# Patient Record
Sex: MALE | Race: OTHER | HISPANIC OR LATINO | Employment: FULL TIME | URBAN - METROPOLITAN AREA
[De-identification: names, ages, dates, MRNs, and addresses within clinical notes are randomized per-mention and may not be internally consistent; named-entity substitution may affect disease eponyms.]

---

## 2022-03-08 ENCOUNTER — OFFICE VISIT (OUTPATIENT)
Dept: FAMILY MEDICINE CLINIC | Facility: CLINIC | Age: 37
End: 2022-03-08
Payer: COMMERCIAL

## 2022-03-08 DIAGNOSIS — I10 HTN, GOAL BELOW 140/90: ICD-10-CM

## 2022-03-08 DIAGNOSIS — G47.33 OSA (OBSTRUCTIVE SLEEP APNEA): ICD-10-CM

## 2022-03-08 DIAGNOSIS — E11.9 DIABETES MELLITUS WITHOUT COMPLICATION (HCC): ICD-10-CM

## 2022-03-08 DIAGNOSIS — E66.01 MORBID OBESITY DUE TO EXCESS CALORIES (HCC): ICD-10-CM

## 2022-03-08 DIAGNOSIS — Z76.89 ENCOUNTER TO ESTABLISH CARE: Primary | ICD-10-CM

## 2022-03-08 PROBLEM — E78.5 HYPERLIPIDEMIA: Status: ACTIVE | Noted: 2021-11-22

## 2022-03-08 PROBLEM — Z72.0 TOBACCO ABUSE: Status: ACTIVE | Noted: 2021-11-22

## 2022-03-08 PROBLEM — N52.9 ERECTILE DYSFUNCTION: Status: ACTIVE | Noted: 2021-07-30

## 2022-03-08 PROCEDURE — 3725F SCREEN DEPRESSION PERFORMED: CPT | Performed by: NURSE PRACTITIONER

## 2022-03-08 PROCEDURE — 3078F DIAST BP <80 MM HG: CPT | Performed by: NURSE PRACTITIONER

## 2022-03-08 PROCEDURE — 3074F SYST BP LT 130 MM HG: CPT | Performed by: NURSE PRACTITIONER

## 2022-03-08 PROCEDURE — 99203 OFFICE O/P NEW LOW 30 MIN: CPT | Performed by: NURSE PRACTITIONER

## 2022-03-08 PROCEDURE — 3008F BODY MASS INDEX DOCD: CPT | Performed by: NURSE PRACTITIONER

## 2022-03-08 RX ORDER — SEMAGLUTIDE 1.34 MG/ML
INJECTION, SOLUTION SUBCUTANEOUS
COMMUNITY
Start: 2022-02-03 | End: 2022-03-08 | Stop reason: SDUPTHER

## 2022-03-08 RX ORDER — FLASH GLUCOSE SENSOR
KIT MISCELLANEOUS
COMMUNITY
Start: 2022-02-01 | End: 2022-04-14 | Stop reason: SDUPTHER

## 2022-03-08 RX ORDER — LISINOPRIL 10 MG/1
TABLET ORAL
COMMUNITY
Start: 2022-01-16

## 2022-03-08 RX ORDER — SILDENAFIL 100 MG/1
100 TABLET, FILM COATED ORAL DAILY PRN
COMMUNITY
Start: 2021-11-22 | End: 2022-04-14 | Stop reason: SDUPTHER

## 2022-03-08 RX ORDER — SEMAGLUTIDE 1.34 MG/ML
0.5 INJECTION, SOLUTION SUBCUTANEOUS WEEKLY
Qty: 1.5 ML | Refills: 1 | Status: SHIPPED | OUTPATIENT
Start: 2022-03-08 | End: 2022-04-14 | Stop reason: SDUPTHER

## 2022-03-08 RX ORDER — FLASH GLUCOSE SENSOR
KIT MISCELLANEOUS
COMMUNITY
Start: 2022-02-04 | End: 2022-03-08

## 2022-03-08 RX ORDER — METFORMIN HYDROCHLORIDE 500 MG/1
1000 TABLET, EXTENDED RELEASE ORAL 2 TIMES DAILY
COMMUNITY

## 2022-03-09 VITALS
BODY MASS INDEX: 42.38 KG/M2 | RESPIRATION RATE: 16 BRPM | TEMPERATURE: 97.5 F | HEART RATE: 80 BPM | DIASTOLIC BLOOD PRESSURE: 66 MMHG | SYSTOLIC BLOOD PRESSURE: 124 MMHG | HEIGHT: 67 IN | WEIGHT: 270 LBS

## 2022-03-09 NOTE — PROGRESS NOTES
Assessment/Plan:    1  Encounter to establish care    2  Diabetes mellitus without complication [facility])  -     Ambulatory Referral to Endocrinology; Future  -     Ozempic, 0 25 or 0 5 MG/DOSE, 2 MG/1 5ML SOPN; Inject 0 5 mg under the skin once a week    3  LOLITA (obstructive sleep apnea)  -     Ambulatory Referral to Sleep Medicine; Future    4  HTN, goal below 140/90    5  Morbid obesity due to excess calories [facility])    The case discussed with patient using patient centered shared decision making  The patient was counseled regarding instructions for management,-- risk factor reductions,-- prognosis,-- impressions,-- risks and benefits of treatment options,-- importance of compliance with treatment  I have reviewed the instructions with the patient, answering all questions to his satisfaction  rto 4 weeks  Many records to review  Will need some time to review same    Depression Screening and Follow-up Plan: Patient was screened for depression during today's encounter  They screened negative with a PHQ-2 score of 0  There are no Patient Instructions on file for this visit  Return in about 4 weeks (around 4/5/2022)  Subjective:      Patient ID: Jose Cook is a 39 y o  male  Chief Complaint   Patient presents with   [address] presents to establish  Move to this area ([place]) within last few weeks    Records interfaced just prior to visit  Unable to review in detail prior to ov    He was closely managed by diabetic team at previous PCP->[facility]  A1c went 13->7s    We discussed PMH, family HX, social history in detail  History provided by pt    He reports feeling well    He is seeking referral for sleep med for LOLITA surg  He is adamant-> I won't use CPAP    He requests a refill of Ozempic   Advised that I may not be able to support a prior auth as I do not know enough info r/t prior DM management       The following portions of the patient's history were reviewed and updated as appropriate: allergies, current medications, past family history, past medical history, past social history, past surgical history and problem list     Review of Systems   Constitutional: Negative  Respiratory: Negative  Cardiovascular: Negative  Endocrine: Negative  Neurological: Negative  Current Outpatient Medications   Medication Sig Dispense Refill    Continuous Blood Gluc Sensor (FreeStyle Oli 2 Sensor) MISC Replace every 2 weeks as directed  Dx Code E 11 9      lisinopril (ZESTRIL) 10 mg tablet       metFORMIN (GLUCOPHAGE-XR) 500 mg 24 hr tablet Take 1,000 mg by mouth 2 (two) times a day      Ozempic, 0 25 or 0 5 MG/DOSE, 2 MG/1 5ML SOPN Inject 0 5 mg under the skin once a week 1 5 mL 1    sildenafil (VIAGRA) 100 mg tablet Take 100 mg by mouth daily as needed       No current facility-administered medications for this visit  Objective:    /66   Pulse 80   Temp 97 5 °F (36 4 °C)   Resp 16   Ht 5' 7" (1 702 m)   Wt 122 kg (270 lb)   BMI 42 29 kg/m²        Physical Exam  Vitals and nursing note reviewed  Constitutional:       General: He is not in acute distress  Appearance: Normal appearance  He is obese  Cardiovascular:      Rate and Rhythm: Normal rate and regular rhythm  Pulses: Normal pulses  Heart sounds: Normal heart sounds  Pulmonary:      Effort: Pulmonary effort is normal       Breath sounds: Normal breath sounds  Lymphadenopathy:      Cervical: No cervical adenopathy  Skin:     Coloration: Skin is not pale  Neurological:      General: No focal deficit present  Mental Status: He is alert     Psychiatric:         Mood and Affect: Mood normal                 Alex Cardoso [place]

## 2022-04-14 ENCOUNTER — OFFICE VISIT (OUTPATIENT)
Dept: FAMILY MEDICINE CLINIC | Facility: CLINIC | Age: 37
End: 2022-04-14
Payer: COMMERCIAL

## 2022-04-14 VITALS
RESPIRATION RATE: 16 BRPM | HEIGHT: 67 IN | BODY MASS INDEX: 43 KG/M2 | DIASTOLIC BLOOD PRESSURE: 88 MMHG | WEIGHT: 274 LBS | TEMPERATURE: 95.6 F | SYSTOLIC BLOOD PRESSURE: 130 MMHG | HEART RATE: 94 BPM

## 2022-04-14 DIAGNOSIS — E11.9 DIABETES MELLITUS WITHOUT COMPLICATION (HCC): ICD-10-CM

## 2022-04-14 DIAGNOSIS — N52.8 OTHER MALE ERECTILE DYSFUNCTION: Primary | ICD-10-CM

## 2022-04-14 DIAGNOSIS — I10 HTN, GOAL BELOW 140/90: ICD-10-CM

## 2022-04-14 LAB — SL AMB POCT HEMOGLOBIN AIC: 6.1 (ref ?–6.5)

## 2022-04-14 PROCEDURE — 3079F DIAST BP 80-89 MM HG: CPT | Performed by: NURSE PRACTITIONER

## 2022-04-14 PROCEDURE — 3008F BODY MASS INDEX DOCD: CPT | Performed by: NURSE PRACTITIONER

## 2022-04-14 PROCEDURE — 3044F HG A1C LEVEL LT 7.0%: CPT | Performed by: NURSE PRACTITIONER

## 2022-04-14 PROCEDURE — 99214 OFFICE O/P EST MOD 30 MIN: CPT | Performed by: NURSE PRACTITIONER

## 2022-04-14 PROCEDURE — 3075F SYST BP GE 130 - 139MM HG: CPT | Performed by: NURSE PRACTITIONER

## 2022-04-14 PROCEDURE — 83036 HEMOGLOBIN GLYCOSYLATED A1C: CPT | Performed by: NURSE PRACTITIONER

## 2022-04-14 RX ORDER — SEMAGLUTIDE 1.34 MG/ML
0.5 INJECTION, SOLUTION SUBCUTANEOUS WEEKLY
Qty: 1.5 ML | Refills: 1 | Status: SHIPPED | OUTPATIENT
Start: 2022-04-14 | End: 2022-06-03

## 2022-04-14 RX ORDER — FLASH GLUCOSE SENSOR
1 KIT MISCELLANEOUS
Qty: 6 EACH | Refills: 2 | Status: SHIPPED | OUTPATIENT
Start: 2022-04-14

## 2022-04-14 RX ORDER — SILDENAFIL 100 MG/1
100 TABLET, FILM COATED ORAL DAILY PRN
Qty: 30 TABLET | Refills: 9 | Status: SHIPPED | OUTPATIENT
Start: 2022-04-14

## 2022-04-14 NOTE — PROGRESS NOTES
Assessment/Plan:    1  Other male erectile dysfunction  -     sildenafil (VIAGRA) 100 mg tablet; Take 1 tablet (100 mg total) by mouth daily as needed for erectile dysfunction    2  Di[facility] )  -     POCT hemoglobin A1c  -     Ozempic, 0 25 or 0 5 MG/DOSE, 2 MG/1 5ML SOPN; Inject 0 5 mg under the skin once a week  -     Diabetic foot exam; Future  -     Continuous Blood Gluc Sensor (FreeStyle Oli 2 Sensor) MISC; Inject 1 Device under the skin every 14 (fourteen) days    3  HTN, goal below 140/90    The case discussed with patient using patient centered shared decision making  The patient was counseled regarding instructions for management,-- risk factor reductions,-- prognosis,-- impressions,-- risks and benefits of treatment options,-- importance of compliance with treatment  I have reviewed the instructions with the patient, answering all questions to his satisfaction  A1c 6 1  Continue DM treatment  Seeing endo 6/2022  Consider lipid lowering med  Pt dclines at present  rto 4 months    BMI Counseling: Body mass index is 42 91 kg/m²  The BMI is above normal  Nutrition recommendations include decreasing portion sizes, moderation in carbohydrate intake and increasing intake of lean protein  Exercise recommendations include exercising 3-5 times per week  Rationale for BMI follow-up plan is due to patient being overweight or obese  Tobacco Cessation Counseling: Tobacco cessation counseling was provided  The patient is sincerely urged to quit consumption of tobacco  He is not ready to quit tobacco         There are no Patient Instructions on file for this visit  Return in about 5 months (around 9/14/2022)  Subjective:      Patient ID: Nicole Honeycutt is a 39 y o  male      Chief Complaint   Patient presents with    Diabetes     1 mth f/u    Medication Refill     ozempic,viagra, oli sensors       Pt presents for dm check med refill  Recently moved to [place]  Having difficulty get meds approved    Did not take lisinopril today    Advises that he needs PA for Ozempic  Paperwork not received    He is requesting viagra for ED, longstanding problem      The following portions of the patient's history were reviewed and updated as appropriate: allergies, current medications, past family history, past medical history, past social history, past surgical history and problem list     Review of Systems   Constitutional: Positive for fatigue  Negative for fever and unexpected weight change  Respiratory: Negative  Cardiovascular: Negative  Gastrointestinal: Negative  Endocrine: Negative  Musculoskeletal: Positive for arthralgias and back pain  Neurological: Negative  Current Outpatient Medications   Medication Sig Dispense Refill    Continuous Blood Gluc Sensor (FreeStyle Oli 2 Sensor) MISC Inject 1 Device under the skin every 14 (fourteen) days 6 each 2    lisinopril (ZESTRIL) 10 mg tablet       metFORMIN (GLUCOPHAGE-XR) 500 mg 24 hr tablet Take 1,000 mg by mouth 2 (two) times a day      Ozempic, 0 25 or 0 5 MG/DOSE, 2 MG/1 5ML SOPN Inject 0 5 mg under the skin once a week 1 5 mL 1    sildenafil (VIAGRA) 100 mg tablet Take 1 tablet (100 mg total) by mouth daily as needed for erectile dysfunction 30 tablet 9     No current facility-administered medications for this visit  Objective:    /88   Pulse 94   Temp (!) 95 6 °F (35 3 °C)   Resp 16   Ht 5' 7" (1 702 m)   Wt 124 kg (274 lb)   BMI 42 91 kg/m²        Physical Exam  Vitals and nursing note reviewed  Constitutional:       General: He is not in acute distress  Appearance: Normal appearance  He is obese  HENT:      Head: Normocephalic and atraumatic  Cardiovascular:      Rate and Rhythm: Normal rate and regular rhythm  Pulses: Normal pulses  no weak pulses          Dorsalis pedis pulses are 2+ on the right side and 2+ on the left side          Posterior tibial pulses are 2+ on the right side and 2+ on the left side  Heart sounds: Normal heart sounds  Pulmonary:      Effort: Pulmonary effort is normal       Breath sounds: Normal breath sounds  Musculoskeletal:      Right lower leg: No edema  Left lower leg: No edema  Feet:      Right foot:      Skin integrity: No ulcer, skin breakdown, erythema, warmth, callus or dry skin  Left foot:      Skin integrity: No ulcer, skin breakdown, erythema, warmth, callus or dry skin  Lymphadenopathy:      Cervical: No cervical adenopathy  Skin:     Coloration: Skin is not pale  Neurological:      General: No focal deficit present  Mental Status: He is alert  Mental status is at baseline  Psychiatric:         Mood and Affect: Mood normal             Patient's shoes and socks removed  Right Foot/Ankle   Right Foot Inspection  Skin Exam: skin normal and skin intact  No dry skin, no warmth, no callus, no erythema, no maceration, no abnormal color, no pre-ulcer, no ulcer and no callus  Toe Exam: ROM and strength within normal limits  Sensory   Vibration: intact  Proprioception: intact  Monofilament testing: intact    Vascular  Capillary refills: < 3 seconds  The right DP pulse is 2+  The right PT pulse is 2+  Left Foot/Ankle  Left Foot Inspection  Skin Exam: skin normal and skin intact  No dry skin, no warmth, no erythema, no maceration, normal color, no pre-ulcer, no ulcer and no callus  Toe Exam: ROM and strength within normal limits  Sensory   Vibration: intact  Proprioception: intact  Monofilament testing: intact    Vascular  Capillary refills: < 3 seconds  The left DP pulse is 2+  The left PT pulse is 2+       Assign Risk Category  No deformity present  No loss of protective sensation  No weak pulses  Risk: One SHIMON Mccord

## 2022-04-15 ENCOUNTER — TELEPHONE (OUTPATIENT)
Dept: ADMINISTRATIVE | Facility: OTHER | Age: 37
End: 2022-04-15

## 2022-04-15 NOTE — LETTER
Diabetic Eye Exam Form    Date Requested: 22  Patient: Dat Cuevas  Patient : 1985   Referring Provider: No primary care provider on file  DIABETIC Eye Exam Date _______________________________    Type of Exam MUST be documented for Diabetic Eye Exams  Please CHECK ONE  Retinal Exam       Dilated Retinal Exam       OCT       Optomap-Iris Exam      Fundus Photography     Left Eye - Please check Retinopathy AND Type or No Retinopathy      Exam did show retinopathy    Exam did not show retinopathy         Mild     Proliferative           Moderate    Severe            None         Right Eye - Please check Retinopathy AND Type or No Retinopathy     Exam did show retinopathy    Exam did not show retinopathy         Mild     Proliferative        Moderate    Severe        None       Comments __________________________________________________________    Practice Providing Exam ______________________________________________    Exam Performed By (print name) _______________________________________      Provider Signature ___________________________________________________    These reports are needed for  compliance  Please fax this completed form and a copy of the Diabetic Eye Exam report to our office located at [place] [address] as soon as possible via [phone] attention Harriett Ross: Phone [phone]  We thank you for your assistance in treating our mutual patient

## 2022-04-15 NOTE — LETTER
Diabetic Eye Exam Form    Date Requested: 05/10/22  Patient: Dez Hung  Patient : 1985   Referring Provider: Radha primary care provider on file  DIABETIC Eye Exam Date _______________________________    Type of Exam MUST be documented for Diabetic Eye Exams  Please CHECK ONE  Retinal Exam       Dilated Retinal Exam       OCT       Optomap-Iris Exam      Fundus Photography     Left Eye - Please check Retinopathy AND Type or No Retinopathy      Exam did show retinopathy    Exam did not show retinopathy         Mild     Proliferative           Moderate    Severe            None         Right Eye - Please check Retinopathy AND Type or No Retinopathy     Exam did show retinopathy    Exam did not show retinopathy         Mild     Proliferative        Moderate    Severe        None       Comments __________________________________________________________    Practice Providing Exam ______________________________________________    Exam Performed By (print name) _______________________________________      Provider Signature ___________________________________________________    These reports are needed for  compliance  Please fax this completed form and a copy of the Diabetic Eye Exam report to our office located at [place] [address] as soon as possible via [phone] frederick [place]: Phone [phone]  We thank you for your assistance in treating our mutual patient

## 2022-04-15 NOTE — LETTER
Diabetic Eye Exam Form    Date Requested: 22  Patient: Ching Brunner  Patient : 1985   Referring Provider: No primary care provider on file  DIABETIC Eye Exam Date _______________________________    Type of Exam MUST be documented for Diabetic Eye Exams  Please CHECK ONE  Retinal Exam       Dilated Retinal Exam       OCT       Optomap-Iris Exam      Fundus Photography     Left Eye - Please check Retinopathy AND Type or No Retinopathy      Exam did show retinopathy    Exam did not show retinopathy         Mild     Proliferative           Moderate    Severe            None         Right Eye - Please check Retinopathy AND Type or No Retinopathy     Exam did show retinopathy    Exam did not show retinopathy         Mild     Proliferative        Moderate    Severe        None       Comments __________________________________________________________    Practice Providing Exam ______________________________________________    Exam Performed By (print name) _______________________________________      Provider Signature ___________________________________________________    These reports are needed for  compliance  Please fax this completed form and a copy of the Diabetic Eye Exam report to our office located at [place] [address] as soon as possible via [phone] [facility] [place]: Phone [phone]  We thank you for your assistance in treating our mutual patient

## 2022-04-15 NOTE — LETTER
Diabetic Eye Exam Form    Date Requested: 22  Patient: Chris Eng  Patient : 1985   Referring Provider: Radha primary care provider on file  DIABETIC Eye Exam Date _______________________________    Type of Exam MUST be documented for Diabetic Eye Exams  Please CHECK ONE  Retinal Exam       Dilated Retinal Exam       OCT       Optomap-Iris Exam      Fundus Photography     Left Eye - Please check Retinopathy AND Type or No Retinopathy      Exam did show retinopathy    Exam did not show retinopathy         Mild     Proliferative           Moderate    Severe            None         Right Eye - Please check Retinopathy AND Type or No Retinopathy     Exam did show retinopathy    Exam did not show retinopathy         Mild     Proliferative        Moderate    Severe        None       Comments __________________________________________________________    Practice Providing Exam ______________________________________________    Exam Performed By (print name) _______________________________________      Provider Signature ___________________________________________________    These reports are needed for  compliance  Please fax this completed form and a copy of the Diabetic Eye Exam report to our office located at [place] [address] as soon as possible via [phone] frederick Navarro: Phone [phone]  We thank you for your assistance in treating our mutual patient

## 2022-04-29 NOTE — TELEPHONE ENCOUNTER
Upon review of the In Basket request and the patient's chart, initial outreach has been made via telephone call, please see Contacts section for details       Thank you  Kong Roldan MA

## 2022-05-02 ENCOUNTER — OFFICE VISIT (OUTPATIENT)
Dept: URGENT CARE | Facility: CLINIC | Age: 37
End: 2022-05-02
Payer: COMMERCIAL

## 2022-05-02 VITALS
TEMPERATURE: 97.1 F | RESPIRATION RATE: 18 BRPM | WEIGHT: 267 LBS | DIASTOLIC BLOOD PRESSURE: 92 MMHG | HEIGHT: 67 IN | OXYGEN SATURATION: 99 % | HEART RATE: 92 BPM | BODY MASS INDEX: 41.91 KG/M2 | SYSTOLIC BLOOD PRESSURE: 141 MMHG

## 2022-05-02 DIAGNOSIS — K52.9 GASTROENTERITIS: Primary | ICD-10-CM

## 2022-05-02 PROCEDURE — 3008F BODY MASS INDEX DOCD: CPT | Performed by: PHYSICIAN ASSISTANT

## 2022-05-02 PROCEDURE — 3080F DIAST BP >= 90 MM HG: CPT | Performed by: PHYSICIAN ASSISTANT

## 2022-05-02 PROCEDURE — 3077F SYST BP >= 140 MM HG: CPT | Performed by: PHYSICIAN ASSISTANT

## 2022-05-02 PROCEDURE — 99203 OFFICE O/P NEW LOW 30 MIN: CPT | Performed by: PHYSICIAN ASSISTANT

## 2022-05-02 RX ORDER — DICYCLOMINE HCL 20 MG
20 TABLET ORAL EVERY 6 HOURS PRN
Qty: 10 TABLET | Refills: 0 | Status: SHIPPED | OUTPATIENT
Start: 2022-05-02

## 2022-05-02 NOTE — PROGRESS NOTES
[address] [facility]        NAME: Shanelle Temple is a 39 y o  male  : 1985    MRN: [id number]  DATE: May 2, 2022  TIME: 9:41 AM    Assessment and Plan   Gastroenteritis [K52 9]  1  Gastroenteritis  dicyclomine (BENTYL) 20 mg tablet     [place] diet  Stay hydrated with pedialyte or zero sugar gatorade  Discussed strict return to care precautions as well as red flag symptoms which should prompt immediate ED referral  Pt verbalized understanding and is in agreement with plan  Please follow up with your primary care provider within the next week  Please remember that your visit today was with an urgent care provider and should not replace follow up with your primary care provider for chronic medical issues or annual physicals  Patient Instructions       Follow up with PCP in 3-5 days  Proceed to  ER if symptoms worsen  Chief Complaint     Chief Complaint   Patient presents with    GI Problem     intestinal cramps, body aches, diarrhea began yesterday going q 1 hr x 12 hrs took immodium today         History of Present Illness       Pt is a 38 yo male pw abd cramping, diarrhea x 1 day  Did have food from a food truck and from [address] [place], [place] [address] the day before symptoms started but states girlfriend ate the same food and not having same symptoms  Cramping is located across entire lower abdomen and comes in waves  Diarrhea occurring about once per hour and is described as watery  Took a dose of imodium 5 hours ago and has only had 1 episode of diarrhea since (approx 3 hrs ago)  No nausea, vomiting  Was having some sweats and body aches but no fevers  Review of Systems   Review of Systems   Constitutional: Positive for diaphoresis  Negative for chills, fatigue and fever  HENT: Negative for congestion, ear pain, postnasal drip, rhinorrhea, sinus pain, sneezing, sore throat and trouble swallowing  Eyes: Negative for pain and redness     Respiratory: Negative for cough, chest tightness, shortness of breath and wheezing  Cardiovascular: Negative for chest pain and leg swelling  Gastrointestinal: Positive for abdominal pain and diarrhea  Negative for blood in stool, constipation, nausea and vomiting  Musculoskeletal: Positive for myalgias  Neurological: Negative for dizziness, weakness and headaches  Current Medications       Current Outpatient Medications:     Continuous Blood Gluc Sensor (FreeStyle Oli 2 Sensor) MISC, Inject 1 Device under the skin every 14 (fourteen) days, Disp: 6 each, Rfl: 2    lisinopril (ZESTRIL) 10 mg tablet, , Disp: , Rfl:     metFORMIN (GLUCOPHAGE-XR) 500 mg 24 hr tablet, Take 1,000 mg by mouth 2 (two) times a day, Disp: , Rfl:     Ozempic, 0 25 or 0 5 MG/DOSE, 2 MG/1 5ML SOPN, Inject 0 5 mg under the skin once a week, Disp: 1 5 mL, Rfl: 1    sildenafil (VIAGRA) 100 mg tablet, Take 1 tablet (100 mg total) by mouth daily as needed for erectile dysfunction, Disp: 30 tablet, Rfl: 9    dicyclomine (BENTYL) 20 mg tablet, Take 1 tablet (20 mg total) by mouth every 6 (six) hours as needed (abdominal cramping), Disp: 10 tablet, Rfl: 0    Current Allergies     Allergies as of 05/02/2022 - Reviewed 05/02/2022   Allergen Reaction Noted    Other Angioedema 10/23/2019    Shellfish-derived products - food allergy Anaphylaxis 04/14/2022    Penicillins Hives 07/14/2015            The following portions of the patient's history were reviewed and updated as appropriate: allergies, current medications, past family history, past medical history, past social history, past surgical history and problem list      Past Medical History:   Diagnosis Date    Diabetes mellitus ([address] )     Hypertension        History reviewed  No pertinent surgical history  Family History   Problem Relation Age of Onset    Diabetes Mother     Diabetes Maternal Grandmother          Medications have been verified          Objective   /92   Pulse 92   Temp (!) 97 1 °F (36 2 °C)   Resp 18   Ht 5' 7" (1 702 m)   Wt 121 kg (267 lb)   SpO2 99%   BMI 41 82 kg/m²        Physical Exam     Physical Exam  Vitals and nursing note reviewed  Constitutional:       General: He is not in acute distress  Appearance: Normal appearance  He is not ill-appearing  HENT:      Head: Normocephalic and atraumatic  Cardiovascular:      Rate and Rhythm: Normal rate  Pulmonary:      Effort: Pulmonary effort is normal  No respiratory distress  Abdominal:      General: Abdomen is flat  There is no distension  Palpations: Abdomen is soft  There is no mass  Tenderness: There is abdominal tenderness (entire lower abdomen)  There is no guarding or rebound  Skin:     General: Skin is warm and dry  Capillary Refill: Capillary refill takes less than 2 seconds  Neurological:      Mental Status: He is alert and oriented to person, place, and time     Psychiatric:         Behavior: Behavior normal

## 2022-05-02 NOTE — LETTER
May 2, 2022     Patient: Nicole Honeycutt   YOB: 1985   Date of Visit: 5/2/2022       To Whom it May Concern:    Nicole Honeycutt was seen in my clinic on 5/2/2022  He may return to work on 05/03/2022  If you have any questions or concerns, please don't hesitate to call           Sincerely,          Boogie Cortés PA-C        CC: No Recipients

## 2022-05-03 NOTE — TELEPHONE ENCOUNTER
Upon review of the In Basket request and the patient's chart, initial outreach has been made via fax, please see Contacts section for details       Thank you  Kimberly Pichardo MA

## 2022-05-10 NOTE — TELEPHONE ENCOUNTER
As a follow-up, a second attempt has been made for outreach via fax, please see Contacts section for details      Thank you  Speedy Frausto MA

## 2022-05-16 NOTE — TELEPHONE ENCOUNTER
As a final attempt, a third outreach has been made via telephone call  Please see Contacts section for details  This encounter will be closed and completed by end of day  Should we receive the requested information because of previous outreach attempts, the requested patient's chart will be updated appropriately       Thank you  Ramona Yang MA

## 2022-08-08 DIAGNOSIS — E11.9 DIABETES MELLITUS WITHOUT COMPLICATION (HCC): ICD-10-CM

## 2022-08-08 RX ORDER — SEMAGLUTIDE 1.34 MG/ML
0.5 INJECTION, SOLUTION SUBCUTANEOUS WEEKLY
Qty: 1.5 ML | Refills: 12 | Status: SHIPPED | OUTPATIENT
Start: 2022-08-08 | End: 2022-08-09 | Stop reason: SDUPTHER

## 2022-08-09 DIAGNOSIS — E11.9 DIABETES MELLITUS WITHOUT COMPLICATION (HCC): ICD-10-CM

## 2022-08-10 RX ORDER — SEMAGLUTIDE 1.34 MG/ML
0.5 INJECTION, SOLUTION SUBCUTANEOUS WEEKLY
Qty: 1.5 ML | Refills: 12 | Status: SHIPPED | OUTPATIENT
Start: 2022-08-10 | End: 2022-09-15 | Stop reason: SDUPTHER

## 2022-09-15 ENCOUNTER — OFFICE VISIT (OUTPATIENT)
Dept: FAMILY MEDICINE CLINIC | Facility: CLINIC | Age: 37
End: 2022-09-15
Payer: COMMERCIAL

## 2022-09-15 VITALS
SYSTOLIC BLOOD PRESSURE: 122 MMHG | BODY MASS INDEX: 44.1 KG/M2 | HEART RATE: 78 BPM | HEIGHT: 67 IN | TEMPERATURE: 98.6 F | RESPIRATION RATE: 14 BRPM | DIASTOLIC BLOOD PRESSURE: 80 MMHG | WEIGHT: 281 LBS

## 2022-09-15 DIAGNOSIS — Z00.00 ANNUAL PHYSICAL EXAM: ICD-10-CM

## 2022-09-15 DIAGNOSIS — Z12.5 PROSTATE CANCER SCREENING: ICD-10-CM

## 2022-09-15 DIAGNOSIS — E11.9 DIABETES MELLITUS WITHOUT COMPLICATION (HCC): Primary | ICD-10-CM

## 2022-09-15 DIAGNOSIS — G47.33 OSA (OBSTRUCTIVE SLEEP APNEA): ICD-10-CM

## 2022-09-15 DIAGNOSIS — Z13.29 THYROID DISORDER SCREENING: ICD-10-CM

## 2022-09-15 DIAGNOSIS — I10 PRIMARY HYPERTENSION: ICD-10-CM

## 2022-09-15 DIAGNOSIS — E66.01 MORBID OBESITY DUE TO EXCESS CALORIES (HCC): ICD-10-CM

## 2022-09-15 DIAGNOSIS — E78.5 HYPERLIPIDEMIA, UNSPECIFIED HYPERLIPIDEMIA TYPE: ICD-10-CM

## 2022-09-15 DIAGNOSIS — N52.9 ERECTILE DYSFUNCTION, UNSPECIFIED ERECTILE DYSFUNCTION TYPE: ICD-10-CM

## 2022-09-15 LAB — SL AMB POCT HEMOGLOBIN AIC: 6.9 (ref ?–6.5)

## 2022-09-15 PROCEDURE — 99395 PREV VISIT EST AGE 18-39: CPT | Performed by: STUDENT IN AN ORGANIZED HEALTH CARE EDUCATION/TRAINING PROGRAM

## 2022-09-15 PROCEDURE — 3079F DIAST BP 80-89 MM HG: CPT | Performed by: STUDENT IN AN ORGANIZED HEALTH CARE EDUCATION/TRAINING PROGRAM

## 2022-09-15 PROCEDURE — 83036 HEMOGLOBIN GLYCOSYLATED A1C: CPT | Performed by: STUDENT IN AN ORGANIZED HEALTH CARE EDUCATION/TRAINING PROGRAM

## 2022-09-15 PROCEDURE — 3074F SYST BP LT 130 MM HG: CPT | Performed by: STUDENT IN AN ORGANIZED HEALTH CARE EDUCATION/TRAINING PROGRAM

## 2022-09-15 PROCEDURE — 3044F HG A1C LEVEL LT 7.0%: CPT | Performed by: STUDENT IN AN ORGANIZED HEALTH CARE EDUCATION/TRAINING PROGRAM

## 2022-09-15 PROCEDURE — 99214 OFFICE O/P EST MOD 30 MIN: CPT | Performed by: STUDENT IN AN ORGANIZED HEALTH CARE EDUCATION/TRAINING PROGRAM

## 2022-09-15 RX ORDER — FLASH GLUCOSE SENSOR
1 KIT MISCELLANEOUS
Qty: 6 EACH | Refills: 2 | Status: SHIPPED | OUTPATIENT
Start: 2022-09-15 | End: 2022-09-15

## 2022-09-15 RX ORDER — SEMAGLUTIDE 1.34 MG/ML
1 INJECTION, SOLUTION SUBCUTANEOUS WEEKLY
Qty: 1.5 ML | Refills: 12 | Status: SHIPPED | OUTPATIENT
Start: 2022-09-15 | End: 2022-09-20

## 2022-09-15 RX ORDER — FLASH GLUCOSE SENSOR
1 KIT MISCELLANEOUS
Qty: 6 EACH | Refills: 2 | Status: SHIPPED | OUTPATIENT
Start: 2022-09-15

## 2022-09-15 NOTE — PROGRESS NOTES
[address] [facility]    NAME: Navi Molina  AGE: 39 y o  SEX: male  : 1985     DATE: 9/15/2022     Assessment and Plan:     Problem List Items Addressed This Visit        Endocrine    Diabetes mellitus without complication ([facility] [place] [address] ) - Primary    Relevant Medications    Ozempic, 0 25 or 0 5 MG/DOSE, 2 MG/1 5ML SOPN    Continuous Blood Gluc Sensor (FreeStyle Oli 2 Sensor) MISC    Other Relevant Orders    Ambulatory Referral to Ophthalmology    POCT hemoglobin A1c (Completed)    Ambulatory Referral to Nutrition Services    Comprehensive metabolic panel    Hemoglobin A1C    Urinalysis with microscopic    Microalbumin / creatinine urine ratio    One Touch Verio IQ    Glucometer test strips    Lancets       Other    Erectile dysfunction    Hyperlipidemia    Relevant Orders    Lipid panel    Morbid obesity due to excess calories ([facility] [place] [address] )      Other Visit Diagnoses     Annual physical exam        LOLITA (obstructive sleep apnea)        Relevant Orders    CBC and differential    BMI 40 0-44 9, adult ([facility] [place] [address] )        Relevant Orders    Ambulatory Referral to Nutrition Services    Primary hypertension        Relevant Orders    Ambulatory Referral to Nutrition Services    Thyroid disorder screening        Relevant Orders    TSH, 3rd generation with Free T4 reflex    Prostate cancer screening        Relevant Orders    PSA, Total Screen        Physical exam unremarkable today  Lab work given for next visit in 3 months  Type 2 diabetes mellitus  Increase Ozempic with A1c of 6 9%, nutrition referral, continue regular exercise  Ambulatory referral to Nutrition Services  Diabetic supplies reordered  Endocrinology appointment scheduled      Hypertension  Stable, continue lisinopril    LOLITA  Will focus on weight loss, follow-up ENT given nasal left side obstruction    Hyperlipidemia  Patient does not need statin therapy at this time, continue lifestyle modifications    Immunizations and preventive care screenings were discussed with patient today  Appropriate education was printed on patient's after visit summary  Counseling:  Alcohol/drug use: discussed moderation in alcohol intake, the recommendations for healthy alcohol use, and avoidance of illicit drug use  Dental Health: discussed importance of regular tooth brushing, flossing, and dental visits  Injury prevention: discussed safety/seat belts, safety helmets, smoke detectors, carbon dioxide detectors, and smoking near bedding or upholstery  Sexual health: discussed sexually transmitted diseases, partner selection, use of condoms, avoidance of unintended pregnancy, and contraceptive alternatives  Exercise: the importance of regular exercise/physical activity was discussed  Recommend exercise 3-5 times per week for at least 30 minutes  Return in about 3 months (around 12/15/2022) for Recheck A1c  Chief Complaint:     Chief Complaint   Patient presents with    Diabetes      History of Present Illness:     Adult Annual Physical   Patient here for a comprehensive physical exam  The patient reports no problems  Diet and Physical Activity  Diet/Nutrition: diabetic diet  Exercise: walking  Depression Screening  PHQ-2/9 Depression Screening         General Health  Sleep: sleeps well  Sleep study shows mild LOLITA, follow up with ENT  Hearing: normal - bilateral   Vision: no vision problems  Dental: no dental visits for >1 year   Health  History of STDs?: no      Review of Systems:     Review of Systems   Constitutional: Negative for chills, fatigue and fever  HENT: Negative for congestion and sore throat  Eyes: Negative for pain and visual disturbance  Respiratory: Negative for shortness of breath and wheezing  Cardiovascular: Negative for chest pain and palpitations  Gastrointestinal: Negative for abdominal pain, constipation, diarrhea, nausea and vomiting  Genitourinary: Negative for dysuria and frequency  Musculoskeletal: Negative for back pain and neck pain  Skin: Negative for color change and rash  Neurological: Negative for dizziness and headaches  Psychiatric/Behavioral: Negative for agitation and confusion  All other systems reviewed and are negative  Past Medical History:     Past Medical History:   Diagnosis Date    Diabetes mellitus ([facility] [address] )     Hypertension       Past Surgical History:     History reviewed  No pertinent surgical history     Social History:     Social History     Socioeconomic History    Marital status: Single     Spouse name: None    Number of children: None    Years of education: None    Highest education level: None   Occupational History    None   Tobacco Use    Smoking status: Current Every Day Smoker     Packs/day: 2 00     Types: Cigars    Smokeless tobacco: Never Used   Vaping Use    Vaping Use: Never used   Substance and Sexual Activity    Alcohol use: Not Currently    Drug use: Never    Sexual activity: None   Other Topics Concern    None   Social History Narrative    None     Social Determinants of Health     Financial Resource Strain: Not on file   Food Insecurity: Not on file   Transportation Needs: Not on file   Physical Activity: Not on file   Stress: Not on file   Social Connections: Not on file   Intimate Partner Violence: Not on file   Housing Stability: Not on file      Family History:     Family History   Problem Relation Age of Onset    Diabetes Mother     Diabetes Maternal Grandmother       Current Medications:     Current Outpatient Medications   Medication Sig Dispense Refill    Continuous Blood Gluc Sensor (FreeStyle Oli 2 Sensor) MISC Inject 1 Device under the skin every 14 (fourteen) days 6 each 2    lisinopril (ZESTRIL) 10 mg tablet Take 10 mg by mouth daily      metFORMIN (GLUCOPHAGE-XR) 500 mg 24 hr tablet Take 1,000 mg by mouth 2 (two) times a day      Ozempic, 0 25 or 0 5 MG/DOSE, 2 MG/1 5ML SOPN Inject 1 mg under the skin once a week 1 5 mL 12    sildenafil (VIAGRA) 100 mg tablet Take 1 tablet (100 mg total) by mouth daily as needed for erectile dysfunction 30 tablet 9     No current facility-administered medications for this visit  Allergies: Allergies   Allergen Reactions    Other Angioedema    Shellfish-Derived Products - Food Allergy Anaphylaxis    Penicillins Hives      Physical Exam:     /80 (BP Location: Left arm, Patient Position: Sitting, Cuff Size: Adult)   Pulse 78   Temp 98 6 °F (37 °C) (Temporal)   Resp 14   Ht 5' 7" (1 702 m)   Wt 127 kg (281 lb)   BMI 44 01 kg/m²     Physical Exam  Constitutional:       General: He is not in acute distress  HENT:      Head: Normocephalic and atraumatic  Nose:      Comments: Left nostril collapse  Eyes:      General: No scleral icterus  Conjunctiva/sclera: Conjunctivae normal    Cardiovascular:      Rate and Rhythm: Normal rate and regular rhythm  Pulses: Normal pulses  Heart sounds: No murmur heard  Pulmonary:      Effort: Pulmonary effort is normal  No respiratory distress  Breath sounds: Normal breath sounds  Abdominal:      General: Bowel sounds are normal  There is no distension  Tenderness: There is no abdominal tenderness  Musculoskeletal:         General: No swelling  Normal range of motion  Skin:     General: Skin is warm and dry  Capillary Refill: Capillary refill takes less than 2 seconds  Neurological:      General: No focal deficit present  Mental Status: He is alert and oriented to person, place, and time  Mental status is at baseline     Psychiatric:         Mood and Affect: Mood normal          Behavior: Behavior normal           Ezio Nayak DO   [address]

## 2022-09-15 NOTE — PATIENT INSTRUCTIONS

## 2022-09-20 DIAGNOSIS — E11.9 DIABETES MELLITUS WITHOUT COMPLICATION (HCC): Primary | ICD-10-CM

## 2022-09-20 RX ORDER — SEMAGLUTIDE 1.34 MG/ML
1 INJECTION, SOLUTION SUBCUTANEOUS WEEKLY
Qty: 3 ML | Refills: 4 | Status: SHIPPED | OUTPATIENT
Start: 2022-09-20 | End: 2022-12-15

## 2022-09-22 DIAGNOSIS — E11.9 DIABETES MELLITUS WITHOUT COMPLICATION (HCC): Primary | ICD-10-CM

## 2022-09-26 ENCOUNTER — OFFICE VISIT (OUTPATIENT)
Dept: OTOLARYNGOLOGY | Facility: CLINIC | Age: 37
End: 2022-09-26
Payer: COMMERCIAL

## 2022-09-26 VITALS — TEMPERATURE: 97.4 F | BODY MASS INDEX: 42.69 KG/M2 | HEIGHT: 67 IN | WEIGHT: 272 LBS

## 2022-09-26 DIAGNOSIS — R06.83 SNORING: ICD-10-CM

## 2022-09-26 DIAGNOSIS — J31.0 RHINITIS, CHRONIC: ICD-10-CM

## 2022-09-26 DIAGNOSIS — G47.33 OSA (OBSTRUCTIVE SLEEP APNEA): ICD-10-CM

## 2022-09-26 DIAGNOSIS — J34.2 DEVIATED NASAL SEPTUM: ICD-10-CM

## 2022-09-26 DIAGNOSIS — J34.3 HYPERTROPHY OF BOTH INFERIOR NASAL TURBINATES: Primary | ICD-10-CM

## 2022-09-26 PROCEDURE — 99204 OFFICE O/P NEW MOD 45 MIN: CPT | Performed by: STUDENT IN AN ORGANIZED HEALTH CARE EDUCATION/TRAINING PROGRAM

## 2022-09-26 PROCEDURE — 31231 NASAL ENDOSCOPY DX: CPT | Performed by: STUDENT IN AN ORGANIZED HEALTH CARE EDUCATION/TRAINING PROGRAM

## 2022-09-26 NOTE — PROGRESS NOTES
[facility]NT [address] [facility] Otolaryngology  Otolaryngology -- Head and Neck Surgery New Patient Visit  Preston Apple is a 39 y o  who presents with a chief complaint of     His partner is complaining of snoring  Sleep study showed AHI 6 9  11/22/21  Also complaining of left sided nasal congestion and blockage for years  No facial pressure  Tried different types of medications including with Flonase for very long time with no significant improvement  Review of systems: Pertinent review of systems documented in the HPI  10 point ROS documented in a separate note, as necessary  Results reviewed; images from any scan have been personally reviewed: The past medical, surgical, social and family history have been reviewed as documented in today's record  Past Medical History:   Diagnosis Date    Diabetes mellitus ([address] )     Hypertension     Nasal congestion     Frequently    Sleep apnea     5 5 apnea scale     History reviewed  No pertinent surgical history  Family History   Problem Relation Age of Onset    Diabetes Mother     Diabetes Maternal Grandmother     Cancer Maternal Grandfather         Liver cancer     Current Outpatient Medications on File Prior to Visit   Medication Sig Dispense Refill    Continuous Blood Gluc Sensor (FreeStyle Oli 2 Sensor) MISC Inject 1 Device under the skin every 14 (fourteen) days 6 each 2    lisinopril (ZESTRIL) 10 mg tablet Take 10 mg by mouth daily      metFORMIN (GLUCOPHAGE-XR) 500 mg 24 hr tablet Take 1,000 mg by mouth 2 (two) times a day      semaglutide, 1 mg/dose, (Ozempic, 1 MG/DOSE,) 4 MG/3ML SOPN injection pen Inject 0 75 mL (1 mg total) under the skin once a week 3 mL 4    sildenafil (VIAGRA) 100 mg tablet Take 1 tablet (100 mg total) by mouth daily as needed for erectile dysfunction 30 tablet 9     No current facility-administered medications on file prior to visit        Physical exam:   Temp (!) 97 4 °F (36 3 °C) (Temporal)   Ht 5' 7" (1 702 m)   Wt 123 kg (272 lb)   BMI 42 60 kg/m²   Head: Atraumatic, no visible scalp lesions, parotid and submandibular salivary glands non-tender to palpation and without masses bilaterally  Neck:  No visible or palpable cervical lesions or lymphadenopathy, thyroid gland is normal in size and symmetry and without masses, normal laryngeal elevation with swallowing  Ears:    Right ear :  Auricle normal in appearance, mastoid prominence non-tender, external auditory canal clear  Tympanic membranes intact  Left ear :  Auricle normal in appearance, mastoid prominence non-tender, external auditory canal clear   Tympanic membranes intact  Nose/Sinuses:  External appearance unremarkable, no maxillary or frontal sinus tenderness to palpation bilaterally  Nasal endoscopic examination showed deviated nasal septum, bilateral enlarged inferior turbinates, no polyps, thick clear mucus, middle, superior meatus and sphenoethmoid recess clear  Oral Cavity:  Moist mucus membranes, gums and dentition unremarkable, no oral mucosal masses or lesions, floor of mouth soft, tongue mobile without masses or lesions  Oropharynx:  Base of tongue soft and without masses, tonsils bilaterally unremarkable, soft palate mucosa unremarkable  Eyes:  Extra-ocular movements intact, pupils equally round and reactive to light and accommodation, the lids and conjunctivae are normal in appearance  Constitutional:  Well developed, well nourished and groomed, in no acute distress  Cardiovascular:  Normal rate and rhythm, no palpable thrills, no jugulovenous distension observed  Respiratory:  Normal respiratory effort without evidence of retractions or use of accessory muscles  Neurologic:  Cranial nerves II-XII intact bilaterally  Abdomen: Soft and lax  Extremities: No bruises   Psychiatric:  Alert and oriented to time, place and person    Procedures  Rigid nasal endoscopic examination:  The nasal cavities were decongested with lidocaine and oxymetazoline spray  Bilateral nasal endoscopy was performed as follows:  Endoscopy type: 0 degree rigid scope  Results: look above  The patient tolerated the procedure well  Assessment:   1  Hypertrophy of both inferior nasal turbinates     2  Deviated nasal septum     3  Rhinitis, chronic     4  Snoring     5  LOLITA (obstructive sleep apnea)       Orders  No orders of the defined types were placed in this encounter  Discussion/Plan:      Deviated nasal septum and bilateral enlarged inferior turbinates   I recommend septoplasty and bilateral inferior turbinates reduction  Benefits were discussed include better breathing  Risks were discussed including but no limited to bleeding, infection, septal perforation, need for revision nose surgery and injury to surrounding structures  The patient demonstrated understanding these risks  All of their questions were answered and they wish to proceed as planned       Encouraged to lose weight

## 2022-10-20 ENCOUNTER — OFFICE VISIT (OUTPATIENT)
Dept: ENDOCRINOLOGY | Facility: CLINIC | Age: 37
End: 2022-10-20
Payer: COMMERCIAL

## 2022-10-20 VITALS
BODY MASS INDEX: 43.47 KG/M2 | WEIGHT: 277 LBS | HEART RATE: 74 BPM | OXYGEN SATURATION: 97 % | HEIGHT: 67 IN | DIASTOLIC BLOOD PRESSURE: 70 MMHG | SYSTOLIC BLOOD PRESSURE: 134 MMHG

## 2022-10-20 DIAGNOSIS — E78.5 HYPERLIPIDEMIA, UNSPECIFIED HYPERLIPIDEMIA TYPE: ICD-10-CM

## 2022-10-20 DIAGNOSIS — E66.01 CLASS 3 SEVERE OBESITY DUE TO EXCESS CALORIES WITH SERIOUS COMORBIDITY AND BODY MASS INDEX (BMI) OF 40.0 TO 44.9 IN ADULT (HCC): ICD-10-CM

## 2022-10-20 DIAGNOSIS — E11.65 TYPE 2 DIABETES MELLITUS WITH HYPERGLYCEMIA, WITHOUT LONG-TERM CURRENT USE OF INSULIN (HCC): Primary | ICD-10-CM

## 2022-10-20 DIAGNOSIS — I10 HTN, GOAL BELOW 140/90: ICD-10-CM

## 2022-10-20 PROBLEM — E66.813 CLASS 3 SEVERE OBESITY DUE TO EXCESS CALORIES WITH SERIOUS COMORBIDITY AND BODY MASS INDEX (BMI) OF 40.0 TO 44.9 IN ADULT (HCC): Status: ACTIVE | Noted: 2022-10-20

## 2022-10-20 PROCEDURE — 99205 OFFICE O/P NEW HI 60 MIN: CPT | Performed by: INTERNAL MEDICINE

## 2022-10-20 PROCEDURE — 95251 CONT GLUC MNTR ANALYSIS I&R: CPT | Performed by: INTERNAL MEDICINE

## 2022-10-20 NOTE — PROGRESS NOTES
Hortencia Boo 44 y o  male MRN: [id number]    Encounter: [id number]      Assessment/Plan     Problem List Items Addressed This Visit        Endocrine    Type 2 diabetes mellitus with hyperglycemia, without long-term current use of insulin ([address] ) - Primary       Lab Results   Component Value Date    HGBA1C 6 9 (A) 09/15/2022   A1c almost at goal-he does appear to have some post meal excursions  Counseled on dietary modifications-he will be seeing a nutritionist soon-consider switching from Ozempic to [facility]  next visit         Relevant Orders    Comprehensive metabolic panel Lab Collect    Microalbumin / creatinine urine ratio Lab Collect    TSH, 3rd generation Lab Collect    T4, free Lab Collect       Cardiovascular and Mediastinum    HTN, goal below 140/90       Other    Class 3 severe obesity due to excess calories with serious comorbidity and body mass index (BMI) of 40 0 to 44 9 in [facility])     Counseled on metabolic complications of obesity-he will be seeing the nutritionist soon         Relevant Orders    Comprehensive metabolic panel Lab Collect    Hyperlipidemia    Relevant Orders    Lipid Panel with Direct LDL reflex Lab Collect        CC: Diabetes    History of Present Illness     HPI:  69-year-old male referred here for evaluation of uncontrolled type 2 diabetes  He has had type 2 DM for the past 1 1/2 years  No microvascular complications   Last ye exam - within the past year- no retinopathy    Current meds - metformin and ozempic   No GI SE   No history of pancreatitis , no personal or FH  Of medullary cancer     Has been on freestyle neo  - average 130s  Hortencia Boo   Device used   Home use       Indication   Type 2 Diabetes    More than 72 hours of data was reviewed  Report to be scanned to chart       Date Range:  October 7th -20th 2022    Analysis of data:   Average Glucose:  137 mg/dL  Coefficient of Variation:  16 9%  Time in Target Range:  96%  Time Above Range:  4%  Time Below Range: 0%    Interpretation of data:  Sugars well controlled with occasional post meal excursions    Will be seeing nutritionist next week      lost 20 lbs after starting ozempic a year back  No polyuria , polydipsia , no blurry vision , no numbness and tingling in feet   Review of Systems    Historical Information   Past Medical History:   Diagnosis Date   • Diabetes mellitus ([facility] [address] )    • Hypertension    • Nasal congestion     Frequently   • Sleep apnea     5 5 apnea scale     History reviewed  No pertinent surgical history  Social History   Social History     Substance and Sexual Activity   Alcohol Use Not Currently     Social History     Substance and Sexual Activity   Drug Use Never     Social History     Tobacco Use   Smoking Status Current Every Day Smoker   • Packs/day: 2 00   • Years: 5 00   • Pack years: 10 00   • Types: Cigars   Smokeless Tobacco Never Used     Family History:   Family History   Problem Relation Age of Onset   • Diabetes type II Mother    • Diabetes Mother    • Diabetes Maternal Grandmother    • Diabetes type II Maternal Grandmother    • Cancer Maternal Grandfather         Liver cancer       Meds/Allergies   Current Outpatient Medications   Medication Sig Dispense Refill   • Continuous Blood Gluc Sensor (FreeStyle Oli 2 Sensor) MISC Inject 1 Device under the skin every 14 (fourteen) days 6 each 2   • lisinopril (ZESTRIL) 10 mg tablet Take 10 mg by mouth daily     • metFORMIN (GLUCOPHAGE-XR) 500 mg 24 hr tablet Take 1,000 mg by mouth 2 (two) times a day     • semaglutide, 1 mg/dose, (Ozempic, 1 MG/DOSE,) 4 MG/3ML SOPN injection pen Inject 0 75 mL (1 mg total) under the skin once a week (Patient taking differently: Inject 1 mg under the skin once a week Pt is taking 1mg daily) 3 mL 4   • sildenafil (VIAGRA) 100 mg tablet Take 1 tablet (100 mg total) by mouth daily as needed for erectile dysfunction 30 tablet 9     No current facility-administered medications for this visit       Allergies Allergen Reactions   • Other Angioedema   • Shellfish-Derived Products - Food Allergy Anaphylaxis   • Penicillins Hives       Objective   Vitals: Blood pressure 134/70, pulse 74, height 5' 7" (1 702 m), weight 126 kg (277 lb), SpO2 97 %  Physical Exam  Vitals reviewed  Constitutional:       Appearance: Normal appearance  He is obese  He is not ill-appearing or diaphoretic  HENT:      Head: Normocephalic and atraumatic  Eyes:      General: No scleral icterus  Extraocular Movements: Extraocular movements intact  Cardiovascular:      Rate and Rhythm: Normal rate and regular rhythm  Heart sounds: Normal heart sounds  No murmur heard  Pulmonary:      Effort: Pulmonary effort is normal  No respiratory distress  Breath sounds: Normal breath sounds  No wheezing or rales  Abdominal:      Palpations: Abdomen is soft  Tenderness: There is no abdominal tenderness  There is no guarding  Musculoskeletal:      Cervical back: Neck supple  Right lower leg: No edema  Left lower leg: No edema  Lymphadenopathy:      Cervical: No cervical adenopathy  Skin:     General: Skin is warm and dry  Neurological:      General: No focal deficit present  Mental Status: He is alert and oriented to person, place, and time  Psychiatric:         Mood and Affect: Mood normal          Behavior: Behavior normal          Thought Content: Thought content normal          Judgment: Judgment normal          The history was obtained from the review of the chart, patient  Lab Results:   Lab Results   Component Value Date/Time    Hemoglobin A1C 6 9 (A) 09/15/2022 02:25 PM    Hemoglobin A1C 6 1 04/14/2022 04:17 PM    Hemoglobin A1C 7 1 (H) 11/22/2021 08:48 AM           Imaging Studies:     Portions of the record may have been created with voice recognition software  Occasional wrong word or "sound a like" substitutions may have occurred due to the inherent limitations of voice recognition software  Read the chart carefully and recognize, using context, where substitutions have occurred

## 2022-10-23 NOTE — ASSESSMENT & PLAN NOTE
Lab Results   Component Value Date    HGBA1C 6 9 (A) 09/15/2022   A1c almost at goal-he does appear to have some post meal excursions    Counseled on dietary modifications-he will be seeing a nutritionist soon-consider switching from [facility] to [facility]  next visit

## 2022-10-26 ENCOUNTER — CLINICAL SUPPORT (OUTPATIENT)
Dept: NUTRITION | Facility: HOSPITAL | Age: 37
End: 2022-10-26
Attending: STUDENT IN AN ORGANIZED HEALTH CARE EDUCATION/TRAINING PROGRAM
Payer: COMMERCIAL

## 2022-10-26 VITALS — BODY MASS INDEX: 42.98 KG/M2 | WEIGHT: 274.4 LBS

## 2022-10-26 DIAGNOSIS — I10 PRIMARY HYPERTENSION: ICD-10-CM

## 2022-10-26 DIAGNOSIS — E11.9 DIABETES MELLITUS WITHOUT COMPLICATION (HCC): ICD-10-CM

## 2022-10-26 PROCEDURE — 97802 MEDICAL NUTRITION INDIV IN: CPT | Performed by: DIETITIAN, REGISTERED

## 2022-10-26 NOTE — PROGRESS NOTES
Initial Nutrition Assessment Form    Patient Name: Barbara Santos    YOB: 1985    Sex: Male     Assessment Date: 10/26/2022  Start Time: 1:45 Stop Time: 2:45 Total Minutes: 60     Data:  Present at session: self   Parent/Patient Concerns: Elevated BS   Medical Dx/Reason for Referral: E11 9 Diabetes Mellitus without complication    Past Medical History:   Diagnosis Date   • Diabetes mellitus ([address] )    • Hypertension    • Nasal congestion     Frequently   • Sleep apnea     5 5 apnea scale       Current Outpatient Medications   Medication Sig Dispense Refill   • Continuous Blood Gluc Sensor (FreeStyle Oli 2 Sensor) MISC Inject 1 Device under the skin every 14 (fourteen) days 6 each 2   • lisinopril (ZESTRIL) 10 mg tablet Take 10 mg by mouth daily     • metFORMIN (GLUCOPHAGE-XR) 500 mg 24 hr tablet Take 1,000 mg by mouth 2 (two) times a day     • semaglutide, 1 mg/dose, (Ozempic, 1 MG/DOSE,) 4 MG/3ML SOPN injection pen Inject 0 75 mL (1 mg total) under the skin once a week (Patient taking differently: Inject 1 mg under the skin once a week Pt is taking 1mg daily) 3 mL 4   • sildenafil (VIAGRA) 100 mg tablet Take 1 tablet (100 mg total) by mouth daily as needed for erectile dysfunction 30 tablet 9     No current facility-administered medications for this visit  Additional Meds/Supplements:    Special Learning Needs:    Height: HC Readings from Last 5 Encounters:   No data found for [facility]       Weight: Wt Readings from Last 10 Encounters:   10/26/22 124 kg (274 lb 6 4 oz)   10/20/22 126 kg (277 lb)   09/26/22 123 kg (272 lb)   09/15/22 127 kg (281 lb)   05/02/22 121 kg (267 lb)   04/14/22 124 kg (274 lb)   03/08/22 122 kg (270 lb)     Estimated body mass index is 42 98 kg/m² as calculated from the following:    Height as of 10/20/22: 5' 7" (1 702 m)  Weight as of this encounter: 124 kg (274 lb 6 4 oz)     Recent Weight Change: []Yes     [x]No  Amount:       Energy Needs: No calculation needed Allergies   Allergen Reactions   • Other Angioedema   • Shellfish-Derived Products - Food Allergy Anaphylaxis   • Penicillins Hives       Social History     Substance and Sexual Activity   Alcohol Use Not Currently       Social History     Tobacco Use   Smoking Status Current Every Day Smoker   • Packs/day: 2 00   • Years: 5 00   • Pack years: 10 00   • Types: Cigars   Smokeless Tobacco Never Used       Who shops? significant other   Who cooks? patient and significant other   Exercise: Very physical on the job   Prior Counseling?  []Yes     [x]No  When:      Why:         Diet Hx:  Breakfast: Diet:  7 Pork and veggie dumplings or [employer] Stuffed croissant, 2 hash browns, 2 bloods orage refresher or [place] toast with 2 eggs & sausage or small cup of coffee with 3 Splenda & half and half and 1[facility]2 of an [facility] sandwich   Lunch:     [place] with deli meat and cheese with lyles and bottle of water, Grocery store sushi with sugar-free Snapple and bottle of water, 1/2 of an [facility] her sandwich       Dinner:   Hello Fresh Vegan sandwich with potato wedges and pink lemonade or 10 deviled egg 5/9'N or 2 slices of deli meat & cheese with a handful of grapes and a glass of pink lemonade and 2 glasses of water     Snacks: Pretzel rods, glazed donut, movie popcorn and coke zero  Drinks mainly water and [employer]        Nutrition Diagnosis:   Food and nutrition related knowledge deficit  related to Lack of prior exposure to accurate nutrition related information as evidenced by [employer] inaccurate or incomplete information       Medical Nutrition Therapy Intervention:  []Individualized Meal Plan []Understanding Lab Values   []Basic Pathophysiology of Disease []Food/Medication Interactions   [x]Food Diary: Journal with an tanya on the phone []Exercise   [x]Lifestyle/Behavior Modification Techniques: Limit CHO intake to 4 servings per meal []Medication, Mechanism of Action   [x]Label Reading: 15 grams of CHO = 1 CHO serving []Self Blood Glucose Monitoring   []Weight/BMI Goals []Other -    Other Notes/Assessment:  PT states that he has been dx with diabetes and has a CGM and his BS usually averages about 130  His A1c on 9/15/22 was 6 9  He has been on Metformin and Ozempic  Discussed carbohydrates and what they were considered  PT states that he has been watching his labels for sugar content not total carbohydrates  Discussed how 15 grams of total CHO is 1 serving  Explained how there should be 3-4 servings per meal for weight loss and 4-5 servings per meal for maintenance  Suggested that he keep track of intake on an tanya on his phone  PT's significant other is pescatarian with gestational diabetes so they are having issues with preparing meals to accommodate all of the restrictions  He said that his girlfriend has tried to eat meat but was not able to tolerate it  She has an appointment with a RD but not until the next week or two  Gave some suggestions for protein sources  Comprehension: []Excellent  [x]Very Good  []Good  []Fair   []Poor    Receptivity: []Excellent  [x]Very Good  []Good  []Fair   []Poor    Expected Compliance: []Excellent  [x]Very Good  []Good  []Fair   []Poor        Goals:  1  Journal intake daily on tanya on phone to track CHO   2   4 servings of CHO per meal   3  No follow-ups on file    Labs:  CMP  No results found for: NA, K, CL, CO2, ANIONGAP, BUN, CREATININE, GLUCOSE, GLUF, CALCIUM, CORRECTEDCA, AST, ALT, ALKPHOS, PROT, BILITOT, EGFR    BMP  No results found for: GLUCOSE, CALCIUM, NA, K, CO2, CL, BUN, CREATININE    Lipids  No results found for: CHOL  No results found for: HDL  No results found for: LDLCALC  No results found for: TRIG  No results found for: CHOLHDL    Hemoglobin A1C  Lab Results   Component Value Date    HGBA1C 6 9 (A) 09/15/2022       Fasting Glucose  No results found for: GLUF    Insulin     Thyroid  No results found for: TSH, K6FNTGW, Q4IZGHI, THYROIDAB    Hepatic Function Panel  No results found for: ALT, AST, GGT, ALKPHOS, BILITOT    Celiac Disease Antibody Panel  No results found for: ENDOMYSIAL IGA, GLIADIN IGA, GLIADIN IGG, IGA, TISSUE TRANSGLUT AB, TTG IGA   Iron  No results found for: IRON, TIBC, FERRITIN         Lorenzo Gutierrez [facility] [address] [facility] [address]

## 2022-10-30 LAB
ALBUMIN SERPL-MCNC: 4.6 G/DL (ref 4–5)
ALBUMIN/CREAT UR: 7 MG/G CREAT (ref 0–29)
ALBUMIN/GLOB SERPL: 1.6 {RATIO} (ref 1.2–2.2)
ALP SERPL-CCNC: 90 IU/L (ref 44–121)
ALT SERPL-CCNC: 84 IU/L (ref 0–44)
AST SERPL-CCNC: 39 IU/L (ref 0–40)
BILIRUB SERPL-MCNC: 0.9 MG/DL (ref 0–1.2)
BUN SERPL-MCNC: 14 MG/DL (ref 6–20)
BUN/CREAT SERPL: 18 (ref 9–20)
CALCIUM SERPL-MCNC: 9.7 MG/DL (ref 8.7–10.2)
CHLORIDE SERPL-SCNC: 103 MMOL/L (ref 96–106)
CHOLEST SERPL-MCNC: 150 MG/DL (ref 100–199)
CO2 SERPL-SCNC: 21 MMOL/L (ref 20–29)
CREAT SERPL-MCNC: 0.8 MG/DL (ref 0.76–1.27)
CREAT UR-MCNC: 138.1 MG/DL
EGFR: 116 ML/MIN/1.73
GLOBULIN SER-MCNC: 2.8 G/DL (ref 1.5–4.5)
GLUCOSE SERPL-MCNC: 131 MG/DL (ref 70–99)
HDLC SERPL-MCNC: 31 MG/DL
LDLC SERPL CALC-MCNC: 99 MG/DL (ref 0–99)
MICROALBUMIN UR-MCNC: 9.6 UG/ML
POTASSIUM SERPL-SCNC: 4.7 MMOL/L (ref 3.5–5.2)
PROT SERPL-MCNC: 7.4 G/DL (ref 6–8.5)
SODIUM SERPL-SCNC: 139 MMOL/L (ref 134–144)
T4 FREE SERPL-MCNC: 1.11 NG/DL (ref 0.82–1.77)
TRIGL SERPL-MCNC: 110 MG/DL (ref 0–149)
TSH SERPL DL<=0.005 MIU/L-ACNC: 1.61 UIU/ML (ref 0.45–4.5)

## 2022-10-31 NOTE — RESULT ENCOUNTER NOTE
Please call the patient regarding labs - labs okay-1 of the liver function test is slightly high however appears that it has been slightly high in the past as well  Has he ever been diagnosed with fatty liver?

## 2022-11-07 DIAGNOSIS — E11.9 DIABETES MELLITUS WITHOUT COMPLICATION (HCC): Primary | ICD-10-CM

## 2022-11-07 RX ORDER — METFORMIN HYDROCHLORIDE 500 MG/1
1000 TABLET, EXTENDED RELEASE ORAL 2 TIMES DAILY WITH MEALS
Qty: 360 TABLET | Refills: 3 | Status: SHIPPED | OUTPATIENT
Start: 2022-11-07 | End: 2023-02-08

## 2022-11-22 RX ORDER — MULTIVITAMIN
1 TABLET ORAL DAILY
COMMUNITY

## 2022-11-22 NOTE — PRE-PROCEDURE INSTRUCTIONS
My Surgical Experience    The following information was developed to assist you to prepare for your operation  What do I need to do before coming to the hospital?  • Arrange for a responsible person to drive you to and from the hospital   • Arrange care for your children at home  Children are not allowed in the recovery areas of the hospital  • Plan to wear clothing that is easy to put on and take off  If you are having shoulder surgery, wear a shirt that buttons or zippers in the front  Bathing  o Shower the evening before and the morning of your surgery with an antibacterial soap  Please refer to the Pre Op Showering Instructions for Surgery Patients Sheet   o Remove nail polish and all body piercing jewelry  o Do not shave any body part for at least 24 hours before surgery-this includes face, arms, legs and upper body  Food  o Nothing to eat or drink after midnight the night before your surgery  This includes candy and chewing gum  o Exception: If your surgery is after 12:00pm (noon), you may have clear liquids such as 7-Up®, ginger ale, apple or cranberry juice, Jell-O®, water, or clear broth until 8:00 am  o Do not drink milk or juice with pulp on the morning before surgery  o Do not drink alcohol 24 hours before surgery  Medicine  o Follow instructions you received from your surgeon about which medicines you may take on the day of surgery  o If instructed to take medicine on the morning of surgery, take pills with just a small sip of water  Call your prescribing doctor for specific infroamtion on what to do if you take insulin    What should I bring to the hospital?    Bring:  • Crutches or a walker, if you have them, for foot or knee surgery  • A list of the daily medicines, vitamins, minerals, herbals and nutritional supplements you take   Include the dosages of medicines and the time you take them each day  • Glasses, dentures or hearing aids  • Minimal clothing; you will be wearing hospital sleepwear  • Photo ID; required to verify your identity  • If you have a Living Will or Power of , bring a copy of the documents  • If you have an ostomy, bring an extra pouch and any supplies you use    Do not bring  • Medicines or inhalers  • Money, valuables or jewelry    What other information should I know about the day of surgery? • Notify your surgeons if you develop a cold, sore throat, cough, fever, rash or any other illness  • Report to the Ambulatory Surgical/Same Day Surgery Unit  • You will be instructed to stop at Registration only if you have not been pre-registered  • Inform your  fi they do not stay that they will be asked by the staff to leave a phone number where they can be reached  • Be available to be reached before surgery  In the event the operating room schedule changes, you may be asked to come in earlier or later than expected    *It is important to tell your doctor and others involved in your health care if you are taking or have been taking any non-prescription drugs, vitamins, minerals, herbals or other nutritional supplements  Any of these may interact with some food or medicines and cause a reaction      Pre-Surgery Instructions:   Medication Instructions   • Continuous Blood Gluc Sensor (FreeStyle Oli 2 Sensor) MISC Take day of surgery  • lisinopril (ZESTRIL) 10 mg tablet Hold day of surgery  • metFORMIN (GLUCOPHAGE-XR) 500 mg 24 hr tablet Hold day of surgery  • Multiple Vitamin (multivitamin) tablet Hold day of surgery  • semaglutide, 1 mg/dose, (Ozempic, 1 MG/DOSE,) 4 MG/3ML SOPN injection pen Hold day of surgery  • sildenafil (VIAGRA) 100 mg tablet Hold day of surgery

## 2022-11-25 ENCOUNTER — ANESTHESIA EVENT (OUTPATIENT)
Dept: PERIOP | Facility: HOSPITAL | Age: 37
End: 2022-11-25

## 2022-11-28 ENCOUNTER — HOSPITAL ENCOUNTER (OUTPATIENT)
Facility: HOSPITAL | Age: 37
Setting detail: OUTPATIENT SURGERY
Discharge: HOME | End: 2022-11-28
Attending: STUDENT IN AN ORGANIZED HEALTH CARE EDUCATION/TRAINING PROGRAM | Admitting: STUDENT IN AN ORGANIZED HEALTH CARE EDUCATION/TRAINING PROGRAM

## 2022-11-28 ENCOUNTER — ANESTHESIA (OUTPATIENT)
Dept: PERIOP | Facility: HOSPITAL | Age: 37
End: 2022-11-28

## 2022-11-28 VITALS
WEIGHT: 271 LBS | BODY MASS INDEX: 42.53 KG/M2 | DIASTOLIC BLOOD PRESSURE: 61 MMHG | HEART RATE: 94 BPM | TEMPERATURE: 97.3 F | HEIGHT: 67 IN | RESPIRATION RATE: 18 BRPM | SYSTOLIC BLOOD PRESSURE: 131 MMHG | OXYGEN SATURATION: 95 %

## 2022-11-28 DIAGNOSIS — Z98.890 S/P NASAL SEPTOPLASTY: Primary | ICD-10-CM

## 2022-11-28 LAB
GLUCOSE SERPL-MCNC: 113 MG/DL (ref 65–140)
GLUCOSE SERPL-MCNC: 220 MG/DL (ref 65–140)
GLUCOSE SERPL-MCNC: 95 MG/DL (ref 65–140)

## 2022-11-28 RX ORDER — PROPOFOL 10 MG/ML
INJECTION, EMULSION INTRAVENOUS AS NEEDED
Status: DISCONTINUED | OUTPATIENT
Start: 2022-11-28 | End: 2022-11-28

## 2022-11-28 RX ORDER — METOCLOPRAMIDE HYDROCHLORIDE 5 MG/ML
10 INJECTION INTRAMUSCULAR; INTRAVENOUS ONCE
Status: COMPLETED | OUTPATIENT
Start: 2022-11-28 | End: 2022-11-28

## 2022-11-28 RX ORDER — MAGNESIUM HYDROXIDE 1200 MG/15ML
LIQUID ORAL AS NEEDED
Status: DISCONTINUED | OUTPATIENT
Start: 2022-11-28 | End: 2022-11-28 | Stop reason: HOSPADM

## 2022-11-28 RX ORDER — LIDOCAINE HYDROCHLORIDE AND EPINEPHRINE 10; 10 MG/ML; UG/ML
INJECTION, SOLUTION INFILTRATION; PERINEURAL AS NEEDED
Status: DISCONTINUED | OUTPATIENT
Start: 2022-11-28 | End: 2022-11-28 | Stop reason: HOSPADM

## 2022-11-28 RX ORDER — ONDANSETRON 2 MG/ML
INJECTION INTRAMUSCULAR; INTRAVENOUS AS NEEDED
Status: DISCONTINUED | OUTPATIENT
Start: 2022-11-28 | End: 2022-11-28

## 2022-11-28 RX ORDER — ONDANSETRON 2 MG/ML
4 INJECTION INTRAMUSCULAR; INTRAVENOUS ONCE AS NEEDED
Status: COMPLETED | OUTPATIENT
Start: 2022-11-28 | End: 2022-11-28

## 2022-11-28 RX ORDER — SODIUM CHLORIDE, SODIUM LACTATE, POTASSIUM CHLORIDE, CALCIUM CHLORIDE 600; 310; 30; 20 MG/100ML; MG/100ML; MG/100ML; MG/100ML
125 INJECTION, SOLUTION INTRAVENOUS CONTINUOUS
Status: DISCONTINUED | OUTPATIENT
Start: 2022-11-28 | End: 2022-11-29 | Stop reason: HOSPADM

## 2022-11-28 RX ORDER — FENTANYL CITRATE 50 UG/ML
INJECTION, SOLUTION INTRAMUSCULAR; INTRAVENOUS AS NEEDED
Status: DISCONTINUED | OUTPATIENT
Start: 2022-11-28 | End: 2022-11-28

## 2022-11-28 RX ORDER — HYDROMORPHONE HCL/PF 1 MG/ML
SYRINGE (ML) INJECTION AS NEEDED
Status: DISCONTINUED | OUTPATIENT
Start: 2022-11-28 | End: 2022-11-28

## 2022-11-28 RX ORDER — FENTANYL CITRATE/PF 50 MCG/ML
50 SYRINGE (ML) INJECTION
Status: DISCONTINUED | OUTPATIENT
Start: 2022-11-28 | End: 2022-11-29 | Stop reason: HOSPADM

## 2022-11-28 RX ORDER — DEXAMETHASONE SODIUM PHOSPHATE 10 MG/ML
INJECTION, SOLUTION INTRAMUSCULAR; INTRAVENOUS AS NEEDED
Status: DISCONTINUED | OUTPATIENT
Start: 2022-11-28 | End: 2022-11-28

## 2022-11-28 RX ORDER — MIDAZOLAM HYDROCHLORIDE 2 MG/2ML
INJECTION, SOLUTION INTRAMUSCULAR; INTRAVENOUS AS NEEDED
Status: DISCONTINUED | OUTPATIENT
Start: 2022-11-28 | End: 2022-11-28

## 2022-11-28 RX ORDER — ONDANSETRON 2 MG/ML
4 INJECTION INTRAMUSCULAR; INTRAVENOUS ONCE
Status: COMPLETED | OUTPATIENT
Start: 2022-11-28 | End: 2022-11-28

## 2022-11-28 RX ORDER — SODIUM CHLORIDE, SODIUM LACTATE, POTASSIUM CHLORIDE, CALCIUM CHLORIDE 600; 310; 30; 20 MG/100ML; MG/100ML; MG/100ML; MG/100ML
20 INJECTION, SOLUTION INTRAVENOUS CONTINUOUS
Status: DISCONTINUED | OUTPATIENT
Start: 2022-11-28 | End: 2022-11-29 | Stop reason: HOSPADM

## 2022-11-28 RX ORDER — ROCURONIUM BROMIDE 10 MG/ML
INJECTION, SOLUTION INTRAVENOUS AS NEEDED
Status: DISCONTINUED | OUTPATIENT
Start: 2022-11-28 | End: 2022-11-28

## 2022-11-28 RX ORDER — OXYMETAZOLINE HYDROCHLORIDE 0.05 G/100ML
SPRAY NASAL AS NEEDED
Status: DISCONTINUED | OUTPATIENT
Start: 2022-11-28 | End: 2022-11-28 | Stop reason: HOSPADM

## 2022-11-28 RX ORDER — LIDOCAINE HYDROCHLORIDE 10 MG/ML
INJECTION, SOLUTION EPIDURAL; INFILTRATION; INTRACAUDAL; PERINEURAL AS NEEDED
Status: DISCONTINUED | OUTPATIENT
Start: 2022-11-28 | End: 2022-11-28

## 2022-11-28 RX ORDER — CEFUROXIME AXETIL 500 MG/1
500 TABLET ORAL EVERY 12 HOURS SCHEDULED
Qty: 14 TABLET | Refills: 0 | Status: SHIPPED | OUTPATIENT
Start: 2022-11-28 | End: 2022-12-05

## 2022-11-28 RX ADMIN — ONDANSETRON 4 MG: 2 INJECTION INTRAMUSCULAR; INTRAVENOUS at 16:49

## 2022-11-28 RX ADMIN — SODIUM CHLORIDE, POTASSIUM CHLORIDE, SODIUM LACTATE AND CALCIUM CHLORIDE 125 ML/HR: 600; 310; 30; 20 INJECTION, SOLUTION INTRAVENOUS at 11:39

## 2022-11-28 RX ADMIN — PROPOFOL 200 MG: 10 INJECTION, EMULSION INTRAVENOUS at 13:50

## 2022-11-28 RX ADMIN — DEXAMETHASONE SODIUM PHOSPHATE 10 MG: 10 INJECTION, SOLUTION INTRAMUSCULAR; INTRAVENOUS at 13:59

## 2022-11-28 RX ADMIN — METOCLOPRAMIDE 10 MG: 5 INJECTION, SOLUTION INTRAMUSCULAR; INTRAVENOUS at 16:49

## 2022-11-28 RX ADMIN — ONDANSETRON 4 MG: 2 INJECTION INTRAMUSCULAR; INTRAVENOUS at 15:54

## 2022-11-28 RX ADMIN — MIDAZOLAM 1 MG: 1 INJECTION INTRAMUSCULAR; INTRAVENOUS at 13:40

## 2022-11-28 RX ADMIN — MIDAZOLAM 1 MG: 1 INJECTION INTRAMUSCULAR; INTRAVENOUS at 13:47

## 2022-11-28 RX ADMIN — ROCURONIUM BROMIDE 20 MG: 10 INJECTION, SOLUTION INTRAVENOUS at 14:10

## 2022-11-28 RX ADMIN — ONDANSETRON 4 MG: 2 INJECTION INTRAMUSCULAR; INTRAVENOUS at 13:59

## 2022-11-28 RX ADMIN — FENTANYL CITRATE 50 MCG: 50 INJECTION, SOLUTION INTRAMUSCULAR; INTRAVENOUS at 14:04

## 2022-11-28 RX ADMIN — ROCURONIUM BROMIDE 50 MG: 10 INJECTION, SOLUTION INTRAVENOUS at 13:51

## 2022-11-28 RX ADMIN — LIDOCAINE HYDROCHLORIDE 50 MG: 10 INJECTION, SOLUTION EPIDURAL; INFILTRATION; INTRACAUDAL; PERINEURAL at 13:50

## 2022-11-28 RX ADMIN — SUGAMMADEX 400 MG: 100 INJECTION, SOLUTION INTRAVENOUS at 14:51

## 2022-11-28 RX ADMIN — HYDROMORPHONE HYDROCHLORIDE 1 MG: 1 INJECTION, SOLUTION INTRAMUSCULAR; INTRAVENOUS; SUBCUTANEOUS at 14:26

## 2022-11-28 RX ADMIN — FENTANYL CITRATE 100 MCG: 50 INJECTION, SOLUTION INTRAMUSCULAR; INTRAVENOUS at 13:50

## 2022-11-28 RX ADMIN — FENTANYL CITRATE 50 MCG: 50 INJECTION, SOLUTION INTRAMUSCULAR; INTRAVENOUS at 14:14

## 2022-11-28 NOTE — H&P
H&P Exam - ENT   Evgeny Callaway 44 y o  male MRN: [id number]  Unit/Bed#: OR POOL Encounter: [id number]    Assessment/Plan     Assessment:  Evgeny Callaway is a 39 y o  who presents with a chief complaint of      His partner is complaining of snoring  Sleep study showed AHI 6 9  11/22/21  Also complaining of left sided nasal congestion and blockage for years  No facial pressure  Tried different types of medications including with Flonase for very long time with no significant improvement  Plan:  Deviated nasal septum and bilateral enlarged inferior turbinates   I recommend septoplasty and bilateral inferior turbinates reduction  Benefits were discussed include better breathing  Risks were discussed including but no limited to bleeding, infection, septal perforation, need for revision nose surgery and injury to surrounding structures  The patient demonstrated understanding these risks  All of their questions were answered and they wish to proceed as planned       Encouraged to lose weight     History of Present Illness   HPI:  Evgeny Callaway is a 44 y o  male who presents with   Evgeny Callaway is a 39 y o  who presents with a chief complaint of      His partner is complaining of snoring  Sleep study showed AHI 6 9  11/22/21  Also complaining of left sided nasal congestion and blockage for years  No facial pressure  Tried different types of medications including with Flonase for very long time with no significant improvement        Review of Systems    Historical Information   Past Medical History:   Diagnosis Date   • Diabetes mellitus ([address] )    • Hypertension    • Nasal congestion     Frequently   • Sleep apnea     5 5 apnea scale     Past Surgical History:   Procedure Laterality Date   • HAND SURGERY       Social History   Social History     Substance and Sexual Activity   Alcohol Use Not Currently     Social History     Substance and Sexual Activity   Drug Use Never     Social History     Tobacco Use   Smoking Status Every Day   • Packs/day: 2 00   • Years: 5 00   • Pack years: 10 00   • Types: Cigars, Cigarettes   Smokeless Tobacco Never     E-Cigarette/Vaping   • E-Cigarette Use Never User      E-Cigarette/Vaping Substances   • Nicotine No    • THC No    • CBD No      Family History: non-contributory    Meds/Allergies   all medications and allergies reviewed  Allergies   Allergen Reactions   • Other Angioedema   • Shellfish-Derived Products - Food Allergy Anaphylaxis   • Penicillins Hives       Objective   Vitals: Blood pressure 141/78, pulse 77, temperature 97 8 °F (36 6 °C), temperature source Temporal, resp  rate 18, height 5' 7" (1 702 m), weight 123 kg (271 lb), SpO2 98 %  No intake or output data in the 24 hours ending 11/28/22 1255    Invasive Devices     Peripheral Intravenous Line  Duration           Peripheral IV 11/28/22 Left Forearm <1 day                Physical Exam  Head: Atraumatic, no visible scalp lesions, parotid and submandibular salivary glands non-tender to palpation and without masses bilaterally  Neck:  No visible or palpable cervical lesions or lymphadenopathy, thyroid gland is normal in size and symmetry and without masses, normal laryngeal elevation with swallowing  Ears:    Right ear :  Auricle normal in appearance, mastoid prominence non-tender, external auditory canal clear  Tympanic membranes intact  Left ear :  Auricle normal in appearance, mastoid prominence non-tender, external auditory canal clear   Tympanic membranes intact  Nose/Sinuses:  External appearance unremarkable, no maxillary or frontal sinus tenderness to palpation bilaterally  Nasal endoscopic examination showed deviated nasal septum, bilateral enlarged inferior turbinates, no polyps, thick clear mucus, middle, superior meatus and sphenoethmoid recess clear    Oral Cavity:  Moist mucus membranes, gums and dentition unremarkable, no oral mucosal masses or lesions, floor of mouth soft, tongue mobile without masses or lesions  Oropharynx:  Base of tongue soft and without masses, tonsils bilaterally unremarkable, soft palate mucosa unremarkable       Eyes:  Extra-ocular movements intact, pupils equally round and reactive to light and accommodation, the lids and conjunctivae are normal in appearance  Constitutional:  Well developed, well nourished and groomed, in no acute distress  Cardiovascular:  Normal rate and rhythm, no palpable thrills, no jugulovenous distension observed  Respiratory:  Normal respiratory effort without evidence of retractions or use of accessory muscles  Neurologic:  Cranial nerves II-XII intact bilaterally  Abdomen: Soft and lax  Extremities: No bruises   Psychiatric:  Alert and oriented to time, place and person  Lab Results: I have personally reviewed pertinent lab results  Imaging: I have personally reviewed pertinent reports  EKG, Pathology, and Other Studies: I have personally reviewed pertinent reports  Code Status: No Order  Advance Directive and Living Will:      Power of :    POLST:      Counseling/Coordination of Care: Total floor / unit time spent today 15 minutes  Greater than 50% of total time was spent with the patient and / or family counseling and / or coordination of care   A description of the counseling / coordination of care: given

## 2022-11-28 NOTE — OP NOTE
OPERATIVE REPORT  PATIENT NAME: Evgeny Callaway    :  1985  MRN: [id number]  Pt Location: [facility] ROOM 02    SURGERY DATE: 2022    Surgeon(s) and Role:     * Abigail Burgess MD - Primary    Preop Diagnosis:  Hypertrophy of both inferior nasal turbinates [J34 3]  Deviated nasal septum [J34 2]  Rhinitis, chronic [J31 0]  Snoring [R06 83]  LOLITA (obstructive sleep apnea) [G47 33]    Post-Op Diagnosis Codes:     * Hypertrophy of both inferior nasal turbinates [J34 3]     * Deviated nasal septum [J34 2]     * Rhinitis, chronic [J31 0]     * Snoring [R06 83]     * LOLITA (obstructive sleep apnea) [G47 33]    Procedure(s) (LRB):  SEPTOPLASTY (N/A)  TURBINECTOMY (Bilateral)    Specimen(s):  * No specimens in log *    Estimated Blood Loss:   Minimal    Drains:  * No LDAs found *    Anesthesia Type:   General    Operative Indications:  Hypertrophy of both inferior nasal turbinates [J34 3]  Deviated nasal septum [J34 2]  Rhinitis, chronic [J31 0]  Snoring [R06 83]  LOLITA (obstructive sleep apnea) [G47 33]      Operative Findings:  Deviated nasal septum  Bilateral enlarged inferior turbinates     Complications:   None    Procedure and Technique:  The patient was positively identified and transferred onto the operating table in the supine position  Appropriate monitoring devices were put in place, anesthesia was induced and the patient was intubated without difficulty  The operating room table no turned, and the patient was draped in the usual clean fashion  Before proceeding further, the time out procedure was completed  Oyxmetazoline nasal spray was applied to bilateral nasal cavities  Examination with a speculum confirmed significant obstruction bilaterally, and 1% lidocaine with 1/100,000 epinephrine was injected to the septum on both right and left sides    After allowing adequate time for anesthetic and vasoconstrictive effect,surgery was begun by making an incision along the caudal margin of the septum on the left side  Mucoperichondrial and mucoperiosteal flaps were elevated with the [place] elevator  Taking care to leave a 1 cm strut of septal cartilage caudally, the septal cartilage was transected, and mucoperichondrial and mucoperiosteal flaps were elevated on the right side  A portion of septal cartilage was removed, allowing further elevation of mucoperiosteal flaps over deviated portions of septal bone  These portions were removed using Jantzen-Duvall rongeurs, and Salas forceps  Bilateral nasal cavities were then re-examined, and the longest nasal speculum could be passed back along the septum on both sides without meeting any resistance  Removed portions of septal cartilage were morselized, and replaced between mucoperiochondrial flaps  Through and through stitches taken using 4 0 plain gut with a small luis needle  The incision was closed using 4 0 chromic  Attention was then directed to the turbinates, and 1% lidocaine with 1/100,000 epinephrine was injected to bilateral inferior turbinates  Turbinate reduction was accomplished by creating three submucosal lesions in each turbinate with the coablation wand  Both right and left turbinates were then medialized and out-fractured using the Jose elevator  1 doyles splint placed in each side and sutured with 4 0 prolene    The nasopharynx was then suctioned free of blood and secretions  Anesthesia was reversed, and drapes were removed  The patient was awakened, extubated and taken to the recovery room in stable condition  All counts were correct at the end of the case, and no complications were encountered     I was present for the entire procedure    Patient Disposition:  PACU         SIGNATURE: Jorge Antoine MD  DATE: November 28, 2022  TIME: 2:48 PM

## 2022-11-28 NOTE — DISCHARGE INSTRUCTIONS
Septoplasty   AMBULATORY CARE:   What you need to know about septoplasty:  Septoplasty is surgery to repair or straighten your nasal septum  The nasal septum is the cartilage and bone that forms a wall to separate your nostrils  Septoplasty may relieve symptoms such as dry mouth and trouble breathing or sleeping  Septoplasty is most commonly done in adults, but may also be done in children  How to prepare for septoplasty:  Your healthcare provider will talk to you about how to prepare for surgery  He may tell you not to eat or drink anything after midnight on the day of your surgery  He will tell you what medicines to take or not take on the day of your surgery  You will need someone to drive you home after surgery  What will happen during septoplasty:  You may have general anesthesia and be asleep during surgery  You may have local anesthesia and be sedated but awake during surgery  Your surgeon will cut and reattach your septum to straighten it  Swollen tissues may also be trimmed or partially removed  You may have stitches that dissolve or staples that absorb to hold your septum in place  You may have gauze or a soft splint inside your nose to help prevent bleeding  What will happen after septoplasty:  You may have pain, fatigue, and nasal stuffiness after surgery  The nasal stuffiness is caused by swelling in your nose, and will decrease in about 1 week  You may also have mild drainage made up of mucus and blood  Risks of septoplasty:  You may bleed more than expected or get an infection  Blood may build up on your septum, or it may tear  The fluid around your brain may drain from your nose  Your symptoms may not go away  You may have numbness in your nose, upper teeth, or gums  The shape of your nose, your voice, or your sense of taste or smell may change  You may have swelling or bruising around your eye     © Copyright [employer] 2022 Information is for End User's use only and may not be sold, redistributed or otherwise used for commercial purposes  All illustrations and images included in CareNotes® are the copyrighted property of A D A M , Inc  or Destini Venegas  The above information is an  only  It is not intended as medical advice for individual conditions or treatments  Talk to your doctor, nurse or pharmacist before following any medical regimen to see if it is safe and effective for you

## 2022-11-28 NOTE — ANESTHESIA PREPROCEDURE EVALUATION
Procedure:  SEPTOPLASTY (Nose)  TURBINECTOMY (Bilateral: Nose)    Relevant Problems   ANESTHESIA (within normal limits)      CARDIO   (+) HTN, goal below 140/90   (+) Hyperlipidemia      ENDO   (+) Type 2 diabetes mellitus with hyperglycemia, without long-term current use of insulin (HCC)      PULMONARY (within normal limits)        Physical Exam    Airway    Mallampati score: II  TM Distance: >3 FB  Neck ROM: full     Dental   No notable dental hx     Cardiovascular  Cardiovascular exam normal    Pulmonary  Pulmonary exam normal     Other Findings        Anesthesia Plan  ASA Score- 3     Anesthesia Type- general with ASA Monitors  Additional Monitors:   Airway Plan: ETT  Plan Factors-Exercise tolerance (METS): >4 METS  Chart reviewed  EKG reviewed  Existing labs reviewed  Patient summary reviewed  Patient is a current smoker  Patient did not smoke on day of surgery  Obstructive sleep apnea risk education given perioperatively  Induction- intravenous  Postoperative Plan- Plan for postoperative opioid use  Planned trial extubation    Informed Consent- Anesthetic plan and risks discussed with patient  I personally reviewed this patient with the CRNA  Discussed and agreed on the Anesthesia Plan with the CRNA  Juliana Hudson

## 2022-11-28 NOTE — ANESTHESIA POSTPROCEDURE EVALUATION
Post-Op Assessment Note    CV Status:  Stable  Pain Score: 0    Pain management: adequate     Mental Status:  Alert and awake   Hydration Status:  Euvolemic   PONV Controlled:  Controlled   Airway Patency:  Patent      Post Op Vitals Reviewed: Yes      Staff: Anesthesiologist, CRNA         No notable events documented      BP   144/70   Temp      Pulse  80   Resp   14   SpO2   100

## 2022-12-05 ENCOUNTER — OFFICE VISIT (OUTPATIENT)
Dept: OTOLARYNGOLOGY | Facility: CLINIC | Age: 37
End: 2022-12-05

## 2022-12-05 VITALS — BODY MASS INDEX: 42.53 KG/M2 | TEMPERATURE: 97.6 F | WEIGHT: 271 LBS | HEIGHT: 67 IN

## 2022-12-05 DIAGNOSIS — Z98.890 S/P NASAL SEPTOPLASTY: Primary | ICD-10-CM

## 2022-12-05 DIAGNOSIS — E11.9 DIABETES MELLITUS WITHOUT COMPLICATION (HCC): ICD-10-CM

## 2022-12-05 NOTE — PROGRESS NOTES
Otolaryngology-- Head and Neck Surgery Follow up visit      Follow up:    S/p septoplasty and ITR follow up and silastic sheets removal         Review of any relevant imaging:      Interval Review of systems: Pertinent review of systems documented in the HPI  Interval Social History:  Social History     Socioeconomic History   • Marital status: Single     Spouse name: Not on file   • Number of children: Not on file   • Years of education: Not on file   • Highest education level: Not on file   Occupational History   • Not on file   Tobacco Use   • Smoking status: Every Day     Packs/day: 2 00     Years: 5 00     Pack years: 10 00     Types: Cigars, Cigarettes   • Smokeless tobacco: Never   Vaping Use   • Vaping Use: Never used   Substance and Sexual Activity   • Alcohol use: Not Currently   • Drug use: Never   • Sexual activity: Yes     Partners: Female     Birth control/protection: None   Other Topics Concern   • Not on file   Social History Narrative   • Not on file     Social Determinants of Health     Financial Resource Strain: Not on file   Food Insecurity: Not on file   Transportation Needs: Not on file   Physical Activity: Not on file   Stress: Not on file   Social Connections: Not on file   Intimate Partner Violence: Not on file   Housing Stability: Not on file       Interval Physical Examination:  Temp 97 6 °F (36 4 °C) (Temporal)   Ht 5' 7" (1 702 m)   Wt 123 kg (271 lb)   BMI 42 44 kg/m²   silastic sheets removed and septum look straight  Procedure:      Assessment:  1   S/P nasal septoplasty            Plan:    S/p septoplasty and ITR follow up and silastic sheets removal

## 2022-12-06 ENCOUNTER — TELEPHONE (OUTPATIENT)
Dept: OTOLARYNGOLOGY | Facility: CLINIC | Age: 37
End: 2022-12-06

## 2022-12-06 RX ORDER — FLASH GLUCOSE SENSOR
1 KIT MISCELLANEOUS
Qty: 6 EACH | Refills: 0 | Status: SHIPPED | OUTPATIENT
Start: 2022-12-06

## 2022-12-06 NOTE — TELEPHONE ENCOUNTER
Patient thinks he is having a reaction to the Antibiotic  He has 1 pill left and is asking if he should just stop taking them  He is having bloating and some chest pain

## 2022-12-07 ENCOUNTER — OFFICE VISIT (OUTPATIENT)
Dept: URGENT CARE | Facility: CLINIC | Age: 37
End: 2022-12-07

## 2022-12-07 VITALS — HEART RATE: 99 BPM | RESPIRATION RATE: 16 BRPM | TEMPERATURE: 98.3 F | OXYGEN SATURATION: 100 %

## 2022-12-07 DIAGNOSIS — R19.7 DIARRHEA, UNSPECIFIED TYPE: Primary | ICD-10-CM

## 2022-12-07 NOTE — PROGRESS NOTES
[id number] [employer]        NAME: Suze Curry is a 44 y o  male  : 1985    MRN: [id number]  DATE: 2022  TIME: 5:14 PM    Assessment and Plan   Diarrhea, unspecified type [R19 7]  1  Diarrhea, unspecified type          Likely just side effects from antibiotic  Discussed importance of maintaining adequate hydration  Advised use of Imodium as well as over-the-counter probiotic  Discussed strict return to care precautions as well as red flag symptoms which should prompt immediate ED referral  Pt verbalized understanding and is in agreement with plan  Please follow up with your primary care provider within the next week  Please remember that your visit today was with an urgent care provider and should not replace follow up with your primary care provider for chronic medical issues or annual physicals  Patient Instructions       Follow up with PCP in 3-5 days  Proceed to  ER if symptoms worsen  Chief Complaint     Chief Complaint   Patient presents with   • Abdominal Pain     Pt presents with ABD cramping with diarrhea, bloat and flatulence, nausea; started four days ago; post ABX - ; was told by PCP to stop taking medication          History of Present Illness       Septoplasty   Called ENT with symptoms, stopped antibiotics yesterday  Diarrhea - a little better with Gas X and Pepto  Flatulence smells "off", diarrhea is watery  Today bloating, nausea, diarrhea, abd cramping, decreased appetite      Review of Systems   Review of Systems   Constitutional: Positive for appetite change  Negative for chills, diaphoresis and fever  Respiratory: Negative for shortness of breath  Cardiovascular: Negative for chest pain  Gastrointestinal: Positive for abdominal distention, diarrhea and nausea  Negative for blood in stool (no blood but was very dark after taking pepto), rectal pain and vomiting  Genitourinary: Negative for decreased urine volume and dysuria  Musculoskeletal: Negative for back pain  Skin: Negative for color change  Neurological: Negative for weakness           Current Medications       Current Outpatient Medications:   •  Continuous Blood Gluc Sensor (FreeStyle Oli 2 Sensor) MISC, Inject 1 Device under the skin every 14 (fourteen) days, Disp: 6 each, Rfl: 0  •  lisinopril (ZESTRIL) 10 mg tablet, Take 10 mg by mouth daily, Disp: , Rfl:   •  metFORMIN (GLUCOPHAGE-XR) 500 mg 24 hr tablet, Take 2 tablets (1,000 mg total) by mouth 2 (two) times a day with meals, Disp: 360 tablet, Rfl: 3  •  Multiple Vitamin (multivitamin) tablet, Take 1 tablet by mouth daily, Disp: , Rfl:   •  semaglutide, 1 mg/dose, (Ozempic, 1 MG/DOSE,) 4 MG/3ML SOPN injection pen, Inject 0 75 mL (1 mg total) under the skin once a week, Disp: 3 mL, Rfl: 4  •  sildenafil (VIAGRA) 100 mg tablet, Take 1 tablet (100 mg total) by mouth daily as needed for erectile dysfunction, Disp: 30 tablet, Rfl: 9    Current Allergies     Allergies as of 12/07/2022 - Reviewed 12/07/2022   Allergen Reaction Noted   • Other Angioedema 10/23/2019   • Shellfish-derived products - food allergy Anaphylaxis 04/14/2022   • Penicillins Hives 07/14/2015   • Penicillin g Hives 08/08/2022            The following portions of the patient's history were reviewed and updated as appropriate: allergies, current medications, past family history, past medical history, past social history, past surgical history and problem list      Past Medical History:   Diagnosis Date   • Diabetes mellitus ([address] )    • Hypertension    • Nasal congestion     Frequently   • Sleep apnea     5 5 apnea scale       Past Surgical History:   Procedure Laterality Date   • HAND SURGERY     • NV EXCISION TURBINATE,SUBMUCOUS Bilateral 11/28/2022    Procedure: TURBINECTOMY;  Surgeon: Kelly Osman MD;  Location: [address] [place] [address];  Service: ENT   • NV REPAIR OF NASAL SEPTUM N/A 11/28/2022    Procedure: SEPTOPLASTY;  Surgeon: Kelly Osman MD; Location: [facility];  Service: ENT       Family History   Problem Relation Age of Onset   • Diabetes type II Mother    • Diabetes Mother    • Diabetes Maternal Grandmother    • Diabetes type II Maternal Grandmother    • Cancer Maternal Grandfather         Liver cancer         Medications have been verified  Objective   Pulse 99   Temp 98 3 °F (36 8 °C)   Resp 16   SpO2 100%        Physical Exam     Physical Exam  Vitals and nursing note reviewed  Constitutional:       General: He is not in acute distress  Appearance: He is well-developed  He is obese  He is not ill-appearing  HENT:      Head: Normocephalic and atraumatic  Cardiovascular:      Rate and Rhythm: Normal rate and regular rhythm  Heart sounds: Normal heart sounds  Pulmonary:      Effort: Pulmonary effort is normal       Breath sounds: Normal breath sounds  Abdominal:      General: Abdomen is flat  Bowel sounds are normal       Palpations: Abdomen is soft  There is no hepatomegaly or mass  Tenderness: There is generalized abdominal tenderness  There is no right CVA tenderness, left CVA tenderness, guarding or rebound  Negative signs include Nelson's sign, Rovsing's sign and McBurney's sign  Skin:     General: Skin is warm and dry  Neurological:      Mental Status: He is alert and oriented to person, place, and time

## 2022-12-08 ENCOUNTER — HOSPITAL ENCOUNTER (EMERGENCY)
Facility: HOSPITAL | Age: 37
Discharge: HOME | End: 2022-12-08
Attending: EMERGENCY MEDICINE

## 2022-12-08 ENCOUNTER — APPOINTMENT (EMERGENCY)
Dept: RADIOLOGY | Facility: HOSPITAL | Age: 37
End: 2022-12-08

## 2022-12-08 VITALS
DIASTOLIC BLOOD PRESSURE: 80 MMHG | OXYGEN SATURATION: 98 % | RESPIRATION RATE: 16 BRPM | TEMPERATURE: 97.4 F | HEART RATE: 73 BPM | SYSTOLIC BLOOD PRESSURE: 124 MMHG

## 2022-12-08 DIAGNOSIS — K52.9 ENTEROCOLITIS: Primary | ICD-10-CM

## 2022-12-08 DIAGNOSIS — R10.9 ABDOMINAL PAIN: ICD-10-CM

## 2022-12-08 LAB
ALBUMIN SERPL BCP-MCNC: 3.8 G/DL (ref 3.5–5)
ALP SERPL-CCNC: 86 U/L (ref 46–116)
ALT SERPL W P-5'-P-CCNC: 86 U/L (ref 12–78)
ANION GAP SERPL CALCULATED.3IONS-SCNC: 14 MMOL/L (ref 4–13)
AST SERPL W P-5'-P-CCNC: 30 U/L (ref 5–45)
BASOPHILS # BLD AUTO: 0.02 THOUSANDS/ÂΜL (ref 0–0.1)
BASOPHILS NFR BLD AUTO: 0 % (ref 0–1)
BILIRUB DIRECT SERPL-MCNC: 0.24 MG/DL (ref 0–0.2)
BILIRUB SERPL-MCNC: 1.63 MG/DL (ref 0.2–1)
BUN SERPL-MCNC: 14 MG/DL (ref 5–25)
C DIFF TOX GENS STL QL NAA+PROBE: NEGATIVE
CALCIUM SERPL-MCNC: 9.1 MG/DL (ref 8.3–10.1)
CAMPYLOBACTER DNA SPEC NAA+PROBE: NORMAL
CHLORIDE SERPL-SCNC: 101 MMOL/L (ref 96–108)
CO2 SERPL-SCNC: 22 MMOL/L (ref 21–32)
CREAT SERPL-MCNC: 0.82 MG/DL (ref 0.6–1.3)
EOSINOPHIL # BLD AUTO: 0.19 THOUSAND/ÂΜL (ref 0–0.61)
EOSINOPHIL NFR BLD AUTO: 1 % (ref 0–6)
ERYTHROCYTE [DISTWIDTH] IN BLOOD BY AUTOMATED COUNT: 12.9 % (ref 11.6–15.1)
GFR SERPL CREATININE-BSD FRML MDRD: 112 ML/MIN/1.73SQ M
GLUCOSE SERPL-MCNC: 213 MG/DL (ref 65–140)
HCT VFR BLD AUTO: 47.1 % (ref 36.5–49.3)
HGB BLD-MCNC: 16.1 G/DL (ref 12–17)
IMM GRANULOCYTES # BLD AUTO: 0.09 THOUSAND/UL (ref 0–0.2)
IMM GRANULOCYTES NFR BLD AUTO: 1 % (ref 0–2)
LIPASE SERPL-CCNC: 98 U/L (ref 73–393)
LYMPHOCYTES # BLD AUTO: 3.23 THOUSANDS/ÂΜL (ref 0.6–4.47)
LYMPHOCYTES NFR BLD AUTO: 23 % (ref 14–44)
MCH RBC QN AUTO: 27.5 PG (ref 26.8–34.3)
MCHC RBC AUTO-ENTMCNC: 34.2 G/DL (ref 31.4–37.4)
MCV RBC AUTO: 80 FL (ref 82–98)
MONOCYTES # BLD AUTO: 0.98 THOUSAND/ÂΜL (ref 0.17–1.22)
MONOCYTES NFR BLD AUTO: 7 % (ref 4–12)
NEUTROPHILS # BLD AUTO: 9.41 THOUSANDS/ÂΜL (ref 1.85–7.62)
NEUTS SEG NFR BLD AUTO: 68 % (ref 43–75)
NRBC BLD AUTO-RTO: 0 /100 WBCS
PLATELET # BLD AUTO: 410 THOUSANDS/UL (ref 149–390)
PMV BLD AUTO: 10.4 FL (ref 8.9–12.7)
POTASSIUM SERPL-SCNC: 3.6 MMOL/L (ref 3.5–5.3)
PROT SERPL-MCNC: 8 G/DL (ref 6.4–8.4)
RBC # BLD AUTO: 5.86 MILLION/UL (ref 3.88–5.62)
SALMONELLA DNA SPEC QL NAA+PROBE: NORMAL
SHIGA TOXIN STX GENE SPEC NAA+PROBE: NORMAL
SHIGELLA DNA SPEC QL NAA+PROBE: NORMAL
SODIUM SERPL-SCNC: 137 MMOL/L (ref 135–147)
WBC # BLD AUTO: 13.92 THOUSAND/UL (ref 4.31–10.16)

## 2022-12-08 RX ORDER — METRONIDAZOLE 500 MG/1
500 TABLET ORAL EVERY 8 HOURS SCHEDULED
Qty: 21 TABLET | Refills: 0 | Status: SHIPPED | OUTPATIENT
Start: 2022-12-08 | End: 2022-12-15 | Stop reason: ALTCHOICE

## 2022-12-08 RX ORDER — METRONIDAZOLE 500 MG/1
500 TABLET ORAL ONCE
Status: COMPLETED | OUTPATIENT
Start: 2022-12-08 | End: 2022-12-08

## 2022-12-08 RX ORDER — LEVOFLOXACIN 500 MG/1
500 TABLET, FILM COATED ORAL DAILY
Qty: 7 TABLET | Refills: 0 | Status: SHIPPED | OUTPATIENT
Start: 2022-12-08 | End: 2022-12-15

## 2022-12-08 RX ORDER — ONDANSETRON 2 MG/ML
4 INJECTION INTRAMUSCULAR; INTRAVENOUS ONCE
Status: COMPLETED | OUTPATIENT
Start: 2022-12-08 | End: 2022-12-08

## 2022-12-08 RX ORDER — MORPHINE SULFATE 4 MG/ML
4 INJECTION, SOLUTION INTRAMUSCULAR; INTRAVENOUS ONCE
Status: COMPLETED | OUTPATIENT
Start: 2022-12-08 | End: 2022-12-08

## 2022-12-08 RX ADMIN — MORPHINE SULFATE 4 MG: 4 INJECTION INTRAVENOUS at 06:55

## 2022-12-08 RX ADMIN — SODIUM CHLORIDE 1000 ML: 0.9 INJECTION, SOLUTION INTRAVENOUS at 06:53

## 2022-12-08 RX ADMIN — ONDANSETRON 4 MG: 2 INJECTION INTRAMUSCULAR; INTRAVENOUS at 06:55

## 2022-12-08 RX ADMIN — IOHEXOL 100 ML: 350 INJECTION, SOLUTION INTRAVENOUS at 08:16

## 2022-12-08 RX ADMIN — METRONIDAZOLE 500 MG: 500 TABLET ORAL at 09:23

## 2022-12-08 RX ADMIN — LEVOFLOXACIN 750 MG: 500 TABLET, FILM COATED ORAL at 09:23

## 2022-12-08 NOTE — ED NOTES
Patient transported to [address] [facility],[address], [facility]  12/08/22 9534

## 2022-12-08 NOTE — ED PROVIDER NOTES
Final Diagnosis:  1  Enterocolitis    2  Abdominal pain        Chief Complaint   Patient presents with   • Abdominal Pain     Pt c/o nausea vomiting x 3 days and diarrhea x 6 days  Upper abd pain  C/o sweating but did not check fevers     HPI  Patient presents w/ 3d nausea vomiting and diarrhea  Recently had ENT surgery and was on ceftin thereafter  PCP thought likely diarrheal illness 2/2 abx  Those are over  Tried peptobismol, 1 dose of immodium thus far, an old zofran, w/o relief  Then today noted abd pain  Diffuse, nonlocalized  Mainly epigastric though  No prior abd surgeries  No resp symptoms  No chest pain  On arrival sitting bedside holding epigastrum      - No language barrier    - History obtained from patient  - There are no limitations to the history obtained  - Previous charting underwent limited review with attention to last ED visits, labs, ekgs, and prior imaging  PMH:   has a past medical history of Diabetes mellitus ([facility] [address] ), Hypertension, Nasal congestion, and Sleep apnea  PSH:   has a past surgical history that includes pr repair of nasal septum (N/A, 11/28/2022) and pr excision turbinate,submucous (Bilateral, 11/28/2022)  Social History:    Tobacco Use: High Risk   • Smoking Tobacco Use: Every Day   • Smokeless Tobacco Use: Never   • Passive Exposure: Never     Alcohol Use: Not on file     Patient with no illicit use    ROS:    Pertinent positives/negatives:   Review of Systems   Gastrointestinal: Positive for abdominal distention, abdominal pain, diarrhea, nausea and vomiting  CONSTITUTIONAL:  No dizziness  No weakness  No unexpected weight loss  EYES:  No pain, erythema, or discharge  No loss of vision  ENT:  No tinnitus, decreased hearing  No epistaxis/purulent drainage  No voice change, airway closing, trismus  CARDIOVASCULAR:  No chest pain  No palpitations  No new lower extremity edema  RESPIRATORY:  No purulent cough  No hemoptysis  No dyspnea   No paroxysmal nocturnal dyspnea  No stridor, audible wheezing bedside  GASTROINTESTINAL:  Normal appetite  No vomiting, diarrhea  No pain  No bloating  No melena  GENITOURINARY:  No frequency, urgency, nocturia  No hematuria or dysuria  No discharge  No sores/adenopathy  MUSCULOSKELETAL:  No arthralgias or myalgias that are new  INTEGUMENTARY:  No swelling  No unexpected contusions  No abrasions  No lymphangitis  NEUROLOGIC:  No meningismus  No numbness of the extremities  No new focal weakness  No postural instability  PSYCHIATRIC:  No SI HI AVH  HEMATOLOGICAL:  No bleeding  No petechiae  No bruising  ALLERGIES:  No urticaria  No sudden abd cramping  No stridor  PE:     Physical exam highlights:   Physical Exam       Vitals:    12/08/22 0633 12/08/22 0834   BP: 144/91 124/80   BP Location: Left arm Right arm   Pulse: (!) 110 73   Resp: 20 16   Temp: (!) 97 4 °F (36 3 °C)    TempSrc: Oral    SpO2: 100% 98%     Vitals reviewed by me  Nursing note reviewed  Chaperone present for all sensitive exam   Const: mild acute distress 2/2 pain  Alert  Nontoxic  Not diaphoretic   =  HEENT: External ears normal  No protrusion drainage swelling  Nose normal  No drainage/traumatic deformity  MMM  Mouth with baseline/symmetric movement  No trismus  Eyes: No squinting  No icterus  Tracks through the room with normal EOM  No tearing/swelling/drainage  Neck: ROM normal  No rigidity  No meningismus  Cards: Rate as per vitals  Compared to monitor sinus unless documented above  Regular  Well perfused  Pulm: able to verbalize without additional effort  Effort and excursion normal  No disress  No audible wheezing/ stridor  Normal resp rate  Abd: No distension beyond baseline  No fluctuant wave  Patient without peritoneal pain with shifting/bumping the bed  soft  MSK: ROM normal and baseline  No deformity  Skin: No new rashes visible  Well perfused  Neuro: Nonfocal  Baseline  CN grossly intact  Moving all four with coordination     Psych: Normal behavior and affect  A:  - Nursing note reviewed  Ddx and MDM  Check lytes, prolonged diarrheal illness  Fluids     Post abx diarrhea - cdiff? Other infectious diarrhea? Small blood by hemoccult, but light colored stool overall        bilirubinemia - ct r/o biliary disease                CT abdomen pelvis with contrast   Final Result      1  Findings of enteritis with involvement of the terminal ileum, raising the possibility of Crohn's disease  No definite evidence of penetrating disease  2   Liquid stool throughout the colon suggests a component of colitis  3   Hepatomegaly and hepatic steatosis           Workstation performed: [id number]           Orders Placed This Encounter   Procedures   • Clostridium difficile toxin by PCR with EIA   • Stool Enteric Bacterial Panel by PCR   • CT abdomen pelvis with contrast   • CBC and differential   • Basic metabolic panel   • Lipase   • Hepatic function panel     Labs Reviewed   CBC AND DIFFERENTIAL - Abnormal       Result Value Ref Range Status    WBC 13 92 (*) 4 31 - 10 16 Thousand/uL Final    RBC 5 86 (*) 3 88 - 5 62 Million/uL Final    Hemoglobin 16 1  12 0 - 17 0 g/dL Final    Hematocrit 47 1  36 5 - 49 3 % Final    MCV 80 (*) 82 - 98 fL Final    MCH 27 5  26 8 - 34 3 pg Final    MCHC 34 2  31 4 - 37 4 g/dL Final    RDW 12 9  11 6 - 15 1 % Final    MPV 10 4  8 9 - 12 7 fL Final    Platelets 740 (*) 630 - 390 Thousands/uL Final    nRBC 0  /100 WBCs Final    Neutrophils Relative 68  43 - 75 % Final    Immat GRANS % 1  0 - 2 % Final    Lymphocytes Relative 23  14 - 44 % Final    Monocytes Relative 7  4 - 12 % Final    Eosinophils Relative 1  0 - 6 % Final    Basophils Relative 0  0 - 1 % Final    Neutrophils Absolute 9 41 (*) 1 85 - 7 62 Thousands/µL Final    Immature Grans Absolute 0 09  0 00 - 0 20 Thousand/uL Final    Lymphocytes Absolute 3 23  0 60 - 4 47 Thousands/µL Final    Monocytes Absolute 0 98  0 17 - 1 22 Thousand/µL Final Eosinophils Absolute 0 19  0 00 - 0 61 Thousand/µL Final    Basophils Absolute 0 02  0 00 - 0 10 Thousands/µL Final   BASIC METABOLIC PANEL - Abnormal    Sodium 137  135 - 147 mmol/L Final    Potassium 3 6  3 5 - 5 3 mmol/L Final    Chloride 101  96 - 108 mmol/L Final    CO2 22  21 - 32 mmol/L Final    ANION GAP 14 (*) 4 - 13 mmol/L Final    BUN 14  5 - 25 mg/dL Final    Creatinine 0 82  0 60 - 1 30 mg/dL Final    Comment: Standardized to IDMS reference method    Glucose 213 (*) 65 - 140 mg/dL Final    Comment: If the patient is fasting, the ADA then defines impaired fasting glucose as > 100 mg/dL and diabetes as > or equal to 123 mg/dL  Specimen collection should occur prior to Sulfasalazine administration due to the potential for falsely depressed results  Specimen collection should occur prior to Sulfapyridine administration due to the potential for falsely elevated results  Calcium 9 1  8 3 - 10 1 mg/dL Final    eGFR 112  ml/min/1 73sq m Final    Narrative:     [place] guidelines for Chronic Kidney Disease (CKD):   •  Stage 1 with normal or high GFR (GFR > 90 mL/min/1 73 square meters)  •  Stage 2 Mild CKD (GFR = 60-89 mL/min/1 73 square meters)  •  Stage 3A Moderate CKD (GFR = 45-59 mL/min/1 73 square meters)  •  Stage 3B Moderate CKD (GFR = 30-44 mL/min/1 73 square meters)  •  Stage 4 Severe CKD (GFR = 15-29 mL/min/1 73 square meters)  •  Stage 5 End Stage CKD (GFR <15 mL/min/1 73 square meters)  Note: GFR calculation is accurate only with a steady state creatinine   HEPATIC FUNCTION PANEL - Abnormal    Total Bilirubin 1 63 (*) 0 20 - 1 00 mg/dL Final    Comment: Use of this assay is not recommended for patients undergoing treatment with eltrombopag due to the potential for falsely elevated results      Bilirubin, Direct 0 24 (*) 0 00 - 0 20 mg/dL Final    Alkaline Phosphatase 86  46 - 116 U/L Final    AST 30  5 - 45 U/L Final    Comment: Specimen collection should occur prior to Sulfasalazine administration due to the potential for falsely depressed results  ALT 86 (*) 12 - 78 U/L Final    Comment: Specimen collection should occur prior to Sulfasalazine administration due to the potential for falsely depressed results  Total Protein 8 0  6 4 - 8 4 g/dL Final    Albumin 3 8  3 5 - 5 0 g/dL Final   LIPASE - Normal    Lipase 98  73 - 393 u/L Final       Final Diagnosis:  1  Enterocolitis    2  Abdominal pain        P:  - hospital tx includes   Medications   ondansetron (ZOFRAN) injection 4 mg (4 mg Intravenous Given 12/8/22 0655)   morphine injection 4 mg (4 mg Intravenous Given 12/8/22 0655)   sodium chloride 0 9 % bolus 1,000 mL (0 mL Intravenous Stopped 12/8/22 0824)   iohexol (OMNIPAQUE) 350 MG/ML injection (SINGLE-DOSE) 100 mL (100 mL Intravenous Given 12/8/22 0816)   metroNIDAZOLE (FLAGYL) tablet 500 mg (500 mg Oral Given 12/8/22 0923)   levofloxacin (LEVAQUIN) tablet 750 mg (750 mg Oral Given 12/8/22 9930)         - disposition  Time reflects when diagnosis was documented in both MDM as applicable and the Disposition within this note     Time User Action Codes Description Comment    12/8/2022  9:22 AM Geraldine Lakes A Add [R10 9] Abdominal pain     12/8/2022  9:22 AM Geraldine Lakes A Add [K52 9] Enterocolitis     12/8/2022  9:22 AM Sarai Pong Modify [R10 9] Abdominal pain     12/8/2022  9:22 AM Sarai Pong Modify [K52 9] Enterocolitis       ED Disposition     ED Disposition   Discharge    Condition   Stable    Date/Time   Thu Dec 8, 2022  9:22 AM    Comment   Suze Curry discharge to home/self care                 Follow-up Information     Follow up With Specialties Details Why [address] [facility] [address], [address], Internal Medicine   [address]  Rosalina Martin MD Gastroenterology Schedule an appointment as soon as possible for a visit  As needed [facility] [address]  [phone]            - patient will call their PCP to let them know they were in the emergency department  We discuss return precautions       - additional tx intended, if consistent with primary provider:  - patient to follow with :      Discharge Medication List as of 12/8/2022  9:25 AM      START taking these medications    Details   levofloxacin (LEVAQUIN) 500 mg tablet Take 1 tablet (500 mg total) by mouth daily for 7 days, Starting Thu 12/8/2022, Until Thu 12/15/2022, Normal      metroNIDAZOLE (FLAGYL) 500 mg tablet Take 1 tablet (500 mg total) by mouth every 8 (eight) hours for 7 days, Starting Thu 12/8/2022, Until Thu 12/15/2022, Normal         CONTINUE these medications which have NOT CHANGED    Details   Continuous Blood Gluc Sensor (FreeStyle Oli 2 Sensor) MISC Inject 1 Device under the skin every 14 (fourteen) days, Starting Tue 12/6/2022, Normal      lisinopril (ZESTRIL) 10 mg tablet Take 10 mg by mouth daily, Starting Sun 1/16/2022, Historical Med      metFORMIN (GLUCOPHAGE-XR) 500 mg 24 hr tablet Take 2 tablets (1,000 mg total) by mouth 2 (two) times a day with meals, Starting Mon 11/7/2022, Until Sun 2/5/2023, Normal      Multiple Vitamin (multivitamin) tablet Take 1 tablet by mouth daily, Historical Med      semaglutide, 1 mg/dose, (Ozempic, 1 MG/DOSE,) 4 MG/3ML SOPN injection pen Inject 0 75 mL (1 mg total) under the skin once a week, Starting Tue 9/20/2022, Normal      sildenafil (VIAGRA) 100 mg tablet Take 1 tablet (100 mg total) by mouth daily as needed for erectile dysfunction, Starting Thu 4/14/2022, Print           No discharge procedures on file  Prior to Admission Medications   Prescriptions Last Dose Informant Patient Reported? Taking?    Continuous Blood Gluc Sensor (FreeStyle Oli 2 Sensor) MISC   No No   Sig: Inject 1 Device under the skin every 14 (fourteen) days   Multiple Vitamin (multivitamin) tablet   Yes No   Sig: Take 1 tablet by mouth daily   lisinopril (ZESTRIL) 10 mg tablet   Yes No   Sig: Take 10 mg by mouth daily   metFORMIN (GLUCOPHAGE-XR) 500 mg 24 hr tablet   No No   Sig: Take 2 tablets (1,000 mg total) by mouth 2 (two) times a day with meals   semaglutide, 1 mg/dose, (Ozempic, 1 MG/DOSE,) 4 MG/3ML SOPN injection pen   No No   Sig: Inject 0 75 mL (1 mg total) under the skin once a week   sildenafil (VIAGRA) 100 mg tablet   No No   Sig: Take 1 tablet (100 mg total) by mouth daily as needed for erectile dysfunction      Facility-Administered Medications: None       Portions of the record may have been created with voice recognition software  Occasional wrong word or "sound a like" substitutions may have occurred due to the inherent limitations of voice recognition software  Read the chart carefully and recognize, using context, where substitutions have occurred      Electronically signed by:  MD Marleni Klein MD  12/09/22 0600

## 2022-12-08 NOTE — Clinical Note
Rahul Bowman was seen and treated in our emergency department on 12/8/2022  Diagnosis:     Mihai Green  may return to work on return date  He may return on this date: 12/12/2022         If you have any questions or concerns, please don't hesitate to call        Brittany Barillas MD    ______________________________           _______________          _______________  Hospital Representative                              Date                                Time

## 2022-12-15 ENCOUNTER — OFFICE VISIT (OUTPATIENT)
Dept: FAMILY MEDICINE CLINIC | Facility: CLINIC | Age: 37
End: 2022-12-15

## 2022-12-15 VITALS
SYSTOLIC BLOOD PRESSURE: 120 MMHG | BODY MASS INDEX: 42.38 KG/M2 | TEMPERATURE: 97.4 F | RESPIRATION RATE: 14 BRPM | HEART RATE: 72 BPM | DIASTOLIC BLOOD PRESSURE: 82 MMHG | WEIGHT: 270 LBS | HEIGHT: 67 IN

## 2022-12-15 DIAGNOSIS — R93.5 ABNORMAL CT OF THE ABDOMEN: ICD-10-CM

## 2022-12-15 DIAGNOSIS — K52.9 COLITIS: ICD-10-CM

## 2022-12-15 DIAGNOSIS — E11.9 DIABETES MELLITUS WITHOUT COMPLICATION (HCC): Primary | ICD-10-CM

## 2022-12-15 DIAGNOSIS — E78.5 HYPERLIPIDEMIA, UNSPECIFIED HYPERLIPIDEMIA TYPE: ICD-10-CM

## 2022-12-15 PROBLEM — I10 HTN, GOAL BELOW 140/90: Status: RESOLVED | Noted: 2021-11-22 | Resolved: 2022-12-15

## 2022-12-15 PROBLEM — E11.65 TYPE 2 DIABETES MELLITUS WITH HYPERGLYCEMIA, WITHOUT LONG-TERM CURRENT USE OF INSULIN (HCC): Status: RESOLVED | Noted: 2022-10-20 | Resolved: 2022-12-15

## 2022-12-15 PROBLEM — E66.01 MORBID OBESITY DUE TO EXCESS CALORIES (HCC): Status: RESOLVED | Noted: 2021-11-22 | Resolved: 2022-12-15

## 2022-12-15 PROBLEM — M19.011 ARTHRITIS OF RIGHT ACROMIOCLAVICULAR JOINT: Status: ACTIVE | Noted: 2022-12-15

## 2022-12-15 LAB — SL AMB POCT HEMOGLOBIN AIC: 6.9 (ref ?–6.5)

## 2022-12-16 NOTE — PROGRESS NOTES
Clinic Visit Note  Samantha Torres 44 y o  male   MRN: [id number]    Assessment and Plan      Diagnoses and all orders for this visit:    Diabetes mellitus without complication (HCC)  Hemoglobin A1c 6 9%, stable  Patient notes that blood glucose levels around 130  We discussed increasing semaglutide to help with insulin resistance/weight loss  Continue metformin, no symptoms   -     POCT hemoglobin A1c  -     Semaglutide, 2 MG/DOSE, 8 MG/3ML SOPN; Inject 2 mg under the skin once a week    Hyperlipidemia, unspecified hyperlipidemia type  Annual visit  Recheck lipid panel    Colitis  Abnormal CT of the abdomen  Follow-up GI management, recommend endoscopic evaluation secondary to abnormal CT abdomen  Patient notes that food poisoning/gastroenteritis symptoms are improving, still having intermittent diarrhea  -     Ambulatory Referral to Gastroenterology; Future      My impressions and treatment recommendations were discussed in detail with the patient who verbalized understanding and had no further questions  Discharge instructions were provided  Subjective     Chief Complaint: Follow-up visit    History of Present Illness:    Patient is a pleasant 39-year-old male coming in for follow-up on chronic disease management including type 2 diabetes mellitus, well controlled  The following portions of the patient's history were reviewed and updated as appropriate: allergies, current medications, past family history, past medical history, past social history, past surgical history and problem list     REVIEW OF SYSTEMS:  A complete 12-point review of systems is negative other than that noted in the HPI  Review of Systems   Constitutional: Negative for chills, fatigue and fever  HENT: Negative for congestion and sore throat  Eyes: Negative for pain and visual disturbance  Respiratory: Negative for shortness of breath and wheezing  Cardiovascular: Negative for chest pain and palpitations  Gastrointestinal: Negative for abdominal pain, constipation, diarrhea, nausea and vomiting  Genitourinary: Negative for dysuria and frequency  Musculoskeletal: Negative for back pain and neck pain  Skin: Negative for color change and rash  Neurological: Negative for dizziness and headaches  Psychiatric/Behavioral: Negative for agitation and confusion  All other systems reviewed and are negative          Current Outpatient Medications:   •  lisinopril (ZESTRIL) 10 mg tablet, Take 10 mg by mouth daily, Disp: , Rfl:   •  metFORMIN (GLUCOPHAGE-XR) 500 mg 24 hr tablet, Take 2 tablets (1,000 mg total) by mouth 2 (two) times a day with meals, Disp: 360 tablet, Rfl: 3  •  Multiple Vitamin (multivitamin) tablet, Take 1 tablet by mouth daily, Disp: , Rfl:   •  Semaglutide, 2 MG/DOSE, 8 MG/3ML SOPN, Inject 2 mg under the skin once a week, Disp: 3 mL, Rfl: 3  •  sildenafil (VIAGRA) 100 mg tablet, Take 1 tablet (100 mg total) by mouth daily as needed for erectile dysfunction, Disp: 30 tablet, Rfl: 9  •  Continuous Blood Gluc Sensor (FreeStyle Oli 2 Sensor) MISC, Inject 1 Device under the skin every 14 (fourteen) days, Disp: 6 each, Rfl: 0  Past Medical History:   Diagnosis Date   • Diabetes mellitus ([facility] [address] )    • Hypertension    • Nasal congestion     Frequently   • Sleep apnea     5 5 apnea scale     Past Surgical History:   Procedure Laterality Date   • [place] EXCISION TURBINATE,SUBMUCOUS Bilateral 11/28/2022    Procedure: TURBINECTOMY;  Surgeon: Blanca Espana MD;  Location: [address] [place] [address];  Service: ENT   • OH REPAIR OF NASAL SEPTUM N/A 11/28/2022    Procedure: SEPTOPLASTY;  Surgeon: Blanca Espana MD;  Location: [facility];  Service: ENT     Social History     Socioeconomic History   • Marital status: Single     Spouse name: Not on file   • Number of children: Not on file   • Years of education: Not on file   • Highest education level: Not on file   Occupational History   • Not on file   Tobacco Use   • Smoking status: Every Day     Types: Cigars     Passive exposure: Never   • Smokeless tobacco: Never   Vaping Use   • Vaping Use: Never used   Substance and Sexual Activity   • Alcohol use: Not Currently   • Drug use: Never   • Sexual activity: Yes     Partners: Female     Birth control/protection: None   Other Topics Concern   • Not on file   Social History Narrative   • Not on file     Social Determinants of Health     Financial Resource Strain: Not on file   Food Insecurity: Not on file   Transportation Needs: Not on file   Physical Activity: Not on file   Stress: Not on file   Social Connections: Not on file   Intimate Partner Violence: Not on file   Housing Stability: Not on file     Family History   Problem Relation Age of Onset   • Diabetes type II Mother    • Diabetes Mother    • Diabetes Maternal Grandmother    • Diabetes type II Maternal Grandmother    • Cancer Maternal Grandfather         Liver cancer     Allergies   Allergen Reactions   • Other Angioedema   • Shellfish-Derived Products - Food Allergy Anaphylaxis   • Penicillins Hives   • Penicillin G Hives       Objective     Vitals:    12/15/22 1444   BP: 120/82   BP Location: Left arm   Patient Position: Sitting   Cuff Size: Adult   Pulse: 72   Resp: 14   Temp: (!) 97 4 °F (36 3 °C)   TempSrc: Temporal   Weight: 122 kg (270 lb)   Height: 5' 7" (1 702 m)       Physical Exam:     GENERAL: NAD, pleasant   HEENT:  NC/AT, PERRL, EOMI, no scleral icterus  CARDIAC:  RRR, +S1/S2, no S3/S4 appreciated, no m/g/r  PULMONARY:  CTA B/L, no wheezing/rales/rhonci, non-labored breathing  ABDOMEN:  Soft, NT/ND, no rebound/guarding/rigidity  Extremities:   No edema, cyanosis, or clubbing  Musculoskeletal:  Full range of motion intact in all extremities   NEUROLOGIC: Grossly intact, no focal deficits  SKIN:  No rashes or erythema noted on exposed skin  Psych: Normal affect, mood stable    ==  PLEASE NOTE:  This encounter was completed utilizing the M- Modal/Fluency Direct Speech Voice Recognition Software  Grammatical errors, random word insertions, pronoun errors and incomplete sentences are occasional consequences of the system due to software limitations, ambient noise and hardware issues  These may be missed by proof reading prior to affixing electronic signature  Any questions or concerns about the content, text or information contained within the body of this dictation should be directly addressed to the physician for clarification  Please do not hesitate to call me directly if you have any any questions or concerns      DO Joel Phipps [facility] Internal [facility]   [facility]

## 2022-12-21 ENCOUNTER — CLINICAL SUPPORT (OUTPATIENT)
Dept: NUTRITION | Facility: HOSPITAL | Age: 37
End: 2022-12-21

## 2022-12-21 VITALS — BODY MASS INDEX: 41.97 KG/M2 | WEIGHT: 268 LBS

## 2022-12-21 DIAGNOSIS — E11.9 DIABETES MELLITUS WITHOUT COMPLICATION (HCC): ICD-10-CM

## 2022-12-21 NOTE — PROGRESS NOTES
Follow-Up Nutrition Assessment Form    Patient Name: Norma Christianson    YOB: 1985    Sex: Male      Follow Up Date: 12/21/2022  Start Time: 2:15 Stop Time: 2:42 Total Minutes: 27     Data:  Present at session: self   Parent/Patient Concerns: Elevated BS   Medical Dx/Reason for Referral: E11 9 Diabetes Mellitus without complication    Past Medical History:   Diagnosis Date   • Diabetes mellitus ([address] )    • Hypertension    • Nasal congestion     Frequently   • Sleep apnea     5 5 apnea scale       Current Outpatient Medications   Medication Sig Dispense Refill   • Continuous Blood Gluc Sensor (FreeStyle Oli 2 Sensor) MISC Inject 1 Device under the skin every 14 (fourteen) days 6 each 0   • lisinopril (ZESTRIL) 10 mg tablet Take 10 mg by mouth daily     • metFORMIN (GLUCOPHAGE-XR) 500 mg 24 hr tablet Take 2 tablets (1,000 mg total) by mouth 2 (two) times a day with meals 360 tablet 3   • Multiple Vitamin (multivitamin) tablet Take 1 tablet by mouth daily     • Semaglutide, 2 MG/DOSE, 8 MG/3ML SOPN Inject 2 mg under the skin once a week 3 mL 3   • sildenafil (VIAGRA) 100 mg tablet Take 1 tablet (100 mg total) by mouth daily as needed for erectile dysfunction 30 tablet 9     No current facility-administered medications for this visit  Additional Meds/Supplements:    Barriers to Learning: None   Labs:    Height: Ht Readings from Last 3 Encounters:   12/15/22 5' 7" (1 702 m)   12/05/22 5' 7" (1 702 m)   11/28/22 5' 7" (1 702 m)      Weight: Wt Readings from Last 10 Encounters:   12/21/22 122 kg (268 lb)   12/15/22 122 kg (270 lb)   12/05/22 123 kg (271 lb)   11/28/22 123 kg (271 lb)   10/26/22 124 kg (274 lb 6 4 oz)   10/20/22 126 kg (277 lb)   09/26/22 123 kg (272 lb)   09/15/22 127 kg (281 lb)   05/02/22 121 kg (267 lb)   04/14/22 124 kg (274 lb)     Estimated body mass index is 41 97 kg/m² as calculated from the following:    Height as of 12/15/22: 5' 7" (1 702 m)      Weight as of this encounter: 122 kg (268 lb)  Wt  Change Since Last Visit: [x]Yes     []No  Amount: Lost 6 llbs      Energy Needs: No calculation needed   Pain Screen: Are you having pain now? Previous Goals or Goals Achieved:  Has been maintaining consistent BS     New Goals:   1   3-4 serving of carbohydrate per meal   2   1-2 carbohydrate snack at HS   3  Ensure serving of protein with meals/snacks       Initial PES:    Food and nutrition related knowledge deficit  related to Lack of prior exposure to accurate nutrition related information as evidenced by Verbalizes inaccurate or incomplete information   New PES: No Change      New Problem List:  1    2    3        Assessment:  PT states that he had septum surgery and has also had a bout with food poisoning  He had his Ozempic increased  He is no longer keeping a journal as he knows what he eats  He states that he noticed a spike in his BS when he ate just pretzels but has been eating cheese and crackers due to no appetite  Discussed that Ozempic can decrease appetite  Also he may benefit from a protein with his carbohydrate snack so that he doesn't spike his BS  He also states that he has been having occasional low BS overnight  Suggested a HS snack with carbohydrates and protein  He mentioned a headache since increasing the Ozempic which he is atributting to his BS being lower  Suggested that he discuss this with his PCP       Medical Nutrition Therapy Intervention:  []Individualized Meal Plan []Understanding Lab Values   []Basic Pathophysiology of Disease []Food/Medication Interactions   []Food Diary []Exercise   [x]Lifestyle/Behavior Modification Techniques: 1 -2 carb snack with protein at HS, 3-4 carbohydrate servings at meals, ensure protein serving with snacks []Medication, Mechanism of Action   []Label Reading []Self Blood Glucose Monitoring   []Weight/BMI Goals []Other -    Other Notes:        Comprehension: []Excellent  []Very Good  [x]Good  []Fair   []Poor Receptivity: []Excellent  []Very Good  [x]Good  []Fair   []Poor    Expected Compliance: []Excellent  []Very Good  [x]Good  []Fair   []Poor      Labs:  CMP  Lab Results   Component Value Date    K 3 6 12/08/2022     12/08/2022    CO2 22 12/08/2022    BUN 14 12/08/2022    CREATININE 0 82 12/08/2022    CALCIUM 9 1 12/08/2022    AST 30 12/08/2022    ALT 86 (H) 12/08/2022    ALKPHOS 86 12/08/2022    EGFR 112 12/08/2022       BMP  Lab Results   Component Value Date    CALCIUM 9 1 12/08/2022    K 3 6 12/08/2022    CO2 22 12/08/2022     12/08/2022    BUN 14 12/08/2022    CREATININE 0 82 12/08/2022       Lipids  No results found for: CHOL  Lab Results   Component Value Date    HDL 31 (L) 10/29/2022     Lab Results   Component Value Date    LDLCALC 99 10/29/2022     Lab Results   Component Value Date    TRIG 110 10/29/2022     No results found for: CHOLHDL    Hemoglobin A1C  Lab Results   Component Value Date    HGBA1C 6 9 (A) 12/15/2022       Fasting Glucose  No results found for: GLUF    Insulin     Thyroid  Lab Results   Component Value Date    TSH 1 610 10/29/2022       Hepatic Function Panel  Lab Results   Component Value Date    ALT 86 (H) 12/08/2022    AST 30 12/08/2022    ALKPHOS 86 12/08/2022       Celiac Disease Antibody Panel  No results found for: ENDOMYSIAL IGA, GLIADIN IGA, GLIADIN IGG, IGA, TISSUE TRANSGLUT AB, TTG IGA   Iron  No results found for: IRON, TIBC, FERRITIN    Vitamins  No results found for: VITAMIN B2   No results found for: NICOTINAMIDE, NICOTINIC ACID   No results found for: VITAMINB6  No results found for: [id number]  No results found for: VITB5  No results found for: O6QVNBPQ  No results found for: THYROGLB  No results found for: VITAMIN K   No results found for: 25-HYDROXY VIT D   No components found for: VITAMINE     No follow-ups on file      Migue Calabrese, [facility] CLINICAL NUTRITION SERVICES  [address] [place] [address] [address] [place] [address]

## 2022-12-22 ENCOUNTER — TELEPHONE (OUTPATIENT)
Dept: FAMILY MEDICINE CLINIC | Facility: CLINIC | Age: 37
End: 2022-12-22

## 2022-12-22 NOTE — TELEPHONE ENCOUNTER
Can we call his insurance and see if he can get short supply to local pharmacy   Prior to doing this ask patient to see what pharmacy is OK

## 2022-12-22 NOTE — TELEPHONE ENCOUNTER
Dr Lorie nur is out of stock due to back order  Please advise, reference # [id number]  I advised that Dr Selina Hylton is out of the office today

## 2022-12-23 ENCOUNTER — TELEPHONE (OUTPATIENT)
Dept: FAMILY MEDICINE CLINIC | Facility: CLINIC | Age: 37
End: 2022-12-23

## 2022-12-23 NOTE — TELEPHONE ENCOUNTER
[facility] needs authorization to order 1mg ozempic pens because their 2mg pens are on backorder   Is this ok with you ?  Tc/cma

## 2022-12-27 DIAGNOSIS — E11.9 DIABETES MELLITUS WITHOUT COMPLICATION (HCC): ICD-10-CM

## 2022-12-27 NOTE — TELEPHONE ENCOUNTER
I spoke with patient and he said to [facility] , Dr Nikolai Sprague was sent the refill tc/cma

## 2022-12-28 DIAGNOSIS — E11.9 DIABETES MELLITUS WITHOUT COMPLICATION (HCC): ICD-10-CM

## 2022-12-28 RX ORDER — SEMAGLUTIDE 1.34 MG/ML
2 INJECTION, SOLUTION SUBCUTANEOUS WEEKLY
Qty: 21 ML | Refills: 0 | Status: SHIPPED | OUTPATIENT
Start: 2022-12-28 | End: 2023-01-15 | Stop reason: SDUPTHER

## 2023-01-15 DIAGNOSIS — E11.9 DIABETES MELLITUS WITHOUT COMPLICATION (HCC): ICD-10-CM

## 2023-01-20 ENCOUNTER — OFFICE VISIT (OUTPATIENT)
Dept: ENDOCRINOLOGY | Facility: CLINIC | Age: 38
End: 2023-01-20

## 2023-01-20 VITALS
DIASTOLIC BLOOD PRESSURE: 80 MMHG | SYSTOLIC BLOOD PRESSURE: 140 MMHG | HEIGHT: 67 IN | HEART RATE: 81 BPM | WEIGHT: 267.4 LBS | BODY MASS INDEX: 41.97 KG/M2

## 2023-01-20 DIAGNOSIS — K76.0 HEPATIC STEATOSIS: ICD-10-CM

## 2023-01-20 DIAGNOSIS — E11.65 TYPE 2 DIABETES MELLITUS WITH HYPERGLYCEMIA, WITHOUT LONG-TERM CURRENT USE OF INSULIN (HCC): ICD-10-CM

## 2023-01-20 DIAGNOSIS — E66.01 CLASS 3 SEVERE OBESITY DUE TO EXCESS CALORIES WITH SERIOUS COMORBIDITY AND BODY MASS INDEX (BMI) OF 40.0 TO 44.9 IN ADULT (HCC): Primary | ICD-10-CM

## 2023-01-20 DIAGNOSIS — E11.9 DIABETES MELLITUS WITHOUT COMPLICATION (HCC): ICD-10-CM

## 2023-01-20 DIAGNOSIS — E78.5 HYPERLIPIDEMIA, UNSPECIFIED HYPERLIPIDEMIA TYPE: ICD-10-CM

## 2023-01-20 DIAGNOSIS — I10 HTN, GOAL BELOW 140/90: ICD-10-CM

## 2023-01-20 DIAGNOSIS — Z72.0 TOBACCO ABUSE: ICD-10-CM

## 2023-01-20 RX ORDER — BLOOD PRESSURE TEST KIT-MEDIUM
KIT MISCELLANEOUS
Qty: 1 EACH | Refills: 0 | Status: SHIPPED | OUTPATIENT
Start: 2023-01-20

## 2023-01-20 NOTE — ASSESSMENT & PLAN NOTE
Order placed for blood pressure monitor to monitor blood pressures at home  Discussed goal blood pressure less than 140/90  We will re-examine blood pressure at next visit  Patient may need increase in blood pressure medication

## 2023-01-20 NOTE — PROGRESS NOTES
Established Patient Progress Note    CC: Follow up for type 2 diabetes mellitus    History of Present Illness:   Gaye Perla is a 44 y o  male with a history of type 2 diabetes, obesity, fatty liver and hyperlipidemia  Patient is a current smoker  He smokes 1 cigar/day on weekdays  Last seen in the office 10/20/22  Reports complications of none  Last A1C was 6 9  Denies recent illness or hospitalizations  Denies recent severe hypoglycemic or severe hyperglycemic episodes  Denies any issues with current regimen  Home glucose monitoring: Performed using CGM    Patient's Ozempic was increased to 2 mg/week by his PCP  Patient also had a CT scan of the abdomen done in December which showed fatty liver and hepatomegaly  He reports having food poisoning at this time and states he still has some loose stool  He is being followed by GI  He has a colonoscopy scheduled  CGM Interpretation  Gaye Perla   Device used Dexcom  Home use   Indication: Type 2 Diabetes  More than 72 hours of data was reviewed  Report to be scanned to chart  Date Range: January 4, 2023 to January 17, 2023  Analysis of data:   Average Glucose: 130  Coefficient of Variation: 13 4%  Time in Target Range: 99%   Time Above Range: High: 1%; very high: 0%  Time Below Range: 0%  Interpretation of data: Diabetes is very well controlled      Diet: doesn't eat fast food      Current regimen:   Ozempic 2 mg/week  Metformin 1,000 mg BID    Last Eye Exam: overdue, needs to schedule  Last Foot Exam: UTD    Has hypertension: Taking Lisinopril      Patient Active Problem List   Diagnosis   • Diabetes mellitus without complication ([address] )   • Erectile dysfunction   • HTN, goal below 140/90   • Hyperlipidemia   • Tobacco abuse   • Type 2 diabetes mellitus with hyperglycemia, without long-term current use of insulin (HCC)   • Class 3 severe obesity due to excess calories with serious comorbidity and body mass index (BMI) of 40 0 to 44 9 in [facility])   • Arthritis of right acromioclavicular joint   • Hepatic steatosis      Past Medical History:   Diagnosis Date   • Diabetes mellitus ([address] )    • Hypertension    • Nasal congestion     Frequently   • Sleep apnea     5 5 apnea scale      Past Surgical History:   Procedure Laterality Date   • NM SEPTOPLASTY/SUBMUCOUS RESECJ W/WO CARTILAGE GRF N/A 11/28/2022    Procedure: SEPTOPLASTY;  Surgeon: Lesley Cui MD;  Location: [facility];  Service: ENT   • NM SUBMUCOUS [address] PRTL/COMPL Bilateral 11/28/2022    Procedure: TURBINECTOMY;  Surgeon: Lesley Cui MD;  Location: [address] [place] [address];  Service: ENT      Family History   Problem Relation Age of Onset   • Diabetes type II Mother    • Diabetes Mother    • Diabetes Maternal Grandmother    • Diabetes type II Maternal Grandmother    • Cancer Maternal Grandfather         Liver cancer     Social History     Tobacco Use   • Smoking status: Every Day     Types: Cigars     Passive exposure: Never   • Smokeless tobacco: Never   Substance Use Topics   • Alcohol use: Not Currently     Allergies   Allergen Reactions   • Other Angioedema   • Shellfish-Derived Products - Food Allergy Anaphylaxis   • Penicillins Hives   • Penicillin G Hives         Current Outpatient Medications:   •  Blood Pressure Monitoring ([employer] Blood Pressure Monitor) MISC, Use as directed, Disp: 1 each, Rfl: 0  •  Continuous Blood Gluc Sensor (FreeStyle Oli 2 Sensor) MISC, Inject 1 Device under the skin every 14 (fourteen) days, Disp: 6 each, Rfl: 0  •  lisinopril (ZESTRIL) 10 mg tablet, Take 10 mg by mouth daily, Disp: , Rfl:   •  metFORMIN (GLUCOPHAGE-XR) 500 mg 24 hr tablet, Take 2 tablets (1,000 mg total) by mouth 2 (two) times a day with meals, Disp: 360 tablet, Rfl: 3  •  Multiple Vitamin (multivitamin) tablet, Take 1 tablet by mouth daily, Disp: , Rfl:   •  semaglutide, 2 mg/dose, (Ozempic, 2 MG/DOSE,) 8 mg/ mL injection pen, Inject 0 75 mL (2 mg total) under the skin every 7 days, Disp: 15 mL, Rfl: 2  •  sildenafil (VIAGRA) 100 mg tablet, Take 1 tablet (100 mg total) by mouth daily as needed for erectile dysfunction, Disp: 30 tablet, Rfl: 9    Review of Systems   Constitutional: Negative for chills and fever  HENT: Negative for ear pain and sore throat  Eyes: Positive for visual disturbance  Negative for photophobia and pain  Has floaters in both eyes   Respiratory: Negative for cough and shortness of breath  Cardiovascular: Negative for chest pain and palpitations  Gastrointestinal: Negative for abdominal pain and vomiting  Endocrine: Negative for polydipsia, polyphagia and polyuria  Genitourinary: Negative for dysuria and hematuria  Musculoskeletal: Negative for arthralgias and back pain  Skin: Negative for color change and rash  Neurological: Negative for seizures, syncope and numbness  All other systems reviewed and are negative  Physical Exam:  Body mass index is 41 88 kg/m²  /80 (BP Location: Left arm, Patient Position: Sitting, Cuff Size: Large)   Pulse 81   Ht 5' 7" (1 702 m)   Wt 121 kg (267 lb 6 4 oz)   BMI 41 88 kg/m²    Wt Readings from Last 3 Encounters:   01/20/23 121 kg (267 lb 6 4 oz)   12/21/22 122 kg (268 lb)   12/15/22 122 kg (270 lb)       Physical Exam  Vitals reviewed  Constitutional:       Appearance: Normal appearance  HENT:      Head: Normocephalic and atraumatic  Cardiovascular:      Rate and Rhythm: Normal rate and regular rhythm  Heart sounds: Normal heart sounds  Pulmonary:      Effort: Pulmonary effort is normal       Breath sounds: Normal breath sounds  Neurological:      Mental Status: He is alert and oriented to person, place, and time     Psychiatric:         Mood and Affect: Mood normal          Behavior: Behavior normal        Diabetic Foot Exam    Labs:   Lab Results   Component Value Date    HGBA1C 6 9 (A) 12/15/2022    HGBA1C 6 9 (A) 09/15/2022    HGBA1C 6 1 04/14/2022     Lab Results   Component Value Date    CREATININE 0 82 12/08/2022    CREATININE 0 80 10/29/2022    BUN 14 12/08/2022    K 3 6 12/08/2022     12/08/2022    CO2 22 12/08/2022     eGFR   Date Value Ref Range Status   12/08/2022 112 ml/min/1 73sq m Final     Lab Results   Component Value Date    HDL 31 (L) 10/29/2022    TRIG 110 10/29/2022     Lab Results   Component Value Date    ALT 86 (H) 12/08/2022    AST 30 12/08/2022    ALKPHOS 86 12/08/2022     No results found for: [facility]  Lab Results   Component Value Date    FREET4 1 11 10/29/2022       Impression & Plan:    Problem List Items Addressed This Visit        Digestive    Hepatic steatosis     Continue with GLP-1  Relevant Medications    semaglutide, 2 mg/dose, (Ozempic, 2 MG/DOSE,) 8 mg/ mL injection pen       Endocrine    Diabetes mellitus without complication (HCC)    Relevant Medications    semaglutide, 2 mg/dose, (Ozempic, 2 MG/DOSE,) 8 mg/ mL injection pen    Type 2 diabetes mellitus with hyperglycemia, without long-term current use of insulin ([facility] [address] )     Per Dexcom report, patient's glycemic control has greatly improved  Continue with current regimen  Patient is also overdue for diabetic eye exam   Referral placed today  Due to low risk for hypoglycemia given patient's medication regimen, overall degree of control and out-of-pocket expense for Dexcom, it would be okay for patient to monitor blood sugars 1-2 times per day using home glucose meter  Advised patient to notify for blood sugar less than 80 or greater than 250  Lab Results   Component Value Date    HGBA1C 6 9 (A) 12/15/2022            Relevant Medications    semaglutide, 2 mg/dose, (Ozempic, 2 MG/DOSE,) 8 mg/ mL injection pen    Other Relevant Orders    Ambulatory referral to Ophthalmology       Cardiovascular and Mediastinum    HTN, goal below 140/90     Order placed for blood pressure monitor to monitor blood pressures at home  Discussed goal blood pressure less than 140/90    We will re-examine blood pressure at next visit  Patient may need increase in blood pressure medication  Relevant Medications    Blood Pressure Monitoring (CVS Blood Pressure Monitor) MISC       Other    Hyperlipidemia     Controlled  Continue with current regimen  Tobacco abuse     Patient has cut down on smoking however is not ready to quit  Class 3 severe obesity due to excess calories with serious comorbidity and body mass index (BMI) of 40 0 to 44 9 in [facility]) - Primary     Continue with GLP-1 and lifestyle modifications  Relevant Medications    semaglutide, 2 mg/dose, (Ozempic, 2 MG/DOSE,) 8 mg/ mL injection pen       Orders Placed This Encounter   Procedures   • Ambulatory referral to Ophthalmology     Standing Status:   Future     Standing Expiration Date:   1/20/2024     Referral Priority:   Routine     Referral Type:   Consult - AMB     Referral Reason:   Specialty Services Required     Referred to Provider:   Sarah Short MD     Requested Specialty:   Ophthalmology     Number of Visits Requested:   1     Expiration Date:   1/20/2024       There are no Patient Instructions on file for this visit  Discussed with the patient and all questioned fully answered  He will call me if any problems arise

## 2023-01-20 NOTE — ASSESSMENT & PLAN NOTE
Per Dexcom report, patient's glycemic control has greatly improved  Continue with current regimen  Patient is also overdue for diabetic eye exam   Referral placed today  Due to low risk for hypoglycemia given patient's medication regimen, overall degree of control and out-of-pocket expense for Dexcom, it would be okay for patient to monitor blood sugars 1-2 times per day using home glucose meter  Advised patient to notify for blood sugar less than 80 or greater than 250         Lab Results   Component Value Date    HGBA1C 6 9 (A) 12/15/2022

## 2023-02-02 ENCOUNTER — OFFICE VISIT (OUTPATIENT)
Dept: GASTROENTEROLOGY | Facility: CLINIC | Age: 38
End: 2023-02-02

## 2023-02-02 VITALS
SYSTOLIC BLOOD PRESSURE: 141 MMHG | WEIGHT: 267.4 LBS | BODY MASS INDEX: 41.97 KG/M2 | TEMPERATURE: 95.7 F | HEART RATE: 89 BPM | DIASTOLIC BLOOD PRESSURE: 112 MMHG | HEIGHT: 67 IN

## 2023-02-02 DIAGNOSIS — R19.7 DIARRHEA OF PRESUMED INFECTIOUS ORIGIN: Primary | ICD-10-CM

## 2023-02-02 DIAGNOSIS — K52.9 COLITIS: ICD-10-CM

## 2023-02-02 DIAGNOSIS — R93.5 ABNORMAL CT OF THE ABDOMEN: ICD-10-CM

## 2023-02-02 NOTE — PATIENT INSTRUCTIONS
1 tablespoon of metamucil daily  Avoid dairy products    Scheduled date of colonoscopy (as of today): 02/25/23  Physician performing colonoscopy: Sherrill Shone  [facility]  Bowel prep reviewed with patient: EMILIA GALVAN [facility]  Instructions reviewed with patient by: [place]  Clearances: NONE

## 2023-02-02 NOTE — PROGRESS NOTES
Consultation - [address] [facility]  Carmen Phillips 44 y o  male MRN: [id number]          Assessment & Plan:    Pleasant 42-year-old gentleman with 6 to 8 weeks of loose stools, ileitis on CT scan continued diarrhea  1   Abdominal pain, diarrhea and enteritis:  Differential including infectious etiology, postinfectious IBS  -C  difficile was ruled out  -Plan to proceed with colonoscopy to exclude inflammatory bowel disease  -Recommended lactose-free diet and taking daily fiber supplement  -Discussed with him risks of procedure including bleeding, surgery, perforation    Eddie Hartman was seen today for consult  Diagnoses and all orders for this visit:    Diarrhea of presumed infectious origin  -     Colonoscopy; Future    Colitis  -     Ambulatory Referral to Gastroenterology  -     Colonoscopy; Future    Abnormal CT of the abdomen  -     Ambulatory Referral to Gastroenterology  -     Colonoscopy; Future    Other orders  -     Diet NPO; Sips with meds; Standing  -     Void on call to OR; Standing            _____________________________________________________________        CC: Loose stools and diarrhea    HPI:  Carmen Phillips is a 44 y o male who was referred for evaluation of loose stools and diarrhea  This is a 42-year-old gentleman, with a history of hypertension, diabetes who was in his usual state of health, had a dental procedure several weeks later started to have diarrhea, this had gone on for approximately 2 weeks, then he took antidiarrheal had severe epigastric abdominal pain, presented the emergency room was found to have ileitis, leukocytosis, thrombocytosis  Stool studies including C  difficile and enteric panel were negative  He was placed empirically on antibiotics  His pain had improved, diarrhea slowed down, currently having 2 bowel movements per day but still loose watery stools denies any melena, rectal bleeding  Recently developed right ankle pain    Denies any ocular symptoms, oral lesions  No significant NSAID use  No family history of inflammatory bowel disease  Still having loose stools as noted above  Patient lost about 5 pounds unintentionally  Medical history for diabetes hypertension  Surgical history is notable for recent nasal septal surgery  He smokes cigars, denies any alcohol, he works as a   Family history is notable for maternal grandfather with liver cancer  No family history of inflammatory bowel disease  ROS:  The remainder of the ROS was negative except for the pertinent positives mentioned in HPI  Allergies:  Other, Shellfish-derived products - food allergy, Penicillins, and Penicillin g    Medications:   Current Outpatient Medications:   •  lisinopril (ZESTRIL) 10 mg tablet, Take 10 mg by mouth daily, Disp: , Rfl:   •  metFORMIN (GLUCOPHAGE-XR) 500 mg 24 hr tablet, Take 2 tablets (1,000 mg total) by mouth 2 (two) times a day with meals, Disp: 360 tablet, Rfl: 3  •  Multiple Vitamin (multivitamin) tablet, Take 1 tablet by mouth daily, Disp: , Rfl:   •  semaglutide, 2 mg/dose, (Ozempic, 2 MG/DOSE,) 8 mg/ mL injection pen, Inject 0 75 mL (2 mg total) under the skin every 7 days, Disp: 15 mL, Rfl: 2  •  sildenafil (VIAGRA) 100 mg tablet, Take 1 tablet (100 mg total) by mouth daily as needed for erectile dysfunction, Disp: 30 tablet, Rfl: 9  •  Blood Pressure Monitoring (CVS Blood Pressure Monitor) MISC, Use as directed (Patient not taking: Reported on 2/2/2023), Disp: 1 each, Rfl: 0  •  Continuous Blood Gluc Sensor (FreeStyle Oli 2 Sensor) MISC, Inject 1 Device under the skin every 14 (fourteen) days (Patient not taking: Reported on 2/2/2023), Disp: 6 each, Rfl: 0'    Past Medical History:   Diagnosis Date   • Diabetes mellitus ([address] )    • Hypertension    • Nasal congestion     Frequently   • Sleep apnea     5 5 apnea scale       Past Surgical History:   Procedure Laterality Date   • LA SEPTOPLASTY/SUBMUCOUS RESECJ W/WO CARTILAGE GRF N/A 11/28/2022    Procedure: SEPTOPLASTY;  Surgeon: Jordana Steve MD;  Location: [address];  Service: ENT   • AL SUBMUCOUS [address] PRTL/COMPL Bilateral 11/28/2022    Procedure: TURBINECTOMY;  Surgeon: Jordana Steve MD;  Location: [facility];  Service: ENT       Family History   Problem Relation Age of Onset   • Diabetes type II Mother    • Diabetes Mother    • Diabetes Maternal Grandmother    • Diabetes type II Maternal Grandmother    • Arthritis Maternal Grandmother    • Cancer Maternal Grandfather         Liver cancer        reports that he has been smoking cigars  He has never been exposed to tobacco smoke  He has never used smokeless tobacco  He reports that he does not currently use alcohol  He reports that he does not use drugs            Physical Exam:     BP (!) 141/112 (BP Location: Right arm, Patient Position: Sitting, Cuff Size: Standard)   Pulse 89   Temp (!) 95 7 °F (35 4 °C) (Temporal)   Ht 5' 7" (1 702 m)   Wt 121 kg (267 lb 6 4 oz)   BMI 41 88 kg/m²     Gen: wn/wd, NAD, overweight but otherwise healthy  HEENT: anicteric, MMM, no cervical LAD  CVS: RRR, no m/r/g  CHEST: CTA b/l  ABD: +BS, soft, NT,ND, no hepatosplenomegaly  EXT: no c/c/e  NEURO: aaox3  SKIN: NO rashes

## 2023-02-02 NOTE — LETTER
February 2, 2023     Gali Olp, DO  [facility]    Patient: Cecily Walker   YOB: 1985   Date of Visit: 2/2/2023       Dear Dr Pb Kam: Thank you for referring Cecily Walker to me for evaluation  Below are my notes for this consultation  If you have questions, please do not hesitate to call me  I look forward to following your patient along with you  Sincerely,        Jennifer Coreas MD        CC: No Recipients  Jennifer Coreas MD  2/2/2023  5:22 PM  Sign when Signing Visit  Consultation - [address] [facility]  Cecily Walker 44 y o  male MRN: [id number]          Assessment & Plan:    Pleasant 27-year-old gentleman with 6 to 8 weeks of loose stools, ileitis on CT scan continued diarrhea  1   Abdominal pain, diarrhea and enteritis:  Differential including infectious etiology, postinfectious IBS  -C  difficile was ruled out  -Plan to proceed with colonoscopy to exclude inflammatory bowel disease  -Recommended lactose-free diet and taking daily fiber supplement  -Discussed with him risks of procedure including bleeding, surgery, perforation    Gabriel Vinson was seen today for consult  Diagnoses and all orders for this visit:    Diarrhea of presumed infectious origin  -     Colonoscopy; Future    Colitis  -     Ambulatory Referral to Gastroenterology  -     Colonoscopy; Future    Abnormal CT of the abdomen  -     Ambulatory Referral to Gastroenterology  -     Colonoscopy; Future    Other orders  -     Diet NPO; Sips with meds; Standing  -     Void on call to OR; Standing            _____________________________________________________________        CC: Loose stools and diarrhea    HPI:  Cecily Walker is a 44 y o male who was referred for evaluation of loose stools and diarrhea    This is a 27-year-old gentleman, with a history of hypertension, diabetes who was in his usual state of health, had a dental procedure several weeks later started to have diarrhea, this had gone on for approximately 2 weeks, then he took antidiarrheal had severe epigastric abdominal pain, presented the emergency room was found to have ileitis, leukocytosis, thrombocytosis  Stool studies including C  difficile and enteric panel were negative  He was placed empirically on antibiotics  His pain had improved, diarrhea slowed down, currently having 2 bowel movements per day but still loose watery stools denies any melena, rectal bleeding  Recently developed right ankle pain  Denies any ocular symptoms, oral lesions  No significant NSAID use  No family history of inflammatory bowel disease  Still having loose stools as noted above  Patient lost about 5 pounds unintentionally  Medical history for diabetes hypertension  Surgical history is notable for recent nasal septal surgery  He smokes cigars, denies any alcohol, he works as a   Family history is notable for maternal grandfather with liver cancer  No family history of inflammatory bowel disease  ROS:  The remainder of the ROS was negative except for the pertinent positives mentioned in HPI  Allergies:  Other, Shellfish-derived products - food allergy, Penicillins, and Penicillin g    Medications:   Current Outpatient Medications:   •  lisinopril (ZESTRIL) 10 mg tablet, Take 10 mg by mouth daily, Disp: , Rfl:   •  metFORMIN (GLUCOPHAGE-XR) 500 mg 24 hr tablet, Take 2 tablets (1,000 mg total) by mouth 2 (two) times a day with meals, Disp: 360 tablet, Rfl: 3  •  Multiple Vitamin (multivitamin) tablet, Take 1 tablet by mouth daily, Disp: , Rfl:   •  semaglutide, 2 mg/dose, (Ozempic, 2 MG/DOSE,) 8 mg/ mL injection pen, Inject 0 75 mL (2 mg total) under the skin every 7 days, Disp: 15 mL, Rfl: 2  •  sildenafil (VIAGRA) 100 mg tablet, Take 1 tablet (100 mg total) by mouth daily as needed for erectile dysfunction, Disp: 30 tablet, Rfl: 9  •  Blood Pressure Monitoring (CVS Blood Pressure Monitor) MISC, Use as directed (Patient not taking: Reported on 2/2/2023), Disp: 1 each, Rfl: 0  •  Continuous Blood Gluc Sensor (FreeStyle Oli 2 Sensor) MISC, Inject 1 Device under the skin every 14 (fourteen) days (Patient not taking: Reported on 2/2/2023), Disp: 6 each, Rfl: 0'    Past Medical History:   Diagnosis Date   • Diabetes mellitus ([address] )    • Hypertension    • Nasal congestion     Frequently   • Sleep apnea     5 5 apnea scale       Past Surgical History:   Procedure Laterality Date   • [place] SEPTOPLASTY/SUBMUCOUS RESECJ W/WO CARTILAGE GRF N/A 11/28/2022    Procedure: SEPTOPLASTY;  Surgeon: Christ Ventura MD;  Location: [address] [place] [address];  Service: ENT   • [place] SUBMUCOUS [address] PRTL/COMPL Bilateral 11/28/2022    Procedure: TURBINECTOMY;  Surgeon: Christ Ventura MD;  Location: [address] [place] [address];  Service: ENT       Family History   Problem Relation Age of Onset   • Diabetes type II Mother    • Diabetes Mother    • Diabetes Maternal Grandmother    • Diabetes type II Maternal Grandmother    • Arthritis Maternal Grandmother    • Cancer Maternal Grandfather         Liver cancer        reports that he has been smoking cigars  He has never been exposed to tobacco smoke  He has never used smokeless tobacco  He reports that he does not currently use alcohol  He reports that he does not use drugs            Physical Exam:     BP (!) 141/112 (BP Location: Right arm, Patient Position: Sitting, Cuff Size: Standard)   Pulse 89   Temp (!) 95 7 °F (35 4 °C) (Temporal)   Ht 5' 7" (1 702 m)   Wt 121 kg (267 lb 6 4 oz)   BMI 41 88 kg/m²     Gen: wn/wd, NAD, overweight but otherwise healthy  HEENT: anicteric, MMM, no cervical LAD  CVS: RRR, no m/r/g  CHEST: CTA b/l  ABD: +BS, soft, NT,ND, no hepatosplenomegaly  EXT: no c/c/e  NEURO: aaox3  SKIN: NO rashes

## 2023-02-07 DIAGNOSIS — R10.13 EPIGASTRIC PAIN: Primary | ICD-10-CM

## 2023-02-07 RX ORDER — DICYCLOMINE HCL 20 MG
20 TABLET ORAL EVERY 8 HOURS PRN
Qty: 20 TABLET | Refills: 0 | Status: SHIPPED | OUTPATIENT
Start: 2023-02-07 | End: 2023-03-16

## 2023-02-08 ENCOUNTER — OFFICE VISIT (OUTPATIENT)
Dept: FAMILY MEDICINE CLINIC | Facility: CLINIC | Age: 38
End: 2023-02-08

## 2023-02-08 VITALS
RESPIRATION RATE: 14 BRPM | DIASTOLIC BLOOD PRESSURE: 90 MMHG | BODY MASS INDEX: 41.12 KG/M2 | WEIGHT: 262 LBS | TEMPERATURE: 97.7 F | HEIGHT: 67 IN | HEART RATE: 72 BPM | SYSTOLIC BLOOD PRESSURE: 120 MMHG

## 2023-02-08 DIAGNOSIS — E11.65 TYPE 2 DIABETES MELLITUS WITH HYPERGLYCEMIA, WITHOUT LONG-TERM CURRENT USE OF INSULIN (HCC): ICD-10-CM

## 2023-02-08 DIAGNOSIS — R10.13 EPIGASTRIC PAIN: Primary | ICD-10-CM

## 2023-02-08 DIAGNOSIS — R14.0 ABDOMINAL BLOATING: ICD-10-CM

## 2023-02-08 DIAGNOSIS — H93.13 TINNITUS OF BOTH EARS: ICD-10-CM

## 2023-02-08 RX ORDER — ALUMINA, MAGNESIA, AND SIMETHICONE 2400; 2400; 240 MG/30ML; MG/30ML; MG/30ML
10 SUSPENSION ORAL EVERY 6 HOURS PRN
Qty: 355 ML | Refills: 0 | Status: SHIPPED | OUTPATIENT
Start: 2023-02-08 | End: 2023-02-17

## 2023-02-08 NOTE — PROGRESS NOTES
Clinic Visit Note  Rajendra Pagan 44 y o  male   MRN: [id number]    Assessment and Plan      Diagnoses and all orders for this visit:    Epigastric pain  Abdominal bloating  Unclear etiology, patient does have work-up with gastroenterology pending for endoscopic evaluation  Patient did have some relief with Pepto, recommend Maalox max which will hopefully give better relief  Patient can also use Bentyl as needed for symptomatic management  Will discuss with GI, ongoing work-up  Clarnce Alberto has been increased recently to 2 mg weekly, patient has been doing well on this medication without side effects  Recommend stopping metformin to see if this helps with GI symptoms  Continue adequate hydration, low residue diet, advance as tolerated  -     aluminum-magnesium hydroxide-simethicone (MAALOX MAX) 400-400-40 MG/5ML suspension; Take 10 mL by mouth every 6 (six) hours as needed for indigestion or heartburn    Type 2 diabetes mellitus with hyperglycemia, without long-term current use of insulin (HCC)  Most recent hemoglobin A1c 6 9%, stop metformin, continue Ozempic at this time    Tinnitus of both ears  -     Ambulatory Referral to Otolaryngology; Future          My impressions and treatment recommendations were discussed in detail with the patient who verbalized understanding and had no further questions  Discharge instructions were provided  Subjective     Chief Complaint: Follow-up/same-day visit    History of Present Illness:    Patient is a pleasant 65-year-old male coming in same-day visit secondary to nausea/vomiting, epigastric pain, bloating with gas  Patient has had the symptoms previously, emergency room evaluation with nonspecific CT abdomen/pelvis  Nonacute abdomen, no red flag symptoms on exam today      The following portions of the patient's history were reviewed and updated as appropriate: allergies, current medications, past family history, past medical history, past social history, past surgical history and problem list     REVIEW OF SYSTEMS:  A complete 12-point review of systems is negative other than that noted in the HPI  Review of Systems   Constitutional: Negative for chills, fatigue and fever  Respiratory: Negative for cough, shortness of breath and wheezing  Cardiovascular: Negative for chest pain, palpitations and leg swelling  Gastrointestinal: Positive for abdominal pain, diarrhea, nausea and vomiting  Negative for abdominal distention and constipation  Neurological: Negative for dizziness and headaches           Current Outpatient Medications:   •  aluminum-magnesium hydroxide-simethicone (MAALOX MAX) 400-400-40 MG/5ML suspension, Take 10 mL by mouth every 6 (six) hours as needed for indigestion or heartburn, Disp: 355 mL, Rfl: 0  •  dicyclomine (BENTYL) 20 mg tablet, Take 1 tablet (20 mg total) by mouth every 8 (eight) hours as needed (Abdominal pain), Disp: 20 tablet, Rfl: 0  •  lisinopril (ZESTRIL) 10 mg tablet, Take 10 mg by mouth daily, Disp: , Rfl:   •  Multiple Vitamin (multivitamin) tablet, Take 1 tablet by mouth daily, Disp: , Rfl:   •  semaglutide, 2 mg/dose, (Ozempic, 2 MG/DOSE,) 8 mg/ mL injection pen, Inject 0 75 mL (2 mg total) under the skin every 7 days, Disp: 15 mL, Rfl: 2  •  sildenafil (VIAGRA) 100 mg tablet, Take 1 tablet (100 mg total) by mouth daily as needed for erectile dysfunction, Disp: 30 tablet, Rfl: 9  Past Medical History:   Diagnosis Date   • Diabetes mellitus ([facility] [address] )    • Hypertension    • Nasal congestion     Frequently   • Sleep apnea     5 5 apnea scale     Past Surgical History:   Procedure Laterality Date   • [place] SEPTOPLASTY/SUBMUCOUS RESECJ W/WO CARTILAGE GRF N/A 11/28/2022    Procedure: SEPTOPLASTY;  Surgeon: Nino Thayer MD;  Location: [address] [place] [address];  Service: ENT   • [place] SUBMUCOUS [address] PRTL/COMPL Bilateral 11/28/2022    Procedure: TURBINECTOMY;  Surgeon: Nino Thayer MD;  Location: [address] [place] [address];  Service: ENT Social History     Socioeconomic History   • Marital status: Single     Spouse name: Not on file   • Number of children: Not on file   • Years of education: Not on file   • Highest education level: Not on file   Occupational History   • Not on file   Tobacco Use   • Smoking status: Some Days     Types: Cigars     Passive exposure: Never   • Smokeless tobacco: Never   Vaping Use   • Vaping Use: Never used   Substance and Sexual Activity   • Alcohol use: Not Currently   • Drug use: Never   • Sexual activity: Yes     Partners: Female     Birth control/protection: None   Other Topics Concern   • Not on file   Social History Narrative   • Not on file     Social Determinants of Health     Financial Resource Strain: Not on file   Food Insecurity: Not on file   Transportation Needs: Not on file   Physical Activity: Not on file   Stress: Not on file   Social Connections: Not on file   Intimate Partner Violence: Not on file   Housing Stability: Not on file     Family History   Problem Relation Age of Onset   • Diabetes type II Mother    • Diabetes Mother    • Diabetes Maternal Grandmother    • Diabetes type II Maternal Grandmother    • Arthritis Maternal Grandmother    • Cancer Maternal Grandfather         Liver cancer     Allergies   Allergen Reactions   • Other Angioedema   • Shellfish-Derived Products - Food Allergy Anaphylaxis   • Penicillins Hives   • Penicillin G Hives       Objective     Vitals:    02/08/23 0911   BP: 120/90   BP Location: Left arm   Patient Position: Sitting   Cuff Size: Adult   Pulse: 72   Resp: 14   Temp: 97 7 °F (36 5 °C)   TempSrc: Temporal   Weight: 119 kg (262 lb)   Height: 5' 7" (1 702 m)       Physical Exam:     GENERAL: NAD, pleasant   HEENT:  NC/AT, PERRL, EOMI, no scleral icterus  CARDIAC:  RRR, +S1/S2, no S3/S4 appreciated, no m/g/r  PULMONARY:  CTA B/L, no wheezing/rales/rhonci, non-labored breathing  ABDOMEN:  Soft, NT/ND, no rebound/guarding/rigidity  Extremities:   No edema, cyanosis, or clubbing  Musculoskeletal:  Full range of motion intact in all extremities   NEUROLOGIC: Grossly intact, no focal deficits  SKIN:  No rashes or erythema noted on exposed skin  Psych: Normal affect, mood stable    ==  PLEASE NOTE:  This encounter was completed utilizing the [employer] Direct Speech Voice Recognition Software  Grammatical errors, random word insertions, pronoun errors and incomplete sentences are occasional consequences of the system due to software limitations, ambient noise and hardware issues  These may be missed by proof reading prior to affixing electronic signature  Any questions or concerns about the content, text or information contained within the body of this dictation should be directly addressed to the physician for clarification  Please do not hesitate to call me directly if you have any any questions or concerns      DO [facility]   [facility]

## 2023-02-09 DIAGNOSIS — R11.2 NAUSEA AND VOMITING, UNSPECIFIED VOMITING TYPE: Primary | ICD-10-CM

## 2023-02-11 ENCOUNTER — HOSPITAL ENCOUNTER (EMERGENCY)
Facility: HOSPITAL | Age: 38
Discharge: HOME | End: 2023-02-11
Attending: EMERGENCY MEDICINE

## 2023-02-11 ENCOUNTER — APPOINTMENT (EMERGENCY)
Dept: RADIOLOGY | Facility: HOSPITAL | Age: 38
End: 2023-02-11

## 2023-02-11 ENCOUNTER — TELEPHONE (OUTPATIENT)
Dept: FAMILY MEDICINE CLINIC | Facility: CLINIC | Age: 38
End: 2023-02-11

## 2023-02-11 VITALS
RESPIRATION RATE: 18 BRPM | OXYGEN SATURATION: 100 % | SYSTOLIC BLOOD PRESSURE: 158 MMHG | DIASTOLIC BLOOD PRESSURE: 110 MMHG | HEART RATE: 94 BPM | TEMPERATURE: 97.8 F

## 2023-02-11 DIAGNOSIS — R10.9 ABDOMINAL PAIN: Primary | ICD-10-CM

## 2023-02-11 DIAGNOSIS — R11.2 NAUSEA AND VOMITING: ICD-10-CM

## 2023-02-11 DIAGNOSIS — R19.7 DIARRHEA: ICD-10-CM

## 2023-02-11 LAB
ALBUMIN SERPL BCP-MCNC: 3.9 G/DL (ref 3.5–5)
ALP SERPL-CCNC: 93 U/L (ref 46–116)
ALT SERPL W P-5'-P-CCNC: 52 U/L (ref 12–78)
ANION GAP SERPL CALCULATED.3IONS-SCNC: 10 MMOL/L (ref 4–13)
AST SERPL W P-5'-P-CCNC: 38 U/L (ref 5–45)
BASOPHILS # BLD AUTO: 0.03 THOUSANDS/ÂΜL (ref 0–0.1)
BASOPHILS NFR BLD AUTO: 0 % (ref 0–1)
BILIRUB SERPL-MCNC: 1.31 MG/DL (ref 0.2–1)
BILIRUB UR QL STRIP: NEGATIVE
BUN SERPL-MCNC: 16 MG/DL (ref 5–25)
CALCIUM SERPL-MCNC: 8.8 MG/DL (ref 8.3–10.1)
CHLORIDE SERPL-SCNC: 103 MMOL/L (ref 96–108)
CLARITY UR: CLEAR
CO2 SERPL-SCNC: 25 MMOL/L (ref 21–32)
COLOR UR: NORMAL
CREAT SERPL-MCNC: 0.76 MG/DL (ref 0.6–1.3)
EOSINOPHIL # BLD AUTO: 0.4 THOUSAND/ÂΜL (ref 0–0.61)
EOSINOPHIL NFR BLD AUTO: 3 % (ref 0–6)
ERYTHROCYTE [DISTWIDTH] IN BLOOD BY AUTOMATED COUNT: 12.8 % (ref 11.6–15.1)
GFR SERPL CREATININE-BSD FRML MDRD: 115 ML/MIN/1.73SQ M
GLUCOSE SERPL-MCNC: 143 MG/DL (ref 65–140)
GLUCOSE SERPL-MCNC: 144 MG/DL (ref 65–140)
GLUCOSE UR STRIP-MCNC: NEGATIVE MG/DL
HCT VFR BLD AUTO: 45.8 % (ref 36.5–49.3)
HGB BLD-MCNC: 15.5 G/DL (ref 12–17)
HGB UR QL STRIP.AUTO: NEGATIVE
IMM GRANULOCYTES # BLD AUTO: 0.04 THOUSAND/UL (ref 0–0.2)
IMM GRANULOCYTES NFR BLD AUTO: 0 % (ref 0–2)
KETONES UR STRIP-MCNC: NEGATIVE MG/DL
LEUKOCYTE ESTERASE UR QL STRIP: NEGATIVE
LIPASE SERPL-CCNC: 108 U/L (ref 73–393)
LYMPHOCYTES # BLD AUTO: 2.66 THOUSANDS/ÂΜL (ref 0.6–4.47)
LYMPHOCYTES NFR BLD AUTO: 23 % (ref 14–44)
MAGNESIUM SERPL-MCNC: 1.9 MG/DL (ref 1.6–2.6)
MCH RBC QN AUTO: 27.7 PG (ref 26.8–34.3)
MCHC RBC AUTO-ENTMCNC: 33.8 G/DL (ref 31.4–37.4)
MCV RBC AUTO: 82 FL (ref 82–98)
MONOCYTES # BLD AUTO: 0.71 THOUSAND/ÂΜL (ref 0.17–1.22)
MONOCYTES NFR BLD AUTO: 6 % (ref 4–12)
NEUTROPHILS # BLD AUTO: 7.9 THOUSANDS/ÂΜL (ref 1.85–7.62)
NEUTS SEG NFR BLD AUTO: 68 % (ref 43–75)
NITRITE UR QL STRIP: NEGATIVE
NRBC BLD AUTO-RTO: 0 /100 WBCS
PH UR STRIP.AUTO: 5.5 [PH]
PLATELET # BLD AUTO: 355 THOUSANDS/UL (ref 149–390)
PMV BLD AUTO: 10.2 FL (ref 8.9–12.7)
POTASSIUM SERPL-SCNC: 4.9 MMOL/L (ref 3.5–5.3)
PROT SERPL-MCNC: 8.1 G/DL (ref 6.4–8.4)
PROT UR STRIP-MCNC: NEGATIVE MG/DL
RBC # BLD AUTO: 5.59 MILLION/UL (ref 3.88–5.62)
SODIUM SERPL-SCNC: 138 MMOL/L (ref 135–147)
SP GR UR STRIP.AUTO: <=1.005 (ref 1–1.03)
UROBILINOGEN UR QL STRIP.AUTO: 0.2 E.U./DL
WBC # BLD AUTO: 11.74 THOUSAND/UL (ref 4.31–10.16)

## 2023-02-11 RX ORDER — DIPHENHYDRAMINE HYDROCHLORIDE 50 MG/ML
12.5 INJECTION INTRAMUSCULAR; INTRAVENOUS ONCE
Status: COMPLETED | OUTPATIENT
Start: 2023-02-11 | End: 2023-02-11

## 2023-02-11 RX ORDER — ONDANSETRON 2 MG/ML
4 INJECTION INTRAMUSCULAR; INTRAVENOUS ONCE
Status: COMPLETED | OUTPATIENT
Start: 2023-02-11 | End: 2023-02-11

## 2023-02-11 RX ORDER — HYDROMORPHONE HCL/PF 1 MG/ML
1 SYRINGE (ML) INJECTION ONCE
Status: COMPLETED | OUTPATIENT
Start: 2023-02-11 | End: 2023-02-11

## 2023-02-11 RX ORDER — METOCLOPRAMIDE HYDROCHLORIDE 5 MG/ML
5 INJECTION INTRAMUSCULAR; INTRAVENOUS ONCE
Status: COMPLETED | OUTPATIENT
Start: 2023-02-11 | End: 2023-02-11

## 2023-02-11 RX ORDER — FAMOTIDINE 10 MG/ML
20 INJECTION, SOLUTION INTRAVENOUS ONCE
Status: COMPLETED | OUTPATIENT
Start: 2023-02-11 | End: 2023-02-11

## 2023-02-11 RX ORDER — ONDANSETRON 4 MG/1
4 TABLET, ORALLY DISINTEGRATING ORAL EVERY 6 HOURS PRN
Qty: 10 TABLET | Refills: 0 | Status: SHIPPED | OUTPATIENT
Start: 2023-02-11

## 2023-02-11 RX ORDER — METOCLOPRAMIDE 10 MG/1
10 TABLET ORAL EVERY 6 HOURS PRN
Qty: 10 TABLET | Refills: 0 | Status: SHIPPED | OUTPATIENT
Start: 2023-02-11

## 2023-02-11 RX ADMIN — SODIUM CHLORIDE, SODIUM LACTATE, POTASSIUM CHLORIDE, AND CALCIUM CHLORIDE 1000 ML: .6; .31; .03; .02 INJECTION, SOLUTION INTRAVENOUS at 06:41

## 2023-02-11 RX ADMIN — SODIUM CHLORIDE 1000 ML: 0.9 INJECTION, SOLUTION INTRAVENOUS at 08:55

## 2023-02-11 RX ADMIN — ONDANSETRON 4 MG: 2 INJECTION INTRAMUSCULAR; INTRAVENOUS at 06:41

## 2023-02-11 RX ADMIN — DIPHENHYDRAMINE HYDROCHLORIDE 12.5 MG: 50 INJECTION, SOLUTION INTRAMUSCULAR; INTRAVENOUS at 08:57

## 2023-02-11 RX ADMIN — IOHEXOL 100 ML: 350 INJECTION, SOLUTION INTRAVENOUS at 07:30

## 2023-02-11 RX ADMIN — HYDROMORPHONE HYDROCHLORIDE 1 MG: 1 INJECTION, SOLUTION INTRAMUSCULAR; INTRAVENOUS; SUBCUTANEOUS at 08:28

## 2023-02-11 RX ADMIN — FAMOTIDINE 20 MG: 10 INJECTION, SOLUTION INTRAVENOUS at 06:41

## 2023-02-11 RX ADMIN — METOCLOPRAMIDE 5 MG: 5 INJECTION, SOLUTION INTRAMUSCULAR; INTRAVENOUS at 08:58

## 2023-02-11 RX ADMIN — ONDANSETRON 4 MG: 2 INJECTION INTRAMUSCULAR; INTRAVENOUS at 08:28

## 2023-02-11 NOTE — TELEPHONE ENCOUNTER
----- Message from Cecily Walker sent at 2/10/2023  5:22 PM EST -----  Regarding: Stomach pain  Contact: [phone]  Good afternoon doc  I am having belching again all day today and I’m passing gas that is really foul smelling along with stomach cramps  Joey Mcpherson been fighting the urge to throw up today  I took the stomach cramp pills and gas x but it’s not having much effect  Just wanted to give you an update on my status

## 2023-02-11 NOTE — ED PROVIDER NOTES
History  Chief Complaint   Patient presents with   • Abdominal Pain     Pt reports abdominal pain recently, seen and d/c in December with enteritis, seen by primary doctor for f/u, started with intense abdominal pain and vomiting over last day or so  Pt unable to keep down solids, can keep down liquids  50-year-old male presents emergency department with complaint of vomiting, abdominal pain and copious, nonbloody diarrhea x2 days  Patient has a history of chronic soft "fluffy" stools with a possible diagnosis of Crohn's disease  He also has a history of hypertension and diabetes  Patient states that he is point-of-care glucose was 57 prior to calling EMS  History provided by:  Patient   used: No    Abdominal Pain  Pain location:  Generalized  Pain quality: aching    Pain radiates to:  Does not radiate  Pain severity:  Moderate  Onset quality:  Gradual  Timing:  Constant  Progression:  Worsening  Chronicity:  Recurrent  Relieved by:  Nothing  Worsened by:  Nothing  Ineffective treatments:  None tried  Associated symptoms: nausea and vomiting    Associated symptoms: no chest pain, no chills, no constipation, no cough, no diarrhea, no dysuria, no fever, no hematuria and no shortness of breath    Nausea:     Severity:  Moderate    Onset quality:  Gradual    Timing:  Constant    Progression:  Worsening  Vomiting:     Quality:  Bilious material    Severity:  Moderate    Timing:  Constant    Progression:  Worsening      Prior to Admission Medications   Prescriptions Last Dose Informant Patient Reported? Taking?    Multiple Vitamin (multivitamin) tablet   Yes No   Sig: Take 1 tablet by mouth daily   aluminum-magnesium hydroxide-simethicone (MAALOX MAX) 400-400-40 MG/5ML suspension   No No   Sig: Take 10 mL by mouth every 6 (six) hours as needed for indigestion or heartburn   dicyclomine (BENTYL) 20 mg tablet   No No   Sig: Take 1 tablet (20 mg total) by mouth every 8 (eight) hours as needed (Abdominal pain)   lisinopril (ZESTRIL) 10 mg tablet   Yes No   Sig: Take 10 mg by mouth daily   semaglutide, 2 mg/dose, (Ozempic, 2 MG/DOSE,) 8 mg/ mL injection pen   No No   Sig: Inject 0 75 mL (2 mg total) under the skin every 7 days   sildenafil (VIAGRA) 100 mg tablet   No No   Sig: Take 1 tablet (100 mg total) by mouth daily as needed for erectile dysfunction      Facility-Administered Medications: None       Past Medical History:   Diagnosis Date   • Diabetes mellitus ([facility] [address] )    • Hypertension    • Nasal congestion     Frequently   • Sleep apnea     5 5 apnea scale       Past Surgical History:   Procedure Laterality Date   • NV SEPTOPLASTY/SUBMUCOUS RESECJ W/WO CARTILAGE GRF N/A 11/28/2022    Procedure: SEPTOPLASTY;  Surgeon: Ferdinand Minor MD;  Location: [address] [place] [address];  Service: ENT   • NV SUBMUCOUS [address] PRTL/COMPL Bilateral 11/28/2022    Procedure: TURBINECTOMY;  Surgeon: Ferdinand Minor MD;  Location: [address] [place] [address];  Service: ENT       Family History   Problem Relation Age of Onset   • Diabetes type II Mother    • Diabetes Mother    • Diabetes Maternal Grandmother    • Diabetes type II Maternal Grandmother    • Arthritis Maternal Grandmother    • Cancer Maternal Grandfather         Liver cancer     I have reviewed and agree with the history as documented  E-Cigarette/Vaping   • E-Cigarette Use Never User      E-Cigarette/Vaping Substances   • Nicotine No    • THC No    • CBD No    • Flavoring No    • Other No    • Unknown No      Social History     Tobacco Use   • Smoking status: Some Days     Types: Cigars     Passive exposure: Never   • Smokeless tobacco: Never   Vaping Use   • Vaping Use: Never used   Substance Use Topics   • Alcohol use: Not Currently   • Drug use: Never       Review of Systems   Constitutional: Negative for chills and fever  Respiratory: Negative for cough, shortness of breath and wheezing  Cardiovascular: Negative for chest pain and palpitations  Gastrointestinal: Positive for abdominal pain, nausea and vomiting  Negative for constipation and diarrhea  Genitourinary: Negative for dysuria, flank pain, hematuria and urgency  Musculoskeletal: Negative for back pain  Skin: Negative for color change and rash  Psychiatric/Behavioral: Negative for agitation and confusion  The patient is nervous/anxious  All other systems reviewed and are negative  Physical Exam  Physical Exam  Vitals and nursing note reviewed  Constitutional:       Appearance: He is well-developed  HENT:      Head: Normocephalic and atraumatic  Eyes:      Pupils: Pupils are equal, round, and reactive to light  Cardiovascular:      Rate and Rhythm: Normal rate and regular rhythm  Heart sounds: Normal heart sounds  Pulmonary:      Effort: Pulmonary effort is normal       Breath sounds: Normal breath sounds  Abdominal:      General: Abdomen is protuberant  Bowel sounds are normal  There is distension  Palpations: Abdomen is soft  There is no mass  Tenderness: There is abdominal tenderness in the epigastric area and left upper quadrant  There is no guarding or rebound  Skin:     General: Skin is warm and dry  Capillary Refill: Capillary refill takes less than 2 seconds  Neurological:      General: No focal deficit present  Mental Status: He is alert and oriented to person, place, and time  Psychiatric:         Mood and Affect: Mood is anxious  Behavior: Behavior normal          Thought Content:  Thought content normal          Judgment: Judgment normal          Vital Signs  ED Triage Vitals [02/11/23 0649]   Temperature Pulse Respirations Blood Pressure SpO2   97 8 °F (36 6 °C) 89 17 (!) 173/111 97 %      Temp Source Heart Rate Source Patient Position - Orthostatic VS BP Location FiO2 (%)   Oral Monitor Lying Right arm --      Pain Score       --           Vitals:    02/11/23 0649   BP: (!) 173/111   Pulse: 89   Patient Position - Orthostatic VS: Lying         Visual Acuity      ED Medications  Medications   ondansetron (ZOFRAN) injection 4 mg (4 mg Intravenous Given 2/11/23 0641)   lactated ringers bolus 1,000 mL (1,000 mL Intravenous New Bag 2/11/23 0641)   Famotidine (PF) (PEPCID) injection 20 mg (20 mg Intravenous Given 2/11/23 0641)   iohexol (OMNIPAQUE) 350 MG/ML injection (SINGLE-DOSE) 100 mL (100 mL Intravenous Given 2/11/23 0730)       Diagnostic Studies  Results Reviewed     Procedure Component Value Units Date/Time    Comprehensive metabolic panel [[id number]]  (Abnormal) Collected: 02/11/23 0644    Lab Status: Final result Specimen: Blood from Arm, Right Updated: 02/11/23 0708     Sodium 138 mmol/L      Potassium 4 9 mmol/L      Chloride 103 mmol/L      CO2 25 mmol/L      ANION GAP 10 mmol/L      BUN 16 mg/dL      Creatinine 0 76 mg/dL      Glucose 144 mg/dL      Calcium 8 8 mg/dL      AST 38 U/L      ALT 52 U/L      Alkaline Phosphatase 93 U/L      Total Protein 8 1 g/dL      Albumin 3 9 g/dL      Total Bilirubin 1 31 mg/dL      eGFR 115 ml/min/1 73sq m     Narrative:      [place] guidelines for Chronic Kidney Disease (CKD):   •  Stage 1 with normal or high GFR (GFR > 90 mL/min/1 73 square meters)  •  Stage 2 Mild CKD (GFR = 60-89 mL/min/1 73 square meters)  •  Stage 3A Moderate CKD (GFR = 45-59 mL/min/1 73 square meters)  •  Stage 3B Moderate CKD (GFR = 30-44 mL/min/1 73 square meters)  •  Stage 4 Severe CKD (GFR = 15-29 mL/min/1 73 square meters)  •  Stage 5 End Stage CKD (GFR <15 mL/min/1 73 square meters)  Note: GFR calculation is accurate only with a steady state creatinine    Magnesium [[id number]]  (Normal) Collected: 02/11/23 0644    Lab Status: Final result Specimen: Blood from Arm, Right Updated: 02/11/23 0708     Magnesium 1 9 mg/dL     Lipase [[id number]]  (Normal) Collected: 02/11/23 0644    Lab Status: Final result Specimen: Blood from Arm, Right Updated: 02/11/23 0708     Lipase 108 u/L CBC and differential [[id number]]  (Abnormal) Collected: 02/11/23 0644    Lab Status: Final result Specimen: Blood from Arm, Right Updated: 02/11/23 0651     WBC 11 74 Thousand/uL      RBC 5 59 Million/uL      Hemoglobin 15 5 g/dL      Hematocrit 45 8 %      MCV 82 fL      MCH 27 7 pg      MCHC 33 8 g/dL      RDW 12 8 %      MPV 10 2 fL      Platelets 904 Thousands/uL      nRBC 0 /100 WBCs      Neutrophils Relative 68 %      Immat GRANS % 0 %      Lymphocytes Relative 23 %      Monocytes Relative 6 %      Eosinophils Relative 3 %      Basophils Relative 0 %      Neutrophils Absolute 7 90 Thousands/µL      Immature Grans Absolute 0 04 Thousand/uL      Lymphocytes Absolute 2 66 Thousands/µL      Monocytes Absolute 0 71 Thousand/µL      Eosinophils Absolute 0 40 Thousand/µL      Basophils Absolute 0 03 Thousands/µL     Fingerstick Glucose (POCT) [[id number]]  (Abnormal) Collected: 02/11/23 0630    Lab Status: Final result Updated: 02/11/23 0859     POC Glucose 143 mg/dl     UA (URINE) with reflex to Scope [[id number]]     Lab Status: No result Specimen: Urine                  CT abdomen pelvis with contrast    (Results Pending)              Procedures  Procedures         ED Course                                             Medical Decision Making  34-year-old female with complaint of nausea, vomiting, abdominal pain and diarrhea  Labs obtained and reviewed  Leukocytosis noted  CT ordered and pending  UA ordered and pending  Patient will be signed out to oncoming physician who will review CT and make appropriate disposition on patient  Amount and/or Complexity of Data Reviewed  Labs: ordered  Radiology: ordered  Risk  Prescription drug management            Disposition  Final diagnoses:   Abdominal pain   Nausea and vomiting   Diarrhea     Time reflects when diagnosis was documented in both MDM as applicable and the Disposition within this note     Time User Action Codes Description Comment    2/11/2023  7:50 AM Izabella Bio O Add [R10 9] Abdominal pain     2/11/2023  7:50 AM JuansaAbhilash napoles Gess O Add [R11 2] Nausea and vomiting     2/11/2023  7:50 AM JuansaAbhilash napoles Gess O Add [R19 7] Diarrhea       ED Disposition     None      Follow-up Information    None         Patient's Medications   Discharge Prescriptions    No medications on file       No discharge procedures on file      PDMP Review     None          ED Provider  Electronically Signed by           Ava Schneider DO  02/11/23 9687

## 2023-02-11 NOTE — ED NOTES
Patient transported to [facility] [facility]  02/11/23 0154

## 2023-02-17 DIAGNOSIS — R14.0 ABDOMINAL BLOATING: Primary | ICD-10-CM

## 2023-02-17 RX ORDER — ALUMINUM HYDROXIDE, MAGNESIUM HYDROXIDE, SIMETHICONE 400; 400; 40 MG/10ML; MG/10ML; MG/10ML
30 SUSPENSION ORAL
Qty: 335 ML | Refills: 0 | Status: SHIPPED | OUTPATIENT
Start: 2023-02-17 | End: 2023-03-16

## 2023-02-18 ENCOUNTER — NURSE TRIAGE (OUTPATIENT)
Dept: OTHER | Facility: OTHER | Age: 38
End: 2023-02-18

## 2023-02-19 NOTE — TELEPHONE ENCOUNTER
Reason for Disposition  • [1] Blood in the stool AND [2] small amount of blood   (Exception: only on toilet paper  Reason: diarrhea can cause rectal irritation with blood on wiping)    Answer Assessment - Initial Assessment Questions  1  DIARRHEA SEVERITY: "How bad is the diarrhea?" "How many extra stools have you had in the past 24 hours than normal?"     - NO DIARRHEA (SCALE 0)    - MILD (SCALE 1-3): Few loose or mushy BMs; increase of 1-3 stools over normal daily number of stools; mild increase in ostomy output  -  MODERATE (SCALE 4-7): Increase of 4-6 stools daily over normal; moderate increase in ostomy output  * SEVERE (SCALE 8-10; OR 'WORST POSSIBLE'): Increase of 7 or more stools daily over normal; moderate increase in ostomy output; incontinence  7-10 BMs today    2  ONSET: "When did the diarrhea begin?"       Watery diarrhea began today    3  BM CONSISTENCY: "How loose or watery is the diarrhea?"       Was previously loose, now watery today    4  VOMITING: "Are you also vomiting?" If Yes, ask: "How many times in the past 24 hours?"       Yes just vomited for the first time a couple of minutes ago    5  ABDOMINAL PAIN: "Are you having any abdominal pain?" If Yes, ask: "What does it feel like?" (e g , crampy, dull, intermittent, constant)       *No Answer*  6  ABDOMINAL PAIN SEVERITY: If present, ask: "How bad is the pain?"  (e g , Scale 1-10; mild, moderate, or severe)    - MILD (1-3): doesn't interfere with normal activities, abdomen soft and not tender to touch     - MODERATE (4-7): interferes with normal activities or awakens from sleep, tender to touch     - SEVERE (8-10): excruciating pain, doubled over, unable to do any normal activities        Denies pain-feels pressure in upper abdomen    7  ORAL INTAKE: If vomiting, "Have you been able to drink liquids?" "How much fluids have you had in the past 24 hours?"      Has been drinking water    8   HYDRATION: "Any signs of dehydration?" (e g , dry mouth [not just dry lips], too weak to stand, dizziness, new weight loss) "When did you last urinate?"      Denies any signs of dehydration    11  OTHER SYMPTOMS: "Do you have any other symptoms?" (e g , fever, blood in stool)        Small amount of blood in toilet when having BMs and with wiping  States blood is mixed with watery BM and he is not sure if this is bright red bleeding or darker blood  Protocols used: DIARRHEA-ADULT-AH    Pt  Scheduled for colonoscopy next week  Was seen in ED last week and was told there wasn't much they could do until he had his colonoscopy  Recommended pt be seen within at least the next 24 hours if severe diarrhea and blood in stool continue  Pt  Verbalized understanding but is frustrated with frequent ER visits  Advised to go to ER sooner if symptoms worsen or he develops severe abdominal pain

## 2023-02-19 NOTE — TELEPHONE ENCOUNTER
Regarding: Tyra have been having diarrhea since yesterdat about 4 to 5 time  ----- Message from Favian Meza sent at 2/18/2023  8:47 PM EST -----  '' Since yesterday my tyra has been having diarrhea about 4 to 5 times now he's feeling nauseous looking for medical advice  ''

## 2023-02-25 ENCOUNTER — HOSPITAL ENCOUNTER (OUTPATIENT)
Dept: GASTROENTEROLOGY | Facility: HOSPITAL | Age: 38
Setting detail: OUTPATIENT SURGERY
Discharge: HOME | End: 2023-02-25
Attending: INTERNAL MEDICINE

## 2023-02-25 ENCOUNTER — ANESTHESIA (OUTPATIENT)
Dept: GASTROENTEROLOGY | Facility: HOSPITAL | Age: 38
End: 2023-02-25

## 2023-02-25 ENCOUNTER — ANESTHESIA EVENT (OUTPATIENT)
Dept: GASTROENTEROLOGY | Facility: HOSPITAL | Age: 38
End: 2023-02-25

## 2023-02-25 VITALS
HEART RATE: 77 BPM | HEIGHT: 66 IN | DIASTOLIC BLOOD PRESSURE: 63 MMHG | WEIGHT: 255 LBS | TEMPERATURE: 97 F | RESPIRATION RATE: 16 BRPM | SYSTOLIC BLOOD PRESSURE: 140 MMHG | BODY MASS INDEX: 40.98 KG/M2 | OXYGEN SATURATION: 99 %

## 2023-02-25 DIAGNOSIS — R19.7 DIARRHEA OF PRESUMED INFECTIOUS ORIGIN: ICD-10-CM

## 2023-02-25 DIAGNOSIS — K52.9 COLITIS: ICD-10-CM

## 2023-02-25 DIAGNOSIS — R93.5 ABNORMAL CT OF THE ABDOMEN: ICD-10-CM

## 2023-02-25 LAB — GLUCOSE SERPL-MCNC: 109 MG/DL (ref 65–140)

## 2023-02-25 RX ORDER — PANTOPRAZOLE SODIUM 40 MG/1
40 TABLET, DELAYED RELEASE ORAL DAILY
Qty: 30 TABLET | Refills: 2 | Status: SHIPPED | OUTPATIENT
Start: 2023-02-25

## 2023-02-25 RX ORDER — PROPOFOL 10 MG/ML
INJECTION, EMULSION INTRAVENOUS AS NEEDED
Status: DISCONTINUED | OUTPATIENT
Start: 2023-02-25 | End: 2023-02-25

## 2023-02-25 RX ORDER — PROPOFOL 10 MG/ML
INJECTION, EMULSION INTRAVENOUS CONTINUOUS PRN
Status: DISCONTINUED | OUTPATIENT
Start: 2023-02-25 | End: 2023-02-25

## 2023-02-25 RX ADMIN — PROPOFOL 120 MCG/KG/MIN: 10 INJECTION, EMULSION INTRAVENOUS at 09:44

## 2023-02-25 RX ADMIN — PROPOFOL 80 MG: 10 INJECTION, EMULSION INTRAVENOUS at 09:44

## 2023-02-25 NOTE — H&P
History and Physical -  [facility]  Coy Craig 44 y o  male MRN: [id number]    HPI: Coy Craig is a 44y o  year old male who presents with abd pain, diarrhea, ilietis       Review of Systems    Historical Information   Past Medical History:   Diagnosis Date   • Diabetes mellitus ([address] )    • Hypertension    • Nasal congestion     Frequently   • PONV (postoperative nausea and vomiting)    • Sleep apnea     5 5 apnea scale     Past Surgical History:   Procedure Laterality Date   • CT SEPTOPLASTY/SUBMUCOUS RESECJ W/WO CARTILAGE GRF N/A 11/28/2022    Procedure: SEPTOPLASTY;  Surgeon: Christ Ventura MD;  Location: [facility];  Service: ENT   • CT SUBMUCOUS [address] PRTL/COMPL Bilateral 11/28/2022    Procedure: TURBINECTOMY;  Surgeon: Christ Ventura MD;  Location: [facility];  Service: ENT     Social History   Social History     Substance and Sexual Activity   Alcohol Use Not Currently     Social History     Substance and Sexual Activity   Drug Use Never     Social History     Tobacco Use   Smoking Status Some Days   • Types: Cigars   • Passive exposure: Never   Smokeless Tobacco Never     Family History   Problem Relation Age of Onset   • Diabetes type II Mother    • Diabetes Mother    • Diabetes Maternal Grandmother    • Diabetes type II Maternal Grandmother    • Arthritis Maternal Grandmother    • Cancer Maternal Grandfather         Liver cancer       Meds/Allergies     (Not in a hospital admission)      Allergies   Allergen Reactions   • Other Angioedema   • Shellfish-Derived Products - Food Allergy Anaphylaxis   • Penicillins Hives   • Penicillin G Hives       Objective     /86   Pulse 89   Temp (!) 96 7 °F (35 9 °C) (Temporal)   Resp 16   Ht 5' 6" (1 676 m)   Wt 116 kg (255 lb)   SpO2 99%   BMI 41 16 kg/m²       PHYSICAL EXAM    Gen: NAD  CV: RRR  CHEST: Clear  ABD: soft, NT/ND  EXT: no edema  Neuro: AAO      ASSESSMENT/PLAN:  This is a 44y o  year old male here for abd pain, diarrhea, ilietis         PLAN:   Procedure: colonoscopy

## 2023-02-25 NOTE — ANESTHESIA PREPROCEDURE EVALUATION
Procedure:  COLONOSCOPY    Relevant Problems   CARDIO   (+) HTN, goal below 140/90   (+) Hyperlipidemia      ENDO   (+) Type 2 diabetes mellitus with hyperglycemia, without long-term current use of insulin (HCC)      GI/HEPATIC   (+) Hepatic steatosis      MUSCULOSKELETAL   (+) Arthritis of right acromioclavicular joint        Physical Exam    Airway    Mallampati score: III         Dental   No notable dental hx     Cardiovascular  Cardiovascular exam normal    Pulmonary  Pulmonary exam normal     Other Findings        Anesthesia Plan  ASA Score- 3     Anesthesia Type- IV sedation with anesthesia with ASA Monitors  Additional Monitors:   Airway Plan:           Plan Factors-    Chart reviewed  Patient summary reviewed  Induction- intravenous  Postoperative Plan-     Informed Consent- Anesthetic plan and risks discussed with patient  I personally reviewed this patient with the CRNA  Discussed and agreed on the Anesthesia Plan with the CRNA  Elier Brink

## 2023-02-25 NOTE — ANESTHESIA POSTPROCEDURE EVALUATION
Post-Op Assessment Note    CV Status:  Stable  Pain Score: 0    Pain management: adequate     Mental Status:  Alert and awake   Hydration Status:  Euvolemic   PONV Controlled:  Controlled   Airway Patency:  Patent      Post Op Vitals Reviewed: Yes      Staff: Anesthesiologist         No notable events documented      BP      Temp     Pulse     Resp      SpO2

## 2023-02-27 ENCOUNTER — OFFICE VISIT (OUTPATIENT)
Dept: AUDIOLOGY | Facility: CLINIC | Age: 38
End: 2023-02-27

## 2023-02-27 ENCOUNTER — OFFICE VISIT (OUTPATIENT)
Dept: OTOLARYNGOLOGY | Facility: CLINIC | Age: 38
End: 2023-02-27

## 2023-02-27 VITALS — BODY MASS INDEX: 41.78 KG/M2 | WEIGHT: 260 LBS | TEMPERATURE: 97.6 F | HEIGHT: 66 IN

## 2023-02-27 DIAGNOSIS — H93.13 TINNITUS OF BOTH EARS: ICD-10-CM

## 2023-02-27 DIAGNOSIS — H93.A1 PULSATILE TINNITUS OF RIGHT EAR: Primary | ICD-10-CM

## 2023-02-27 DIAGNOSIS — H69.81 ETD (EUSTACHIAN TUBE DYSFUNCTION), RIGHT: ICD-10-CM

## 2023-02-27 DIAGNOSIS — E07.89 THYROID FULLNESS: ICD-10-CM

## 2023-02-27 DIAGNOSIS — Z98.890 S/P NASAL SEPTOPLASTY: ICD-10-CM

## 2023-02-27 DIAGNOSIS — Z01.10 NORMAL BEHAVIORAL AUDIOMETRY: Primary | ICD-10-CM

## 2023-02-27 DIAGNOSIS — R06.83 SNORING: ICD-10-CM

## 2023-02-27 DIAGNOSIS — J31.0 RHINITIS, CHRONIC: ICD-10-CM

## 2023-02-27 PROBLEM — H69.91 ETD (EUSTACHIAN TUBE DYSFUNCTION), RIGHT: Status: ACTIVE | Noted: 2023-02-27

## 2023-02-27 RX ORDER — AZELASTINE 1 MG/ML
1 SPRAY, METERED NASAL 2 TIMES DAILY
Qty: 1 ML | Refills: 2 | Status: SHIPPED | OUTPATIENT
Start: 2023-02-27

## 2023-02-27 NOTE — ASSESSMENT & PLAN NOTE
Symptoms include pulsing tinnitus right ear, nasal congestion, snoring  Septoplasty and turbinate reduction with Dr Aman Sanford 11/28/2022    Reviewed nature of pulsatile tinnitus and possible causes including viral illness, cardiovascular, intracranial HTN, primary HTN, thyroid disorder, and musculoskeletal     Audiogram today indicating normal bilateral hearing, type A tymps  No SNHL, no CHL  No evidence otitis media  Options for treatment include lab studies, medication options for ETD, CTA study, treat for TMJ/musculoskeletal, and eye exam   Recommend monitor HTN  Discussed option of treating for ETD with nasal steroids and decongestants  May consider PT for musculoskeletal concerns as well  Due to recent septoplasty, concern for ETD or sinus source of his symptoms  May need to consider Ct sinus or sleep study  Pt agreed to Fluticasone nasal spray one spray each nostril twice per day, Astelin nasal spray, marshal med sinus rinses, Warm compresses to area during increased pulsing, massage to area  Due to pending labs for PCP agree add autoimmune and thyroid studies  Follow up in 4 to 6 weeks, if no change then will proceed with CTA

## 2023-02-27 NOTE — PATIENT INSTRUCTIONS
Simone Vargas med sinus rinses daily    Fluticasone nasal spray one spray each nostril twice per day    Astelin nasal spray prescribed

## 2023-02-27 NOTE — PROGRESS NOTES
Assessment/Plan:    Pulsatile tinnitus of right ear  Symptoms include pulsing tinnitus right ear, nasal congestion, snoring  Septoplasty and turbinate reduction with Dr Aman Sanford 11/28/2022    Reviewed nature of pulsatile tinnitus and possible causes including viral illness, cardiovascular, intracranial HTN, primary HTN, thyroid disorder, and musculoskeletal     Audiogram today indicating normal bilateral hearing, type A tymps  No SNHL, no CHL  No evidence otitis media  Options for treatment include lab studies, medication options for ETD, CTA study, treat for TMJ/musculoskeletal, and eye exam   Recommend monitor HTN  Discussed option of treating for ETD with nasal steroids and decongestants  May consider PT for musculoskeletal concerns as well  Due to recent septoplasty, concern for ETD or sinus source of his symptoms  May need to consider Ct sinus or sleep study  Pt agreed to Fluticasone nasal spray one spray each nostril twice per day, Astelin nasal spray, marshal med sinus rinses, Warm compresses to area during increased pulsing, massage to area  Due to pending labs for PCP agree add autoimmune and thyroid studies  Follow up in 4 to 6 weeks, if no change then will proceed with CTA  Diagnoses and all orders for this visit:    Pulsatile tinnitus of right ear  -     Ambulatory referral to Audiology  -     TSH, 3rd generation with Free T4 reflex; Future  -     T4; Future  -     RPR (Monitor) W/Refl Titer; Future  -     TANG w/Reflex; Future  -     RF Screen w/ Reflex to Titer; Future  -     Sedimentation rate, automated; Future  -     C-reactive protein; Future  -     TSH, 3rd generation with Free T4 reflex  -     T4  -     TANG w/Reflex  -     Sedimentation rate, automated  -     C-reactive protein    Tinnitus of both ears  -     Ambulatory Referral to Otolaryngology  -     TSH, 3rd generation with Free T4 reflex; Future  -     T4; Future  -     RPR (Monitor) W/Refl Titer;  Future  -     TANG w/Reflex; Future  -     RF Screen w/ Reflex to Titer; Future  -     Sedimentation rate, automated; Future  -     C-reactive protein; Future  -     TSH, 3rd generation with Free T4 reflex  -     T4  -     TANG w/Reflex  -     Sedimentation rate, automated  -     C-reactive protein    ETD (Eustachian tube dysfunction), right  -     TSH, 3rd generation with Free T4 reflex; Future  -     T4; Future  -     RPR (Monitor) W/Refl Titer; Future  -     TANG w/Reflex; Future  -     RF Screen w/ Reflex to Titer; Future  -     Sedimentation rate, automated; Future  -     C-reactive protein; Future  -     TSH, 3rd generation with Free T4 reflex  -     T4  -     TANG w/Reflex  -     Sedimentation rate, automated  -     C-reactive protein    S/P nasal septoplasty  -     TSH, 3rd generation with Free T4 reflex; Future  -     T4; Future  -     RPR (Monitor) W/Refl Titer; Future  -     TANG w/Reflex; Future  -     RF Screen w/ Reflex to Titer; Future  -     Sedimentation rate, automated; Future  -     C-reactive protein; Future  -     TSH, 3rd generation with Free T4 reflex  -     T4  -     TANG w/Reflex  -     Sedimentation rate, automated  -     C-reactive protein    Rhinitis, chronic  -     azelastine (ASTELIN) 0 1 % nasal spray; 1 spray into each nostril 2 (two) times a day Use in each nostril as directed  -     TSH, 3rd generation with Free T4 reflex; Future  -     T4; Future  -     RPR (Monitor) W/Refl Titer; Future  -     TANG w/Reflex; Future  -     RF Screen w/ Reflex to Titer; Future  -     Sedimentation rate, automated; Future  -     C-reactive protein; Future  -     TSH, 3rd generation with Free T4 reflex  -     T4  -     TANG w/Reflex  -     Sedimentation rate, automated  -     C-reactive protein    Snoring  -     TSH, 3rd generation with Free T4 reflex; Future  -     T4; Future  -     RPR (Monitor) W/Refl Titer; Future  -     TANG w/Reflex; Future  -     RF Screen w/ Reflex to Titer;  Future  -     Sedimentation rate, automated; Future  -     C-reactive protein; Future  -     TSH, 3rd generation with Free T4 reflex  -     T4  -     TANG w/Reflex  -     Sedimentation rate, automated  -     C-reactive protein    Thyroid fullness  -     TSH, 3rd generation with Free T4 reflex; Future  -     T4; Future  -     RPR (Monitor) W/Refl Titer; Future  -     TANG w/Reflex; Future  -     RF Screen w/ Reflex to Titer; Future  -     Sedimentation rate, automated; Future  -     C-reactive protein; Future  -     TSH, 3rd generation with Free T4 reflex  -     T4  -     TANG w/Reflex  -     Sedimentation rate, automated  -     C-reactive protein          Subjective:      Patient ID: Coy Craig is a 44 y o  male  Presents today as a new patient due to ear concerns  Occurring for about one to two months  Pulsing tinnitus  Hearing heartbeat in right ear  No URI at start of symptoms  No otalgia or otorrhea  Ears itching often and feel wet  No history of ear surgery  No current hearing aids  History of noise exposure with [employer]  Septoplasty and turbinate reduction with Dr Ruel Atkins 11/28/2022        The following portions of the patient's history were reviewed and updated as appropriate: allergies, current medications, past family history, past medical history, past social history, past surgical history and problem list     Review of Systems   Constitutional: Negative  HENT: Positive for tinnitus  Negative for congestion, ear discharge, ear pain, hearing loss, nosebleeds, postnasal drip, rhinorrhea, sinus pressure, sinus pain, sore throat and voice change  Eyes: Negative  Respiratory: Negative for chest tightness and shortness of breath  Cardiovascular: Negative  Gastrointestinal: Negative  Endocrine: Negative  Musculoskeletal: Negative  Skin: Negative for color change  Neurological: Negative for dizziness, numbness and headaches  Psychiatric/Behavioral: Negative            Objective:      Temp 97 6 °F (36 4 °C) (Temporal)   Ht 5' 6" (1 676 m)   Wt 118 kg (260 lb)   BMI 41 97 kg/m²          Physical Exam  Constitutional:       Appearance: He is well-developed  HENT:      Head: Normocephalic  Right Ear: Hearing, tympanic membrane, ear canal and external ear normal  No decreased hearing noted  No drainage or tenderness  Tympanic membrane is not perforated or erythematous  Left Ear: Hearing, tympanic membrane, ear canal and external ear normal  No decreased hearing noted  No drainage or tenderness  Tympanic membrane is not perforated or erythematous  Nose: Nose normal  No nasal deformity or septal deviation  Mouth/Throat:      Mouth: Mucous membranes are not pale and not dry  No oral lesions  Dentition: Normal dentition  Pharynx: Uvula midline  No oropharyngeal exudate  Neck:      Trachea: No tracheal deviation  Cardiovascular:      Rate and Rhythm: Normal rate  Pulmonary:      Effort: Pulmonary effort is normal  No accessory muscle usage or respiratory distress  Musculoskeletal:      Cervical back: Full passive range of motion without pain, normal range of motion and neck supple  Lymphadenopathy:      Cervical: No cervical adenopathy  Skin:     General: Skin is warm and dry  Neurological:      Mental Status: He is alert and oriented to person, place, and time  Cranial Nerves: No cranial nerve deficit  Sensory: No sensory deficit  Psychiatric:         Behavior: Behavior is cooperative

## 2023-02-27 NOTE — PROGRESS NOTES
ADULT ENT HEARING EVALUATION - [place] [address] AUDIOLOGY      Patient Name: Raven Mensah   MRN:  [id number]   :  1985   Age: 44 y o  Gender: male   DOS: 2023     HISTORY:     Raven Mensah, a 44 y o  male, was seen on 2023 at the referral of SHIMON Venegas,  for an evaluation of hearing as part of his ENT visit  Mr Genesis Carrillo primary complaint is right pulsatile tinnitus  He denied otalgia, otorrhea, aural fullness and dizziness  Mr Tab Lockhart has had his hearing tested previously in the [employer]  RESULTS:    Otoscopic Evaluation:   Right Ear: Unremarkable, canal clear   Left Ear: Unremarkable, canal clear    Tympanometry:   Right Ear: Type A; normal middle ear pressure and static compliance    Left Ear: Type A; normal middle ear pressure and static compliance     Audiometry:  Conventional pure tone audiometry from 250 - 8000 Hz  obtained with good reliability and revealed the following:     Right Ear: normal hearing sensitivity   Left Ear: normal hearing sensitivity     Speech Audiometry:    Speech Reception (SRT)   Right Ear: 15 dB HL   Left Ear: 20 dB HL    Word Recognition Scores (WRS):  Right Ear: excellent (100 % correct)     Left Ear: excellent (100 % correct)   Stimuli: [id number]    *see attached audiogram*      RECOMMENDATIONS:    1 ) Follow-up with referring provider  2 ) Further audiometric testing PRN  It was a pleasure working with Mr Tab Lockhart today  Thank you for referring this patient  Sheryle Medico, AuD    Clinical Audiologist    [address] [place] [id number]

## 2023-03-09 ENCOUNTER — RA CDI HCC (OUTPATIENT)
Dept: OTHER | Facility: HOSPITAL | Age: 38
End: 2023-03-09

## 2023-03-09 NOTE — PROGRESS NOTES
[facility]  coding opportunities          Chart Reviewed number of suggestions sent to Provider: 1  [phone]     Patients Insurance        Commercial Insurance: Familia Hooper

## 2023-03-16 ENCOUNTER — OFFICE VISIT (OUTPATIENT)
Dept: FAMILY MEDICINE CLINIC | Facility: CLINIC | Age: 38
End: 2023-03-16

## 2023-03-16 VITALS
HEIGHT: 66 IN | HEART RATE: 72 BPM | DIASTOLIC BLOOD PRESSURE: 82 MMHG | RESPIRATION RATE: 14 BRPM | SYSTOLIC BLOOD PRESSURE: 120 MMHG | WEIGHT: 272 LBS | TEMPERATURE: 97.4 F | BODY MASS INDEX: 43.71 KG/M2

## 2023-03-16 DIAGNOSIS — H69.81 ETD (EUSTACHIAN TUBE DYSFUNCTION), RIGHT: ICD-10-CM

## 2023-03-16 DIAGNOSIS — N52.9 ERECTILE DYSFUNCTION, UNSPECIFIED ERECTILE DYSFUNCTION TYPE: ICD-10-CM

## 2023-03-16 DIAGNOSIS — E11.65 TYPE 2 DIABETES MELLITUS WITH HYPERGLYCEMIA, WITHOUT LONG-TERM CURRENT USE OF INSULIN (HCC): Primary | ICD-10-CM

## 2023-03-16 DIAGNOSIS — I10 HTN, GOAL BELOW 140/90: ICD-10-CM

## 2023-03-16 DIAGNOSIS — E78.5 HYPERLIPIDEMIA, UNSPECIFIED HYPERLIPIDEMIA TYPE: ICD-10-CM

## 2023-03-16 DIAGNOSIS — E66.01 CLASS 3 SEVERE OBESITY DUE TO EXCESS CALORIES WITH SERIOUS COMORBIDITY AND BODY MASS INDEX (BMI) OF 40.0 TO 44.9 IN ADULT (HCC): ICD-10-CM

## 2023-03-16 PROBLEM — E11.9 DIABETES MELLITUS WITHOUT COMPLICATION (HCC): Status: RESOLVED | Noted: 2021-05-29 | Resolved: 2023-03-16

## 2023-03-16 PROBLEM — R11.2 NAUSEA AND VOMITING: Status: RESOLVED | Noted: 2023-02-09 | Resolved: 2023-03-16

## 2023-03-16 PROBLEM — R19.7 DIARRHEA: Status: RESOLVED | Noted: 2023-02-02 | Resolved: 2023-03-16

## 2023-03-16 LAB — SL AMB POCT HEMOGLOBIN AIC: 6.6 (ref ?–6.5)

## 2023-03-16 RX ORDER — TADALAFIL 10 MG/1
10 TABLET ORAL DAILY PRN
Qty: 10 TABLET | Refills: 0 | Status: SHIPPED | OUTPATIENT
Start: 2023-03-16

## 2023-03-16 NOTE — PROGRESS NOTES
Clinic Visit Note  Zaida Suarez 44 y o  male   MRN: [id number]    Assessment and Plan      Diagnoses and all orders for this visit:    Type 2 diabetes mellitus with hyperglycemia, without long-term current use of insulin ([facility] [address] )  Diabetes well controlled, hemoglobin A1c 6 6%, continue Ozempic weekly, continue to monitor weight, follow-up 3 months recommended  -     POCT hemoglobin A1c  -     Ambulatory Referral to Ophthalmology; Future    Erectile dysfunction, unspecified erectile dysfunction type  -     tadalafil (CIALIS) 10 MG tablet; Take 1 tablet (10 mg total) by mouth daily as needed for erectile dysfunction    HTN, goal below 140/90  Continue low-dose lisinopril, blood pressure appropriate today on exam    Hyperlipidemia, unspecified hyperlipidemia type  LDL appropriate, repeat next visit    Class 3 severe obesity due to excess calories with serious comorbidity and body mass index (BMI) of 40 0 to 44 9 in [facility])  Continue high-dose Ozempic    ETD (Eustachian tube dysfunction), right  Patient is following closely with ENT    My impressions and treatment recommendations were discussed in detail with the patient who verbalized understanding and had no further questions  Discharge instructions were provided  Subjective     Chief Complaint: F/U    History of Present Illness:    Patient is a pleasant 39-year-old male coming in for follow-up on type 2 diabetes management  Asymptomatic on exam today  The following portions of the patient's history were reviewed and updated as appropriate: allergies, current medications, past family history, past medical history, past social history, past surgical history and problem list     REVIEW OF SYSTEMS:  A complete 12-point review of systems is negative other than that noted in the HPI  Review of Systems   Constitutional: Negative for chills, fatigue and fever  HENT: Negative for congestion and sore throat  Eyes: Negative for pain and visual disturbance  Respiratory: Negative for shortness of breath and wheezing  Cardiovascular: Negative for chest pain and palpitations  Gastrointestinal: Negative for abdominal pain, constipation, diarrhea, nausea and vomiting  Genitourinary: Negative for dysuria and frequency  Musculoskeletal: Negative for back pain and neck pain  Skin: Negative for color change and rash  Neurological: Negative for dizziness and headaches  Psychiatric/Behavioral: Negative for agitation and confusion  All other systems reviewed and are negative          Current Outpatient Medications:   •  azelastine (ASTELIN) 0 1 % nasal spray, 1 spray into each nostril 2 (two) times a day Use in each nostril as directed, Disp: 1 mL, Rfl: 2  •  lisinopril (ZESTRIL) 10 mg tablet, Take 10 mg by mouth daily, Disp: , Rfl:   •  Multiple Vitamin (multivitamin) tablet, Take 1 tablet by mouth daily, Disp: , Rfl:   •  semaglutide, 2 mg/dose, (Ozempic, 2 MG/DOSE,) 8 mg/ mL injection pen, Inject 0 75 mL (2 mg total) under the skin every 7 days, Disp: 15 mL, Rfl: 2  •  tadalafil (CIALIS) 10 MG tablet, Take 1 tablet (10 mg total) by mouth daily as needed for erectile dysfunction, Disp: 10 tablet, Rfl: 0  Past Medical History:   Diagnosis Date   • Diabetes mellitus ([facility] [address] )    • Hypertension    • Nasal congestion     Frequently   • PONV (postoperative nausea and vomiting)    • Sleep apnea     5 5 apnea scale     Past Surgical History:   Procedure Laterality Date   • [place] SEPTOPLASTY/SUBMUCOUS RESECJ W/WO CARTILAGE GRF N/A 11/28/2022    Procedure: SEPTOPLASTY;  Surgeon: Ko Rivera MD;  Location: [facility];  Service: ENT   • [place] SUBMUCOUS [address] PRTL/COMPL Bilateral 11/28/2022    Procedure: TURBINECTOMY;  Surgeon: Ko Rivera MD;  Location: [facility];  Service: ENT     Social History     Socioeconomic History   • Marital status: Single     Spouse name: Not on file   • Number of children: Not on file   • Years of education: Not on file • Highest education level: Not on file   Occupational History   • Not on file   Tobacco Use   • Smoking status: Some Days     Types: Cigars     Passive exposure: Never   • Smokeless tobacco: Never   Vaping Use   • Vaping Use: Never used   Substance and Sexual Activity   • Alcohol use: Not Currently   • Drug use: Never   • Sexual activity: Yes     Partners: Female     Birth control/protection: None   Other Topics Concern   • Not on file   Social History Narrative   • Not on file     Social Determinants of Health     Financial Resource Strain: Not on file   Food Insecurity: Not on file   Transportation Needs: Not on file   Physical Activity: Not on file   Stress: Not on file   Social Connections: Not on file   Intimate Partner Violence: Not on file   Housing Stability: Not on file     Family History   Problem Relation Age of Onset   • Diabetes type II Mother    • Diabetes Mother    • Diabetes Maternal Grandmother    • Diabetes type II Maternal Grandmother    • Arthritis Maternal Grandmother    • Cancer Maternal Grandfather         Liver cancer     Allergies   Allergen Reactions   • Other Angioedema   • Shellfish-Derived Products - Food Allergy Anaphylaxis   • Penicillins Hives   • Penicillin G Hives       Objective     Vitals:    03/16/23 1411   BP: 120/82   BP Location: Left arm   Patient Position: Sitting   Cuff Size: Adult   Pulse: 72   Resp: 14   Temp: (!) 97 4 °F (36 3 °C)   TempSrc: Temporal   Weight: 123 kg (272 lb)   Height: 5' 6" (1 676 m)       Physical Exam:     GENERAL: NAD, pleasant   HEENT:  NC/AT, PERRL, EOMI, no scleral icterus  CARDIAC:  RRR, +S1/S2, no S3/S4 appreciated, no m/g/r  PULMONARY:  CTA B/L, no wheezing/rales/rhonci, non-labored breathing  ABDOMEN:  Soft, NT/ND, no rebound/guarding/rigidity  Extremities:   No edema, cyanosis, or clubbing  Musculoskeletal:  Full range of motion intact in all extremities   NEUROLOGIC: Grossly intact, no focal deficits  SKIN:  No rashes or erythema noted on exposed skin  Psych: Normal affect, mood stable    ==  PLEASE NOTE:  This encounter was completed utilizing the [employer] Direct Speech Voice Recognition Software  Grammatical errors, random word insertions, pronoun errors and incomplete sentences are occasional consequences of the system due to software limitations, ambient noise and hardware issues  These may be missed by proof reading prior to affixing electronic signature  Any questions or concerns about the content, text or information contained within the body of this dictation should be directly addressed to the physician for clarification  Please do not hesitate to call me directly if you have any any questions or concerns      DO Joel Saul [facility] Internal Medicine   [facility]

## 2023-04-07 ENCOUNTER — LAB (OUTPATIENT)
Dept: LAB | Facility: CLINIC | Age: 38
End: 2023-04-07

## 2023-04-07 DIAGNOSIS — G47.33 OSA (OBSTRUCTIVE SLEEP APNEA): ICD-10-CM

## 2023-04-07 DIAGNOSIS — H93.13 TINNITUS OF BOTH EARS: ICD-10-CM

## 2023-04-07 DIAGNOSIS — H93.A1 PULSATILE TINNITUS OF RIGHT EAR: ICD-10-CM

## 2023-04-07 DIAGNOSIS — E11.65 TYPE 2 DIABETES MELLITUS WITH HYPERGLYCEMIA, WITHOUT LONG-TERM CURRENT USE OF INSULIN (HCC): ICD-10-CM

## 2023-04-07 DIAGNOSIS — Z12.5 PROSTATE CANCER SCREENING: ICD-10-CM

## 2023-04-07 DIAGNOSIS — E11.9 DIABETES MELLITUS WITHOUT COMPLICATION (HCC): ICD-10-CM

## 2023-04-07 DIAGNOSIS — E66.01 CLASS 3 SEVERE OBESITY DUE TO EXCESS CALORIES WITH SERIOUS COMORBIDITY AND BODY MASS INDEX (BMI) OF 40.0 TO 44.9 IN ADULT (HCC): ICD-10-CM

## 2023-04-07 DIAGNOSIS — E07.89 THYROID FULLNESS: ICD-10-CM

## 2023-04-07 DIAGNOSIS — Z98.890 S/P NASAL SEPTOPLASTY: ICD-10-CM

## 2023-04-07 DIAGNOSIS — R06.83 SNORING: ICD-10-CM

## 2023-04-07 DIAGNOSIS — J31.0 RHINITIS, CHRONIC: ICD-10-CM

## 2023-04-07 DIAGNOSIS — H69.81 ETD (EUSTACHIAN TUBE DYSFUNCTION), RIGHT: ICD-10-CM

## 2023-04-07 DIAGNOSIS — E78.5 HYPERLIPIDEMIA, UNSPECIFIED HYPERLIPIDEMIA TYPE: ICD-10-CM

## 2023-04-07 DIAGNOSIS — Z13.29 THYROID DISORDER SCREENING: ICD-10-CM

## 2023-04-07 LAB
ALBUMIN SERPL BCP-MCNC: 3.9 G/DL (ref 3.5–5)
ALP SERPL-CCNC: 80 U/L (ref 46–116)
ALT SERPL W P-5'-P-CCNC: 51 U/L (ref 12–78)
ANION GAP SERPL CALCULATED.3IONS-SCNC: 2 MMOL/L (ref 4–13)
AST SERPL W P-5'-P-CCNC: 21 U/L (ref 5–45)
BACTERIA UR QL AUTO: NORMAL /HPF
BASOPHILS # BLD AUTO: 0.04 THOUSANDS/ÂΜL (ref 0–0.1)
BASOPHILS NFR BLD AUTO: 1 % (ref 0–1)
BILIRUB SERPL-MCNC: 0.87 MG/DL (ref 0.2–1)
BILIRUB UR QL STRIP: NEGATIVE
BUN SERPL-MCNC: 15 MG/DL (ref 5–25)
CALCIUM SERPL-MCNC: 9.8 MG/DL (ref 8.3–10.1)
CHLORIDE SERPL-SCNC: 106 MMOL/L (ref 96–108)
CHOLEST SERPL-MCNC: 132 MG/DL
CLARITY UR: CLEAR
CO2 SERPL-SCNC: 28 MMOL/L (ref 21–32)
COLOR UR: NORMAL
CREAT SERPL-MCNC: 0.8 MG/DL (ref 0.6–1.3)
CREAT UR-MCNC: 112 MG/DL
CRP SERPL QL: <3 MG/L
EOSINOPHIL # BLD AUTO: 0.21 THOUSAND/ÂΜL (ref 0–0.61)
EOSINOPHIL NFR BLD AUTO: 3 % (ref 0–6)
ERYTHROCYTE [DISTWIDTH] IN BLOOD BY AUTOMATED COUNT: 12.6 % (ref 11.6–15.1)
ERYTHROCYTE [SEDIMENTATION RATE] IN BLOOD: 19 MM/HOUR (ref 0–14)
GFR SERPL CREATININE-BSD FRML MDRD: 113 ML/MIN/1.73SQ M
GLUCOSE P FAST SERPL-MCNC: 126 MG/DL (ref 65–99)
GLUCOSE UR STRIP-MCNC: NEGATIVE MG/DL
HCT VFR BLD AUTO: 42.5 % (ref 36.5–49.3)
HDLC SERPL-MCNC: 30 MG/DL
HGB BLD-MCNC: 14 G/DL (ref 12–17)
HGB UR QL STRIP.AUTO: NEGATIVE
IMM GRANULOCYTES # BLD AUTO: 0.03 THOUSAND/UL (ref 0–0.2)
IMM GRANULOCYTES NFR BLD AUTO: 0 % (ref 0–2)
KETONES UR STRIP-MCNC: NEGATIVE MG/DL
LDLC SERPL CALC-MCNC: 82 MG/DL (ref 0–100)
LEUKOCYTE ESTERASE UR QL STRIP: NEGATIVE
LYMPHOCYTES # BLD AUTO: 2.62 THOUSANDS/ÂΜL (ref 0.6–4.47)
LYMPHOCYTES NFR BLD AUTO: 35 % (ref 14–44)
MCH RBC QN AUTO: 27.4 PG (ref 26.8–34.3)
MCHC RBC AUTO-ENTMCNC: 32.9 G/DL (ref 31.4–37.4)
MCV RBC AUTO: 83 FL (ref 82–98)
MICROALBUMIN UR-MCNC: 10.6 MG/L (ref 0–20)
MICROALBUMIN/CREAT 24H UR: 9 MG/G CREATININE (ref 0–30)
MONOCYTES # BLD AUTO: 0.42 THOUSAND/ÂΜL (ref 0.17–1.22)
MONOCYTES NFR BLD AUTO: 6 % (ref 4–12)
NEUTROPHILS # BLD AUTO: 4.26 THOUSANDS/ÂΜL (ref 1.85–7.62)
NEUTS SEG NFR BLD AUTO: 55 % (ref 43–75)
NITRITE UR QL STRIP: NEGATIVE
NON-SQ EPI CELLS URNS QL MICRO: NORMAL /HPF
NRBC BLD AUTO-RTO: 0 /100 WBCS
PH UR STRIP.AUTO: 6 [PH]
PLATELET # BLD AUTO: 342 THOUSANDS/UL (ref 149–390)
PMV BLD AUTO: 11.1 FL (ref 8.9–12.7)
POTASSIUM SERPL-SCNC: 4.2 MMOL/L (ref 3.5–5.3)
PROT SERPL-MCNC: 7.6 G/DL (ref 6.4–8.4)
PROT UR STRIP-MCNC: NEGATIVE MG/DL
PSA SERPL-MCNC: 0.1 NG/ML (ref 0–4)
RBC # BLD AUTO: 5.11 MILLION/UL (ref 3.88–5.62)
RBC #/AREA URNS AUTO: NORMAL /HPF
SODIUM SERPL-SCNC: 136 MMOL/L (ref 135–147)
SP GR UR STRIP.AUTO: 1.02 (ref 1–1.03)
T4 FREE SERPL-MCNC: 0.82 NG/DL (ref 0.76–1.46)
T4 SERPL-MCNC: 7.5 UG/DL (ref 4.7–13.3)
TRIGL SERPL-MCNC: 98 MG/DL
TSH SERPL DL<=0.05 MIU/L-ACNC: 1.94 UIU/ML (ref 0.45–4.5)
TSH SERPL DL<=0.05 MIU/L-ACNC: 2.06 UIU/ML (ref 0.45–4.5)
UROBILINOGEN UR STRIP-ACNC: <2 MG/DL
WBC # BLD AUTO: 7.58 THOUSAND/UL (ref 4.31–10.16)
WBC #/AREA URNS AUTO: NORMAL /HPF

## 2023-04-08 LAB
ANA SER QL IA: NEGATIVE
EST. AVERAGE GLUCOSE BLD GHB EST-MCNC: 134 MG/DL
HBA1C MFR BLD: 6.3 %
RHEUMATOID FACT SER QL LA: NEGATIVE
TREPONEMA PALLIDUM IGG+IGM AB [PRESENCE] IN SERUM OR PLASMA BY IMMUNOASSAY: NORMAL

## 2023-05-08 LAB
LEFT EYE DIABETIC RETINOPATHY: NORMAL
RIGHT EYE DIABETIC RETINOPATHY: NORMAL

## 2023-05-08 PROCEDURE — 2023F DILAT RTA XM W/O RTNOPTHY: CPT | Performed by: PHYSICIAN ASSISTANT

## 2023-05-23 ENCOUNTER — OFFICE VISIT (OUTPATIENT)
Dept: OBGYN CLINIC | Facility: CLINIC | Age: 38
End: 2023-05-23

## 2023-05-23 ENCOUNTER — APPOINTMENT (OUTPATIENT)
Dept: LAB | Facility: CLINIC | Age: 38
End: 2023-05-23

## 2023-05-23 VITALS
DIASTOLIC BLOOD PRESSURE: 84 MMHG | WEIGHT: 275 LBS | HEIGHT: 66 IN | SYSTOLIC BLOOD PRESSURE: 124 MMHG | HEART RATE: 82 BPM | BODY MASS INDEX: 44.2 KG/M2

## 2023-05-23 DIAGNOSIS — M25.50 ARTHRALGIA, UNSPECIFIED JOINT: ICD-10-CM

## 2023-05-23 DIAGNOSIS — M25.50 ARTHRALGIA, UNSPECIFIED JOINT: Primary | ICD-10-CM

## 2023-05-23 DIAGNOSIS — M77.12 LATERAL EPICONDYLITIS OF LEFT ELBOW: ICD-10-CM

## 2023-05-23 NOTE — PROGRESS NOTES
Assessment/Plan:  1  Arthralgia, unspecified joint  RF Screen w/ Reflex to Titer    Cyclic citrul peptide antibody, IgG    HLA-B27 antigen    HLA-B27 antigen      2  Lateral epicondylitis of left elbow          The patient does appear to have lateral epicondylitis of the left elbow  We will start with conservative treatment for this  He was provided with a counterforce and cock-up wrist brace today  I also prescribed patient occupational therapy  He can ice and take over-the-counter medications as needed for discomfort  If he fails make progress, we did discuss x-rays and possible MRI of the elbow to further evaluate  As for his multiple other joint complaints, I did order some baseline blood work to rule out an autoimmune cause  He did have recent TANG, ESR and CRP which were normal   The patient will follow-up in 4 to 6 weeks for repeat evaluation  Subjective:   Nino Shen is a 44 y o  male who presents today for evaluation of his left elbow  He notes this started to bother him about a month and a half ago with no inciting event prior to the onset of his symptoms  He does work as a , and does do a lot of lifting with this arm though  He notes pain localized to the lateral aspect of the elbow, which is worse with lifting things and better with rest   He still notes full range of motion of the elbow  He denies any paresthesias of the left upper extremity  Patient does also complain of multiple other joint problems including bilateral knee pain, ankle pain, and thumb pain  He has no known history of rheumatoid arthritis  Review of Systems   Constitutional: Negative  Negative for chills and fever  HENT: Negative  Negative for ear pain and sore throat  Eyes: Negative  Negative for pain and redness  Respiratory: Negative  Negative for shortness of breath and wheezing  Cardiovascular: Negative for chest pain and palpitations  Gastrointestinal: Negative    Negative for abdominal pain and blood in stool  Endocrine: Negative  Negative for polydipsia and polyuria  Genitourinary: Negative  Negative for difficulty urinating and dysuria  Musculoskeletal:        As noted in HPI   Skin: Negative  Negative for pallor and rash  Neurological: Negative  Negative for dizziness and numbness  Hematological: Negative  Negative for adenopathy  Does not bruise/bleed easily  Psychiatric/Behavioral: Negative  Negative for confusion and suicidal ideas           Past Medical History:   Diagnosis Date   • Diabetes mellitus ([facility] [address] )    • Hypertension    • Nasal congestion     Frequently   • PONV (postoperative nausea and vomiting)    • Sleep apnea     5 5 apnea scale       Past Surgical History:   Procedure Laterality Date   • [place] SEPTOPLASTY/SUBMUCOUS RESECJ W/WO CARTILAGE GRF N/A 11/28/2022    Procedure: SEPTOPLASTY;  Surgeon: Mando Cooper MD;  Location: [facility];  Service: ENT   • [place] SUBMUCOUS [address] PRTL/COMPL Bilateral 11/28/2022    Procedure: TURBINECTOMY;  Surgeon: Mando Cooper MD;  Location: [address] [place] [address];  Service: ENT       Family History   Problem Relation Age of Onset   • Diabetes type II Mother    • Diabetes Mother    • Diabetes Maternal Grandmother    • Diabetes type II Maternal Grandmother    • Arthritis Maternal Grandmother    • Cancer Maternal Grandfather         Liver cancer       Social History     Occupational History   • Not on file   Tobacco Use   • Smoking status: Some Days     Types: Cigars     Passive exposure: Never   • Smokeless tobacco: Never   • Tobacco comments:     Wife just had baby not smoking right now   Vaping Use   • Vaping Use: Never used   Substance and Sexual Activity   • Alcohol use: Not Currently     Comment: rare   • Drug use: Never   • Sexual activity: Yes     Partners: Female     Birth control/protection: None         Current Outpatient Medications:   •  lisinopril (ZESTRIL) 10 mg tablet, Take 10 mg by mouth daily, Disp: , Rfl:   •  azelastine (ASTELIN) 0 1 % nasal spray, 1 spray into each nostril 2 (two) times a day Use in each nostril as directed, Disp: 1 mL, Rfl: 2  •  semaglutide, 2 mg/dose, (Ozempic, 2 MG/DOSE,) 8 mg/ mL injection pen, Inject 0 75 mL (2 mg total) under the skin every 7 days, Disp: 15 mL, Rfl: 2  •  tadalafil (CIALIS) 10 MG tablet, Take 1 tablet (10 mg total) by mouth daily as needed for erectile dysfunction, Disp: 10 tablet, Rfl: 0    Allergies   Allergen Reactions   • Other Angioedema   • Shellfish-Derived Products - Food Allergy Anaphylaxis   • Penicillins Hives   • Penicillin G Hives       Objective:  Vitals:    05/23/23 0928   BP: 124/84   Pulse: 82            Left Elbow Exam     Tenderness   The patient is experiencing tenderness in the lateral epicondyle  Range of Motion   Extension: 0   Flexion: 140   Pronation: normal   Supination: normal     Other   Erythema: absent  Sensation: normal  Pulse: present            Physical Exam  Constitutional:       General: He is not in acute distress  Appearance: He is well-developed  HENT:      Head: Normocephalic and atraumatic  Eyes:      General: No scleral icterus  Conjunctiva/sclera: Conjunctivae normal    Neck:      Vascular: No JVD  Cardiovascular:      Rate and Rhythm: Normal rate  Pulmonary:      Effort: Pulmonary effort is normal  No respiratory distress  Skin:     General: Skin is warm  Neurological:      Mental Status: He is alert and oriented to person, place, and time  Coordination: Coordination normal              This document was created using speech voice recognition software  Grammatical errors, random word insertions, pronoun errors, and incomplete sentences are an occasional consequence of this system due to software limitations, ambient noise, and hardware issues     Any formal questions or concerns about content, text, or information contained within the body of this dictation should be directly addressed to the provider for clarification

## 2023-05-24 ENCOUNTER — OFFICE VISIT (OUTPATIENT)
Dept: OBGYN CLINIC | Facility: CLINIC | Age: 38
End: 2023-05-24

## 2023-05-24 ENCOUNTER — APPOINTMENT (OUTPATIENT)
Dept: RADIOLOGY | Facility: CLINIC | Age: 38
End: 2023-05-24

## 2023-05-24 VITALS
HEART RATE: 78 BPM | BODY MASS INDEX: 43.9 KG/M2 | SYSTOLIC BLOOD PRESSURE: 109 MMHG | DIASTOLIC BLOOD PRESSURE: 79 MMHG | TEMPERATURE: 98.2 F | WEIGHT: 272 LBS

## 2023-05-24 DIAGNOSIS — M25.571 PAIN, JOINT, ANKLE AND FOOT, RIGHT: ICD-10-CM

## 2023-05-24 DIAGNOSIS — M76.821 POSTERIOR TIBIAL TENDINITIS OF RIGHT LOWER EXTREMITY: ICD-10-CM

## 2023-05-24 DIAGNOSIS — M25.571 PAIN, JOINT, ANKLE AND FOOT, RIGHT: Primary | ICD-10-CM

## 2023-05-24 DIAGNOSIS — M21.41 PES PLANUS OF BOTH FEET: ICD-10-CM

## 2023-05-24 DIAGNOSIS — M21.42 PES PLANUS OF BOTH FEET: ICD-10-CM

## 2023-05-24 LAB — RHEUMATOID FACT SER QL LA: NEGATIVE

## 2023-05-24 RX ORDER — PANTOPRAZOLE SODIUM 20 MG/1
20 TABLET, DELAYED RELEASE ORAL DAILY
COMMUNITY

## 2023-05-24 NOTE — PROGRESS NOTES
Assessment/Plan:  1  Pain, joint, ankle and foot, right  XR ankle 3+ vw right      2  Pes planus of both feet  Ambulatory Referral to Podiatry      3  Posterior tibial tendinitis of right lower extremity          The patient does have severe bilateral pes planus  This is causing some deformity of the right ankle, which I do feel is contributing to his feelings of weakness and instability  He does have some tenderness of the posterior tibial tendon today, as well as some mild weakness  I did provide the patient with a referral to podiatry for custom orthotics for his pes planus  I also provided him with a physician directed home exercise program for ankle strengthening  If he fails make progress, we can have him do formal physical therapy in the future  He will FU as needed  Subjective:   Odilia Patel is a 44 y o  male who presents today for evaluation of his right ankle  He notes some chronic type weakness about the right ankle, which seems to be getting progressively worse  He notes  he is fine when he wears his high work boots, but when he comes home and takes his shoes off he notes weakness about the ankle  He has difficulty with going downstairs in particular as he feels the ankle is unstable  He also gets some pain about the ankle, which is mostly anterior and medial   He denies any history of injury to this ankle  He also denies any swelling  Review of Systems   Constitutional: Negative  Negative for chills and fever  HENT: Negative  Negative for ear pain and sore throat  Eyes: Negative  Negative for pain and redness  Respiratory: Negative  Negative for shortness of breath and wheezing  Cardiovascular: Negative for chest pain and palpitations  Gastrointestinal: Negative  Negative for abdominal pain and blood in stool  Endocrine: Negative  Negative for polydipsia and polyuria  Genitourinary: Negative  Negative for difficulty urinating and dysuria     Musculoskeletal: As noted in HPI   Skin: Negative  Negative for pallor and rash  Neurological: Negative  Negative for dizziness and numbness  Hematological: Negative  Negative for adenopathy  Does not bruise/bleed easily  Psychiatric/Behavioral: Negative  Negative for confusion and suicidal ideas           Past Medical History:   Diagnosis Date   • Diabetes mellitus ([address] )    • Hypertension    • Nasal congestion     Frequently   • PONV (postoperative nausea and vomiting)    • Sleep apnea     5 5 apnea scale       Past Surgical History:   Procedure Laterality Date   • [place] SEPTOPLASTY/SUBMUCOUS RESECJ W/WO CARTILAGE GRF N/A 11/28/2022    Procedure: SEPTOPLASTY;  Surgeon: Ella Bashir MD;  Location: [facility];  Service: ENT   • [place] SUBMUCOUS [address] PRTL/COMPL Bilateral 11/28/2022    Procedure: TURBINECTOMY;  Surgeon: Ella Bashir MD;  Location: [address] [place] [address];  Service: ENT       Family History   Problem Relation Age of Onset   • Diabetes type II Mother    • Diabetes Mother    • Diabetes Maternal Grandmother    • Diabetes type II Maternal Grandmother    • Arthritis Maternal Grandmother    • Cancer Maternal Grandfather         Liver cancer       Social History     Occupational History   • Not on file   Tobacco Use   • Smoking status: Some Days     Types: Cigars     Passive exposure: Never   • Smokeless tobacco: Never   • Tobacco comments:     Wife just had baby not smoking right now   Vaping Use   • Vaping Use: Never used   Substance and Sexual Activity   • Alcohol use: Not Currently     Comment: rare   • Drug use: Never   • Sexual activity: Yes     Partners: Female     Birth control/protection: None         Current Outpatient Medications:   •  lisinopril (ZESTRIL) 10 mg tablet, Take 10 mg by mouth daily, Disp: , Rfl:   •  pantoprazole (PROTONIX) 20 mg tablet, Take 20 mg by mouth daily, Disp: , Rfl:   •  semaglutide, 2 mg/dose, (Ozempic, 2 MG/DOSE,) 8 mg/ mL injection pen, Inject 0 75 mL (2 mg total) under the skin every 7 days, Disp: 15 mL, Rfl: 2  •  tadalafil (CIALIS) 10 MG tablet, Take 1 tablet (10 mg total) by mouth daily as needed for erectile dysfunction, Disp: 10 tablet, Rfl: 0  •  Azelastine HCl 137 MCG/SPRAY SOLN, 1 SPRAY INTO EACH NOSTRIL 2 (TWO) TIMES A DAY USE IN EACH NOSTRIL AS DIRECTED, Disp: 30 mL, Rfl: 4    Allergies   Allergen Reactions   • Other Angioedema   • Shellfish-Derived Products - Food Allergy Anaphylaxis   • Penicillins Hives   • Penicillin G Hives       Objective:  Vitals:    05/24/23 1508   BP: 109/79   Pulse: 78   Temp: 98 2 °F (36 8 °C)     Pain Score:   2      Right Ankle Exam     Tenderness   Right ankle tenderness location: posterior tibial tendon  Swelling: none    Range of Motion   Dorsiflexion: 20   Plantar flexion: 40     Muscle Strength   Dorsiflexion:  5/5  Plantar flexion:  5/5  Posterior tibial:  4/5  Peroneal muscle:  4/5    Other   Erythema: absent  Sensation: normal  Pulse: present     Comments:  Hind foot valgus deformity  Too many toes sign  Severe pes planus  Strength/Myotome Testing     Right Ankle/Foot   Dorsiflexion: 5  Plantar flexion: 5      Physical Exam  Constitutional:       General: He is not in acute distress  Appearance: He is well-developed  HENT:      Head: Normocephalic and atraumatic  Eyes:      General: No scleral icterus  Conjunctiva/sclera: Conjunctivae normal    Neck:      Vascular: No JVD  Cardiovascular:      Rate and Rhythm: Normal rate  Pulmonary:      Effort: Pulmonary effort is normal  No respiratory distress  Skin:     General: Skin is warm  Neurological:      Mental Status: He is alert and oriented to person, place, and time  Coordination: Coordination normal          I have personally reviewed pertinent films in PACS and my interpretation is as follows:  X-rays right ankle: Small calcification achilles insertion  No acute osseous abnormality         This document was created using speech voice recognition software  Grammatical errors, random word insertions, pronoun errors, and incomplete sentences are an occasional consequence of this system due to software limitations, ambient noise, and hardware issues  Any formal questions or concerns about content, text, or information contained within the body of this dictation should be directly addressed to the provider for clarification

## 2023-05-24 NOTE — PATIENT INSTRUCTIONS
Ankle Exercises   AMBULATORY CARE:   What you need to know about ankle exercises: Ankle exercises help strengthen your ankle and improve its function after injury  These are beginning exercises  Ask your healthcare provider if you need to see a physical therapist for more advanced exercises  General guidelines for ankle exercises:   Do these exercises 3 to 5 days a week, or as directed by your healthcare provider  Ask if you should do the exercises on each ankle  Do the exercises in the order that your healthcare provider recommends  This will help prevent swelling, chronic pain, and reinjury  Start with range of motion exercises  Then move to strengthening exercises, and finally to balancing exercises  Warm up before you do ankle exercises  Walk or ride a stationary bike for 5 to 10 minutes to prepare your ankle for movement  Stop if you feel pain  It is normal to feel some discomfort at first but you should not feel pain  Tell your doctor or physical therapist if you have pain while you exercise  Regular exercise will help decrease your discomfort over time  How to perform range of motion exercises safely:  Begin with range of motion exercises to improve flexibility  Ask your healthcare provider when you can progress to strengthening exercises  Ankle alphabet:  Sit on a chair so that your feet do not touch the floor  Use your big toe to write each letter of the alphabet  Use only your foot and ankle, and keep your movements small  Do 2 sets  Calf stretches:      Sitting calf stretches with a towel:  Sit on the floor with both legs out straight in front of you  Loop a towel around the ball of your injured foot  Grasp the ends of the towel and pull it toward you  Keep your leg and back straight  Do not lean forward as you pull the towel  Hold for 30 seconds  Then relax for 30 seconds  Do 2 sets of 10           Standing calf stretches:  Stand facing a wall with the foot that is not injured forward and your knee slightly bent  Keep the leg with the injured foot straight and behind you with your toes pointed in slightly  With both heels flat on the floor, press your hips forward  Do not arch your back  Hold for 30 seconds, and then relax for 30 seconds  Do 2 sets of 10  Repeat with your leg bent  Do 2 sets of 10  How to perform strengthening exercises safely:  After you can perform range of motion exercises without pain, you may begin strengthening exercises  Ask your healthcare provider when you can progress to balancing exercises  Ankle movement in 4 directions:  Sit on the floor with your legs straight in front of you  Keep your heels on the floor for support  Dorsiflexion:  Begin with your toes pointing straight up  Pull your toes toward your body  Slowly return to the starting position  Do 3 sets of 5  Plantar flexion:  Begin with your toes pointing straight up  Push your toes away from your body  Slowly return to the starting position  Do 3 sets of 5  Inversion:  Begin with your toes pointing straight up  Push your toes inward, toward each other  Slowly return to the starting position  Do 3 sets of 5  Eversion:  Begin with your toes pointing straight up  Push your toes outward, away from each other  Slowly return to the starting position  Do 3 sets of 5  Toe curls with a towel:  Sit on a chair so that both of your feet are flat on the floor  Place a small towel on the floor in front of your injured foot  Grab the center of the towel with your toes and curl the towel toward you  Relax and repeat  Do 1 set of 5  [place] pick-ups:  Sit on a chair so that both of your feet are flat on the floor  Place 20 marbles on the floor in front of your injured foot  Use your toes to  one marble at a time and place it into a bowl  Repeat until you have picked up all the marbles  Do 1 set       Heel raises:      Single leg heel raises:  Stand with your weight evenly on both feet  Hold on to a chair or a wall for balance  Lift the foot that is not injured off the floor so all your weight is placed on your injured foot  Raise the heel of your injured foot as high as you can  Slowly lower your heel to the floor  Do 1 set of 10  Double leg heel raises:  Stand with your weight evenly on both feet  Hold on to a chair or a wall for balance  Raise both of your heels as high as you can  Slowly lower your heels to the floor  Do 1 set of 10  Heel and toe walks:      Heel walks:  Begin in a standing position  Lift your toes off the floor and walk on your heels  Keep your toes lifted as high as possible  Do 2 sets of 10  Toe walks:  Begin in a standing position  Lift your heels off the floor and walk on the balls and toes of your feet  Keep your heels lifted as high as possible  Do 2 sets of 10  How to perform a balance exercise safely:  After you can perform strengthening exercises without pain, you may do this beginning balancing exercise  Ask your healthcare provider for more advanced balance exercises  Single leg stance:  Stand with your weight evenly on both feet, or hold on to a chair or a wall  Do not lean to the side  Lift the foot that is not injured off the floor so all your weight is placed on your injured foot  Balance on your injured foot  Ask your healthcare provider how long to hold this position  Call your doctor or physical therapist if:   You have new pain, or your pain becomes worse  You have questions or concerns about your condition, care, or exercise program     © Copyright S[employer] 2022 Information is for End User's use only and may not be sold, redistributed or otherwise used for commercial purposes  The above information is an  only  It is not intended as medical advice for individual conditions or treatments   Talk to your doctor, nurse or pharmacist before following any medical regimen to see if it is safe and effective for you

## 2023-05-25 DIAGNOSIS — J31.0 RHINITIS, CHRONIC: ICD-10-CM

## 2023-05-25 RX ORDER — AZELASTINE HYDROCHLORIDE 137 UG/1
SPRAY, METERED NASAL
Qty: 30 ML | Refills: 4 | Status: SHIPPED | OUTPATIENT
Start: 2023-05-25

## 2023-05-26 LAB — CCP AB SER IA-ACNC: 1.7

## 2023-06-01 LAB — HLA-B27 QL NAA+PROBE: NEGATIVE

## 2023-06-05 ENCOUNTER — EVALUATION (OUTPATIENT)
Dept: OCCUPATIONAL THERAPY | Facility: CLINIC | Age: 38
End: 2023-06-05
Payer: COMMERCIAL

## 2023-06-05 DIAGNOSIS — M77.12 LATERAL EPICONDYLITIS OF LEFT ELBOW: Primary | ICD-10-CM

## 2023-06-05 PROCEDURE — 97166 OT EVAL MOD COMPLEX 45 MIN: CPT

## 2023-06-05 NOTE — PROGRESS NOTES
OT Evaluation     Today's date: 2023  Patient name: Armand Smalls  : 1985  MRN: [id number]  Referring provider: Nadeem Delgado  Dx:   Encounter Diagnosis     ICD-10-CM    1  Lateral epicondylitis of left elbow  M77 12 Ambulatory Referral to Occupational Therapy          Start Time: 930  Stop Time: 1015  Total time in clinic (min): 45 minutes    Assessment  Assessment details: Patient is a 44 y o  RHD male who presents for OT IE and treatment for left upper extremity lateral epicondylitis  Patient reports symptoms started approximately about a 1 month and half to 2 months ago with mild pain/discomfort when fully extending the elbow to  items (I e  such as a container of formula)  Denies any injury or trauma to the left elbow  Patient reports becoming a father approximately 6 weeks ago to a  baby boy and holds his son using the LUE for feeding time  Patient using a counter force brace of the left elbow for support  Patient works as an  repairing heavy duty equipment, 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  Patient referred by Bel Cummings PA-C to initiate treatment including hand therapy  Impairments: abnormal or restricted ROM, activity intolerance, lacks appropriate home exercise program and pain with function  Understanding of Dx/Px/POC: good   Prognosis: good    Goals  Short Term Goals by 2 - 4 weeks:    Establish HEP to increase performance with daily activities  Patient will increase LUE  strength by at least 10 lbs to assist in completing ADLs  Patient will increase ROM of LUE forearm by at least 10 degrees to complete ADLs  Patient will increase ROM of LUE elbow by at least 10 degrees to complete ADLs  Patient will decrease pain rate of LUE by at least 2 points per the VAS scale  Long Term Goals by discharge:    Establish final home exercise program to enhance maximal functional level with ADLs      Achieve functional active range of motion of LUE for full return to household chores  Achieve functional strength of LUE for full return to high level ADLs  Plan  Patient would benefit from: OT eval and skilled occupational therapy  Planned modality interventions: cryotherapy, thermotherapy: hydrocollator packs and unattended electrical stimulation  Planned therapy interventions: joint mobilization, manual therapy, massage, graded exercise, home exercise program, graded activity, functional ROM exercises, therapeutic activities, stretching, strengthening, therapeutic exercise and patient education  Frequency: 2x week  Duration in weeks: 8  Plan of Care beginning date: 2023  Plan of Care expiration date: 2023  Treatment plan discussed with: patient        Subjective Evaluation    History of Present Illness  Mechanism of injury: Patient is a 44 y o  RHD male who presents for OT IE and treatment for left upper extremity lateral epicondylitis  Patient reports symptoms started approximately about a 1 month and half to 2 months ago with mild pain/discomfort when fully extending the elbow to  items (I e  such as a container of formula)  Denies any injury or trauma to the left elbow  Patient reports becoming a father approximately 6 weeks ago to a  baby boy and holds his son using the LUE for feeding time  Patient using a counter force brace of the left elbow for support  Patient works as an  repairing heavy duty equipment, 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  Patient referred by Iek Navarrete PA-C to initiate treatment including hand therapy       Pain  Current pain ratin  At best pain ratin  At worst pain ratin  Quality: sharp  Aggravating factors: lifting    Social Support    Employment status: working  Hand dominance: right    Patient Goals  Patient goals for therapy: decreased pain, increased motion, increased strength and return to "sport/leisure activities  Patient goal: \"pain under control, management\"        Objective     Active Range of Motion     Left Elbow   Flexion: 131 degrees   Extension: -10 degrees   Forearm supination: 75 degrees   Forearm pronation: 90 degrees     Right Elbow   Flexion: 146 degrees   Extension: -12 degrees   Forearm supination: 80 degrees   Forearm pronation: 85 degrees     Left Wrist   Wrist flexion: 74 degrees   Wrist extension: 75 degrees   Radial deviation: 20 degrees   Ulnar deviation: 28 degrees     Right Wrist   Wrist flexion: 68 degrees   Wrist extension: 66 degrees   Radial deviation: 25 degrees   Ulnar deviation: 16 degrees     Strength/Myotome Testing     Left Wrist/Hand      (2nd hand position)     Trial 1: 95    Right Wrist/Hand      (2nd hand position)     Trial 1: 83    Additional Strength Details  RUE  Elbow Extended: 90    LUE  Elbow Extended: 85             Precautions: Universal    Visit Tracker:No Auth Req  30 visits PCY not combined  Copay: $20  Date 6/5  IE                                                                                           Manuals HEP 6/5/2023                       Ther Ex     Education on Dx and HEP x x5min   Wrist flex & ext stretch x x5 10 sec hold   Wrist composite stretch with elbow extended, wrist flexed x x3 10 sec hold   Wrist AROM x x10   Forearm Pro & Sup x x10   STM  x5min             Ther Activity                                   Modalities      MPH  x5min   ESTM - premod  x10min starting 9 6 Cv volts - 10 0 Cv volts       Assessment:   Patient tolerated session well  Patient demonstrated min deficits in range of motion and strength upon today's assessment  Patient session focused on patient education on anatomy and physiology concerning current dx, techniques for decreasing deficits through HEP, and appropriate use of modalities   Patient received PreMod Electric Stimulation to left upper extremity lateral elbow at sensory level (80-150HZ) to " decrease pain to allow for increased ability to perform ADLs  2in x 2in electrode padswere used at an average intensity of 9 6 - 10 0 Cv volts  Skin was assessed ore and post tx with no adverse effects noted  Therapist attended to patient throughout the entire duration of ESTM treatment to adjust intensity and checking up on patient  Patient educated on HEP with verbal instructions and handouts for patient reference  Patient educated on treatment plan at this time  Patient benefiting from skilled hand therapy OT to reduce deficits to improve independence with daily activities  Plan:   Focus on AROM, stretching and ESTM to improve ability to complete daily activites with ease   POC: 6/5/2023 - 7/31/2023

## 2023-06-07 ENCOUNTER — OFFICE VISIT (OUTPATIENT)
Dept: OCCUPATIONAL THERAPY | Facility: CLINIC | Age: 38
End: 2023-06-07
Payer: COMMERCIAL

## 2023-06-07 DIAGNOSIS — M77.12 LATERAL EPICONDYLITIS OF LEFT ELBOW: Primary | ICD-10-CM

## 2023-06-07 PROCEDURE — 97032 APPL MODALITY 1+ESTIM EA 15: CPT

## 2023-06-07 PROCEDURE — 97110 THERAPEUTIC EXERCISES: CPT

## 2023-06-07 NOTE — PROGRESS NOTES
Daily Note     Today's date: 2023  Patient name: Marylen Isaacs  : 1985  MRN: [id number]  Referring provider: Joyce Cardenas  Dx:   Encounter Diagnosis     ICD-10-CM    1  Lateral epicondylitis of left elbow  M77 12           Start Time: 0845  Stop Time: 09  Total time in clinic (min): 45 minutes    Subjective: Patient reports consistency with HEP and wearing the counter force brace  Objective: See treatment diary below       Precautions: Universal    Visit Tracker:No Auth Req  30 visits PCY not combined  Copay: $20  Date   IE                                                                                           Manuals HEP 2023                       Ther Ex     Education on Dx and HEP x x5min   Wrist flex & ext stretch x x5 10 sec hold   Wrist composite stretch with elbow extended, wrist flexed x x3 10 sec hold   Wrist AROM x x10   Forearm Pro & Sup x x10   STM/IASTM  x10min   Flex bar Santi Twist  [id number]   Resistive digit extension with rubberband  [id number]                                      Modalities      MPH  x5min   ESTM - premod  x10min starting 10 5 Cv volts - 12 0 Cv volts       Assessment:   Patient tolerated session well  Session focused on HEP education, stretching, range of motion, patient education and strengthening to improve functional task performance with daily activities  Patient received PreMod Electric Stimulation to left upper extremity lateral elbow at sensory level (80-150HZ) to decrease pain to allow for increased ability to perform ADLs  2in x 2in electrode padswere used at an average intensity of 10 5 - 12 0 Cv volts  Skin was assessed ore and post tx with no adverse effects noted  Therapist attended to patient throughout the entire duration of ESTM treatment to adjust intensity and checking up on patient  Patient educated on HEP with verbal instructions and handouts for patient reference  Patient educated on treatment plan at this time   Patient benefiting from skilled hand therapy OT to reduce deficits to improve independence with daily activities  Patient tolerated all TE with no complaints of pain  Patient progressing well towards goals  Patient benefiting from skilled hand therapy OT to reduce deficits to improve independence with daily activities  Plan:   Focus on AROM, strengthening, stretching and ESTM to improve ability to complete daily activites with ease   POC: 6/5/2023 - 7/31/2023

## 2023-06-09 DIAGNOSIS — N52.9 ERECTILE DYSFUNCTION, UNSPECIFIED ERECTILE DYSFUNCTION TYPE: ICD-10-CM

## 2023-06-09 RX ORDER — TADALAFIL 10 MG/1
10 TABLET ORAL DAILY PRN
Qty: 10 TABLET | Refills: 0 | Status: CANCELLED | OUTPATIENT
Start: 2023-06-09

## 2023-06-09 RX ORDER — TADALAFIL 10 MG/1
10 TABLET ORAL DAILY PRN
Qty: 30 TABLET | Refills: 0 | Status: SHIPPED | OUTPATIENT
Start: 2023-06-09 | End: 2023-06-16 | Stop reason: SDUPTHER

## 2023-06-12 ENCOUNTER — OFFICE VISIT (OUTPATIENT)
Dept: OCCUPATIONAL THERAPY | Facility: CLINIC | Age: 38
End: 2023-06-12
Payer: COMMERCIAL

## 2023-06-12 DIAGNOSIS — M77.12 LATERAL EPICONDYLITIS OF LEFT ELBOW: Primary | ICD-10-CM

## 2023-06-12 PROCEDURE — 97032 APPL MODALITY 1+ESTIM EA 15: CPT

## 2023-06-12 PROCEDURE — 97110 THERAPEUTIC EXERCISES: CPT

## 2023-06-12 NOTE — PROGRESS NOTES
Daily Note     Today's date: 2023  Patient name: Armand Smalls  : 1985  MRN: [id number]  Referring provider: Nadeem Delgado  Dx:   Encounter Diagnosis     ICD-10-CM    1  Lateral epicondylitis of left elbow  M77 12           Start Time: 930  Stop Time: 1015  Total time in clinic (min): 45 minutes    Subjective: Patient reports symptoms have been improving since starting therapy  Objective: See treatment diary below       Precautions: Universal    Visit Tracker:No Auth Req  30 visits PCY not combined  Copay: $20  Date   IE                                                                                          Manuals HEP 2023                       Ther Ex     Education on Dx and HEP x x5min   Wrist flex & ext stretch x x5 10 sec hold   Wrist composite stretch with elbow extended, wrist flexed x x3 10 sec hold   Wrist AROM x x10   Forearm Pro & Sup x x10   STM/IASTM  x10min   Flex bar Santi Twist  [id number]   Resistive digit extension with rubberband  [id number]   Wrist PREs with blue wedge  x10 5lbs   Triceps extension, shoulder rows, and shoulder extension with theraband  3x10 green                            Modalities      MPH  x5min   ESTM - premod  x10min s12 0 Cv volts       Assessment:   Patient tolerated session well  Session focused on HEP education, stretching, range of motion, patient education and strengthening to improve functional task performance with daily activities  Patient received PreMod Electric Stimulation to left upper extremity lateral elbow at sensory level (80-150HZ) to decrease pain to allow for increased ability to perform ADLs  2in x 2in electrode padswere used at an average intensity of  12 0 Cv volts  Skin was assessed ore and post tx with no adverse effects noted  Therapist attended to patient throughout the entire duration of ESTM treatment to adjust intensity and checking up on patient   Patient educated on HEP with verbal instructions and handouts for patient reference  Patient educated on treatment plan at this time  Patient benefiting from skilled hand therapy OT to reduce deficits to improve independence with daily activities  Patient tolerated all TE with no complaints of pain  Patient progressing well towards goals  Patient benefiting from skilled hand therapy OT to reduce deficits to improve independence with daily activities  Plan:   Focus on AROM, strengthening, stretching and ESTM to improve ability to complete daily activites with ease   POC: 6/5/2023 - 7/31/2023

## 2023-06-13 ENCOUNTER — OFFICE VISIT (OUTPATIENT)
Dept: OCCUPATIONAL THERAPY | Facility: CLINIC | Age: 38
End: 2023-06-13
Payer: COMMERCIAL

## 2023-06-13 DIAGNOSIS — M77.12 LATERAL EPICONDYLITIS OF LEFT ELBOW: Primary | ICD-10-CM

## 2023-06-13 PROCEDURE — 97110 THERAPEUTIC EXERCISES: CPT

## 2023-06-13 NOTE — PROGRESS NOTES
"Daily Note     Today's date: 2023  Patient name: Daryle Albe  : 1985  MRN: [id number]  Referring provider: Bola Roy  Dx:   Encounter Diagnosis     ICD-10-CM    1  Lateral epicondylitis of left elbow  M77 12           Start Time: 1415  Stop Time: 1500  Total time in clinic (min): 45 minutes    Subjective: Patient reports continued improvement in symptoms, stating \" the pain has mostly gone away but It fatigues quickly\"  Objective: See treatment diary below       Precautions: Universal    Visit Tracker:No Auth Req  30 visits PCY not combined  Copay: $20  Date   IE                                                                                         Manuals HEP 2023                       Ther Ex     Education on Dx and HEP x x5min   Wrist flex & ext stretch x x5 10 sec hold   Wrist composite stretch with elbow extended, wrist flexed x x3 10 sec hold   Wrist AROM x x10   Forearm Pro & Sup x x10   STM/IASTM  x10min   Flex bar Santi Twist  [id number]   Resistive digit extension with rubberband x [id number]   Wrist PREs with blue wedge x x10 5lbs   Triceps extension, shoulder rows, and shoulder extension with theraband x 3x10 green                            Modalities      MPH  x5min   ESTM - premod  x10min s12 0 Cv volts       Assessment:   Patient tolerated session well  Session focused on HEP education, stretching, range of motion, patient education and strengthening to improve functional task performance with daily activities  Patient educated on HEP with verbal instructions and handouts for patient reference  Patient educated on treatment plan at this time  Patient benefiting from skilled hand therapy OT to reduce deficits to improve independence with daily activities  Patient tolerated all TE with no complaints of pain  Patient progressing well towards goals   Patient benefiting from skilled hand therapy OT to reduce deficits to improve independence with daily " activities  Plan:   Focus on AROM, strengthening, stretching and ESTM to improve ability to complete daily activites with ease   POC: 6/5/2023 - 7/31/2023

## 2023-06-14 ENCOUNTER — APPOINTMENT (OUTPATIENT)
Dept: OCCUPATIONAL THERAPY | Facility: CLINIC | Age: 38
End: 2023-06-14
Payer: COMMERCIAL

## 2023-06-16 ENCOUNTER — OFFICE VISIT (OUTPATIENT)
Dept: FAMILY MEDICINE CLINIC | Facility: CLINIC | Age: 38
End: 2023-06-16
Payer: COMMERCIAL

## 2023-06-16 VITALS
DIASTOLIC BLOOD PRESSURE: 82 MMHG | WEIGHT: 274 LBS | RESPIRATION RATE: 14 BRPM | OXYGEN SATURATION: 98 % | BODY MASS INDEX: 44.03 KG/M2 | HEIGHT: 66 IN | TEMPERATURE: 98.4 F | HEART RATE: 110 BPM | SYSTOLIC BLOOD PRESSURE: 128 MMHG

## 2023-06-16 DIAGNOSIS — N52.9 ERECTILE DYSFUNCTION, UNSPECIFIED ERECTILE DYSFUNCTION TYPE: ICD-10-CM

## 2023-06-16 DIAGNOSIS — E11.65 TYPE 2 DIABETES MELLITUS WITH HYPERGLYCEMIA, WITHOUT LONG-TERM CURRENT USE OF INSULIN (HCC): Primary | ICD-10-CM

## 2023-06-16 DIAGNOSIS — E66.01 CLASS 3 SEVERE OBESITY DUE TO EXCESS CALORIES WITH SERIOUS COMORBIDITY AND BODY MASS INDEX (BMI) OF 40.0 TO 44.9 IN ADULT (HCC): ICD-10-CM

## 2023-06-16 PROCEDURE — 99214 OFFICE O/P EST MOD 30 MIN: CPT | Performed by: STUDENT IN AN ORGANIZED HEALTH CARE EDUCATION/TRAINING PROGRAM

## 2023-06-16 RX ORDER — TADALAFIL 10 MG/1
10 TABLET ORAL DAILY PRN
Qty: 30 TABLET | Refills: 0 | Status: SHIPPED | OUTPATIENT
Start: 2023-06-16

## 2023-06-16 NOTE — PROGRESS NOTES
Clinic Visit Note  Karrie Brewster 44 y o  male   MRN: [id number]    Assessment and Plan      Diagnoses and all orders for this visit:    Type 2 diabetes mellitus with hyperglycemia, without long-term current use of insulin ([address] )  Continue Ozempic, most recent hemoglobin A1c appropriate, continue lifestyle modifications including low added sugar diet, routine exercise  Erectile dysfunction, unspecified erectile dysfunction type  -     tadalafil (CIALIS) 10 MG tablet; Take 1 tablet (10 mg total) by mouth daily as needed for erectile dysfunction    Class 3 severe obesity due to excess calories with serious comorbidity and body mass index (BMI) of 40 0 to 44 9 in [facility])      My impressions and treatment recommendations were discussed in detail with the patient who verbalized understanding and had no further questions  Discharge instructions were provided  Subjective     Chief Complaint: Follow-up visit    History of Present Illness:    Patient is a pleasant 28-year-old male coming in for follow-up visit to discuss weight loss management, chronic disease management  Patient also notes that Cialis has been working well, would like refill, good Rx discussed  The following portions of the patient's history were reviewed and updated as appropriate: allergies, current medications, past family history, past medical history, past social history, past surgical history and problem list     REVIEW OF SYSTEMS:  A complete 12-point review of systems is negative other than that noted in the HPI  Review of Systems   Constitutional: Negative for chills, fatigue and fever  HENT: Negative for congestion and sore throat  Eyes: Negative for pain and visual disturbance  Respiratory: Negative for shortness of breath and wheezing  Cardiovascular: Negative for chest pain and palpitations  Gastrointestinal: Negative for abdominal pain, constipation, diarrhea, nausea and vomiting     Genitourinary: Negative for dysuria and frequency  Musculoskeletal: Negative for back pain and neck pain  Skin: Negative for color change and rash  Neurological: Negative for dizziness and headaches  Psychiatric/Behavioral: Negative for agitation and confusion  All other systems reviewed and are negative          Current Outpatient Medications:   •  Azelastine HCl 137 MCG/SPRAY SOLN, 1 SPRAY INTO EACH NOSTRIL 2 (TWO) TIMES A DAY USE IN EACH NOSTRIL AS DIRECTED, Disp: 30 mL, Rfl: 4  •  lisinopril (ZESTRIL) 10 mg tablet, Take 10 mg by mouth daily, Disp: , Rfl:   •  pantoprazole (PROTONIX) 20 mg tablet, Take 20 mg by mouth daily, Disp: , Rfl:   •  semaglutide, 2 mg/dose, (Ozempic, 2 MG/DOSE,) 8 mg/ mL injection pen, Inject 0 75 mL (2 mg total) under the skin every 7 days, Disp: 15 mL, Rfl: 2  •  tadalafil (CIALIS) 10 MG tablet, Take 1 tablet (10 mg total) by mouth daily as needed for erectile dysfunction, Disp: 30 tablet, Rfl: 0  Past Medical History:   Diagnosis Date   • Diabetes mellitus ([facility] [address] )    • Hypertension    • Nasal congestion     Frequently   • PONV (postoperative nausea and vomiting)    • Sleep apnea     5 5 apnea scale     Past Surgical History:   Procedure Laterality Date   • PA SEPTOPLASTY/SUBMUCOUS RESECJ W/WO CARTILAGE GRF N/A 11/28/2022    Procedure: SEPTOPLASTY;  Surgeon: Santosh Doll MD;  Location: [facility] OR;  Service: ENT   • PA SUBMUCOUS [address] PRTL/COMPL Bilateral 11/28/2022    Procedure: TURBINECTOMY;  Surgeon: Santosh Doll MD;  Location: [facility] OR;  Service: ENT     Social History     Socioeconomic History   • Marital status: Single     Spouse name: Not on file   • Number of children: Not on file   • Years of education: Not on file   • Highest education level: Not on file   Occupational History   • Not on file   Tobacco Use   • Smoking status: Some Days     Types: Cigars     Passive exposure: Never   • Smokeless tobacco: Never   • Tobacco comments:     Wife just had baby not smoking "right now   Vaping Use   • Vaping Use: Never used   Substance and Sexual Activity   • Alcohol use: Not Currently     Comment: rare   • Drug use: Never   • Sexual activity: Yes     Partners: Female     Birth control/protection: None   Other Topics Concern   • Not on file   Social History Narrative   • Not on file     Social Determinants of Health     Financial Resource Strain: Not on file   Food Insecurity: Not on file   Transportation Needs: Not on file   Physical Activity: Not on file   Stress: Not on file   Social Connections: Not on file   Intimate Partner Violence: Not on file   Housing Stability: Not on file     Family History   Problem Relation Age of Onset   • Diabetes type II Mother    • Diabetes Mother    • Diabetes Maternal Grandmother    • Diabetes type II Maternal Grandmother    • Arthritis Maternal Grandmother    • Cancer Maternal Grandfather         Liver cancer     Allergies   Allergen Reactions   • Other Angioedema   • Shellfish-Derived Products - Food Allergy Anaphylaxis   • Penicillins Hives   • Penicillin G Hives       Objective     Vitals:    06/16/23 1544   BP: 128/82   BP Location: Left arm   Patient Position: Sitting   Cuff Size: Adult   Pulse: (!) 110   Resp: 14   Temp: 98 4 °F (36 9 °C)   TempSrc: Temporal   SpO2: 98%   Weight: 124 kg (274 lb)   Height: 5' 6\" (1 676 m)       Physical Exam:     GENERAL: NAD, pleasant   HEENT:  NC/AT, PERRL, EOMI, no scleral icterus  CARDIAC:  RRR, +S1/S2, no S3/S4 appreciated, no m/g/r  PULMONARY:  CTA B/L, no wheezing/rales/rhonci, non-labored breathing  ABDOMEN:  Soft, NT/ND, no rebound/guarding/rigidity  Extremities:   No edema, cyanosis, or clubbing  Musculoskeletal:  Full range of motion intact in all extremities   NEUROLOGIC: Grossly intact, no focal deficits  SKIN:  No rashes or erythema noted on exposed skin  Psych: Normal affect, mood stable    ==  PLEASE NOTE:  This encounter was completed utilizing the M- Modal/Fluency Direct Speech Voice " Recognition Software  Grammatical errors, random word insertions, pronoun errors and incomplete sentences are occasional consequences of the system due to software limitations, ambient noise and hardware issues  These may be missed by proof reading prior to affixing electronic signature  Any questions or concerns about the content, text or information contained within the body of this dictation should be directly addressed to the physician for clarification  Please do not hesitate to call me directly if you have any any questions or concerns      DO Joel Bains [facility] Internal Medicine   [facility]

## 2023-06-18 NOTE — PROGRESS NOTES
Daily Note     Today's date: 2023  Patient name: Wayne Patrick  : 1985  MRN: [id number]  Referring provider: Cee Penny  Dx:   Encounter Diagnosis     ICD-10-CM    1  Lateral epicondylitis of left elbow  M77 12                      Subjective: Patient reports continued feeling of fatigue in left forearm  Objective: See treatment diary below Performed ESTM for pain management of the forearm for lateral epicondylitis with supervision provided by Christelle Ferguson OTR/SANDRA,  #[id number]       Precautions: Universal    Visit Tracker:No Auth Req  30 visits PCY not combined  Copay: $20  Date   IE                                                                                        Manuals HEP 2023                       Ther Ex     Education on Dx and HEP x x5min   Wrist flex & ext stretch x x5 10 sec hold   Wrist composite stretch with elbow extended, wrist flexed x x3 10 sec hold   Wrist AROM x x10   Forearm Pro & Sup x x10   STM/IASTM  x10min   Flex bar Santi Twist  [id number]   Resistive digit extension with rubberband x [id number]   Wrist PREs with blue wedge x x10 5lbs   Triceps extension, shoulder rows, and shoulder extension with theraband x 3x10 green                            Modalities      MPH  x5min   ESTM - premod  x10min s12 0 Cv volts       Assessment:   Patient tolerated session well  Session focused on HEP education, stretching, range of motion, patient education and strengthening to improve functional task performance with daily activities  Patient received PreMod Electric Stimulation to left upper extremity lateral elbow to decrease pain to allow for increased ability to perform ADLs  Skin was assessed ore and post tx with no adverse effects noted  Therapist attended to patient throughout the entire duration of ESTM treatment to adjust intensity and checking up on patient   Patient benefiting from skilled hand therapy OT to reduce deficits to improve independence with daily activities  Patient tolerated all TE with no complaints of pain  Patient progressing well towards goals  Patient benefiting from skilled hand therapy OT to reduce deficits to improve independence with daily activities  Plan:   Focus on AROM, strengthening, stretching and ESTM to improve ability to complete daily activites with ease   POC: 6/5/2023 - 7/31/2023

## 2023-06-19 ENCOUNTER — OFFICE VISIT (OUTPATIENT)
Dept: OCCUPATIONAL THERAPY | Facility: CLINIC | Age: 38
End: 2023-06-19
Payer: COMMERCIAL

## 2023-06-19 ENCOUNTER — OFFICE VISIT (OUTPATIENT)
Dept: OTOLARYNGOLOGY | Facility: CLINIC | Age: 38
End: 2023-06-19
Payer: COMMERCIAL

## 2023-06-19 VITALS — TEMPERATURE: 97.2 F | BODY MASS INDEX: 44.03 KG/M2 | HEIGHT: 66 IN | WEIGHT: 274 LBS

## 2023-06-19 DIAGNOSIS — H93.A1 PULSATILE TINNITUS OF RIGHT EAR: Primary | ICD-10-CM

## 2023-06-19 DIAGNOSIS — M77.12 LATERAL EPICONDYLITIS OF LEFT ELBOW: Primary | ICD-10-CM

## 2023-06-19 PROCEDURE — 97110 THERAPEUTIC EXERCISES: CPT

## 2023-06-19 PROCEDURE — 99213 OFFICE O/P EST LOW 20 MIN: CPT | Performed by: STUDENT IN AN ORGANIZED HEALTH CARE EDUCATION/TRAINING PROGRAM

## 2023-06-19 NOTE — PROGRESS NOTES
"Otolaryngology-- Head and Neck Surgery Follow up visit      Follow up:    Pulsing tinnitus  Hearing heartbeat in right ear  No URI at start of symptoms  No otalgia or otorrhea  No history of ear surgery  No current hearing aids  History of noise exposure with [employer]  Septoplasty and turbinate reduction  CTA unremarkable  Tinnitus imprved        Review of any relevant imaging:      Interval Review of systems: Pertinent review of systems documented in the HPI  Interval Social History:  Social History     Socioeconomic History   • Marital status: Single     Spouse name: Not on file   • Number of children: Not on file   • Years of education: Not on file   • Highest education level: Not on file   Occupational History   • Not on file   Tobacco Use   • Smoking status: Some Days     Types: Cigars     Passive exposure: Never   • Smokeless tobacco: Never   • Tobacco comments:     Wife just had baby not smoking right now   Vaping Use   • Vaping Use: Never used   Substance and Sexual Activity   • Alcohol use: Not Currently     Comment: rare   • Drug use: Never   • Sexual activity: Yes     Partners: Female     Birth control/protection: None   Other Topics Concern   • Not on file   Social History Narrative   • Not on file     Social Determinants of Health     Financial Resource Strain: Not on file   Food Insecurity: Not on file   Transportation Needs: Not on file   Physical Activity: Not on file   Stress: Not on file   Social Connections: Not on file   Intimate Partner Violence: Not on file   Housing Stability: Not on file       Interval Physical Examination:  Temp (!) 97 2 °F (36 2 °C) (Temporal)   Ht 5' 6\" (1 676 m)   Wt 124 kg (274 lb)   BMI 44 22 kg/m²   Head: Atraumatic, no visible scalp lesions, parotid and submandibular salivary glands non-tender to palpation and without masses bilaterally     Neck:  No visible or palpable cervical lesions or lymphadenopathy, thyroid gland is normal in size and " symmetry and without masses, normal laryngeal elevation with swallowing  Ears:    Right ear :  Auricles normal in appearance, mastoid prominence non-tender, external auditory canal clear  Tympanic membrane intact  Left ear :  Auricles normal in appearance, mastoid prominence non-tender, external auditory canal clear  Tympanic membrane intact  Nose/Sinuses:  External appearance unremarkable, no maxillary or frontal sinus tenderness to palpation bilaterally  Anterior rhinoscopy reveals:  Oral Cavity:  Moist mucus membranes, gums and dentition unremarkable, no oral mucosal masses or lesions, floor of mouth soft, tongue mobile without masses or lesions  Oropharynx:  Base of tongue soft and without masses, tonsils bilaterally unremarkable, soft palate mucosa unremarkable  Abdomen: Soft and lax  Extremities: No bruises   Eyes:  Extra-ocular movements intact, pupils equally round and reactive to light and accommodation, the lids and conjunctivae are normal in appearance  Constitutional:  Well developed, well nourished and groomed, in no acute distress  Cardiovascular:  Normal rate and rhythm, no palpable thrills, no jugulovenous distension observed  Respiratory:  Normal respiratory effort without evidence of retractions or use of accessory muscles  Neurologic:  Cranial nerves II-XII intact bilaterally  Psychiatric:  Alert and oriented to time, place and person  Procedure:      Assessment:  1  Pulsatile tinnitus of right ear            Plan:    Tinnitus  Decrease caffeine intake  Avoid tobacco, high doses of aspirin or ibuprofen; they can worsen tinnitus  Avoid loud sounds  Use a radio set at night between stations or a sound generator or tinnitus phone applications to mask the sound of tinnitus  Try cognitive behavior therapy or other tinnitus-specific therapies to help you ignore the sound of tinnitus  Try meditation, acupuncture, yoga, or therapeutic massage    Talk to a psychiatrist about improving management of anxiety or depression if you have these conditions and they are contributing to your tinnitus  If you have hearing loss, use hearing aids to improve your hearing and reduce tinnitus  Try not to focus your thoughts on tinnitus or arrange your life around the sound of tinnitus, as this can worsen it

## 2023-06-21 ENCOUNTER — OFFICE VISIT (OUTPATIENT)
Dept: OCCUPATIONAL THERAPY | Facility: CLINIC | Age: 38
End: 2023-06-21
Payer: COMMERCIAL

## 2023-06-21 DIAGNOSIS — M77.12 LATERAL EPICONDYLITIS OF LEFT ELBOW: Primary | ICD-10-CM

## 2023-06-21 PROCEDURE — 97110 THERAPEUTIC EXERCISES: CPT

## 2023-06-21 NOTE — PROGRESS NOTES
"Daily Note     Today's date: 2023  Patient name: Murali Laws  : 1985  MRN: [id number]  Referring provider: Barbara Del Valle  Dx:   Encounter Diagnosis     ICD-10-CM    1  Lateral epicondylitis of left elbow  M77 12           Start Time: 930  Stop Time: 1015  Total time in clinic (min): 45 minutes    Subjective: Patient reports continued improvement of lateral forearm stating  \"The pain isn't really there any more\"  Patient additionally reports easy fatigue of extensor muscles  Objective: See treatment diary below Performed ESTM for pain management of the forearm for lateral epicondylitis with supervision provided by Keya MICHELE/SANDRA,  YV#[id number]       Precautions: Universal    Visit Tracker:No Auth Req  30 visits PCY not combined  Copay: $20  Date   IE                                                                                       Manuals HEP 2023                       Ther Ex     Education on Dx and HEP x x5min   Wrist flex & ext stretch x x5 10 sec hold   Wrist composite stretch with elbow extended, wrist flexed x x3 10 sec hold   Wrist AROM x x10   Forearm Pro & Sup x x10   STM/IASTM  x10min   Flex bar Santi Twist  [id number]   Resistive digit extension with rubberband x [id number]   Wrist PREs with blue wedge x x10 5lbs   Triceps extension, shoulder rows, and shoulder extension with theraband x 3x10 green                            Modalities      MPH  x5min   ESTM - premod  x10min s12 0 Cv volts       Assessment:   Patient tolerated session well  Session focused on stretching, range of motion, patient education and strengthening to improve functional task performance with daily activities  Patient received PreMod Electric Stimulation to left upper extremity lateral elbow to decrease pain to allow for increased ability to perform ADLs  Skin was assessed ore and post tx with no adverse effects noted   Therapist attended to patient throughout " the entire duration of ESTM treatment to adjust intensity and checking up on patient  Patient benefiting from skilled hand therapy OT to reduce deficits to improve independence with daily activities  Patient tolerated all TE with no complaints of pain  Patient progressing well towards goals  Patient benefiting from skilled hand therapy OT to reduce deficits to improve independence with daily activities  Plan:   Focus on AROM, strengthening, stretching and ESTM to improve ability to complete daily activites with ease   POC: 6/5/2023 - 7/31/2023

## 2023-06-25 NOTE — PROGRESS NOTES
"Daily Note     Today's date: 2023  Patient name: Yecenia Plunkett  : 1985  MRN: [id number]  Referring provider: Em Lazo  Dx:   Encounter Diagnosis     ICD-10-CM    1  Lateral epicondylitis of left elbow  M77 12                      Subjective: Patient reports increased discomfort at lateral epicondyle, stating \" its just very sore today\"  Objective: See treatment diary below  Precautions: Universal    Visit Tracker:No Auth Req  30 visits PCY not combined  Copay: $20  Date   IE    X  RE                                                                               Manuals HEP 2023                       Ther Ex     Education on Dx and HEP x x5min   Wrist flex & ext stretch x x5 10 sec hold   Wrist composite stretch with elbow extended, wrist flexed x x3 10 sec hold   Wrist AROM x x10   Forearm Pro & Sup x x10   STM/IASTM  x10min   Flex bar Santi Twist  [id number]   Resistive digit extension with rubberband x [id number]   Wrist PREs with blue wedge x x10 5lbs   Triceps extension, shoulder rows, and shoulder extension with theraband x 3x10 green                            Modalities      MPH  x5min   ESTM - premod  x10min s12 0 Cv volts       Assessment:   Patient tolerated session well  Session focused on stretching, range of motion and patient education to improve functional task performance with daily activities  No strengthening activities were performed today due to increased pain and discomfort at lateral epicondyle  Wrist cock-up splint was fabricated for patient to immobilize wrist and reduce irritation at lateral epicondyle  Current wrist immobilizing splint is leading to irritation and does not provide adequate support  Splint size was determined after performing appropriate custom measurements  The splint was then applied to the patient, securing all straps with moderate tension   The splint was then re-evaluated to confirm fit and verify " that no compromise of circulation was occurring  All bony prominences were evaluated and padding was utilized as needed to further protect bony prominences  Application instructions and care of the splint were reviewed in detail with the patient, including need for regular inspection of the splint  It was verified that the patient was able to perform independent application and removal of the splint with appropriate technique  No areas of irritation present prior to leaving the clinic  Patient tolerated all TE with no complaints of pain  Patient progressing well towards goals  Patient benefiting from skilled hand therapy OT to reduce deficits to improve independence with daily activities  Plan:   Focus on AROM, strengthening, stretching and ESTM to improve ability to complete daily activites with ease   POC: 6/5/2023 - 7/31/2023

## 2023-06-26 ENCOUNTER — OFFICE VISIT (OUTPATIENT)
Dept: OCCUPATIONAL THERAPY | Facility: CLINIC | Age: 38
End: 2023-06-26
Payer: COMMERCIAL

## 2023-06-26 DIAGNOSIS — M77.12 LATERAL EPICONDYLITIS OF LEFT ELBOW: Primary | ICD-10-CM

## 2023-06-26 PROCEDURE — 97110 THERAPEUTIC EXERCISES: CPT

## 2023-06-28 ENCOUNTER — OFFICE VISIT (OUTPATIENT)
Dept: OCCUPATIONAL THERAPY | Facility: CLINIC | Age: 38
End: 2023-06-28
Payer: COMMERCIAL

## 2023-06-28 DIAGNOSIS — M77.12 LATERAL EPICONDYLITIS OF LEFT ELBOW: Primary | ICD-10-CM

## 2023-06-28 PROCEDURE — 97110 THERAPEUTIC EXERCISES: CPT

## 2023-06-28 PROCEDURE — 97032 APPL MODALITY 1+ESTIM EA 15: CPT

## 2023-06-28 NOTE — PROGRESS NOTES
Daily Note     Today's date: 2023  Patient name: Fina Villalobos  : 1985  MRN: [id number]  Referring provider: Ginny Antonio  Dx:   Encounter Diagnosis     ICD-10-CM    1  Lateral epicondylitis of left elbow  M77 12                      Subjective: Patient reports improvement in symptoms since previous visit with reports of decreased pain  Objective: See treatment diary below  Performed ESTM for pain management of the forearm for lateral epicondylitis with supervision provided by Tabitha MICHELE/SANDRA,  FL#[id number]       Precautions: Universal    Visit Tracker:No Auth Req  30 visits PCY not combined  Copay: $20  Date   IE   X  RE                                                                               Manuals HEP 2023                       Ther Ex     Education on Dx and HEP x x5min   Wrist flex & ext stretch x x5 10 sec hold   Wrist composite stretch with elbow extended, wrist flexed x x3 10 sec hold   Wrist AROM x x10   Forearm Pro & Sup x x10   STM/IASTM  x10min   Flex bar Santi Twist  [id number]   Resistive digit extension with rubberband x [id number]   Wrist PREs with blue wedge x x10 5lbs   Triceps extension, shoulder rows, and shoulder extension with theraband x 3x10 green                            Modalities      MPH  x5min   ESTM - premod  x10min s12 0 Cv volts       Assessment:   Patient tolerated session well  Session focused on stretching, range of motion, strengthening and patient education to improve functional task performance with daily activities  Strengthening activities were reintroduced today due to increased pain and discomfort at lateral epicondyle previous visit  Strengthening activities were graded to patient tolerance  Patient tolerated all TE with no complaints of pain  Patient received PreMod Electric Stimulation to left upper extremity lateral elbow to decrease pain to allow for increased ability to perform ADLs  Skin was assessed ore and post tx with no adverse effects noted  Therapist attended to patient throughout the entire duration of ESTM treatment to adjust intensity and checking up on patient  Patient progressing well towards goals  Patient benefiting from skilled hand therapy OT to reduce deficits to improve independence with daily activities  Plan:   Focus on AROM, strengthening, stretching and ESTM to improve ability to complete daily activites with ease   POC: 6/5/2023 - 7/31/2023

## 2023-07-02 NOTE — PROGRESS NOTES
Daily Note     Today's date: 7/3/2023  Patient name: Rishi Granados  : 1985  MRN: [id number]  Referring provider: Orrin Primrose  Dx:   Encounter Diagnosis     ICD-10-CM    1. Lateral epicondylitis of left elbow  M77.12                      Subjective: Patient reports improvement in symptoms from previous visit however continues to experience discomfort in lateral epicondyle. Objective: See treatment diary below. Performed ESTM for pain management of the forearm for lateral epicondylitis with supervision provided by Anamika MICHELE/SANDRA,  #[id number]       Precautions: Universal    Visit Tracker:No Auth Req. 30 visits PCY not combined. Copay: $20  Date   IE 6/7 6/12 6/13 6/19 6/21    6/26 6/28 7/3 X  RE                                                                               Manuals HEP 7/3/2023                       Ther Ex     Education on Dx and HEP x x5min   Wrist flex & ext stretch x x5 10 sec hold   Wrist composite stretch with elbow extended, wrist flexed x x3 10 sec hold   Wrist AROM x x10   Forearm Pro & Sup x x10   STM/IASTM  x10min   Flex bar Santi Twist  [id number]   Resistive digit extension with rubberband x [id number]   Wrist PREs with blue wedge x x10 5lbs   Triceps extension, shoulder rows, and shoulder extension with theraband x 3x10 green                            Modalities      MPH  x5min   ESTM - premod  x10min s12.0 Cv volts       Assessment:   Patient tolerated session well. Session focused on stretching, range of motion, strengthening and patient education to improve functional task performance with daily activities. Strengthening activities were modified to patient tolerance due to discomfort at lateral epicondyle. Patient tolerated all TE with no complaints of pain. Patient received PreMod Electric Stimulation to left upper extremity lateral elbow to decrease pain to allow for increased ability to perform ADLs.  Skin was assessed ore and post tx with no adverse effects noted. Therapist attended to patient throughout the entire duration of ESTM treatment to adjust intensity and checking up on patient. Patient progressing well towards goals. Patient benefiting from skilled hand therapy OT to reduce deficits to improve independence with daily activities. Plan:   Focus on AROM, strengthening, stretching and ESTM to improve ability to complete daily activites with ease.  POC: 6/5/2023 - 7/31/2023

## 2023-07-03 ENCOUNTER — OFFICE VISIT (OUTPATIENT)
Dept: OCCUPATIONAL THERAPY | Facility: CLINIC | Age: 38
End: 2023-07-03
Payer: COMMERCIAL

## 2023-07-03 DIAGNOSIS — M77.12 LATERAL EPICONDYLITIS OF LEFT ELBOW: Primary | ICD-10-CM

## 2023-07-03 PROCEDURE — 97032 APPL MODALITY 1+ESTIM EA 15: CPT

## 2023-07-03 PROCEDURE — 97110 THERAPEUTIC EXERCISES: CPT

## 2023-07-05 NOTE — PROGRESS NOTES
OT Re-Evaluation     Today's date: 2023  Patient name: Iain Moore  : 1985  MRN: [id number]  Referring provider: Trey Moore  Dx:   Encounter Diagnosis     ICD-10-CM    1. Lateral epicondylitis of left elbow  M77.12                      Assessment  Assessment details: Patient is a 44 y.o. RHD male who presents for OT re-evaluation and treatment for left upper extremity lateral epicondylitis. Patients initial evaluation was on  and has been attending therapy services for 4 weeks for conservative management. Today patient reports to experience decreased symptoms of pain at lateral epicondyle and noted improvement in ability to perform daily tasks. Despite improvement, patient reports continued difficulty performing resistive tasks such as gripping, lifting, and carrying. Impairments: abnormal or restricted ROM, activity intolerance, lacks appropriate home exercise program and pain with function  Understanding of Dx/Px/POC: good   Prognosis: good    Goals  Short Term Goals by 2 - 4 weeks:    Establish HEP to increase performance with daily activities. MET     Patient will increase LUE  strength by at least 10 lbs to assist in completing ADLs. PARTIALLY MET     Patient will increase ROM of LUE forearm by at least 10 degrees to complete ADLs. MET    Patient will increase ROM of LUE elbow by at least 10 degrees to complete ADLs. PARTIALLY MET     Patient will decrease pain rate of LUE by at least 2 points per the VAS scale. MET        Long Term Goals by discharge:    Establish final home exercise program to enhance maximal functional level with ADLs. PARTIALLY MET    Achieve functional active range of motion of LUE for full return to household chores. MET                            Achieve functional strength of LUE for full return to high level ADLs.   PARTIALLY MET            Plan  Patient would benefit from: OT eval and skilled occupational therapy  Planned modality interventions: cryotherapy, thermotherapy: hydrocollator packs and unattended electrical stimulation  Planned therapy interventions: joint mobilization, manual therapy, massage, graded exercise, home exercise program, graded activity, functional ROM exercises, therapeutic activities, stretching, strengthening, therapeutic exercise and patient education  Frequency: 2x week  Duration in weeks: 8  Plan of Care beginning date: 2023  Plan of Care expiration date: 2023  Treatment plan discussed with: patient        Subjective Evaluation    History of Present Illness  Mechanism of injury: Patient is a 44 y.o. RHD male who presents for OT IE and treatment for left upper extremity lateral epicondylitis. Patient reports symptoms started approximately about a 1 month and half to 2 months ago with mild pain/discomfort when fully extending the elbow to  items (I.e. such as a container of formula). Denies any injury or trauma to the left elbow. Patient reports becoming a father approximately 6 weeks ago to a  baby boy and holds his son using the LUE for feeding time. Patient using a counter force brace of the left elbow for support. 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 Patient referred by Natan Webber PA-C to initiate treatment including hand therapy.      Pain  Current pain ratin  At best pain ratin  At worst pain ratin  Quality: dull ache  Aggravating factors: lifting    Social Support    Employment status: working  Hand dominance: right    Patient Goals  Patient goals for therapy: decreased pain, increased motion, increased strength and return to sport/leisure activities  Patient goal: "pain under control, management"        Objective     Active Range of Motion     Left Elbow   Flexion: 140 degrees   Extension: -10 degrees   Forearm supination: 90 degrees   Forearm pronation: 90 degrees     Right Elbow Flexion: 146 degrees   Extension: -12 degrees   Forearm supination: 80 degrees   Forearm pronation: 85 degrees     Left Wrist   Wrist flexion: 70 degrees   Wrist extension: 70 degrees   Radial deviation: 20 degrees   Ulnar deviation: 30 degrees     Right Wrist   Wrist flexion: 68 degrees   Wrist extension: 66 degrees   Radial deviation: 25 degrees   Ulnar deviation: 16 degrees     Strength/Myotome Testing     Left Wrist/Hand      (2nd hand position)     Trial 1: 100    Right Wrist/Hand      (2nd hand position)     Trial 1: 83    Additional Strength Details  RUE  Elbow Extended: 90    LUE  Elbow Extended: 85                    Daily Note     Today's date: 2023  Patient name: Ruma Escudero  : 1985  MRN: [id number]  Referring provider: Olga Mccarty  Dx:   Encounter Diagnosis     ICD-10-CM    1. Lateral epicondylitis of left elbow  M77.12                      Subjective: Patient reports continued improvement in symptoms however soreness at lateral epicondyle continued. Objective: See treatment diary below. Precautions: Universal    Visit Tracker:No Auth Req. 30 visits PCY not combined.  Copay: $20  Date   IE 6/7 6/12 6/13 6/19 6/21    6/26 6/28 7/3 7/6  RE                                                                               Manuals HEP 2023                       Ther Ex     Education on Dx and HEP x x5min   Wrist flex & ext stretch x x5 10 sec hold   Wrist composite stretch with elbow extended, wrist flexed x x3 10 sec hold   Wrist AROM x x10   Forearm Pro & Sup x x10   STM/IASTM  x10min   Flex bar Santi Twist  [id number]   Resistive digit extension with rubberband x [id number]   Wrist PREs with blue wedge x x10 5lbs   Triceps extension, shoulder rows, and shoulder extension with theraband x 3x10 green                            Modalities      MPH  x5min   ESTM - premod  x10min s12.0 Cv volts       Assessment:   Upon re-evaluation patient presents with improvement in all areas measured. Range of motion measures shows improvement in pronation/supination and ulnar deviation. No significant changes noted in wrist flexion extension. Patient verbalized decreased discomfort in range of motion since initial evaluation. Strength measures so slight increase from initial evaluation, however discomfort still noted. Patient tolerated session well. Session focused on stretching, range of motion, strengthening and patient education to improve functional task performance with daily activities. Patient tolerated all TE with no complaints of pain. Patient progressing well towards goals. Patient will continue to benefit from skilled hand therapy OT to reduce deficits to improve independence with daily activities. Plan:   Focus on AROM, strengthening, stretching and ESTM to improve ability to complete daily activites with ease.  POC:7/6/23-8/31/2023

## 2023-07-06 ENCOUNTER — APPOINTMENT (OUTPATIENT)
Dept: OCCUPATIONAL THERAPY | Facility: CLINIC | Age: 38
End: 2023-07-06
Payer: COMMERCIAL

## 2023-07-06 ENCOUNTER — EVALUATION (OUTPATIENT)
Dept: OCCUPATIONAL THERAPY | Facility: CLINIC | Age: 38
End: 2023-07-06
Payer: COMMERCIAL

## 2023-07-06 DIAGNOSIS — M77.12 LATERAL EPICONDYLITIS OF LEFT ELBOW: Primary | ICD-10-CM

## 2023-07-06 PROCEDURE — 97110 THERAPEUTIC EXERCISES: CPT

## 2023-07-07 ENCOUNTER — OFFICE VISIT (OUTPATIENT)
Dept: OBGYN CLINIC | Facility: CLINIC | Age: 38
End: 2023-07-07
Payer: COMMERCIAL

## 2023-07-07 ENCOUNTER — APPOINTMENT (OUTPATIENT)
Dept: RADIOLOGY | Facility: CLINIC | Age: 38
End: 2023-07-07
Payer: COMMERCIAL

## 2023-07-07 VITALS
HEIGHT: 66 IN | DIASTOLIC BLOOD PRESSURE: 87 MMHG | WEIGHT: 274 LBS | BODY MASS INDEX: 44.03 KG/M2 | HEART RATE: 88 BPM | SYSTOLIC BLOOD PRESSURE: 125 MMHG

## 2023-07-07 DIAGNOSIS — M75.41 IMPINGEMENT SYNDROME OF RIGHT SHOULDER: Primary | ICD-10-CM

## 2023-07-07 DIAGNOSIS — M25.511 RIGHT SHOULDER PAIN, UNSPECIFIED CHRONICITY: ICD-10-CM

## 2023-07-07 PROCEDURE — 73030 X-RAY EXAM OF SHOULDER: CPT

## 2023-07-07 PROCEDURE — 99203 OFFICE O/P NEW LOW 30 MIN: CPT | Performed by: ORTHOPAEDIC SURGERY

## 2023-07-07 PROCEDURE — 20610 DRAIN/INJ JOINT/BURSA W/O US: CPT | Performed by: ORTHOPAEDIC SURGERY

## 2023-07-07 RX ORDER — DEXAMETHASONE SODIUM PHOSPHATE 10 MG/ML
40 INJECTION, SOLUTION INTRAMUSCULAR; INTRAVENOUS
Status: COMPLETED | OUTPATIENT
Start: 2023-07-07 | End: 2023-07-07

## 2023-07-07 RX ORDER — LIDOCAINE HYDROCHLORIDE 10 MG/ML
4 INJECTION, SOLUTION INFILTRATION; PERINEURAL
Status: COMPLETED | OUTPATIENT
Start: 2023-07-07 | End: 2023-07-07

## 2023-07-07 RX ADMIN — DEXAMETHASONE SODIUM PHOSPHATE 40 MG: 10 INJECTION, SOLUTION INTRAMUSCULAR; INTRAVENOUS at 11:00

## 2023-07-07 RX ADMIN — LIDOCAINE HYDROCHLORIDE 4 ML: 10 INJECTION, SOLUTION INFILTRATION; PERINEURAL at 11:00

## 2023-07-07 NOTE — PROGRESS NOTES
Assessment/Plan:  1. Impingement syndrome of right shoulder  XR shoulder 2+ vw right    Ambulatory referral to Physical Therapy    Large joint arthrocentesis: R subacromial bursa          Art Brock has right-sided shoulder pain consistent with impingement syndrome of the right shoulder. His x-rays do not show any acute abnormality but does show a hooked acromion and smaller space in the subacromial region which can lead to impingement. I recommended undergoing    Large joint arthrocentesis: R subacromial bursa  Universal Protocol:  Consent: Verbal consent obtained. Risks and benefits: risks, benefits and alternatives were discussed  Consent given by: patient  Time out: Immediately prior to procedure a "time out" was called to verify the correct patient, procedure, equipment, support staff and site/side marked as required. Site marked: the operative site was marked  Supporting Documentation  Indications: pain   Procedure Details  Location: shoulder - R subacromial bursa  Preparation: Patient was prepped and draped in the usual sterile fashion  Needle size: 22 G  Ultrasound guidance: no  Approach: posterior  Medications administered: 40 mg dexamethasone 100 mg/10 mL; 4 mL lidocaine 1 %    Patient tolerance: patient tolerated the procedure well with no immediate complications  Dressing:  Sterile dressing applied            Subjective:   Mathew Tovar is a 44 y.o. male who presents to the office for evaluation for right-sided shoulder pain. He denies any  recent injury or trauma. He has been feeling increased discomfort in the right shoulder that worsens with any lifting or overhead movement. He does work as a  and repetitive lifting of the shoulder does bother him. He has a young son at home and holding or lifting his son does give him increased pain. He denies any significant weakness into the right shoulder. He denies any rating pain or numbness down the arm.   Denies any history of shoulder issues in the past.      Review of Systems   Constitutional: Negative for chills, fever and unexpected weight change. HENT: Negative for hearing loss, nosebleeds and sore throat. Eyes: Negative for pain, redness and visual disturbance. Respiratory: Negative for cough, shortness of breath and wheezing. Cardiovascular: Negative for chest pain, palpitations and leg swelling. Gastrointestinal: Negative for abdominal pain, nausea and vomiting. Endocrine: Negative for polyphagia and polyuria. Genitourinary: Negative for dysuria and hematuria. Musculoskeletal:        See HPI   Skin: Negative for rash and wound. Neurological: Negative for dizziness, numbness and headaches. Psychiatric/Behavioral: Negative for decreased concentration and suicidal ideas. The patient is not nervous/anxious.           Past Medical History:   Diagnosis Date   • Diabetes mellitus ([address])    • Hypertension    • Nasal congestion     Frequently   • PONV (postoperative nausea and vomiting)    • Sleep apnea     5.5 apnea scale       Past Surgical History:   Procedure Laterality Date   • [place] SEPTOPLASTY/SUBMUCOUS RESECJ W/WO CARTILAGE GRF N/A 11/28/2022    Procedure: SEPTOPLASTY;  Surgeon: Mason Hooks MD;  Location: [facility];  Service: ENT   • [place] SUBMUCOUS [place] PRTL/COMPL Bilateral 11/28/2022    Procedure: TURBINECTOMY;  Surgeon: Mason Hooks MD;  Location: [facility];  Service: ENT       Family History   Problem Relation Age of Onset   • Diabetes type II Mother    • Diabetes Mother    • Diabetes Maternal Grandmother    • Diabetes type II Maternal Grandmother    • Arthritis Maternal Grandmother    • Cancer Maternal Grandfather         Liver cancer       Social History     Occupational History   • Not on file   Tobacco Use   • Smoking status: Some Days     Types: Cigars     Passive exposure: Never   • Smokeless tobacco: Never   • Tobacco comments:     Wife just had baby not smoking right now   Vaping Use   • Vaping Use: Never used   Substance and Sexual Activity   • Alcohol use: Not Currently     Comment: rare   • Drug use: Never   • Sexual activity: Yes     Partners: Female     Birth control/protection: None         Current Outpatient Medications:   •  Azelastine HCl 137 MCG/SPRAY SOLN, 1 SPRAY INTO EACH NOSTRIL 2 (TWO) TIMES A DAY USE IN EACH NOSTRIL AS DIRECTED, Disp: 30 mL, Rfl: 4  •  lisinopril (ZESTRIL) 10 mg tablet, Take 10 mg by mouth daily, Disp: , Rfl:   •  pantoprazole (PROTONIX) 20 mg tablet, Take 20 mg by mouth daily (Patient not taking: Reported on 6/19/2023), Disp: , Rfl:   •  semaglutide, 2 mg/dose, (Ozempic, 2 MG/DOSE,) 8 mg/ mL injection pen, Inject 0.75 mL (2 mg total) under the skin every 7 days, Disp: 15 mL, Rfl: 2  •  tadalafil (CIALIS) 10 MG tablet, Take 1 tablet (10 mg total) by mouth daily as needed for erectile dysfunction, Disp: 30 tablet, Rfl: 0    Allergies   Allergen Reactions   • Other Angioedema   • Shellfish-Derived Products - Food Allergy Anaphylaxis   • Penicillins Hives   • Penicillin G Hives       Objective:  Vitals:    07/07/23 1105   BP: 125/87   Pulse: 88            Right Shoulder Exam     Tenderness   Right shoulder tenderness location: Subacromial space. Range of Motion   Active abduction: normal   Passive abduction: normal   Extension: normal   External rotation: normal   Forward flexion: normal   Internal rotation 0 degrees:  Sacrum abnormal     Muscle Strength   Abduction: 4/5   Internal rotation: 5/5   External rotation: 5/5   Supraspinatus: 5/5   Subscapularis: 5/5   Biceps: 5/5     Tests   Denis test: positive  Impingement: positive  Drop arm: negative    Other   Erythema: absent  Sensation: normal  Pulse: present            Physical Exam  Vitals and nursing note reviewed. Constitutional:       Appearance: Normal appearance. He is well-developed. HENT:      Head: Normocephalic and atraumatic.       Right Ear: External ear normal.      Left Ear: External ear normal. Nose: Nose normal.   Eyes:      General: No scleral icterus. Extraocular Movements: Extraocular movements intact. Conjunctiva/sclera: Conjunctivae normal.   Cardiovascular:      Rate and Rhythm: Normal rate. Pulmonary:      Effort: Pulmonary effort is normal. No respiratory distress. Musculoskeletal:      Cervical back: Normal range of motion and neck supple. Comments: See Ortho exam   Skin:     General: Skin is warm and dry. Neurological:      General: No focal deficit present. Mental Status: He is alert and oriented to person, place, and time. Psychiatric:         Behavior: Behavior normal.         I have personally reviewed pertinent films in PACS and my interpretation is as follows:  X-rays of the right shoulder demonstrate no evidence of acute fracture. Hooked acromion and slight bursal calcification      This document was created using speech voice recognition software. Grammatical errors, random word insertions, pronoun errors, and incomplete sentences are an occasional consequence of this system due to software limitations, ambient noise, and hardware issues. Any formal questions or concerns about content, text, or information contained within the body of this dictation should be directly addressed to the provider for clarification.

## 2023-07-10 ENCOUNTER — OFFICE VISIT (OUTPATIENT)
Dept: OBGYN CLINIC | Facility: CLINIC | Age: 38
End: 2023-07-10
Payer: COMMERCIAL

## 2023-07-10 ENCOUNTER — APPOINTMENT (OUTPATIENT)
Dept: RADIOLOGY | Facility: CLINIC | Age: 38
End: 2023-07-10
Payer: COMMERCIAL

## 2023-07-10 VITALS
DIASTOLIC BLOOD PRESSURE: 86 MMHG | BODY MASS INDEX: 44.03 KG/M2 | HEART RATE: 89 BPM | SYSTOLIC BLOOD PRESSURE: 122 MMHG | HEIGHT: 66 IN | WEIGHT: 274 LBS

## 2023-07-10 DIAGNOSIS — M25.562 LEFT KNEE PAIN, UNSPECIFIED CHRONICITY: ICD-10-CM

## 2023-07-10 DIAGNOSIS — M22.2X2 PATELLOFEMORAL SYNDROME OF LEFT KNEE: ICD-10-CM

## 2023-07-10 DIAGNOSIS — M25.561 RIGHT KNEE PAIN, UNSPECIFIED CHRONICITY: ICD-10-CM

## 2023-07-10 DIAGNOSIS — M22.2X1 PATELLOFEMORAL SYNDROME OF RIGHT KNEE: Primary | ICD-10-CM

## 2023-07-10 PROCEDURE — 99214 OFFICE O/P EST MOD 30 MIN: CPT | Performed by: ORTHOPAEDIC SURGERY

## 2023-07-10 PROCEDURE — 73562 X-RAY EXAM OF KNEE 3: CPT

## 2023-07-10 NOTE — PROGRESS NOTES
Assessment/Plan:  1. Patellofemoral syndrome of right knee  XR knee 3 vw right non injury    Ambulatory referral to Physical Therapy      2. Patellofemoral syndrome of left knee  XR knee 3 vw left non injury    Ambulatory referral to Physical Therapy        Luly Pratt has bilateral knee pain in the right is worse than the left. He is experiencing some type of mechanical episode in the right knee when he is in a flexed position but I cannot reproduce any instability on examination today. We discussed the possibility of an underlying meniscal injury however that does not seem to be the case on his exam, I do think it is more likely that he is experiencing patellofemoral pain bilaterally and may be subluxing in a flexed position on the right side at times. I recommended starting with formal physical therapy for both knees. He agreed with this plan and follow-up with me in 6 weeks for repeat evaluation if the knee pain persists or worsens. Subjective:   Jonh Bermudez is a 44 y.o. male who presents to the office for evaluation for bilateral knee pain. He states the right knee is worse than the left. He has intermittent episodes of pain over the right knee that seems to get stuck in a flexed position. If he is at work and working in a flexed position on his knees he sometimes feels like the right knee gets stuck. He has severe pain and then he has to straighten it. This only occurs at times. He does have increased pain going up and down stairs in both knees that seems to come and go with more activity. He denies any significant swelling. He denies any significant traumatic injury to either knee. He denies any rating pain or numbness throughout his legs. Review of Systems   Constitutional: Negative for chills, fever and unexpected weight change. HENT: Negative for hearing loss, nosebleeds and sore throat. Eyes: Negative for pain, redness and visual disturbance.    Respiratory: Negative for cough, shortness of breath and wheezing. Cardiovascular: Negative for chest pain, palpitations and leg swelling. Gastrointestinal: Negative for abdominal pain, nausea and vomiting. Endocrine: Negative for polyphagia and polyuria. Genitourinary: Negative for dysuria and hematuria. Musculoskeletal:        See HPI   Skin: Negative for rash and wound. Neurological: Negative for dizziness, numbness and headaches. Psychiatric/Behavioral: Negative for decreased concentration and suicidal ideas. The patient is not nervous/anxious.           Past Medical History:   Diagnosis Date   • Diabetes mellitus ([address])    • Hypertension    • Nasal congestion     Frequently   • PONV (postoperative nausea and vomiting)    • Sleep apnea     5.5 apnea scale       Past Surgical History:   Procedure Laterality Date   • [place] SEPTOPLASTY/SUBMUCOUS RESECJ W/WO CARTILAGE GRF N/A 11/28/2022    Procedure: SEPTOPLASTY;  Surgeon: Lambert Romero MD;  Location: [facility];  Service: ENT   • [place] SUBMUCOUS [place] PRTL/COMPL Bilateral 11/28/2022    Procedure: TURBINECTOMY;  Surgeon: Lambert Romero MD;  Location: [facility];  Service: ENT       Family History   Problem Relation Age of Onset   • Diabetes type II Mother    • Diabetes Mother    • Diabetes Maternal Grandmother    • Diabetes type II Maternal Grandmother    • Arthritis Maternal Grandmother    • Cancer Maternal Grandfather         Liver cancer       Social History     Occupational History   • Not on file   Tobacco Use   • Smoking status: Some Days     Types: Cigars     Passive exposure: Never   • Smokeless tobacco: Never   • Tobacco comments:     Wife just had baby not smoking right now   Vaping Use   • Vaping Use: Never used   Substance and Sexual Activity   • Alcohol use: Not Currently     Comment: rare   • Drug use: Never   • Sexual activity: Yes     Partners: Female     Birth control/protection: None         Current Outpatient Medications:   •  Azelastine HCl 137 MCG/SPRAY SOLN, 1 SPRAY INTO EACH NOSTRIL 2 (TWO) TIMES A DAY USE IN EACH NOSTRIL AS DIRECTED, Disp: 30 mL, Rfl: 4  •  lisinopril (ZESTRIL) 10 mg tablet, Take 10 mg by mouth daily, Disp: , Rfl:   •  pantoprazole (PROTONIX) 20 mg tablet, Take 20 mg by mouth daily (Patient not taking: Reported on 6/19/2023), Disp: , Rfl:   •  semaglutide, 2 mg/dose, (Ozempic, 2 MG/DOSE,) 8 mg/ mL injection pen, Inject 0.75 mL (2 mg total) under the skin every 7 days, Disp: 15 mL, Rfl: 2  •  tadalafil (CIALIS) 10 MG tablet, Take 1 tablet (10 mg total) by mouth daily as needed for erectile dysfunction, Disp: 30 tablet, Rfl: 0    Allergies   Allergen Reactions   • Other Angioedema   • Shellfish-Derived Products - Food Allergy Anaphylaxis   • Penicillins Hives   • Penicillin G Hives       Objective:  Vitals:    07/10/23 0902   BP: 122/86   Pulse: 89            Right Knee Exam     Tenderness   The patient is experiencing tenderness in the medial joint line. Range of Motion   Extension: normal   Flexion: normal     Tests   Roman:  Medial - negative Lateral - negative  Varus: negative Valgus: negative  Lachman:  Anterior - negative      Drawer:  Anterior - negative    Posterior - negative    Other   Erythema: absent  Sensation: normal  Pulse: present  Swelling: none  Effusion: no effusion present      Left Knee Exam     Tenderness   The patient is experiencing no tenderness. Range of Motion   Extension: normal   Flexion: normal     Tests   Roman:  Medial - negative Lateral - negative  Varus: negative Valgus: negative  Lachman:  Anterior - negative      Drawer:  Anterior - negative     Posterior - negative    Other   Erythema: absent  Sensation: normal  Pulse: present  Swelling: none  Effusion: no effusion present          Observations   Left Knee   Negative for effusion. Right Knee   Negative for effusion. Physical Exam  Vitals and nursing note reviewed. Constitutional:       Appearance: Normal appearance.  He is well-developed. HENT:      Head: Normocephalic and atraumatic. Right Ear: External ear normal.      Left Ear: External ear normal.      Nose: Nose normal.   Eyes:      General: No scleral icterus. Extraocular Movements: Extraocular movements intact. Conjunctiva/sclera: Conjunctivae normal.   Cardiovascular:      Rate and Rhythm: Normal rate. Pulmonary:      Effort: Pulmonary effort is normal. No respiratory distress. Musculoskeletal:      Cervical back: Normal range of motion and neck supple. Right knee: No effusion. Instability Tests: Medial Roman test negative and lateral Roman test negative. Left knee: No effusion. Instability Tests: Medial Roman test negative and lateral Roman test negative. Comments: See Ortho exam   Skin:     General: Skin is warm and dry. Neurological:      General: No focal deficit present. Mental Status: He is alert and oriented to person, place, and time. Psychiatric:         Behavior: Behavior normal.         I have personally reviewed pertinent films in PACS and my interpretation is as follows:  Bilateral knee x-rays demonstrate no evidence of acute fracture or significant degenerative change      This document was created using speech voice recognition software. Grammatical errors, random word insertions, pronoun errors, and incomplete sentences are an occasional consequence of this system due to software limitations, ambient noise, and hardware issues. Any formal questions or concerns about content, text, or information contained within the body of this dictation should be directly addressed to the provider for clarification.

## 2023-07-11 ENCOUNTER — OFFICE VISIT (OUTPATIENT)
Dept: OCCUPATIONAL THERAPY | Facility: CLINIC | Age: 38
End: 2023-07-11
Payer: COMMERCIAL

## 2023-07-11 DIAGNOSIS — M77.12 LATERAL EPICONDYLITIS OF LEFT ELBOW: Primary | ICD-10-CM

## 2023-07-11 PROCEDURE — 97032 APPL MODALITY 1+ESTIM EA 15: CPT

## 2023-07-11 PROCEDURE — 97110 THERAPEUTIC EXERCISES: CPT

## 2023-07-11 NOTE — PROGRESS NOTES
Daily Note     Today's date: 2023  Patient name: Vicente Zabala  : 1985  MRN: [id number]  Referring provider: Mary Isabel  Dx:   Encounter Diagnosis     ICD-10-CM    1. Lateral epicondylitis of left elbow  M77.12           Start Time: 0845  Stop Time: 0930  Total time in clinic (min): 45 minutes    Subjective: Patient reports difficulty with gripping items (I.e. formula bottle) and reaching forward with elbow extended and hand/forearm pronated. Objective: See treatment diary below. Precautions: Universal    Visit Tracker:No Auth Req. 30 visits PCY not combined. Copay: $20  Date   IE 6/7 6/12 6/13 6/19 6/21    6/26 6/28 7/3 7/6  RE                                                                               Manuals HEP 2023                       Ther Ex     Education on Dx and HEP x x5min   Wrist flex & ext stretch x x5 10 sec hold   Wrist composite stretch with elbow extended, wrist flexed x x3 10 sec hold   Wrist AROM x x10   Forearm Pro & Sup x x10   STM/IASTM  x10min   Flex bar Santi Twist  [id number]   Resistive digit extension with rubberband x [id number]   Wrist PREs with blue wedge x x10 3lbs   Triceps extension, shoulder rows, and shoulder extension with theraband x 3x10 green   Joint mobilization with therapist assisted with dynamometer   3 x10                          Modalities      MPH  x5min   ESTM - premod  x10min s12.0 Cv volts       Assessment:   Patient tolerated session well. Session focused on stretching, joint mobilization,  strategies, range of motion, strengthening and patient education to improve functional task performance with daily activities. Patient tolerated all TE with min complaints of pain on the lateral aspect of the elbow.  Educated patient on joint protection and lifting strategies of avoiding elbow extension and wrist & forearm pronated and to place elbow at 90 degrees/side of the body to give more support to elbow and to  items with elbow extended and wrist in neutral plane and to  items by sliding and placing the item (I.e. formula container) onto nguyen surface of the UE. Patient progressing well towards goals. Patient will continue to benefit from skilled hand therapy OT to reduce deficits to improve independence with daily activities. Plan:   Focus on AROM, strengthening, stretching and ESTM to improve ability to complete daily activites with ease.  POC:7/6/23-8/31/2023

## 2023-07-12 ENCOUNTER — TELEPHONE (OUTPATIENT)
Dept: OBGYN CLINIC | Facility: HOSPITAL | Age: 38
End: 2023-07-12

## 2023-07-12 NOTE — PROGRESS NOTES
Daily Note     Today's date: 2023  Patient name: Gael Smalls  : 1985  MRN: [id number]  Referring provider: Barton Callow  Dx:   Encounter Diagnosis     ICD-10-CM    1. Lateral epicondylitis of left elbow  M77.12                      Subjective: Patient reports no significant change in symptoms/function. He reports he will be returning to work on Monday. Objective: See treatment diary below. Precautions: Universal    Visit Tracker:No Auth Req. 30 visits PCY not combined. Copay: $20  Date   IE 6/7 6/12 6/13 6/19 6/21    6/26 6/28 7/3 7/6  RE                                                                              Manuals HEP 2023                       Ther Ex     Education on Dx and HEP x x5min   Wrist flex & ext stretch x x5 10 sec hold   Wrist composite stretch with elbow extended, wrist flexed x x3 10 sec hold   Wrist AROM x x10   Forearm Pro & Sup x x10   STM/IASTM  x10min   Flex bar Santi Twist  [id number]   Resistive digit extension with rubberband x [id number]   Wrist PREs with blue wedge x x10 3lbs   Triceps extension, shoulder rows, and shoulder extension with theraband x 3x10 green   Joint mobilization with therapist assisted with dynamometer   3 x10                          Modalities      MPH  x5min   ESTM - premod  x10min s12.0 Cv volts       Assessment:   IASTM performed at lateral epicondyle to improve blood flow and reduce tissue tightness. Patient tolerated session well. Session focused on stretching, range of motion, strengthening and patient education to improve functional task performance with daily activities. Patient tolerated all TE with min complaints of pain on the lateral aspect of the elbow. Patient progressing well towards goals. Patient will continue to benefit from skilled hand therapy OT to reduce deficits to improve independence with daily activities.              Plan:   Focus on AROM, strengthening, stretching and ESTM to improve ability to complete daily activites with ease.  POC:7/6/23-8/31/2023

## 2023-07-12 NOTE — TELEPHONE ENCOUNTER
Caller: Self    Doctor: Kevan Mcclain    Reason for call: Can order be placed for physical therapy of bilateral knees    Call back#: [phone]

## 2023-07-13 ENCOUNTER — OFFICE VISIT (OUTPATIENT)
Dept: OCCUPATIONAL THERAPY | Facility: CLINIC | Age: 38
End: 2023-07-13
Payer: COMMERCIAL

## 2023-07-13 DIAGNOSIS — M77.12 LATERAL EPICONDYLITIS OF LEFT ELBOW: Primary | ICD-10-CM

## 2023-07-13 PROCEDURE — 97140 MANUAL THERAPY 1/> REGIONS: CPT

## 2023-07-13 PROCEDURE — 97110 THERAPEUTIC EXERCISES: CPT

## 2023-07-17 ENCOUNTER — EVALUATION (OUTPATIENT)
Dept: PHYSICAL THERAPY | Facility: CLINIC | Age: 38
End: 2023-07-17
Payer: COMMERCIAL

## 2023-07-17 DIAGNOSIS — M75.41 IMPINGEMENT SYNDROME OF RIGHT SHOULDER: Primary | ICD-10-CM

## 2023-07-17 DIAGNOSIS — M22.2X1 PATELLOFEMORAL SYNDROME OF RIGHT KNEE: ICD-10-CM

## 2023-07-17 DIAGNOSIS — M22.2X2 PATELLOFEMORAL SYNDROME OF LEFT KNEE: ICD-10-CM

## 2023-07-17 PROCEDURE — 97161 PT EVAL LOW COMPLEX 20 MIN: CPT

## 2023-07-17 NOTE — PROGRESS NOTES
PT Evaluation   Today's date: 2023  Patient name: Zaira Kurtz  : 1985  MRN: [id number]  Referring provider: Ela Gunter DO  Dx:   Encounter Diagnosis     ICD-10-CM    1. Impingement syndrome of right shoulder  M75.41 Ambulatory referral to Physical Therapy      2. Patellofemoral syndrome of right knee  M22.2X1 Ambulatory referral to Physical Therapy      3. Patellofemoral syndrome of left knee  M22.2X2 Ambulatory referral to Physical Therapy          Patient treated by AMERICO Mccurdy under supervision from this PT. We discussed the case to ensure appropriate continuation and progression of care and I reviewed the documentation. Assessment  Assessment details: Patient is a 44 y.o. male who presents with referring diagnosis of impingement syndrome of right shoulder. Patient's greatest concern is no signs of improvement and future ill health. Primary movement impairment diagnosis of shoulder pain w/ mobility deficits resulting in pathoanatomical symptoms of restricted range of motion, muscular power deficits, scapular dyskinesis, possible cervical involvement and functional limitations. The aforementioned impairments have limited the patient's ability to reach away from body, lifting over head, and perform ADLs without restriction. No further referral appears necessary at this time based upon examination results. Plan to evaluate bilateral knee patient next visit.     Patient education performed during today's session included: Cervical spine pain referral (nerve root involvement), HEP repeated retractions to better assess symptom response    Primary movement impairments:  1) Mobility deficits  2) Range of motion deficits    Etiological factors include: works as , frequent heavy lifting        Impairments: Abnormal coordination, Abnormal muscle tone, Abnormal or restricted ROM, Activity intolerance, Impaired physical strength, Lacks appropriate HEP, Poor body mechanics, Pain with function and Scapular dyskinesis  Understanding of Dx/Px/POC: Excellent  Prognosis: Good   Positive prognostic factors: Age, willingness to improve, immediate change in symptom   Negative prognostic factors: comorbid conditions    Patient verbalized understanding of POC. Please contact me if you have any questions or recommendations. Thank you for the referral and the opportunity to share in [place] care.         Plan    Patient would benefit from: Skilled PT  Planned modality interventions: Biofeedback, Cryotherapy, TENS, Thermotherapy: Hydrocollator Packs and Traction  Planned therapy interventions: ADL training, Body mechanics training, Coordination, Functional ROM exercises, HEP, Joint mobilization, Manual therapy, Motor coordination training, Neuromuscular re-education, Patient education, Strengthening, Stretching, Therapeutic activities, Therapeutic exercises, Activity modification and Work reintegration  Frequency: 2x/week  Duration in weeks: 12  Plan of Care beginning date: 7/17/2023  Plan of Care expiration date: 16 weeks - 10/9/2023  Treatment plan discussed with: Patient       Goals  Short Term Goals (6 weeks):    - Patient will be independent in basic HEP 2-3 weeks  - Patient will report >50% reduction in pain  - Patient will demonstrate >1/3 improvement in MMT grade as applicable  - Patient will demonstrate ability to abduct R shoulder to 180 deg  - Patient will report <5 instances of shoulder pain exacerbation in a given week    Long Term Goals (12 weeks):  - Patient will be independent in a comprehensive home exercise program  - Patient FOTO score will improve by >10 points  - Patient will self-report >90% improvement in function  - Patient will deny any shoulder pain while reaching overhead  - Patient will demonstrate ability to reach behind his head      Subjective    History of Present Illness  - Mechanism of injury: Ivana Austin presents to [facility] with a primary complaint of right shoulder pain. 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 Patient reports that he has become a master at [employer] to be able to complete the necessary tasks at work. Patient is unable to recall a specific PORSHA but has started to notice the pain more as he was on paternity leave for the past few months. He has felt high levels of restriction and pain since being home, patient received a steroid injection 23 that helped decrease the pain but did not improve his shoulder range of motion. Patient has tried several remedies at home including ice, heat and topical analgesics that have been unsuccessful for improving symptoms. Patient also reports that he occasionally wakes up with tingling throughout both hands. Patient does report history of bilateral knee pain that he would like have have evaluated in future sessions for PT. States he has been experiencing pain at lateral elbow with gripping activities; is currently receiving OT, which has been improving pain.   - Assist level at home: lives with wife  - Prior level of function: very active, physically demanding job requirements      Pain  - Current pain ratin/10  - At best pain ratin/10  - At worst pain ratin/10  - Location: subacromial space  - Aggravating factors: Shoulder Abduction to ~90 degrees      Objective        Postural Assessment  -Posture in Sitting: moderate rounded shoulders    Sensation  - Light touch: intact  - Reflexes:   Left: C5: 2+  C6: 2+  C7: 1+   Right: C5: 2+  C6: 2+  C7: 1+   - Upper Motor Neuron Signs: negative    Active Range of Motion  Cervical Spine  Flexion:  No limitation  without pain  Extension:  Minimally limited  without pain  Lateral Flexion - Left:  Minimally limited  without pain  Lateral Flexion - Right:  Moderately limited  without pain  Rotation - Left:  No limitation without pain  Rotation - Right:  No limitation  without pain    Thoracic Spine  Flexion:  No limitation  without pain  Extension:  No limitation  without pain  Rotation - Left:  Minimally limited  without pain  Rotation - Right:  Minimally limited  without pain    Shoulder   LEFT  RIGHT  - Flexion: 170  170  - Abduction: 180  80 *p  - ER:  T3  Unable *p  - IR:  T12  L3    Passive Shoulder Range of Motion in Supine    LEFT  RIGHT  - Flexion: 180  180  - Abduction: 180  90 p*  - ER at 0: 90  60  - IR at 0: 20  20  - ER at 45: 100  90  - IR at 45: 70  50    Mechanical Assessment  In seated:  - Retraction: improved patient abduction AROM to 165 deg  - Retraction + Extension: increase in pain and ROM worsened to 90 deg    Scapular Mobility  Right  Below 90 degrees elevation: Winging:  Minimal  Upward Rotation:  Inadequate  Above 90 degrees elevation: Upward Rotation:  Delayed   Left  Below 90 degrees elevation:Upward Rotation: Delayed  Above 90 degrees elevation: Upward Rotation:  Delayed     UE MMT  LEFT   RIGHT  - Shoulder Shru/5   5/5  - Shoulder Abduction:   4/5   3+/5   - Shoulder ER:   4/5   4-/5 *d  - Shoulder IR:   4+/5   4/5  - Elbow Flexion:  4/5   4-/5  - Elbow Extension:  4+/5   4/5    - Middle Trap:   4-/5   3+/5  - Lower Trap:   4/5   3+/5    (*d = discomfort, *p = pain)  Joint Play  - Anterior Capsule: Hypomobile  - Posterior Capsule: Hypomobile  - Inferior Capsule: Hypomobile  - Acromioclavicular Joint: Hypomobile  - Sternoclavicular Joint: Normal  - CTJ: Hypomobile  - Mid-Thoracic Spine: Hypomobile  - Lower Thoracic Spine: Normal    Diagnostic Tests Performed  Positive: Scapular Assist and Painful Arc  Negative:  Drop Arm, Empty Can, Full Can, Hawkin's and Neer    Functional Assessment   strength: R: 60, L:46 pounds of force (blub dynamometer)             Insurance:  AMA/CMS Eval/ Re-eval POC expires Mukesh Liter #/ Referral # Total visits  Start date  Expiration date Extension  Visit limitation? PT only or  PT+OT?  Co-Insurance   [facility] 7/17/23 10/9/23  SUBMITTED                                                                         Date 7-17               Used 1               Remaining                     Date                Used                Remaining                    Date 7/17/2023        Visit Number IE        Manual         T/s PA mob         GHJ mob         C/s upglide         C/s downglide                  TherEx         Cervical retraction 4x10 5-8 sec hold        Shld ext         Rows         Scaption         Banded ER         T/s extension over half roll         IYT         Open book         Bear hugs                           Neuro Re-Ed         Scap resetting                                    TherAct         Patient education                                             Gait Training                                    Modalities         CP           Precautions: diabetes mellitus  Past Medical History:   Diagnosis Date   • Diabetes mellitus ([address])    • Hypertension    • Nasal congestion     Frequently   • PONV (postoperative nausea and vomiting)    • Sleep apnea     5.5 apnea scale       HEP  - retractions x10 every 2-3 hours

## 2023-07-18 ENCOUNTER — OFFICE VISIT (OUTPATIENT)
Dept: OCCUPATIONAL THERAPY | Facility: CLINIC | Age: 38
End: 2023-07-18
Payer: COMMERCIAL

## 2023-07-18 ENCOUNTER — OFFICE VISIT (OUTPATIENT)
Dept: PHYSICAL THERAPY | Facility: CLINIC | Age: 38
End: 2023-07-18
Payer: COMMERCIAL

## 2023-07-18 DIAGNOSIS — M22.2X2 PATELLOFEMORAL SYNDROME OF LEFT KNEE: ICD-10-CM

## 2023-07-18 DIAGNOSIS — M75.41 IMPINGEMENT SYNDROME OF RIGHT SHOULDER: Primary | ICD-10-CM

## 2023-07-18 DIAGNOSIS — M77.12 LATERAL EPICONDYLITIS OF LEFT ELBOW: Primary | ICD-10-CM

## 2023-07-18 DIAGNOSIS — M22.2X1 PATELLOFEMORAL SYNDROME OF RIGHT KNEE: ICD-10-CM

## 2023-07-18 PROCEDURE — 97112 NEUROMUSCULAR REEDUCATION: CPT

## 2023-07-18 PROCEDURE — 97530 THERAPEUTIC ACTIVITIES: CPT

## 2023-07-18 PROCEDURE — 97110 THERAPEUTIC EXERCISES: CPT

## 2023-07-18 NOTE — PROGRESS NOTES
Daily Note     Today's date: 2023  Patient name: Ruma Escudero  : 1985  MRN: [id number]  Referring provider: Rhonda Thompson DO  Dx:   Encounter Diagnosis     ICD-10-CM    1. Impingement syndrome of right shoulder  M75.41       2. Patellofemoral syndrome of right knee  M22.2X1       3. Patellofemoral syndrome of left knee  M22.2X2           Start Time: 1630  Stop Time: 1710  Total time in clinic (min): 40 minutes     Patient treated by AMERICO Marques under supervision from this PT. We discussed the case to ensure appropriate continuation and progression of care and I reviewed the documentation. Subjective: Patient reports that the symptoms in the shoulder were improved with cervical retractions, felt increased discomfort in the neck with repeated repititons. Objective: See treatment diary below      Assessment: Tolerated treatment well. Due to patient report of feeling neck click with cervical retractions, patient was provided with cervical up glides which were successful for abolishing clicking sensation in neck. Patient also provided with scapular resetting which was successful in improving patient R shoulder ROM. Patient was provided with wall slides with lift off during today's session to begin lower trap training. Due to compensations even after cueing, exercise was held from HEP; HEP was updated to include scapular retractions, patient expressed good understanding and had no questions or concerns. Patient fatigued quickly with all shoulder abduction exercises during today's session. Patient would benefit from continued PT to improve exercise tolerance and functional mobility. Plan: Continue per plan of care. Insurance:  AMA/CMS Eval/ Re-eval POC expires Lenda Fears #/ Referral # Total visits  Start date  Expiration date Extension  Visit limitation? PT only or  PT+OT?  Co-Insurance   [facility] 7/17/23 10/9/23  SUBMITTED Date 10-13 8-20              Used 1 1              Remaining  11 10                  Date                Used                Remaining                    Date 7/17/23 7/18/23       Visit Number IE 2       Manual         T/s PA mob         GHJ mob         C/s upglide  2x20 ea       C/s downglide                  TherEx         Cervical retraction 4x10 5-8 sec hold 2x10 10sec hold       Shld ext         Rows         Scaption         Banded ER         T/s extension over half roll         IYT         Open book         Bear hugs                           Neuro Re-Ed         Scap resetting  S/L x10 with assistance  x10 independently       Wall slides + lift off  3x10       SL shld abd  2x10        Scap retractions  2x10 3" hold       TherAct         Patient education  HEP                                           Gait Training                                    Modalities         CP           Precautions: diabetes mellitus  Past Medical History:   Diagnosis Date   • Diabetes mellitus ([address])    • Hypertension    • Nasal congestion     Frequently   • PONV (postoperative nausea and vomiting)    • Sleep apnea     5.5 apnea scale       HEP  - c/s retractions x10 every 2-3 hours  - scap retractions

## 2023-07-18 NOTE — PROGRESS NOTES
Daily Note     Today's date: 2023  Patient name: Gael Smalls  : 1985  MRN: [id number]  Referring provider: Barton Callow  Dx:   Encounter Diagnosis     ICD-10-CM    1. Lateral epicondylitis of left elbow  M77.12                      Subjective: Patient reports no pain with the upgrades in HEP. No pain when working. Objective: See treatment diary below. Precautions: Universal    Visit Tracker:No Auth Req. 30 visits PCY not combined. Copay: $20  Date   IE 6/7 6/12 6/13 6/19 6/21    6/26 6/28 7/3 7/6  RE                                                                          Manuals HEP 2023                       Ther Ex     Education on Dx and HEP x x5min   Wrist flex & ext stretch x x5 10 sec hold   Wrist composite stretch with elbow extended, wrist flexed x x3 10 sec hold   Wrist AROM x x10   Forearm Pro & Sup x x10   STM/IASTM  x10min   Flex bar Santi Twist  [id number]   Resistive digit extension with rubberband x [id number]   Wrist PREs with blue wedge x x10 3lbs   Triceps extension, shoulder rows, and shoulder extension with theraband x 3x10 green   Joint mobilization with therapist assisted with dynamometer   3 x10                          Modalities      MPH  x5min   ESTM - premod  x10min s12.0 Cv volts       Assessment:   IASTM performed at lateral epicondyle to improve blood flow and reduce tissue tightness. Patient tolerated session well. Session focused on stretching, range of motion, strengthening and patient education to improve functional task performance with daily activities. Patient tolerated all TE with min complaints of pain on the lateral aspect of the elbow. Patient progressing well towards goals. Patient will continue to benefit from skilled hand therapy OT to reduce deficits to improve independence with daily activities.              Plan:   Focus on AROM, strengthening, stretching and ESTM to improve ability to complete daily activites with ease.  POC:7/6/23-8/31/2023

## 2023-07-20 ENCOUNTER — OFFICE VISIT (OUTPATIENT)
Dept: OCCUPATIONAL THERAPY | Facility: CLINIC | Age: 38
End: 2023-07-20
Payer: COMMERCIAL

## 2023-07-20 DIAGNOSIS — M77.12 LATERAL EPICONDYLITIS OF LEFT ELBOW: Primary | ICD-10-CM

## 2023-07-20 PROCEDURE — 97110 THERAPEUTIC EXERCISES: CPT

## 2023-07-20 PROCEDURE — 97140 MANUAL THERAPY 1/> REGIONS: CPT

## 2023-07-20 NOTE — PROGRESS NOTES
Daily Note     Today's date: 2023  Patient name: Scar Martinez  : 1985  MRN: [id number]  Referring provider: Thor Rodrigez  Dx:   Encounter Diagnosis     ICD-10-CM    1. Lateral epicondylitis of left elbow  M77.12           Start Time: 1630  Stop Time: 1705  Total time in clinic (min): 35 minutes    Subjective: Patient reports improvement in overall  strength, no pain. Patient has returned back to work with no complaints or difficulties. Objective: See treatment diary below. Precautions: Universal    Visit Tracker:No Auth Req. 30 visits PCY not combined. Copay: $20  Date   IE 6/7 6/12 6/13 6/19 6/21    6/26 6/28 7/3 7/6  RE                                                                         Manuals HEP 2023   STM/IASTM  x10min                  Ther Ex     Education on Dx and HEP x x5min   Wrist flex & ext stretch x x5 10 sec hold   Wrist composite stretch with elbow extended, wrist flexed x x3 10 sec hold   Wrist AROM x x10   Forearm Pro & Sup x x10   Flex bar Santi Twist  [id number]   Resistive digit extension with rubberband x [id number]   Wrist PREs with blue wedge x 2x10 4lbs   Elbow PREs hammer and bicep curls  2x10 4lbs   Triceps extension, shoulder rows, and shoulder extension with theraband x 3x10 green   Joint mobilization with therapist assisted with dynamometer   3 x10                          Modalities      MPH  x5min   ESTM - premod         Assessment:   IASTM performed at lateral epicondyle to improve blood flow and reduce tissue tightness. Patient tolerated session well. Session focused on stretching, range of motion, strengthening and patient education to improve functional task performance with daily activities. Patient tolerated all TE with no complaints of pain on the lateral aspect of the elbow. Patient progressing well towards goals.  Patient will continue to benefit from skilled hand therapy OT to reduce deficits to improve independence with daily activities. Plan:   Focus on AROM, strengthening, stretching and ESTM to improve ability to complete daily activites with ease.  POC:7/6/23-8/31/2023

## 2023-07-22 NOTE — PROGRESS NOTES
Daily Note     Today's date: 2023  Patient name: Vicente Zabala  : 1985  MRN: [id number]  Referring provider: Mary Isabel  Dx:   Encounter Diagnosis     ICD-10-CM    1. Lateral epicondylitis of left elbow  M77.12                      Subjective: Patient reports improvement of symptoms at lateral epicondyle with minimal pain experienced. Objective: See treatment diary below. Precautions: Universal    Visit Tracker:No Auth Req. 30 visits PCY not combined. Copay: $20  Date   IE 6/7 6/12 6/13 6/19 6/21    6/26 6/28 7/3 7/6  RE                                                                        Manuals HEP 2023   STM/IASTM  x10min                  Ther Ex     Education on Dx and HEP x x5min   Wrist flex & ext stretch x x5 10 sec hold   Wrist composite stretch with elbow extended, wrist flexed x x3 10 sec hold   Wrist AROM x x10   Forearm Pro & Sup x x10   Flex bar Santi Twist  [id number]   Resistive digit extension with rubberband x [id number]   Wrist PREs with blue wedge x 2x10 4lbs   Elbow PREs hammer and bicep curls  2x10 4lbs   Triceps extension, shoulder rows, and shoulder extension with theraband x 3x10 green   Joint mobilization with therapist assisted with dynamometer   3 x10                          Modalities      MPH  x5min   ESTM - premod         Assessment:   IASTM performed at lateral epicondyle to improve blood flow and reduce tissue tightness. Patient tolerated session well. Session focused on stretching, range of motion, strengthening and patient education to improve functional task performance with daily activities. Patient progressed to increase in resistive exercises due to decrease in symptoms. Patient tolerated all TE with no complaints of pain on the lateral aspect of the elbow. Patient progressing well towards goals.  Patient will continue to benefit from skilled hand therapy OT to reduce deficits to improve independence with daily activities. Plan:   Focus on AROM, strengthening, stretching and ESTM to improve ability to complete daily activites with ease.  POC:7/6/23-8/31/2023

## 2023-07-25 ENCOUNTER — EVALUATION (OUTPATIENT)
Dept: PHYSICAL THERAPY | Facility: CLINIC | Age: 38
End: 2023-07-25
Payer: COMMERCIAL

## 2023-07-25 ENCOUNTER — OFFICE VISIT (OUTPATIENT)
Dept: OCCUPATIONAL THERAPY | Facility: CLINIC | Age: 38
End: 2023-07-25
Payer: COMMERCIAL

## 2023-07-25 DIAGNOSIS — M22.2X2 PATELLOFEMORAL SYNDROME OF LEFT KNEE: ICD-10-CM

## 2023-07-25 DIAGNOSIS — M75.41 IMPINGEMENT SYNDROME OF RIGHT SHOULDER: ICD-10-CM

## 2023-07-25 DIAGNOSIS — M22.2X1 PATELLOFEMORAL SYNDROME OF RIGHT KNEE: Primary | ICD-10-CM

## 2023-07-25 DIAGNOSIS — M77.12 LATERAL EPICONDYLITIS OF LEFT ELBOW: Primary | ICD-10-CM

## 2023-07-25 PROCEDURE — 97164 PT RE-EVAL EST PLAN CARE: CPT

## 2023-07-25 PROCEDURE — 97110 THERAPEUTIC EXERCISES: CPT

## 2023-07-25 PROCEDURE — 97140 MANUAL THERAPY 1/> REGIONS: CPT

## 2023-07-25 NOTE — PROGRESS NOTES
PT Re-Evaluation   Today's date: 2023  Patient name: Theodora Easley  : 1985  MRN: [id number]  Referring provider: Lauren Delcid DO  Dx:   Encounter Diagnosis     ICD-10-CM    1. Patellofemoral syndrome of right knee  M22.2X1       2. Patellofemoral syndrome of left knee  M22.2X2       3. Impingement syndrome of right shoulder  M75.41           Patient treated by AMERICO Mccartney under supervision from this PT. We discussed the case to ensure appropriate continuation and progression of care and I reviewed the documentation. Subjective    Due to progress made to this point in regards to right shoulder, patient would like to undergo evaluation for bilateral knee pain. History of Present Illness  - Mechanism of injury: Patient presents with long standing knee pain in bilateral knees. Patient reports that he has had knee pain on and off for about 6 months. Patient reports that he is unable to identify a single point in time that the knee pain began. Patient reports that he did notice an exacerbation of his knee symptoms when he returned to work last week. Patient works as  resulting in lots of time and stress placed through the knees, patient notices that knee pain is worse while at work. Patient had no pain in non-weightbearing positions during today's eval. Patient also reports that he experienced patella dislocation today while at work resulting in his L knee being very sensitive today.     Goals  Short Term Goals (6 weeks):    - Patient will be independent in basic HEP 2-3 weeks  - Patient will report >50% reduction in pain  - Patient will demonstrate >1/3 improvement in MMT grade as applicable  - Patient will demonstrate ability to abduct R shoulder to 180 deg  - Patient will report <5 instances of shoulder pain exacerbation in a given week  - Patient will demonstrate improvement in hip flexion by >10 deg    Long Term Goals (12 weeks):  - Patient will be independent in a comprehensive home exercise program  - Patient FOTO score will improve by >10 points  - Patient will self-report >90% improvement in function  - Patient will deny any shoulder pain while reaching overhead  - Patient will demonstrate ability to reach behind his head  - Patient will deny knee pain with ambulation  - Patient will perform work activities with <20% increase in knee pain    Pain  - Current pain ratin/10  - At best pain ratin/10  - At worst pain ratin/10  - Location: globally anterior knee (has been above and below patella)  - Aggravating factors: stairs, prolonged walking, activities      Objective     Sensation  - Light touch: intact  - Reflexes:   Left: Patellar: absent   Achilles: 1+   Right: Patellar: absent  Achilles: 1+     LE MMT  LEFT   RIGHT  -Hip Flexion:   4-/5   4-/5  -Hip Extension:  4/5   4/5   -Hip Abduction: 4/5   4/5  -Hip Adduction: 4/5   4/5  -Hip IR:  4/5   4/5  -Hip ER:  3+/5   4-/5    -Knee Flexion  4/5   4+/5  -Knee Extension 4-/5   4/5    -Ankle DF  4+/5   4+/5  -Ankle PF  4/5   4+/5    -Great Toe Extension 4+/5   4+/5      Hip Range of Motion    LEFT  RIGHT  - Flexion 105  105  - IR  20  20  - ER  60  60  - Extension 10  10    Knee Range of Motion    LEFT  RIGHT  - Flexion 130  137  - Extension 0  0       Palpation  LEFT  - Positive: Lateral Patella    RIGHT  - Negative: all areas    Diagnostic Tests Performed  LEFT  - Negative: Valgus and varus stress test at 0 & 30 deg, patellar compression, clarks       RIGHT  - Negative: Valgus and varus stress test at 0 & 30 deg, patellar compression, clarks    Functional Assessment  -Functional Squat: Pain with eccentric, improved with medial patellar glide    Re-Assessment of Left Hand  Strength: 58 lbs force      Assessment  Patient is a 44 y.o. Male who presents to physical therapy with referring diagnosis of bilateral patellofemoral pain syndrome. Patient's greatest concern is no signs of improvement and fear of not being able to keep active. Patient's primary movement impairment diagnosis of motor control deficits resulting in pathoanatomical symptoms of stability deficits, motor control deficits, and functional limitations. The aforementioned impairments have limited the patient's ability to perform necessary tasks at work and have caused him to not be confident in his ability to to place stress through his knees. No further referral appears necessary at this time based upon examination results. Patient expressed will to treat both at same time therefore will incorporate targeting both knee pain and shoulder pain in future sessions as knee pain is being added to POC. Insurance:  AMA/CMS Eval/ Re-eval POC expires Bridget Lerner #/ Referral # Total visits  Start date  Expiration date Extension  Visit limitation? PT only or  PT+OT?  Co-Insurance   [facility] 7/17/23 10/9/23  SUBMITTED                                                                         Date 7-17 7-18 7-25             Used 1 1 1             Remaining  11 10 9                 Date                Used                Remaining                    Date 7/17/23 7/18/23 7/25/23      Visit Number IE 2 3 - Knee eval      Manual         T/s PA mob         GHJ mob         C/s upglide  2x20 ea       C/s downglide                  TherEx         Cervical retraction 4x10 5-8 sec hold 2x10 10sec hold       Shld ext         Rows         Scaption         Banded ER         T/s extension over half roll         IYT         Open book         Bear hugs                  Squats         Step down         Step up         LAQ         Leg press         Lateral stepping         March                  Neuro Re-Ed         Scap resetting  S/L x10 with assistance  x10 independently       Wall slides + lift off  3x10       SL shld abd  2x10        Scap retractions  2x10 3" hold       TherAct         Patient education HEP                                           Gait Training                                    Modalities         CP               Precautions:   Past Medical History:   Diagnosis Date   • Diabetes mellitus ([address])    • Hypertension    • Nasal congestion     Frequently   • PONV (postoperative nausea and vomiting)    • Sleep apnea     5.5 apnea scale

## 2023-07-27 ENCOUNTER — OFFICE VISIT (OUTPATIENT)
Dept: PHYSICAL THERAPY | Facility: CLINIC | Age: 38
End: 2023-07-27
Payer: COMMERCIAL

## 2023-07-27 ENCOUNTER — OFFICE VISIT (OUTPATIENT)
Dept: OCCUPATIONAL THERAPY | Facility: CLINIC | Age: 38
End: 2023-07-27
Payer: COMMERCIAL

## 2023-07-27 DIAGNOSIS — M22.2X1 PATELLOFEMORAL SYNDROME OF RIGHT KNEE: Primary | ICD-10-CM

## 2023-07-27 DIAGNOSIS — M22.2X2 PATELLOFEMORAL SYNDROME OF LEFT KNEE: ICD-10-CM

## 2023-07-27 DIAGNOSIS — M77.12 LATERAL EPICONDYLITIS OF LEFT ELBOW: Primary | ICD-10-CM

## 2023-07-27 DIAGNOSIS — M75.41 IMPINGEMENT SYNDROME OF RIGHT SHOULDER: ICD-10-CM

## 2023-07-27 PROCEDURE — 97110 THERAPEUTIC EXERCISES: CPT

## 2023-07-27 PROCEDURE — 97140 MANUAL THERAPY 1/> REGIONS: CPT

## 2023-07-27 NOTE — PROGRESS NOTES
Daily Note     Today's date: 2023  Patient name: Tor Medellin  : 1985  MRN: [id number]  Referring provider: Giorgi Rahman  Dx:   Encounter Diagnosis     ICD-10-CM    1. Lateral epicondylitis of left elbow  M77.12                    Subjective: Patient reports improvement of symptoms at lateral epicondyle with minimal pain experienced. Objective: See treatment diary below. Precautions: Universal    Visit Tracker:No Auth Req. 30 visits PCY not combined. Copay: $20  Date   IE 6/7 6/12 6/13 6/19 6/21    6/26 6/28 7/3 7/6  RE                                                                       Manuals HEP 2023   STM/IASTM  x10min                  Ther Ex     Education on Dx and HEP x x5min   Wrist flex & ext stretch x x5 10 sec hold   Wrist composite stretch with elbow extended, wrist flexed x x3 10 sec hold   Wrist AROM x x10   Forearm Pro & Sup x x10   Flex bar Santi Twist  [id number]   Resistive digit extension with rubberband x [id number]   Wrist PREs with blue wedge x 2x10 4lbs   Elbow PREs hammer and bicep curls  2x10 4lbs   Triceps extension, shoulder rows, and shoulder extension with theraband x 3x10 blue   Joint mobilization with therapist assisted with dynamometer   3 x10                          Modalities      MPH  x5min   ESTM - premod         Assessment:   IASTM performed at lateral epicondyle to improve blood flow and reduce tissue tightness. Patient tolerated session well. Session focused on stretching, range of motion, strengthening and patient education to improve functional task performance with daily activities. Patient progressed to increase in resistive exercises due to decrease in symptoms. Patient tolerated all TE with no complaints of pain on the lateral aspect of the elbow. Patient progressing well towards goals.  Patient will continue to benefit from skilled hand therapy OT to reduce deficits to improve independence with daily activities. Plan:   Focus on AROM, strengthening, stretching and ESTM to improve ability to complete daily activites with ease.  POC:7/6/23-8/31/2023

## 2023-07-27 NOTE — PROGRESS NOTES
Daily Note     Today's date: 2023  Patient name: Gael Smalls  : 1985  MRN: [id number]  Referring provider: John Estrada DO  Dx:   Encounter Diagnosis     ICD-10-CM    1. Patellofemoral syndrome of right knee  M22.2X1       2. Patellofemoral syndrome of left knee  M22.2X2       3. Impingement syndrome of right shoulder  M75.41                      Subjective: Pt reports being compliant with HEP. Also reports getting a stabilization brace for his knee. Objective: See treatment diary below      Assessment: Pt did well with all new TE added for LE strenthening, pt will continue to benenfit from skilled PT to maximize strength and functional mobility. Plan: Continue per plan of care. Insurance:  AMA/CMS Eval/ Re-eval POC expires Ba Sarthak #/ Referral # Total visits  Start date  Expiration date Extension  Visit limitation? PT only or  PT+OT?  Co-Insurance   [employer] [place] 7/17/23 10/9/23   SUBMITTED                                                                                                                                    Date                      Used 1 1 1  1                     Remaining  11 10 9  8                           Date                             Used                             Remaining                                  Date 23       Visit Number IE 2 3 - Knee eval  4        Manual               T/s PA mob               GHJ mob               C/s upglide   2x20 ea           C/s downglide                ACJa/p /inf gr 3-4         performed                TherEx               PROM     performed     Cervical retraction 4x10 5-8 sec hold 2x10 10sec hold           Shld ext               Rows               Scaption               Banded ER               T/s extension over half roll               IYT               Open book               Bear hugs                               Squats        2x10       Step down               Step up        fwd/lat x20 ea       Standing 3 way    Light loop x20 ea     LAQ               Leg press               Lateral stepping        light loop 4 laps       March                SLR       flex/abd 2x10 ea       Neuro Re-Ed               Scap resetting   S/L x10 with assistance  x10 independently           Wall slides + lift off   3x10    5"x10        SL shld abd   2x10            Scap retractions   2x10 3" hold    3" 2x10       TherAct               Patient education   HEP                                                                           Gait Training                                                               Modalities               CP

## 2023-07-30 DIAGNOSIS — N52.9 ERECTILE DYSFUNCTION, UNSPECIFIED ERECTILE DYSFUNCTION TYPE: ICD-10-CM

## 2023-07-30 DIAGNOSIS — E11.65 TYPE 2 DIABETES MELLITUS WITH HYPERGLYCEMIA, WITHOUT LONG-TERM CURRENT USE OF INSULIN (HCC): ICD-10-CM

## 2023-07-30 DIAGNOSIS — E66.01 CLASS 3 SEVERE OBESITY DUE TO EXCESS CALORIES WITH SERIOUS COMORBIDITY AND BODY MASS INDEX (BMI) OF 40.0 TO 44.9 IN ADULT (HCC): ICD-10-CM

## 2023-07-30 DIAGNOSIS — K76.0 HEPATIC STEATOSIS: ICD-10-CM

## 2023-07-30 NOTE — PROGRESS NOTES
OT-Discharge Note     Today's date: 2023  Patient name: Sandra Aguayo  : 1985  MRN: [id number]  Referring provider: Chelsey Charles  Dx:   Encounter Diagnosis     ICD-10-CM    1. Lateral epicondylitis of left elbow  M77.12                  Subjective: Patient reports to experience significant decrease in symptoms related to lateral epicondylitis. Patient reports he is able to perform all daily and work tasks without pain or discomfort. Objective: See treatment diary below. Goals  Short Term Goals by 2 - 4 weeks:    Establish HEP to increase performance with daily activities. MET     Patient will increase LUE  strength by at least 10 lbs to assist in completing ADLs. MET     Patient will increase ROM of LUE forearm by at least 10 degrees to complete ADLs. MET    Patient will increase ROM of LUE elbow by at least 10 degrees to complete ADLs. MET     Patient will decrease pain rate of LUE by at least 2 points per the VAS scale. MET        Long Term Goals by discharge:    Establish final home exercise program to enhance maximal functional level with ADLs. MET    Achieve functional active range of motion of LUE for full return to household chores. MET                            Achieve functional strength of LUE for full return to high level ADLs.    MET      Pain  Current pain ratin  At best pain ratin  At worst pain ratin        Patient Goals  Patient goals for therapy: decreased pain, increased motion, increased strength and return to sport/leisure activities  Patient goal: "pain under control, ma nagement"        Objective     Active Range of Motion     Left Wrist   Wrist flexion: 75 degrees   Wrist extension: 70 degrees   Radial deviation: 20 degrees   Ulnar deviation: 30 degrees       Left Forearm  - flexion - 140   - extension - 0  -supination -80   -pronaiton -75    Strength/Myotome Testing     Left Wrist/Hand      (2nd hand position)     Trial 1: 100        Additional Strength Details      LUE  Elbow Extended: 105       Precautions: Universal    Visit Tracker:No Auth Req. 30 visits PCY not combined. Copay: $20  Date 6/5  IE 6/7 6/12 6/13 6/19 6/21 6/26 6/28 7/3 7/6  RE 7/11 7/13 7/18 7/20 7/25 7/27 8/1                                                                     Manuals HEP 8/1/2023   STM/IASTM  x10min                  Ther Ex     Education on Dx and HEP x x5min   Wrist flex & ext stretch x x5 10 sec hold   Wrist composite stretch with elbow extended, wrist flexed x x3 10 sec hold   Wrist AROM x x10   Forearm Pro & Sup x x10   Flex bar Santi Twist  [id number]   Resistive digit extension with rubberband x [id number]   Wrist PREs with blue wedge x 2x10 4lbs   Elbow PREs hammer and bicep curls  2x10 4lbs   Triceps extension, shoulder rows, and shoulder extension with theraband x 3x10 blue   Joint mobilization with therapist assisted with dynamometer   3 x10                          Modalities      MPH  x5min   ESTM - premod         Assessment:   Patient presents to therapy with reports of improved function and no pain at lateral epicondyle. Patient self discharging at this time. Measurements were taken prior to discharge with patient demonstrating improved range of motion and strength. Range of motion measures show improved range of forearm and elbow with no reports. Strength measures show improvement from previously recorded measures with no report of pain. IASTM performed at lateral epicondyle to improve blood flow and reduce tissue tightness. Patient tolerated session well. Session focused on stretching, range of motion, strengthening and patient education to improve functional task performance with daily activities. HEP reviewed with patient.         Plan:   Discharge to HEP consisting of stretching and strengthening POC:7/6/23-8/31/2023

## 2023-07-31 ENCOUNTER — APPOINTMENT (OUTPATIENT)
Dept: RADIOLOGY | Facility: CLINIC | Age: 38
End: 2023-07-31
Payer: COMMERCIAL

## 2023-07-31 ENCOUNTER — OFFICE VISIT (OUTPATIENT)
Dept: OBGYN CLINIC | Facility: CLINIC | Age: 38
End: 2023-07-31
Payer: COMMERCIAL

## 2023-07-31 VITALS
SYSTOLIC BLOOD PRESSURE: 128 MMHG | HEIGHT: 66 IN | WEIGHT: 274 LBS | DIASTOLIC BLOOD PRESSURE: 84 MMHG | HEART RATE: 108 BPM | BODY MASS INDEX: 44.03 KG/M2

## 2023-07-31 DIAGNOSIS — M54.50 LOW BACK PAIN, UNSPECIFIED BACK PAIN LATERALITY, UNSPECIFIED CHRONICITY, UNSPECIFIED WHETHER SCIATICA PRESENT: ICD-10-CM

## 2023-07-31 DIAGNOSIS — M54.50 ACUTE MIDLINE LOW BACK PAIN WITHOUT SCIATICA: Primary | ICD-10-CM

## 2023-07-31 PROCEDURE — 72100 X-RAY EXAM L-S SPINE 2/3 VWS: CPT

## 2023-07-31 PROCEDURE — 99214 OFFICE O/P EST MOD 30 MIN: CPT | Performed by: ORTHOPAEDIC SURGERY

## 2023-07-31 RX ORDER — TADALAFIL 10 MG/1
10 TABLET ORAL DAILY PRN
Qty: 30 TABLET | Refills: 0 | Status: SHIPPED | OUTPATIENT
Start: 2023-07-31

## 2023-07-31 NOTE — PROGRESS NOTES
Assessment/Plan:  1. Acute midline low back pain without sciatica  XR spine lumbar 2 or 3 views injury    Ambulatory referral to Physical Therapy        Cal Aponte has lower back pain consistent with muscle spasm. He does not have any obvious lumbar radiculopathy on examination today and his x-rays are within normal limits. I recommended home exercises, anti-inflammatories, heat and formal physical therapy. He will follow-up with me if the pain persists or worsens. Subjective:   Carlos Enrique Oliveira is a 44 y.o. male who presents to the office for evaluation for  acute lower back pain. He has been feeling increased discomfort across the lower back for the last 1 week. He has a history of lumbar disc herniation in the past which improved with physical therapy. He denies any recent injury or trauma or lifting. He did feel increased pain in his lower back rating up his lower back but denies any numbness or tingling down his legs. He has not been taking any medications for this. His pain today is a dull aching sensation in the lower back that can become sharp and shoots up his back with certain motions. Review of Systems   Constitutional: Negative for chills, fever and unexpected weight change. HENT: Negative for hearing loss, nosebleeds and sore throat. Eyes: Negative for pain, redness and visual disturbance. Respiratory: Negative for cough, shortness of breath and wheezing. Cardiovascular: Negative for chest pain, palpitations and leg swelling. Gastrointestinal: Negative for abdominal pain, nausea and vomiting. Endocrine: Negative for polyphagia and polyuria. Genitourinary: Negative for dysuria and hematuria. Musculoskeletal:        See HPI   Skin: Negative for rash and wound. Neurological: Negative for dizziness, numbness and headaches. Psychiatric/Behavioral: Negative for decreased concentration and suicidal ideas. The patient is not nervous/anxious.           Past Medical History: Diagnosis Date   • Diabetes mellitus ([address])    • Hypertension    • Nasal congestion     Frequently   • PONV (postoperative nausea and vomiting)    • Sleep apnea     5.5 apnea scale       Past Surgical History:   Procedure Laterality Date   • MO SEPTOPLASTY/SUBMUCOUS RESECJ W/WO CARTILAGE GRF N/A 11/28/2022    Procedure: SEPTOPLASTY;  Surgeon: Pollo Obrien MD;  Location: [address] [place] [address];  Service: ENT   • MO SUBMUCOUS [place] PRTL/COMPL Bilateral 11/28/2022    Procedure: TURBINECTOMY;  Surgeon: Pollo Obrien MD;  Location: [facility];  Service: ENT       Family History   Problem Relation Age of Onset   • Diabetes type II Mother    • Diabetes Mother    • Diabetes Maternal Grandmother    • Diabetes type II Maternal Grandmother    • Arthritis Maternal Grandmother    • Cancer Maternal Grandfather         Liver cancer       Social History     Occupational History   • Not on file   Tobacco Use   • Smoking status: Some Days     Types: Cigars     Passive exposure: Never   • Smokeless tobacco: Never   • Tobacco comments:     Wife just had baby not smoking right now   Vaping Use   • Vaping Use: Never used   Substance and Sexual Activity   • Alcohol use: Not Currently     Comment: rare   • Drug use: Never   • Sexual activity: Yes     Partners: Female     Birth control/protection: None         Current Outpatient Medications:   •  Azelastine HCl 137 MCG/SPRAY SOLN, 1 SPRAY INTO EACH NOSTRIL 2 (TWO) TIMES A DAY USE IN EACH NOSTRIL AS DIRECTED, Disp: 30 mL, Rfl: 4  •  lisinopril (ZESTRIL) 10 mg tablet, Take 10 mg by mouth daily, Disp: , Rfl:   •  pantoprazole (PROTONIX) 20 mg tablet, Take 20 mg by mouth daily (Patient not taking: Reported on 6/19/2023), Disp: , Rfl:   •  semaglutide, 2 mg/dose, (Ozempic, 2 MG/DOSE,) 8 mg/ mL injection pen, Inject 0.75 mL (2 mg total) under the skin every 7 days, Disp: 15 mL, Rfl: 2  •  tadalafil (CIALIS) 10 MG tablet, Take 1 tablet (10 mg total) by mouth daily as needed for erectile dysfunction, Disp: 30 tablet, Rfl: 0    Allergies   Allergen Reactions   • Other Angioedema   • Shellfish-Derived Products - Food Allergy Anaphylaxis   • Penicillins Hives   • Penicillin G Hives       Objective:  Vitals:    07/31/23 1510   BP: 128/84   Pulse: (!) 108            Back Exam     Tenderness   The patient is experiencing tenderness in the lumbar. Range of Motion   Extension: normal   Flexion: normal     Muscle Strength   Right Quadriceps:  5/5   Left Quadriceps:  5/5   Right Hamstrings:  5/5   Left Hamstrings:  5/5     Tests   Straight leg raise right: negative  Straight leg raise left: negative    Reflexes   Patellar: normal    Other   Sensation: normal  Gait: normal   Erythema: no back redness            Physical Exam  Vitals and nursing note reviewed. Constitutional:       Appearance: Normal appearance. He is well-developed. HENT:      Head: Normocephalic and atraumatic. Right Ear: External ear normal.      Left Ear: External ear normal.      Nose: Nose normal.   Eyes:      General: No scleral icterus. Extraocular Movements: Extraocular movements intact. Conjunctiva/sclera: Conjunctivae normal.   Cardiovascular:      Rate and Rhythm: Normal rate. Pulmonary:      Effort: Pulmonary effort is normal. No respiratory distress. Musculoskeletal:      Cervical back: Normal range of motion and neck supple. Lumbar back: Negative right straight leg raise test and negative left straight leg raise test.      Comments: See Ortho exam   Skin:     General: Skin is warm and dry. Neurological:      General: No focal deficit present. Mental Status: He is alert and oriented to person, place, and time. Psychiatric:         Behavior: Behavior normal.         I have personally reviewed pertinent films in PACS and my interpretation is as follows:  X-rays lumbar spine demonstrate no acute fracture.   No significant degeneration loss of normal lumbar lordosis consistent with acute muscle spasm. This document was created using speech voice recognition software. Grammatical errors, random word insertions, pronoun errors, and incomplete sentences are an occasional consequence of this system due to software limitations, ambient noise, and hardware issues. Any formal questions or concerns about content, text, or information contained within the body of this dictation should be directly addressed to the provider for clarification.

## 2023-08-01 ENCOUNTER — TELEPHONE (OUTPATIENT)
Dept: ENDOCRINOLOGY | Facility: CLINIC | Age: 38
End: 2023-08-01

## 2023-08-01 ENCOUNTER — OFFICE VISIT (OUTPATIENT)
Dept: PHYSICAL THERAPY | Facility: CLINIC | Age: 38
End: 2023-08-01
Payer: COMMERCIAL

## 2023-08-01 ENCOUNTER — OFFICE VISIT (OUTPATIENT)
Dept: OCCUPATIONAL THERAPY | Facility: CLINIC | Age: 38
End: 2023-08-01
Payer: COMMERCIAL

## 2023-08-01 DIAGNOSIS — M75.41 IMPINGEMENT SYNDROME OF RIGHT SHOULDER: ICD-10-CM

## 2023-08-01 DIAGNOSIS — M22.2X2 PATELLOFEMORAL SYNDROME OF LEFT KNEE: ICD-10-CM

## 2023-08-01 DIAGNOSIS — M77.12 LATERAL EPICONDYLITIS OF LEFT ELBOW: Primary | ICD-10-CM

## 2023-08-01 DIAGNOSIS — M22.2X1 PATELLOFEMORAL SYNDROME OF RIGHT KNEE: Primary | ICD-10-CM

## 2023-08-01 PROCEDURE — 97112 NEUROMUSCULAR REEDUCATION: CPT

## 2023-08-01 PROCEDURE — 97110 THERAPEUTIC EXERCISES: CPT

## 2023-08-01 PROCEDURE — 97140 MANUAL THERAPY 1/> REGIONS: CPT

## 2023-08-01 NOTE — PROGRESS NOTES
Daily Note     Today's date: 2023  Patient name: Juan Daniel Bergeron  : 1985  MRN: [id number]  Referring provider: Michele Xie DO  Dx:   Encounter Diagnosis     ICD-10-CM    1. Patellofemoral syndrome of right knee  M22.2X1       2. Patellofemoral syndrome of left knee  M22.2X2       3. Impingement syndrome of right shoulder  M75.41                      Subjective: Pt reports his shoulder is feeling good, his R knee is feeling a bit better however does not trust his left knee. Objective: See treatment diary below      Assessment: Tolerated treatment well. Patient exhibited good technique with therapeutic exercises      Plan: Continue per plan of care. Insurance:  AMA/CMS Eval/ Re-eval POC expires Etta Gonzalez #/ Referral # Total visits  Start date  Expiration date Extension  Visit limitation? PT only or  PT+OT?  Co-Insurance   [facility] 7/17/23 10/9/23   SUBMITTED                                                                                                                                    Date                    Used 1 1 1  1  1                   Remaining  11 10 9  8  7                         Date                             Used                             Remaining                                  Date 23 8/1     Visit Number IE 2 3 - Knee eval  4   5     Manual               T/s PA mob               GHJ mob               C/s upglide   2x20 ea           C/s downglide                ACJa/p /inf gr 3-4         performed                       TherEx               PROM       performed       Cervical retraction 4x10 5-8 sec hold 2x10 10sec hold           Shld ext               Rows               Scaption               Banded ER               T/s extension over half roll               IYT               Open book               Bear hugs               Squats        2x10       Step down               Step up        fwd/lat x20 ea       Standing 3 way       Light loop x20 ea  light loop x20 ea     LAQ               Leg press          125# 3x10     Lateral stepping        light loop 4 laps  light loop + "X" walks     clamshells     3" x20 ea    March                SLR        flex/abd 2x10 ea  flex/abd1# 2x10 ea     Neuro Re-Ed               Scap resetting   S/L x10 with assistance  x10 independently           Wall slides + lift off   3x10    5"x10        SL shld abd   2x10            Scap retractions   2x10 3" hold    3" 2x10       TherAct               Patient education   HEP                                                                           Gait Training                                                               Modalities               CP

## 2023-08-02 ENCOUNTER — OFFICE VISIT (OUTPATIENT)
Dept: PHYSICAL THERAPY | Facility: CLINIC | Age: 38
End: 2023-08-02
Payer: COMMERCIAL

## 2023-08-02 ENCOUNTER — OFFICE VISIT (OUTPATIENT)
Dept: BARIATRICS | Facility: CLINIC | Age: 38
End: 2023-08-02

## 2023-08-02 DIAGNOSIS — M75.41 IMPINGEMENT SYNDROME OF RIGHT SHOULDER: ICD-10-CM

## 2023-08-02 DIAGNOSIS — M22.2X2 PATELLOFEMORAL SYNDROME OF LEFT KNEE: ICD-10-CM

## 2023-08-02 DIAGNOSIS — M22.2X1 PATELLOFEMORAL SYNDROME OF RIGHT KNEE: ICD-10-CM

## 2023-08-02 DIAGNOSIS — Z98.84 BARIATRIC SURGERY STATUS: Primary | ICD-10-CM

## 2023-08-02 DIAGNOSIS — M54.50 ACUTE MIDLINE LOW BACK PAIN WITHOUT SCIATICA: Primary | ICD-10-CM

## 2023-08-02 PROCEDURE — 97164 PT RE-EVAL EST PLAN CARE: CPT

## 2023-08-02 PROCEDURE — RECHECK

## 2023-08-02 NOTE — PROGRESS NOTES
Spoke to patient on the phone:    Patient is interested in the bariatric surgical process. Patient qualifies for surgery with current comorbidities and BMI. Discussed the entire surgical workup process and all requirements of [facility] program and patient's insurance. Answered all questions at the time of the phone call. All SW/RD questions redirected to the next appointment, the 2-hour evaluation. Patient can have surgery in [place] per insurance. He has [place] with 3+1 weight checks required. He was previously diagnosed with mild LOLITA and does not use a cpap. He is a current cigar smoker. He stated that he will quit 8/2/23. Informed him of the smoking policy for our office. Patient verbalized understanding of the surgical process at [facility] and had no further questions at this time. [facility] Patient Eligibility and Benefit Details for Surgery  *Not a guarantee of payment    Today's Date: 7/20/2023    Payor: Gabrielle Rojasr  ID#: [id number]  Ins Phone: [phone]    Effective date: 1/1/23  Term Date: NO TERM DATE    Representative name: [facility]  Reference number for call: [id number]    Does the patient have the benefit written on policy: Yes      Facility Requirement: CBC/ IOQ/ MARC: NOT REQUIRED    If applicable: Any policy exclusions or limitations:  If applicable: Are revisions covered under this policy:   COVERED 624%, NO DED, NO PROCEDURE LIMIT      If patient is considering paying out of pocket, provide information: Financial Counselor/  [phone]    Having health insurance coverage is not a guarantee that your individual plan covers weight-loss surgery or that you will be approved for surgery.

## 2023-08-02 NOTE — PROGRESS NOTES
Re-Evaluation    Today's date: 2023  Patient name: Rick Burgess  : 1985  MRN: [id number]  Referring provider: Nova Coronel DO  Dx:   Encounter Diagnosis     ICD-10-CM    1. Acute midline low back pain without sciatica  M54.50       2. Patellofemoral syndrome of right knee  M22.2X1       3. Patellofemoral syndrome of left knee  M22.2X2       4. Impingement syndrome of right shoulder  M75.41           Start Time: 1630  Stop Time: 1715  Total time in clinic (min): 45 minutes    Subjective: Patient reports to [facility] with reports of pain In lower back that does not shoot up his back or down his legs that began following cleaning his house about 1 week ago. Patient reports an occasional jolt/very sharp pain in lower back, happens with inconsistent movements. Sharp pain sensation is isolated to midline of mid-lumbar but there is a constant ache globally through his low back. Noticed increase in back pain with shoulder getting better. Pain  - Current pain ratin/10  - At best pain ratin/10  - At worst pain ratin/10  - Location: lumbar  - Aggravating factors: getting into car, getting out of chairs, and with picking up objects on the floor.        Objective:     Lumbar Spine Range of Motion  Flexion:  No limitation  with pain  Extension:  Moderately limited  without pain  Lateral Flexion - Left:  No limitation  without pain  Lateral Flexion - Right:  No limitation  without pain  Rotation - Left:  No limitation  without pain  Rotation - Right:  No limitation  without pain      Mechanical Assessment  In Standing:  -Flexion: Worse  -Extension: x10 Decreasing, x10 decreasing, x10 decreasing less    In Lying:   -Extension: x10 no pain, just stretch    Joint Play  T10: Normal  T11: Normal  T12: Hypomobile  L1: Hypomobile  L2: Hypomobile  L3: Hypomobile  L4: Normal  L5: Normal    Palpation  (+) TTP of L2 and L3    Diagnostic Tests Performed  Negative: Prone Instability    Assessment:   Patient is a 44 y.o. Male who presents to physical therapy with referring diagnosis of acute midline low back pain without sciatica. Patient's greatest concern is fear of not being able to keep active. Patient's primary movement impairment diagnosis of mobility deficits (with directional preference) resulting in pathoanatomical symptoms of stability deficits, motor control deficits, restricted range of motion and functional limitations. The aforementioned impairments have limited the patient's ability to perform necessary tasks at work and performing his ADLs pain free. No further referral appears necessary at this time based upon examination results. Plan to add treatment of lumbar spine to current plan of care utilizing multifaceted approach. Patient has strong rehab potential based upon positive response to bilateral knee pain and right shoulder pain since starting physical therapy. Plan: Continue per plan of care. Insurance:  AMA/CMS Eval/ Re-eval POC expires Patti Gardiner #/ Referral # Total visits  Start date  Expiration date Extension  Visit limitation? PT only or  PT+OT?  Co-Insurance   [facility] 7/17/23 10/9/23   SUBMITTED                                                                                                                                    Date 7-17 7-18 7-25 7/27  8/1  8-2                 Used 1 1 1  1  1  1                 Remaining  11 10 9  8  7  6                       Date                             Used                             Remaining                                  Date 7/17/23 7/18/23 7/25/23 7/27/23 8/1  8/2   Visit Number IE 2 3 - Knee eval  4   5  6- LBP eval   Manual               T/s PA mob               GHJ mob               C/s upglide   2x20 ea           C/s downglide                ACJa/p /inf gr 3-4         performed                       TherEx               PROM       performed       Cervical retraction 4x10 5-8 sec hold 2x10 10sec hold           Shld ext               Rows               Scaption               Banded ER               T/s extension over half roll               IYT               Open book               Bear hugs               Squats        2x10       Step down               Step up        fwd/lat x20 ea       Standing 3 way       Light loop x20 ea  light loop x20 ea     LAQ               Leg press          125# 3x10     Lateral stepping        light loop 4 laps  light loop + "X" walks     clamshells     3" x20 ea    March                SLR        flex/abd 2x10 ea  flex/abd1# 2x10 ea     PPU      Rev for HEP                     Neuro Re-Ed               Scap resetting   S/L x10 with assistance  x10 independently           Wall slides + lift off   3x10    5"x10        SL shld abd   2x10            Scap retractions   2x10 3" hold    3" 2x10      TherAct               Patient education   HEP                                                                           Gait Training                                                               Modalities               CP

## 2023-08-03 ENCOUNTER — APPOINTMENT (OUTPATIENT)
Dept: OCCUPATIONAL THERAPY | Facility: CLINIC | Age: 38
End: 2023-08-03
Payer: COMMERCIAL

## 2023-08-08 ENCOUNTER — OFFICE VISIT (OUTPATIENT)
Dept: PHYSICAL THERAPY | Facility: CLINIC | Age: 38
End: 2023-08-08
Payer: COMMERCIAL

## 2023-08-08 DIAGNOSIS — M54.50 ACUTE MIDLINE LOW BACK PAIN WITHOUT SCIATICA: ICD-10-CM

## 2023-08-08 DIAGNOSIS — M22.2X2 PATELLOFEMORAL SYNDROME OF LEFT KNEE: ICD-10-CM

## 2023-08-08 DIAGNOSIS — M75.41 IMPINGEMENT SYNDROME OF RIGHT SHOULDER: ICD-10-CM

## 2023-08-08 DIAGNOSIS — M22.2X1 PATELLOFEMORAL SYNDROME OF RIGHT KNEE: Primary | ICD-10-CM

## 2023-08-08 PROCEDURE — 97112 NEUROMUSCULAR REEDUCATION: CPT

## 2023-08-08 NOTE — PROGRESS NOTES
Daily Note     Today's date: 2023  Patient name: Alexandrea Obrien  : 1985  MRN: [id number]  Referring provider: Robe Sales DO  Dx:   Encounter Diagnosis     ICD-10-CM    1. Patellofemoral syndrome of right knee  M22.2X1       2. Patellofemoral syndrome of left knee  M22.2X2       3. Impingement syndrome of right shoulder  M75.41       4. Acute midline low back pain without sciatica  M54.50           Start Time: 1455  Stop Time: 1540  Total time in clinic (min): 45 minutes    Subjective: Patient denies changes in LBP since previous session. States bending forward usually feels better than bending backward. Objective: See treatment diary below      Assessment: Tolerated treatment well. Patient with reduction in back pain/clicking following initiation of core stab work during today's session. Patient with improvement in TA isolation across reps with bracing activities. Initiated bent over rows for lili scap and core activation, patient provided w/ graded vc to promote neutral pelvic alignment. Patient with fatigue of low back mm post session. Patient will continue to benefit from skilled PT to improve functional mobility and activity tolerance. Plan: Continue per plan of care. Insurance:  AMA/CMS Eval/ Re-eval POC expires Rakesh Iglesias #/ Referral # Total visits  Start date  Expiration date Extension  Visit limitation? PT only or  PT+OT?  Co-Insurance   [facility] 7/17/23 10/9/23   SUBMITTED                                                                                                                                    Date   8/1  8-2  8-8               Used 1 1 1  1  1  1  1               Remaining  11 10 9  8  7  6  5                     Date                             Used                             Remaining                                  Date 23 8/1  8/2 23   Visit Number 2 3 - Knee eval  4   5  6- LBP eval 7   Manual              T/s PA mob              GHJ mob              C/s upglide 2x20 ea            C/s downglide               ACJa/p /inf gr 3-4       performed                       TherEx              PROM     performed        Cervical retraction 2x10 10sec hold            Shld ext              Rows              Scaption              Banded ER              T/s extension over half roll              IYT              Open book              Bear hugs              Squats      2x10        Step down              Step up      fwd/lat x20 ea        Standing 3 way     Light loop x20 ea  light loop x20 ea      LAQ              Leg press        125# 3x10      Lateral stepping      light loop 4 laps  light loop + "X" walks      clamshells    3" x20 ea     March               SLR      flex/abd 2x10 ea  flex/abd1# 2x10 ea      PPU     Rev for HEP    LTR      5" x30 total t/o session            Neuro Re-Ed              Scap resetting S/L x10 with assistance  x10 independently            Wall slides + lift off 3x10    5"x10      X-light loop 2x10   SL shld abd 2x10             Scap retractions 2x10 3" hold    3" 2x10       TA progressions      TA isos: 5" x20    TA w/ march: x30 each   Bridge      W/ ad sq: 2x10   Palloff press      RTB 3x10 each   Bent over rows      Alternating 9# KB 3x10 each            TherAct              Patient education HEP                                                                        Gait Training                                                           Modalities              CP

## 2023-08-10 ENCOUNTER — OFFICE VISIT (OUTPATIENT)
Dept: PHYSICAL THERAPY | Facility: CLINIC | Age: 38
End: 2023-08-10
Payer: COMMERCIAL

## 2023-08-10 DIAGNOSIS — E66.01 CLASS 3 SEVERE OBESITY DUE TO EXCESS CALORIES WITH SERIOUS COMORBIDITY AND BODY MASS INDEX (BMI) OF 40.0 TO 44.9 IN ADULT (HCC): ICD-10-CM

## 2023-08-10 DIAGNOSIS — E11.65 TYPE 2 DIABETES MELLITUS WITH HYPERGLYCEMIA, WITHOUT LONG-TERM CURRENT USE OF INSULIN (HCC): ICD-10-CM

## 2023-08-10 DIAGNOSIS — M22.2X1 PATELLOFEMORAL SYNDROME OF RIGHT KNEE: Primary | ICD-10-CM

## 2023-08-10 DIAGNOSIS — K76.0 HEPATIC STEATOSIS: ICD-10-CM

## 2023-08-10 DIAGNOSIS — M22.2X2 PATELLOFEMORAL SYNDROME OF LEFT KNEE: ICD-10-CM

## 2023-08-10 DIAGNOSIS — M54.50 ACUTE MIDLINE LOW BACK PAIN WITHOUT SCIATICA: ICD-10-CM

## 2023-08-10 DIAGNOSIS — M75.41 IMPINGEMENT SYNDROME OF RIGHT SHOULDER: ICD-10-CM

## 2023-08-10 PROCEDURE — 97112 NEUROMUSCULAR REEDUCATION: CPT | Performed by: PHYSICAL THERAPIST

## 2023-08-10 NOTE — PROGRESS NOTES
Daily Note     Today's date: 8/10/2023  Patient name: Melissa Schrader  : 1985  MRN: [id number]  Referring provider: La Melissa DO  Dx:   Encounter Diagnosis     ICD-10-CM    1. Patellofemoral syndrome of right knee  M22.2X1       2. Patellofemoral syndrome of left knee  M22.2X2       3. Impingement syndrome of right shoulder  M75.41       4. Acute midline low back pain without sciatica  M54.50                      Subjective: Patient reports back symptoms can come on unprovoked and that he is unsure of when they occur. Reports that knees and shoulder have been doing better. Objective: See treatment diary below      Assessment: Tolerated treatment well. Patient reported low back fatigue with progression in strengthening/stabilization based exercises. Occasional cueing needed for Tra Activation. Patient reported slight onset of discomfort with addition of sitting on pball and exercise was discontinued. Overall, patient did fairly well today with only reporting of fatigue. Plan: Continue per plan of care. Insurance:  AMA/CMS Eval/ Re-eval POC expires Bridget Lerner #/ Referral # Total visits  Start date  Expiration date Extension  Visit limitation? PT only or  PT+OT?  Co-Insurance   [facility] 7/17/23 10/9/23   SUBMITTED                                                                                                                                    Date   8  8-2  8-  8-10             Used 1 1 1  1  1  1  1  1             Remaining  11 10 9  8  7  6  5  4                   Date                             Used                             Remaining                                  Date  23 8/  8/2 23 8/10   Visit Number  4   5  6- LBP eval 7 8   Manual           T/s PA mob           C/s upglide           C/s downglide            ACJa/p /inf gr 3-4  performed                     TherEx           PROM performed         Cervical retraction           Shld ext           Rows           Scaption           Banded ER           Open book           Bear hugs           Squats  2x10         Step down           Step up  fwd/lat x20 ea         Standing 3 way Light loop x20 ea  light loop x20 ea       Leg press    125# 3x10       Lateral stepping  light loop 4 laps  light loop + "X" walks       clamshells  3" x20 ea       SLR  flex/abd 2x10 ea  flex/abd1# 2x10 ea       PPU   Rev for HEP     LTR    5" x30 total t/o session            Neuro Re-Ed           Wall slides + lift off  5"x10      X-light loop 2x10 X-light loop 2*10   TA progressions    TA isos: 5" x20  TA w/ march: x30 each TA isos: 5" x20  TA w/ march: x30 each   Bridge    W/ ad sq: 2x10 W/ ad sq: 2*10, 5"   Palloff press    RTB 3x10 each 3*10 ea.  Blue TB   Bent over rows    Alternating 9# KB 3x10 each 3*10, 9# KB ea hand   Dead lift     2*10, 9# KB in front   B/L shld extn     3*10 Blue TB   Seated p-ball march     1*5, pain - held           TherAct           Patient education                       Gait Training                       Modalities           CP

## 2023-08-14 ENCOUNTER — OFFICE VISIT (OUTPATIENT)
Dept: PHYSICAL THERAPY | Facility: CLINIC | Age: 38
End: 2023-08-14
Payer: COMMERCIAL

## 2023-08-14 DIAGNOSIS — M22.2X2 PATELLOFEMORAL SYNDROME OF LEFT KNEE: ICD-10-CM

## 2023-08-14 DIAGNOSIS — M75.41 IMPINGEMENT SYNDROME OF RIGHT SHOULDER: ICD-10-CM

## 2023-08-14 DIAGNOSIS — M54.50 ACUTE MIDLINE LOW BACK PAIN WITHOUT SCIATICA: ICD-10-CM

## 2023-08-14 DIAGNOSIS — M22.2X1 PATELLOFEMORAL SYNDROME OF RIGHT KNEE: Primary | ICD-10-CM

## 2023-08-14 PROCEDURE — 97112 NEUROMUSCULAR REEDUCATION: CPT

## 2023-08-14 NOTE — PROGRESS NOTES
Daily Note     Today's date: 2023  Patient name: Iain Moore  : 1985  MRN: [id number]  Referring provider: Hung Montoya DO  Dx:   Encounter Diagnosis     ICD-10-CM    1. Patellofemoral syndrome of right knee  M22.2X1       2. Patellofemoral syndrome of left knee  M22.2X2       3. Impingement syndrome of right shoulder  M75.41       4. Acute midline low back pain without sciatica  M54.50           Start Time: 1545  Stop Time: 1630  Total time in clinic (min): 45 minutes    Subjective: Patient reports that everything has been bothering him lately and that he was sore following last session. Patient reports that he is feeling okay today. Objective: See treatment diary below      Assessment: Tolerated treatment well. Patient's low back pain improved significantly following HVLA to left innominate; addressed to positive supine to long sit test. Patient cont to demo quick fatigability with core stability work, although quality of TA isolation continues to improve. Patient will continue to benefit from skilled PT to improve functional mobility and symptom irritability. Plan: Continue per plan of care. Re-eval nv. Insurance:  AMA/CMS Eval/ Re-eval POC expires [employer] Rash #/ Referral # Total visits  Start date  Expiration date Extension  Visit limitation? PT only or  PT+OT?  Co-Insurance   [facility] 7/17/23 10/9/23   SUBMITTED                                                                                                                                    Date   8/1  8-2  8-8  8-10  8-14           Used 1 1 1  1  1  1  1  1  1           Remaining  11 10 9  8  7  6  5  4  3                 Date                             Used                             Remaining                                  Date  23 8/1  8/2 8/8/23 8/10  8/14/23   Visit Number  4   5  6- LBP eval 7 8  9   Manual            T/s PA mob         Inferior HVLA to L innominate x2   C/s upglide         Prone PA mobs t/o lumbar spine, gr II-III 5x30 each   C/s downglide             ACJa/p /inf gr 3-4  performed                       TherEx            PROM performed          Cervical retraction            Shld ext            Rows            Scaption            Banded ER            Open book         x10 5" hold   Bear hugs            Squats  2x10          Step down            Step up  fwd/lat x20 ea          Standing 3 way Light loop x20 ea  light loop x20 ea        Leg press    125# 3x10        Lateral stepping  light loop 4 laps  light loop + "X" walks        clamshells  3" x20 ea        SLR  flex/abd 2x10 ea  flex/abd1# 2x10 ea        PPU   Rev for HEP      LTR    5" x30 total t/o session  Quad rock backs: 5" x20            Neuro Re-Ed            Wall slides + lift off  5"x10      X-light loop 2x10 X-light loop 2*10 X-light loop 3x8   TA progressions    TA isos: 5" x20  TA w/ march: x30 each TA isos: 5" x20  TA w/ march: x30 each TA isos: 5" x20  TA w/ march: x30 each   Bridge    W/ ad sq: 2x10 W/ ad sq: 2*10, 5" W/ ad sq + TA brace 2x10   Palloff press    RTB 3x10 each 3*10 ea.  Blue TB In half kneel: RTB 3x8 each   Bent over rows    Alternating 9# KB 3x10 each 3*10, 9# KB ea hand 9# 2x10 each *stopped 2/2 pain   Dead lift     2*10, 9# KB in front    B/L shld extn     3*10 Blue TB    Seated p-ball march 1*5, pain - held    [employer]       Modified 1 arm/leg at a time 2x10 ea   TherAct            Patient education                         Gait Training                         Modalities            CP

## 2023-08-15 ENCOUNTER — OFFICE VISIT (OUTPATIENT)
Dept: PODIATRY | Facility: CLINIC | Age: 38
End: 2023-08-15
Payer: COMMERCIAL

## 2023-08-15 VITALS
RESPIRATION RATE: 18 BRPM | SYSTOLIC BLOOD PRESSURE: 128 MMHG | HEIGHT: 66 IN | DIASTOLIC BLOOD PRESSURE: 84 MMHG | WEIGHT: 274 LBS | BODY MASS INDEX: 44.03 KG/M2

## 2023-08-15 DIAGNOSIS — M76.821 POSTERIOR TIBIAL TENDINITIS OF RIGHT LOWER EXTREMITY: Primary | ICD-10-CM

## 2023-08-15 DIAGNOSIS — M21.41 ACQUIRED FLAT FOOT, RIGHT: ICD-10-CM

## 2023-08-15 DIAGNOSIS — B35.9 DERMATOPHYTOSIS: ICD-10-CM

## 2023-08-15 DIAGNOSIS — M21.42 ACQUIRED FLAT FOOT, LEFT: ICD-10-CM

## 2023-08-15 DIAGNOSIS — M76.822 POSTERIOR TIBIAL TENDINITIS OF LEFT LOWER EXTREMITY: ICD-10-CM

## 2023-08-15 DIAGNOSIS — B35.1 ONYCHOMYCOSIS: ICD-10-CM

## 2023-08-15 PROCEDURE — L3000 FT INSERT UCB BERKELEY SHELL: HCPCS | Performed by: PODIATRIST

## 2023-08-15 PROCEDURE — 99203 OFFICE O/P NEW LOW 30 MIN: CPT | Performed by: PODIATRIST

## 2023-08-15 RX ORDER — KETOCONAZOLE 20 MG/G
CREAM TOPICAL DAILY
Qty: 60 G | Refills: 1 | Status: SHIPPED | OUTPATIENT
Start: 2023-08-15 | End: 2023-09-14

## 2023-08-15 RX ORDER — TERBINAFINE HYDROCHLORIDE 250 MG/1
TABLET ORAL
Qty: 15 TABLET | Refills: 0 | Status: SHIPPED | OUTPATIENT
Start: 2023-08-15 | End: 2023-09-15

## 2023-08-15 NOTE — PROGRESS NOTES
Assessment/Plan: History tibial tendinitis secondary to acquired deformity of foot/acquired pes planus. Pain upon ambulation. Mycosis of nail and skin. Plan. Chart reviewed. Lab work reviewed. Patient examined. Patient advised on condition. At this time patient would benefit from pronation control. His feet been casted for custom molded foot orthotics. These will control deformity and ease pain. He will use these daily. In addition patient restarted both oral and topical antifungal.  Patient will spray shoes with Lysol. Diagnoses and all orders for this visit:    Posterior tibial tendinitis of right lower extremity    Posterior tibial tendinitis of left lower extremity    Acquired flat foot, right    Acquired flat foot, left    Dermatophytosis  -     ketoconazole (NIZORAL) 2 % cream; Apply topically daily  -     terbinafine (LamISIL) 250 mg tablet; 1 tab p.o. every other day. Onychomycosis  -     ketoconazole (NIZORAL) 2 % cream; Apply topically daily  -     terbinafine (LamISIL) 250 mg tablet; 1 tab p.o. every other day. Subjective: Patient is seen on referral from orthopedics. Patient has pain in his feet and arches with ambulation.             Allergies   Allergen Reactions   • Other Angioedema   • Shellfish-Derived Products - Food Allergy Anaphylaxis   • Penicillins Hives   • Penicillin G Hives         Current Outpatient Medications:   •  ketoconazole (NIZORAL) 2 % cream, Apply topically daily, Disp: 60 g, Rfl: 1  •  terbinafine (LamISIL) 250 mg tablet, 1 tab p.o. every other day., Disp: 15 tablet, Rfl: 0  •  Azelastine HCl 137 MCG/SPRAY SOLN, 1 SPRAY INTO EACH NOSTRIL 2 (TWO) TIMES A DAY USE IN EACH NOSTRIL AS DIRECTED, Disp: 30 mL, Rfl: 4  •  lisinopril (ZESTRIL) 10 mg tablet, Take 10 mg by mouth daily, Disp: , Rfl:   •  pantoprazole (PROTONIX) 20 mg tablet, Take 20 mg by mouth daily (Patient not taking: Reported on 6/19/2023), Disp: , Rfl:   •  semaglutide, 2 mg/dose, (Ozempic, 2 MG/DOSE,) 8 mg/ mL injection pen, Inject 0.75 mL (2 mg total) under the skin every 7 days, Disp: 15 mL, Rfl: 0  •  tadalafil (CIALIS) 10 MG tablet, Take 1 tablet (10 mg total) by mouth daily as needed for erectile dysfunction, Disp: 30 tablet, Rfl: 0    Patient Active Problem List   Diagnosis   • Erectile dysfunction   • Hyperlipidemia   • Tobacco abuse   • Type 2 diabetes mellitus with hyperglycemia, without long-term current use of insulin (HCC)   • Class 3 severe obesity due to excess calories with serious comorbidity and body mass index (BMI) of 40.0 to 44.9 in adult [facility])   • Arthritis of right acromioclavicular joint   • Hepatic steatosis   • Pulsatile tinnitus of right ear   • ETD (Eustachian tube dysfunction), right   • Lateral epicondylitis of left elbow          Patient ID: Esther Zuniga is a 44 y.o. male. HPI    The following portions of the patient's history were reviewed and updated as appropriate: He  has a past medical history of Diabetes mellitus ([address]), Hypertension, Nasal congestion, PONV (postoperative nausea and vomiting), and Sleep apnea. He   Patient Active Problem List    Diagnosis Date Noted   • Lateral epicondylitis of left elbow 05/23/2023   • Pulsatile tinnitus of right ear 02/27/2023   • ETD (Eustachian tube dysfunction), right 02/27/2023   • Hepatic steatosis 01/20/2023   • Arthritis of right acromioclavicular joint 12/15/2022   • Type 2 diabetes mellitus with hyperglycemia, without long-term current use of insulin ([address]) 10/20/2022   • Class 3 severe obesity due to excess calories with serious comorbidity and body mass index (BMI) of 40.0 to 44.9 in adult ([address]) 10/20/2022   • Hyperlipidemia 11/22/2021   • Tobacco abuse 11/22/2021   • Erectile dysfunction 07/30/2021     He  has a past surgical history that includes pr septoplasty/submucous resecj w/wo cartilage grf (N/A, 11/28/2022) and pr submucous rescj inferior turbinate prtl/compl (Bilateral, 11/28/2022).   His family history includes Arthritis in his maternal grandmother; Cancer in his maternal grandfather; Diabetes in his maternal grandmother and mother; Diabetes type II in his maternal grandmother and mother. He  reports that he has been smoking cigars. He has never been exposed to tobacco smoke. He has never used smokeless tobacco. He reports that he does not currently use alcohol. He reports that he does not use drugs. Current Outpatient Medications   Medication Sig Dispense Refill   • ketoconazole (NIZORAL) 2 % cream Apply topically daily 60 g 1   • terbinafine (LamISIL) 250 mg tablet 1 tab p.o. every other day. 15 tablet 0   • Azelastine HCl 137 MCG/SPRAY SOLN 1 SPRAY INTO EACH NOSTRIL 2 (TWO) TIMES A DAY USE IN EACH NOSTRIL AS DIRECTED 30 mL 4   • lisinopril (ZESTRIL) 10 mg tablet Take 10 mg by mouth daily     • pantoprazole (PROTONIX) 20 mg tablet Take 20 mg by mouth daily (Patient not taking: Reported on 6/19/2023)     • semaglutide, 2 mg/dose, (Ozempic, 2 MG/DOSE,) 8 mg/ mL injection pen Inject 0.75 mL (2 mg total) under the skin every 7 days 15 mL 0   • tadalafil (CIALIS) 10 MG tablet Take 1 tablet (10 mg total) by mouth daily as needed for erectile dysfunction 30 tablet 0     No current facility-administered medications for this visit. Current Outpatient Medications on File Prior to Visit   Medication Sig   • Azelastine HCl 137 MCG/SPRAY SOLN 1 SPRAY INTO EACH NOSTRIL 2 (TWO) TIMES A DAY USE IN EACH NOSTRIL AS DIRECTED   • lisinopril (ZESTRIL) 10 mg tablet Take 10 mg by mouth daily   • pantoprazole (PROTONIX) 20 mg tablet Take 20 mg by mouth daily (Patient not taking: Reported on 6/19/2023)   • semaglutide, 2 mg/dose, (Ozempic, 2 MG/DOSE,) 8 mg/ mL injection pen Inject 0.75 mL (2 mg total) under the skin every 7 days   • tadalafil (CIALIS) 10 MG tablet Take 1 tablet (10 mg total) by mouth daily as needed for erectile dysfunction     No current facility-administered medications on file prior to visit.      He is allergic to other, shellfish-derived products - food allergy, penicillins, and penicillin g..    Vitals:    08/15/23 1514   BP: 128/84   Resp: 18       Review of Systems      Objective:  Patient's shoes and socks removed. Foot Exam    General  General Appearance: appears stated age and healthy   Orientation: alert and oriented to person, place, and time   Affect: appropriate   Gait: antalgic       Right Foot/Ankle     Inspection and Palpation  Tenderness: bony tenderness and navicular   Arch: pes planus  Claw Toes: fifth toe  Hallux limitus: yes  Skin Exam: tinea; Neurovascular  Dorsalis pedis: 3+  Posterior tibial: 3+      Left Foot/Ankle      Inspection and Palpation  Tenderness: bony tenderness and navicular   Arch: pes planus  Claw toes: fifth toe  Hallux limitus: yes  Skin Exam: tinea; Neurovascular  Dorsalis pedis: 3+  Posterior tibial: 3+        Physical Exam  Vitals and nursing note reviewed. Constitutional:       Appearance: Normal appearance. Cardiovascular:      Rate and Rhythm: Normal rate and regular rhythm. Pulses: no weak pulses          Dorsalis pedis pulses are 3+ on the right side and 3+ on the left side. Posterior tibial pulses are 3+ on the right side and 3+ on the left side. Musculoskeletal:      Right foot: Bony tenderness present. Left foot: Bony tenderness present. Feet:      Comments: Patient is pronated in stance and gait. He has metatarsus adductus type foot. Pain on palpation posterior tibial tendon course. Tendon test 5/5. Skin:     Capillary Refill: Capillary refill takes less than 2 seconds. Comments: Patient is dermatophytosis/moccasin tinea pedis bilateral.  All nails are dystrophic. Hallux nails demonstrate distal mycosis. Neurological:      Mental Status: He is alert. Psychiatric:         Mood and Affect: Mood normal.         Behavior: Behavior normal.         Thought Content:  Thought content normal.         Judgment: Judgment normal.     Patient's shoes and socks removed. Right Foot/Ankle   Right Foot Inspection      Vascular  Capillary refills: < 3 seconds  The right DP pulse is 3+. The right PT pulse is 3+. Right Toe  - Comprehensive Exam  Arch: pes planus  Claw Toes: fifth toe  Hallux limitus: yes  Tenderness: bony tenderness and navicular         Left Foot/Ankle  Left Foot Inspection      Vascular  Capillary refills: < 3 seconds  The left DP pulse is 3+. The left PT pulse is 3+.      Left Toe  - Comprehensive Exam  Arch: pes planus  Claw toes: fifth toe  Hallux limitus: yes  Tenderness: bony tenderness and navicular             Assign Risk Category  Deformity present  No loss of protective sensation  No weak pulses  Risk: 0

## 2023-08-16 ENCOUNTER — OFFICE VISIT (OUTPATIENT)
Dept: PHYSICAL THERAPY | Facility: CLINIC | Age: 38
End: 2023-08-16
Payer: COMMERCIAL

## 2023-08-16 DIAGNOSIS — M54.50 ACUTE MIDLINE LOW BACK PAIN WITHOUT SCIATICA: ICD-10-CM

## 2023-08-16 DIAGNOSIS — M75.41 IMPINGEMENT SYNDROME OF RIGHT SHOULDER: ICD-10-CM

## 2023-08-16 DIAGNOSIS — M22.2X2 PATELLOFEMORAL SYNDROME OF LEFT KNEE: ICD-10-CM

## 2023-08-16 DIAGNOSIS — M22.2X1 PATELLOFEMORAL SYNDROME OF RIGHT KNEE: Primary | ICD-10-CM

## 2023-08-16 PROCEDURE — 97164 PT RE-EVAL EST PLAN CARE: CPT

## 2023-08-16 PROCEDURE — 97530 THERAPEUTIC ACTIVITIES: CPT

## 2023-08-16 NOTE — PROGRESS NOTES
Daily Note     Today's date: 2023  Patient name: Carlos Enrique Oliveira  : 1985  MRN: [id number]  Referring provider: Donnel Denver, DO  Dx:   Encounter Diagnosis     ICD-10-CM    1. Patellofemoral syndrome of right knee  M22.2X1       2. Patellofemoral syndrome of left knee  M22.2X2       3. Impingement syndrome of right shoulder  M75.41       4. Acute midline low back pain without sciatica  M54.50           Start Time: 1630  Stop Time: 1715  Total time in clinic (min): 45 minutes    Subjective: Patient reports that his back is really bothering him today and requests that he takes it easy today. Patient reports that his shoulder feels about 80% the most pain he feels is little aches and he feels like he has gained most of the mobility back. Patient reports that the last 20% would include the ability to move his shoulder without any instance of discomfort. Patient reports his goals for the shoulder remain the same. Short Term Goals (6 weeks): ALL MET on 23  - Patient will be independent in basic HEP 2-3 weeks  - Patient will report >50% reduction in pain  - Patient will demonstrate >1/3 improvement in MMT grade as applicable  - Patient will demonstrate ability to abduct R shoulder to 180 deg  - Patient will report <5 instances of shoulder pain exacerbation in a given week    Long Term Goals (12 weeks): PROGRESSED on 23  - Patient will be independent in a comprehensive home exercise program  - Patient FOTO score will improve by >10 points  - Patient will self-report >90% improvement in function  - Patient will deny any shoulder pain while reaching overhead  - Patient will demonstrate ability to reach behind his head -MET    Objective: See treatment diary below.     Postural Assessment  -Posture in Sitting: moderate rounded shoulders    Sensation  - Light touch: intact  - Reflexes: (23: no change)  Left: C5: 2+  C6: 2+  C7: 1+   Right: C5: 2+  C6: 2+  C7: 1+   - Upper Motor Neuron Signs: negative    Active Range of Motion  Cervical Spine (23:   Flexion:  No limitation  without pain  Extension:  Minimally limited  without pain (23: no limitaton)  Lateral Flexion - Left:  Minimally limited  without pain (23: no limitation)  Lateral Flexion - Right:  Moderately limited  without pain (23: no limitation)  Rotation - Left:  No limitation  without pain  Rotation - Right:  No limitation  without pain    Thoracic Spine  Flexion:  No limitation  without pain  Extension:  No limitation  without pain (23: pain in low back)  Rotation - Left:  Minimally limited  without pain (23: no  Rotation - Right:  Minimally limited  without pain (23: no    Shoulder   LEFT  RIGHT  - Flexion: 170  170   - Abduction: 180  80 *p (23: 180)  - ER:  T3  Unable *p (23: T3)  - IR:  T12  L3 (23: T12)    Passive Shoulder Range of Motion in Supine    LEFT   RIGHT  - Flexion: 180    180  - Abduction: 180    90 p* (23: 135)  - ER at 0: 90    60  - IR at 0: 20     20  - ER at 45: 100 (23 at 90: 100)  90 (23 at 90: 90)  - IR at 45: 70 (23 at 90: 80)  50 (23 at 90: 80)    Mechanical Assessment  In seated:  - Retraction: improved patient abduction AROM to 165 deg  - Retraction + Extension: increase in pain and ROM worsened to 90 deg    Scapular Mobility   Right  Below 90 degrees elevation: Winging:  Minimal (  Upward Rotation:  Inadequate  Above 90 degrees elevation: Upward Rotation:  Delayed   Left  Below 90 degrees elevation:Upward Rotation: Delayed  Above 90 degrees elevation: Upward Rotation:  Delayed     UE MMT  LEFT   RIGHT  - Shoulder Shru/5   5/5  - Shoulder Abduction:   4/5 (23: 4+ 3+/5 (23: 4+  - Shoulder ER:   4/5 (23: 4+) 4-/5 *d (23: 4+)  - Shoulder IR:   4+/5   4/5 (23:4+)  - Elbow Flexion:  4/5 (23: 5/5) 4-/5 (23: 5/5)  - Elbow Extension:  4+/5(23: 5/5) 4/5 (23: 5/5)    - Middle Trap:   4-/5   3+/5  - Lower Trap:   4/5   3+/5    (*d = discomfort, *p = pain)  Joint Play  - Anterior Capsule: Hypomobile  - Posterior Capsule: Hypomobile  - Inferior Capsule: Hypomobile   - Acromioclavicular Joint: Hypomobile  - Sternoclavicular Joint: Normal  - CTJ: Hypomobile  - Mid-Thoracic Spine: Hypomobile   - Lower Thoracic Spine: Normal    Diagnostic Tests Performed  Positive: Scapular Assist and Painful Arc (8/16/23: painful arc; decreased level of pain)  Negative:  Drop Arm, Empty Can, Full Can, Hawkin's and Neer     Functional Assessment   strength: R: 60, L:46 pounds of force (blub dynamometer)   (8/16/23: R: 62, L: 54 ponds of force (bulb dynamometer)    Assessment: Morelia Moran has attended 10 visits over 4 weeks. They have made notable improvements in strength, range of motion and activity tolerance. Despite improvements, patient continues to have limitations in pain free movement. These impairments continue to limited the patient's ability to perform all work activities and chores around his house without discomfort. Patient has made progress towards long term goals and met all short term goals. They would benefit from continued skilled physical therapy to address these impairments and functional limitations in order to return to [facility]. Plan: Continue per plan of care. Advance per primary PT. Insurance:  AMA/CMS Eval/ Re-eval POC expires Harlan Hamman #/ Referral # Total visits  Start date  Expiration date Extension  Visit limitation? PT only or  PT+OT?  Co-Insurance   [facility] 7/17/23 10/9/23   SUBMITTED                                                                                                                                    Date 7-17 7-18 7-25 7/27 8/1  8-2  8-8  8-10  8-14  8-16         Used 1 1 1  1  1  1  1  1  1  1         Remaining  11 10 9  8  7  6  5  4  3  2               Date                             Used                             Remaining                                  Date  8/2 8/8/23 8/10  8/14/23 8/16/23   Visit Number  6- LBP eval 7 8  9 10   Manual         T/s PA mob     Inferior HVLA to L innominate x2    C/s upglide     Prone PA mobs t/o lumbar spine, gr II-III 5x30 each    C/s downglide          ACJa/p /inf gr 3-4                   TherEx         PROM         Cervical retraction         Shld ext         Rows         Scaption         Banded ER         Open book     x10 5" hold    Bear hugs         Squats         Step down         Step up         Standing 3 way         Leg press         Lateral stepping         clamshells         SLR         PPU Rev for HEP       LTR  5" x30 total t/o session  Quad rock backs: 5" x20 Quad rock backs: 5" x20   Piriformis stretch     5x30"   Neuro Re-Ed         Wall slides + lift off   X-light loop 2x10 X-light loop 2*10 X-light loop 3x8    TA progressions  TA isos: 5" x20  TA w/ march: x30 each TA isos: 5" x20  TA w/ march: x30 each TA isos: 5" x20  TA w/ march: x30 each TA isos: 5" x20  TA w/ march: x30 each   Bridge  W/ ad sq: 2x10 W/ ad sq: 2*10, 5" W/ ad sq + TA brace 2x10    Palloff press  RTB 3x10 each 3*10 ea.  Blue TB In half kneel: RTB 3x8 each    Bent over rows  Alternating 9# KB 3x10 each 3*10, 9# KB ea hand 9# 2x10 each *stopped 2/2 pain    Dead lift   2*10, 9# KB in front     B/L shld extn   3*10 Blue TB     Seated p-ball march   1*5, pain - held     [employer]     Modified 1 arm/leg at a time 2x10 ea    TherAct         Patient education                   Gait Training                   Modalities         CP

## 2023-08-17 ENCOUNTER — OFFICE VISIT (OUTPATIENT)
Dept: BARIATRICS | Facility: CLINIC | Age: 38
End: 2023-08-17

## 2023-08-17 VITALS — WEIGHT: 281 LBS | BODY MASS INDEX: 45.35 KG/M2

## 2023-08-17 VITALS — WEIGHT: 281 LBS | HEIGHT: 66 IN | BODY MASS INDEX: 45.16 KG/M2

## 2023-08-17 DIAGNOSIS — E78.5 HYPERLIPIDEMIA, UNSPECIFIED HYPERLIPIDEMIA TYPE: ICD-10-CM

## 2023-08-17 DIAGNOSIS — E11.65 TYPE 2 DIABETES MELLITUS WITH HYPERGLYCEMIA, WITHOUT LONG-TERM CURRENT USE OF INSULIN (HCC): Primary | ICD-10-CM

## 2023-08-17 DIAGNOSIS — E66.01 MORBID (SEVERE) OBESITY DUE TO EXCESS CALORIES (HCC): ICD-10-CM

## 2023-08-17 DIAGNOSIS — E66.01 OBESITY, CLASS III, BMI 40-49.9 (MORBID OBESITY) (HCC): Primary | ICD-10-CM

## 2023-08-17 DIAGNOSIS — Z01.818 PRE-OP TESTING: ICD-10-CM

## 2023-08-17 PROCEDURE — RECHECK

## 2023-08-17 NOTE — PROGRESS NOTES
Bariatric Nutrition Assessment Note    Type of surgery    Preop (3 + 1 wt checks)  Surgery Date: TBD  Surgeon: Dr. Roshni Hsu (consult scheduled for 8/30/23)    Nutrition Assessment   Jim Arevalo  44 y.o.  male     Wt with BMI of 25: 155.6#  Pre-Op Excess Wt: 125.4#  Ht 5' 6" (1.676 m)   Wt 127 kg (281 lb)   BMI 45.35 kg/m²     [place] Equation:    Estimated calories for weight loss 7372-7292 (1-2# per wk wt loss - sedentary )  Estimated protein needs 65-97gm (1.0-1.5 gms/kg IBW (65kg))   Estimated fluid needs 1950-2275ml (30-35 ml/kg IBW (65kg))      NAFLD Fibrosis Score is: -1.488--no referral needed    NAFLD Score Correlated Fibrosis Severity   <-1.455 F0-F2   -1.455-0.676 Indeterminate Score   >0.676 F3-F4   **Fibrosis Severity Scale: F0 = no fibrosis; F1= mild fibrosis; F2 = moderate fibrosis; F3 = severe fibrosis; F4 = cirrhosis    NAFLD Score Component Values:  Component Value Date   Age: 44 y.o.     BMI: 45.35 kg/m²    IFG or DM:  Yes    AST: 21 U/L 4/7/2023   ALT: 51 U/L 4/7/2023   Platelet: 977 Thousands/uL 4/7/2023   Albumin: 3.9 g/dL 4/7/2023       Weight History   Onset of Obesity: Childhood  Family history of obesity: Yes  Wt Loss Attempts: Commercial Programs ([employer], [employer], etc.)  Exercise  High Protein/Low CHO diets (Atkins, [place], etc.)  Meal Replacements (Medifast, Slim Fast, etc.)  OTC meds/supplements  Self Created Diets (Portion Control, Healthy Food Choices, etc.)  Patient has tried the above for 6 months or more with insufficient weight loss or weight regain, which is why patient has requested to be evaluated for weight loss surgery today  Highest weight is 280#  180# in [address], when got out in 2016 started gaining more  Maximum Wt Lost: 0-5lbs    Review of History and Medications   DM dx in 2020 at 13-14 K0u--rm Ozempic    Past Medical History:   Diagnosis Date   • Diabetes mellitus ([address])    • Hypertension    • Nasal congestion     Frequently   • PONV (postoperative nausea and vomiting)    • Sleep apnea     5.5 apnea scale     Past Surgical History:   Procedure Laterality Date   • [place] SEPTOPLASTY/SUBMUCOUS RESECJ W/WO CARTILAGE GRF N/A 11/28/2022    Procedure: SEPTOPLASTY;  Surgeon: Celso Pollard MD;  Location: [facility];  Service: ENT   • [place] SUBMUCOUS [place] PRTL/COMPL Bilateral 11/28/2022    Procedure: TURBINECTOMY;  Surgeon: Celso Pollard MD;  Location: [facility];  Service: ENT     Social History     Socioeconomic History   • Marital status: Single     Spouse name: Not on file   • Number of children: Not on file   • Years of education: Not on file   • Highest education level: Not on file   Occupational History   • Not on file   Tobacco Use   • Smoking status: Some Days     Types: Cigars     Passive exposure: Never   • Smokeless tobacco: Never   • Tobacco comments:     Wife just had baby not smoking right now   Vaping Use   • Vaping Use: Never used   Substance and Sexual Activity   • Alcohol use: Not Currently     Comment: rare   • Drug use: Never   • Sexual activity: Yes     Partners: Female     Birth control/protection: None   Other Topics Concern   • Not on file   Social History Narrative   • Not on file     Social Determinants of Health     Financial Resource Strain: Not on file   Food Insecurity: Not on file   Transportation Needs: Not on file   Physical Activity: Not on file   Stress: Not on file   Social Connections: Not on file   Intimate Partner Violence: Not on file   Housing Stability: Not on file       Current Outpatient Medications:   •  Azelastine HCl 137 MCG/SPRAY SOLN, 1 SPRAY INTO EACH NOSTRIL 2 (TWO) TIMES A DAY USE IN EACH NOSTRIL AS DIRECTED, Disp: 30 mL, Rfl: 4  •  ketoconazole (NIZORAL) 2 % cream, Apply topically daily, Disp: 60 g, Rfl: 1  •  lisinopril (ZESTRIL) 10 mg tablet, Take 10 mg by mouth daily, Disp: , Rfl:   •  pantoprazole (PROTONIX) 20 mg tablet, Take 20 mg by mouth daily (Patient not taking: Reported on 6/19/2023), Disp: , Rfl:   •  semaglutide, 2 mg/dose, (Ozempic, 2 MG/DOSE,) 8 mg/ mL injection pen, Inject 0.75 mL (2 mg total) under the skin every 7 days, Disp: 15 mL, Rfl: 0  •  tadalafil (CIALIS) 10 MG tablet, Take 1 tablet (10 mg total) by mouth daily as needed for erectile dysfunction, Disp: 30 tablet, Rfl: 0  •  terbinafine (LamISIL) 250 mg tablet, 1 tab p.o. every other day., Disp: 15 tablet, Rfl: 0    Food Intake and Lifestyle Assessment   Food Intake Assessment completed via usual diet recall  :  3:30am to 11:30am or if does overtime will get home at 3pm  Also volunteer     Wakes at 3am  coffee  Breakfast: 5-6am:  647 bread with lyles, cheese and lunch meat (1 at this time) or if doesn't pack will get sausage egg and cheese on roll   Snack: 7-8am:  Fruit--applesauce, banana, veggie straws, cheese sticks   Lunch: 10-11am: other sandwich or if doesn't   Snack: on way home will eat rest of snacks  Dinner: 6pm- Stuffed clams from [employer] (2) OR Tomer sausage OR likes a rice (meat and beans in it), not great with veggies   Snack: -  Bed:  8-9pm  *weakness: portions are larger    Beverage intake: water, sugar free beverages (diet snapple or gatorade zero) and coffee/tea  Protein supplement: not at this time  Estimated protein intake per day: 80gm  Estimated fluid intake per day: 80oz water, 8-10oz cup coffee with 3 equal + creamer + whipped cream (SF sugar)  Meals eaten away from home: 2 dinners, 2 lunch, 2 breakfast-- per week  Typical meal pattern: 3 meals per day and 2 snacks per day  Eating Behaviors: Consumption of high calorie/ high fat foods, Large portion sizes, Frequent snacking/ grazing and Mindless eating    Food allergies or intolerances:    Allergies   Allergen Reactions   • Other Angioedema   • Shellfish-Derived Products - Food Allergy Anaphylaxis   • Penicillins Hives   • Penicillin G Hives     Cultural or Caodaism considerations: -    Physical Assessment  Physical Activity  Types of exercise: active at work, no set regimen outside of that  Current physical limitations: joint pain/back pain (going to PT for lower back)    Psychosocial Assessment   Support systems: spouse friend(s) relative(s)  Socioeconomic factors: lives with wife and 1 months old son  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 Patient educated and handouts provided.   Surgical changes to stomach / GI  Capacity of post-surgery stomach  Diet progression  Adequate hydration  Sugar and fat restriction to decrease "dumping syndrome"  Fat restriction to decrease steatorrhea  Expected weight loss  Weight loss plateaus/ possibility of weight regain  Exercise  Suggestions for pre-op diet  Nutrition considerations after surgery  Protein supplements  Meal planning and preparation  Appropriate carbohydrate, protein, and fat intake, and food/fluid choices to maximize safe weight loss, nutrient intake, and tolerance   Dietary and lifestyle changes  Possible problems with poor eating habits  Intuitive eating  Techniques for self monitoring and keeping daily food journal  Potential for food intolerance after surgery, and ways to deal with them including: lactose intolerance, nausea, reflux, vomiting, diarrhea, food intolerance, appetite changes, gas  Vitamin / Mineral supplementation of Multivitamin with minerals and Vitamin D    Patient is not currently pregnant and doesn't desire to become pregnant a minimum of one year post-op    Education provided to: patient and family    Barriers to learning: No barriers identified    Readiness to change: preparation    Prior research on procedure: books, internet and friends or family    Comprehension: verbalizes understanding     Expected Compliance: good    Recommendations  Pt is an appropriate candidate for surgery.  Yes    Evaluation / Monitoring  Dietitian to Monitor: Eating pattern as discussed Tolerance of nutrition prescription Body weight Lab values Physical activity    Pre-op weight goals:  • Do NOT Gain  • Can go to BMI of 35:   217#  • Encouraged weight loss w/ diet and lifestyle changes  • Will be started on a 2 week liver shrinking diet, possibly shorter/longer as per the discretion of the surgeon/team, directly prior to surgery    Goals  Eliminate sugar sweetened beverages  Food journal via baritastic  Exercise 30 minutes 5 times per week--can split to 15 mins 2x/day  Complete lesson plans 1-6  Eat 3 meals per day with protein at each meal  Can use protein shake as a meal replacement or snack  Eat and drink slower  Fill 1/2 plate veggies  Eliminate mindless snacking    Time Spent:   1 Hour

## 2023-08-17 NOTE — PROGRESS NOTES
Bariatric Behavioral Health Evaluation    Presenting Problem:  Scar Martinez  is a 44 y.o.   male   :  1985   Patient has struggled with weight throughout his life, even when in the  [address] with all of the activity he did and the limit of food. His job is very physical and keeping up with his son as he gets older will be difficult. He wants to improve his weight and health for his  son. Patient's tried exercise, Weight Watchers, Atkins, Slim Fast shakes, Hydroxicut, [place], he would lose 5-10 pounds at most and then regain. Is the patient seeking Bariatric Surgery Eval?  Yes   If yes, how long has patient been considering, researching and/or making changes for surgery? Patient has been considering the surgery for several years but he didn't have insurance coverage. In the past few months he has been more motivated to look into his benefit and has seen the surgery has become more common as a tool for weight loss. Patient doesn't know any one who has had the surgery or done his own research. Realizes Post-Op Requirements? Yes, patient's wife has good base knowledge about the surgery, patient will benefit from additional information. Pre-morbid level of function and history of present illness: Patient has diabetes, arthritis, high cholesterol and hypertension, he gets winded with mobility, chronic back, knee and ankle pain that he's getting PT for. Living situation: Patient lives with wife and four month old son. Work: Patient is a  with [employer], it's physically demanding but he really enjoys his work. He does experience some stress with the troubleshooting, problem solving and time crunch that can happen with his work but he is able to balance that stress well. He does have some     Physical Activity: Patient doesn't currently have an established workout routine, his job is very physical so that's the majority of physical activity.     Family History (medical, traditions, culture, rules/routines around food):   Obesity/Overweight: mom, grandmother, aunt   Mental Health Diagnosis: none  Substance Use Disorder/Alcoholism: grandfather  Tobacco use: none  Family Cultural/Traditions: Growing up he was expected to eat what was given to him and to finish his food. No food insecurity, food was prepared for him. Mental Health, Trauma and Substance use Assessment    Psychiatric/Psychological Treatment Diagnosis, History of Eating Disorders: Patient denies any current mental health diagnosis, he did have a diagnosis of depression when he was a teenager. He went through treatment at that time, he had a stressful home life but after moving in with his grandmother a lot of those stressors subsided. No other symptoms since that time. Outpatient Counselor No      Psychiatrist No     Have you had any Mental Health or Substance Use Inpatient Treatment? Yes, Patient was hospitalized as a teenager for aggressive behavior and suicidal ideation but after change in living arrangements and then going into the [address] he has not had any issues since that time. Drug and/or Alcohol use and treatment history: Patient denies any substance use disorder, he drinks alcohol on rare social occasions. Tobacco/Vaping History: Patient has cigars on occasion    Domestic Violence: No     Abuse or Trauma History: None      Risk Assessment    Stressors and Supports: Patient struggles with weight and the stress it puts on their health, no other life stressors. Patient's wife, friends and grandmother know he's seeking surgery and they are supportive. Risk of harm to self or others: Patient denies any SI/HI. Presence of Audio/Visual Hallucinations:  No audio or visual hallucinations reported.     Access to weapons: none     Observation: this interview    Based on the previous information, the client presents the following risk of harm to self or others: low      Physical/Mental Health Status: Appearance: appropriate  Sociability: friendly  Affect: appropriate  Mood: calm  Thought Process: coherent  Speech: normal  Content: no impairment  Orientation: person  Yes , place  Yes , time  Yes , normal attention span  Yes , normal memory  Yes  , decreased in concentration ability  No and normal judgement  Yes   Insight: emotional  good       BARIATRIC SURGERY EDUCATION CHECKLIST    Patient has received the following education related to the bariatric surgery process and understands:    Patients may be required to complete a psychiatric evaluation and receive clearance for surgery from mental health provider. Patients who undergo weight loss surgery are at higher risk of increased mental health concerns and suicide attempts. Patients may be required to complete a full substance abuse evaluation and then complete all treatment recommendations prior to surgery. If diagnosis of abuse/dependence results, patient may be required to remain sober for one (1) year before having bariatric surgery. Patients on psychiatric medications should check with their provider to discuss psychiatric medications and the changes in absorption. Patient should discuss all time release medications with provider and take all medications as prescribed. The recommendation is that there is no use of any tobacco products, Hookah or vapes for the bariatric post-operation patient. Bariatric surgery patients should not consume alcohol as a post-operative patient as it may increase risk of numerous health conditions including but not limited to alcohol abuse and ulcers. There is a possibility of weight regain if patient does not follow all program guidelines and recommendations. Bariatric surgery patients should exercise thirty (30) to sixty (60) minutes per day to maintain post-surgical weight loss.     Research indicates that bariatric patients are more successful when they see a therapist for up to two (2) years post-op. Patients will follow all medical and dietary recommendations provided. Patient will keep all scheduled appointments and follow up with their physician for a minimum of five (5) years. Patient will take all vitamins as recommended. Post-operative vitamins are life-long. There is a goal month set. All requirements should be met by this time. Don't wait to get started! There is a deadline month set. All requirements must be finished by this time and if not, the patient will be halted in the surgery process. The patient can be referred to the medical weight management program or can come back to the surgical program once the unfinished tasks from the previous program are completed. Female patients of childbearing years are informed that pregnancy is not recommended until 12 months post-op. Recommendations: Recommended for surgery  yes and Patient meets the criteria to be a member of [facility] Bariatric surgery program.      Note:  Patient denies any mental health diagnosis, did have a diagnosis of depression when he was a teenager but no symptoms since that time. He denies any substance use disorder, he drinks alcohol on social occasions and is an occasional cigar smoker. Risk of alcohol and tobacco use post op were discussed. Patient is going to work on interval eating and increasing water, encouraged to take note of triggers of eating, what he chooses and how he feels when having certain foods. No contraindications for surgery are identified at this time, it is recommended for patient to progress through surgical process.   Missy Gar LCSW, Bariatric

## 2023-08-20 ENCOUNTER — OFFICE VISIT (OUTPATIENT)
Dept: URGENT CARE | Facility: CLINIC | Age: 38
End: 2023-08-20
Payer: COMMERCIAL

## 2023-08-20 VITALS
HEIGHT: 67 IN | DIASTOLIC BLOOD PRESSURE: 81 MMHG | HEART RATE: 93 BPM | TEMPERATURE: 98.2 F | RESPIRATION RATE: 16 BRPM | WEIGHT: 282 LBS | BODY MASS INDEX: 44.26 KG/M2 | OXYGEN SATURATION: 99 % | SYSTOLIC BLOOD PRESSURE: 139 MMHG

## 2023-08-20 DIAGNOSIS — H00.022 HORDEOLUM INTERNUM OF RIGHT LOWER EYELID: Primary | ICD-10-CM

## 2023-08-20 PROCEDURE — 99213 OFFICE O/P EST LOW 20 MIN: CPT | Performed by: PHYSICIAN ASSISTANT

## 2023-08-20 RX ORDER — ERYTHROMYCIN 5 MG/G
0.5 OINTMENT OPHTHALMIC EVERY 12 HOURS SCHEDULED
Qty: 14 G | Refills: 0 | Status: SHIPPED | OUTPATIENT
Start: 2023-08-20 | End: 2023-08-27

## 2023-08-20 NOTE — PROGRESS NOTES
[facility]        NAME: Fly Fishman is a 44 y.o. male  : 1985    MRN: [id number]  DATE: 2023  TIME: 8:24 AM    Assessment and Plan   Hordeolum internum of right lower eyelid [H00.022]  1. Hordeolum internum of right lower eyelid  erythromycin (ILOTYCIN) ophthalmic ointment        Patient Instructions   Right stye  rx erythromycin ointment twice daily x 7 days sent via EMR  Warm compress as needed  Tylenol/ibuprofen as needed  Saline drops for comfort as needed    Follow up with PCP in 3-5 days. Proceed to  ER if symptoms worsen. Chief Complaint     Chief Complaint   Patient presents with   • Eye Pain     Pt reports of right eye pain with drainage and swelling. S/S stared approx 6 days ago. History of Present Illness       Rigoberto Chapa is a 26-year-old male who presents to clinic complaining of right eye pain x5 days. He states is progressively worsening over the last 5 days now hurts when he blinks. He denies any redness but notes some swelling of his lower eyelid and below his eye. He states he looked today and it looks like a pimple on the bottom of his eyelid. He denies any discharge, fever, photophobia, or vision changes. Review of Systems   Review of Systems   Constitutional: Negative for fever. Eyes: Positive for pain. Negative for photophobia, discharge, redness, itching and visual disturbance. Neurological: Negative for headaches.          Current Medications       Current Outpatient Medications:   •  erythromycin (ILOTYCIN) ophthalmic ointment, Administer 0.5 inches to the right eye every 12 (twelve) hours for 7 days, Disp: 14 g, Rfl: 0  •  Azelastine HCl 137 MCG/SPRAY SOLN, 1 SPRAY INTO EACH NOSTRIL 2 (TWO) TIMES A DAY USE IN EACH NOSTRIL AS DIRECTED, Disp: 30 mL, Rfl: 4  •  ketoconazole (NIZORAL) 2 % cream, Apply topically daily, Disp: 60 g, Rfl: 1  •  lisinopril (ZESTRIL) 10 mg tablet, Take 10 mg by mouth daily, Disp: , Rfl:   •  pantoprazole (PROTONIX) 20 mg tablet, Take 20 mg by mouth daily (Patient not taking: Reported on 6/19/2023), Disp: , Rfl:   •  semaglutide, 2 mg/dose, (Ozempic, 2 MG/DOSE,) 8 mg/ mL injection pen, Inject 0.75 mL (2 mg total) under the skin every 7 days, Disp: 15 mL, Rfl: 0  •  tadalafil (CIALIS) 10 MG tablet, Take 1 tablet (10 mg total) by mouth daily as needed for erectile dysfunction, Disp: 30 tablet, Rfl: 0  •  terbinafine (LamISIL) 250 mg tablet, 1 tab p.o. every other day., Disp: 15 tablet, Rfl: 0    Current Allergies     Allergies as of 08/20/2023 - Reviewed 08/20/2023   Allergen Reaction Noted   • Other Angioedema 10/23/2019   • Shellfish-derived products - food allergy Anaphylaxis 04/14/2022   • Penicillins Hives 07/14/2015   • Penicillin g Hives 08/08/2022            The following portions of the patient's history were reviewed and updated as appropriate: allergies, current medications, past family history, past medical history, past social history, past surgical history and problem list.     Past Medical History:   Diagnosis Date   • Diabetes mellitus ([address])    • Hypertension    • Nasal congestion     Frequently   • PONV (postoperative nausea and vomiting)    • Sleep apnea     5.5 apnea scale       Past Surgical History:   Procedure Laterality Date   • [place] SEPTOPLASTY/SUBMUCOUS RESECJ W/WO CARTILAGE GRF N/A 11/28/2022    Procedure: SEPTOPLASTY;  Surgeon: Pau Jurado MD;  Location[facility];  Service: ENT   • [place] SUBMUCOUS [place] PRTL/COMPL Bilateral 11/28/2022    Procedure: TURBINECTOMY;  Surgeon: Pau Jurado MD;  Location: [facility];  Service: ENT       Family History   Problem Relation Age of Onset   • Diabetes type II Mother    • Diabetes Mother    • Diabetes Maternal Grandmother    • Diabetes type II Maternal Grandmother    • Arthritis Maternal Grandmother    • Cancer Maternal Grandfather         Liver cancer         Medications have been verified.         Objective   /81   Pulse 93 Temp 98.2 °F (36.8 °C)   Resp 16   Ht 5' 7" (1.702 m)   Wt 128 kg (282 lb)   SpO2 99%   BMI 44.17 kg/m²   No LMP for male patient. Physical Exam     Physical Exam  Vitals and nursing note reviewed. Constitutional:       General: He is not in acute distress. Appearance: Normal appearance. He is not ill-appearing. Eyes:      General: Vision grossly intact. Right eye: Hordeolum present. No foreign body or discharge. Left eye: No foreign body, discharge or hordeolum. Extraocular Movements: Extraocular movements intact. Conjunctiva/sclera: Conjunctivae normal.      Pupils: Pupils are equal, round, and reactive to light. Pulmonary:      Effort: Pulmonary effort is normal.   Neurological:      Mental Status: He is alert and oriented to person, place, and time.    Psychiatric:         Mood and Affect: Mood normal.         Behavior: Behavior normal.

## 2023-08-21 ENCOUNTER — OFFICE VISIT (OUTPATIENT)
Dept: PHYSICAL THERAPY | Facility: CLINIC | Age: 38
End: 2023-08-21
Payer: COMMERCIAL

## 2023-08-21 DIAGNOSIS — M54.50 ACUTE MIDLINE LOW BACK PAIN WITHOUT SCIATICA: ICD-10-CM

## 2023-08-21 DIAGNOSIS — M22.2X2 PATELLOFEMORAL SYNDROME OF LEFT KNEE: ICD-10-CM

## 2023-08-21 DIAGNOSIS — M22.2X1 PATELLOFEMORAL SYNDROME OF RIGHT KNEE: Primary | ICD-10-CM

## 2023-08-21 DIAGNOSIS — M75.41 IMPINGEMENT SYNDROME OF RIGHT SHOULDER: ICD-10-CM

## 2023-08-21 PROCEDURE — 97110 THERAPEUTIC EXERCISES: CPT

## 2023-08-21 NOTE — PROGRESS NOTES
Daily Note     Today's date: 2023  Patient name: Socorro Us  : 1985  MRN: [id number]  Referring provider: Papi Mcfarland DO  Dx:   Encounter Diagnosis     ICD-10-CM    1. Patellofemoral syndrome of right knee  M22.2X1       2. Patellofemoral syndrome of left knee  M22.2X2       3. Impingement syndrome of right shoulder  M75.41       4. Acute midline low back pain without sciatica  M54.50           Start Time: 1500  Stop Time: 1530  Total time in clinic (min): 30 minutes    Subjective: "My shoulder and knees are much better but my back is pretty much the same." States that LBP is centrally located, reports 4/10 pain intensity to start today's session. States he will be seeing pain management next week for further evaluation of LBP. Objective: See treatment diary below      Assessment: Tolerated treatment well. Patient 's low back pain remained relatively unchanged with interventions targeting lumbar spine mobilization; patient with significant reduction in pain following MT to thoracic spine (reduced to <1/10 post session). HEP updated to emphasize thoracic mobility work, including open books, seated/standing thoracic extension, and cat-camels; pt verbalized good understanding/agreement. Patient will continue to benefit from skilled PT to improve functional mobility and activity tolerance. Plan: Continue per plan of care. Insurance:  AMA/CMS Eval/ Re-eval POC expires Frank Beverly #/ Referral # Total visits  Start date  Expiration date Extension  Visit limitation? PT only or  PT+OT?  Co-Insurance   [facility] 7/17/23 10/9/23   SUBMITTED  12                        APPROVED                                                                                                       Date   8-2  8-8  8-10  8-14  8-16  8-21       Used 1 1 1  1  1  1  1  1  1  1  1       Remaining  11 10 9  8  7  6  5  4  3  2  1             Date                             Used                             Remaining                                  Date 8/8/23 8/10  8/14/23 8/16/23 8/21/23   Visit Number 7 8  9 10 11   Manual        T/s PA mob   Inferior HVLA to L innominate x2  Gr II-V mobs to middle, lower thoracic spine 6x30   C/s upglide   Prone PA mobs t/o lumbar spine, gr II-III 5x30 each     C/s downglide         ACJa/p /inf gr 3-4                 TherEx        Open book   x10 5" hold  5" x20 each   Seated t/s extension     10" x15   Squats        Step down        Step up        PPU        LTR 5" x30 total t/o session  Quad rock backs: 5" x20 Quad rock backs: 5" x20 Cat-camel 5" x20   Piriformis stretch    5x30"    Standing t/s ext at wall     10" x10                                   Neuro Re-Ed        Wall slides + lift off X-light loop 2x10 X-light loop 2*10 X-light loop 3x8     TA progressions TA isos: 5" x20  TA w/ march: x30 each TA isos: 5" x20  TA w/ march: x30 each TA isos: 5" x20  TA w/ march: x30 each TA isos: 5" x20  TA w/ march: x30 each    Bridge W/ ad sq: 2x10 W/ ad sq: 2*10, 5" W/ ad sq + TA brace 2x10     Palloff press RTB 3x10 each 3*10 ea.  Blue TB In half kneel: RTB 3x8 each     Bent over rows Alternating 9# KB 3x10 each 3*10, 9# KB ea hand 9# 2x10 each *stopped 2/2 pain     Dead lift  2*10, 9# KB in front      B/L shld extn  3*10 Blue TB      Seated p-ball march  1*5, pain - held      [employer]    Modified 1 arm/leg at a time 2x10 ea     TherAct        Patient education                 Gait Training                 Modalities        CP

## 2023-08-22 ENCOUNTER — OFFICE VISIT (OUTPATIENT)
Dept: PHYSICAL THERAPY | Facility: CLINIC | Age: 38
End: 2023-08-22
Payer: COMMERCIAL

## 2023-08-22 DIAGNOSIS — M75.41 IMPINGEMENT SYNDROME OF RIGHT SHOULDER: ICD-10-CM

## 2023-08-22 DIAGNOSIS — M22.2X2 PATELLOFEMORAL SYNDROME OF LEFT KNEE: ICD-10-CM

## 2023-08-22 DIAGNOSIS — M22.2X1 PATELLOFEMORAL SYNDROME OF RIGHT KNEE: Primary | ICD-10-CM

## 2023-08-22 DIAGNOSIS — M54.50 ACUTE MIDLINE LOW BACK PAIN WITHOUT SCIATICA: ICD-10-CM

## 2023-08-22 PROCEDURE — 97112 NEUROMUSCULAR REEDUCATION: CPT

## 2023-08-22 PROCEDURE — 97110 THERAPEUTIC EXERCISES: CPT

## 2023-08-22 NOTE — PROGRESS NOTES
Daily Note     Today's date: 2023  Patient name: Tika Dominguez  : 1985  MRN: [id number]  Referring provider: Zac Mercado DO  Dx:   Encounter Diagnosis     ICD-10-CM    1. Patellofemoral syndrome of right knee  M22.2X1       2. Patellofemoral syndrome of left knee  M22.2X2       3. Impingement syndrome of right shoulder  M75.41       4. 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 States he hasn't been able to perform HEP yet today since he was working. Objective: See treatment diary below      Assessment: Tolerated treatment well. Began today's session with thoracic mobility warm up following positive response to back pain after previous session. Patient provided with graded verbal and tactile cues with initiation of clamshells to limit compensatory lumbar extension and promote glute isolation. Able to reintegrate core stab/TA work with fair ability to perform activation in isolation. Patient will continue to benefit from skilled PT to improve functional mobility and symptom irritability. Plan: Continue per plan of care. Insurance:  AMA/CMS Eval/ Re-eval POC expires Juan Myrick #/ Referral # Total visits  Start date  Expiration date Extension  Visit limitation? PT only or  PT+OT?  Co-Insurance   [facility] 7/17/23 10/9/23   SUBMITTED  12                        APPROVED                                                                                                       Date   8-2  8-8  8-10  8-14  8-16  -     Used 1 1 1  1  1  1  1  1  1  1  1  1     Remaining  11 10 9  8  7  6  5  4  3  2  1  0           Date                             Used                             Remaining                                  Date 8/8/23 8/10  8/14/23 8/16/23 8/21/23 8/22/23   Visit Number 7 [phone]  9 10 11 12   Manual         T/s PA mob   Inferior HVLA to L innominate x2  Gr II-V mobs to middle, lower thoracic spine 6x30    C/s upglide   Prone PA mobs t/o lumbar spine, gr II-III 5x30 each      C/s downglide          ACJa/p /inf gr 3-4                   TherEx         Open book   x10 5" hold  5" x20 each    Seated t/s extension     10" x15    Squats         Step down         Stand bilat T      RTB 3x8   PPU         LTR 5" x30 total t/o session  Quad rock backs: 5" x20 Quad rock backs: 5" x20 Cat-camel 5" x20 Cat-camel 5" x20   Piriformis stretch    5x30"     Standing t/s ext at wall     10" x10 10" x10   Quadruped rotations      5" x20 each                              Neuro Re-Ed         Wall slides + lift off X-light loop 2x10 X-light loop 2*10 X-light loop 3x8   X-light loop 3x10   TA progressions TA isos: 5" x20  TA w/ march: x30 each TA isos: 5" x20  TA w/ march: x30 each TA isos: 5" x20  TA w/ march: x30 each TA isos: 5" x20  TA w/ march: x30 each  TA isos: 5" x20  TA w/ LE ext: x20 each   Bridge W/ ad sq: 2x10 W/ ad sq: 2*10, 5" W/ ad sq + TA brace 2x10   Clamshells: 3x10 each   Palloff press RTB 3x10 each 3*10 ea.  Blue TB In half kneel: RTB 3x8 each      Bent over rows Alternating 9# KB 3x10 each 3*10, 9# KB ea hand 9# 2x10 each *stopped 2/2 pain      Dead lift  2*10, 9# KB in front       B/L shld extn  3*10 Blue TB       Seated p-ball march 1*5, pain - held       [employer]    Modified 1 arm/leg at a time 2x10 ea      TherAct         Patient education                   Gait Training                   Modalities         CP

## 2023-08-24 ENCOUNTER — APPOINTMENT (OUTPATIENT)
Dept: LAB | Facility: CLINIC | Age: 38
End: 2023-08-24
Payer: COMMERCIAL

## 2023-08-24 DIAGNOSIS — E66.01 MORBID OBESITY (HCC): ICD-10-CM

## 2023-08-24 DIAGNOSIS — E11.65 TYPE II DIABETES MELLITUS WITH HYPEROSMOLARITY, UNCONTROLLED (HCC): Primary | ICD-10-CM

## 2023-08-24 DIAGNOSIS — E11.00 TYPE II DIABETES MELLITUS WITH HYPEROSMOLARITY, UNCONTROLLED (HCC): Primary | ICD-10-CM

## 2023-08-24 DIAGNOSIS — E78.5 HYPERLIPIDEMIA, UNSPECIFIED HYPERLIPIDEMIA TYPE: ICD-10-CM

## 2023-08-24 DIAGNOSIS — Z01.818 OTHER SPECIFIED PRE-OPERATIVE EXAMINATION: ICD-10-CM

## 2023-08-24 LAB
ALBUMIN SERPL BCP-MCNC: 4.2 G/DL (ref 3.5–5)
ALP SERPL-CCNC: 89 U/L (ref 34–104)
ALT SERPL W P-5'-P-CCNC: 55 U/L (ref 7–52)
ANION GAP SERPL CALCULATED.3IONS-SCNC: 10 MMOL/L
AST SERPL W P-5'-P-CCNC: 28 U/L (ref 13–39)
BASOPHILS # BLD AUTO: 0.06 THOUSANDS/ÂΜL (ref 0–0.1)
BASOPHILS NFR BLD AUTO: 1 % (ref 0–1)
BILIRUB SERPL-MCNC: 0.8 MG/DL (ref 0.2–1)
BUN SERPL-MCNC: 18 MG/DL (ref 5–25)
CALCIUM SERPL-MCNC: 9.2 MG/DL (ref 8.4–10.2)
CHLORIDE SERPL-SCNC: 104 MMOL/L (ref 96–108)
CHOLEST SERPL-MCNC: 147 MG/DL
CO2 SERPL-SCNC: 24 MMOL/L (ref 21–32)
CREAT SERPL-MCNC: 0.75 MG/DL (ref 0.6–1.3)
EOSINOPHIL # BLD AUTO: 0.2 THOUSAND/ÂΜL (ref 0–0.61)
EOSINOPHIL NFR BLD AUTO: 3 % (ref 0–6)
ERYTHROCYTE [DISTWIDTH] IN BLOOD BY AUTOMATED COUNT: 13 % (ref 11.6–15.1)
EST. AVERAGE GLUCOSE BLD GHB EST-MCNC: 163 MG/DL
GFR SERPL CREATININE-BSD FRML MDRD: 116 ML/MIN/1.73SQ M
GLUCOSE P FAST SERPL-MCNC: 166 MG/DL (ref 65–99)
HBA1C MFR BLD: 7.3 %
HCT VFR BLD AUTO: 41.4 % (ref 36.5–49.3)
HDLC SERPL-MCNC: 29 MG/DL
HGB BLD-MCNC: 14.2 G/DL (ref 12–17)
IMM GRANULOCYTES # BLD AUTO: 0.02 THOUSAND/UL (ref 0–0.2)
IMM GRANULOCYTES NFR BLD AUTO: 0 % (ref 0–2)
LDLC SERPL CALC-MCNC: 95 MG/DL (ref 0–100)
LYMPHOCYTES # BLD AUTO: 2.33 THOUSANDS/ÂΜL (ref 0.6–4.47)
LYMPHOCYTES NFR BLD AUTO: 36 % (ref 14–44)
MCH RBC QN AUTO: 28 PG (ref 26.8–34.3)
MCHC RBC AUTO-ENTMCNC: 34.3 G/DL (ref 31.4–37.4)
MCV RBC AUTO: 82 FL (ref 82–98)
MONOCYTES # BLD AUTO: 0.65 THOUSAND/ÂΜL (ref 0.17–1.22)
MONOCYTES NFR BLD AUTO: 10 % (ref 4–12)
NEUTROPHILS # BLD AUTO: 3.22 THOUSANDS/ÂΜL (ref 1.85–7.62)
NEUTS SEG NFR BLD AUTO: 50 % (ref 43–75)
NONHDLC SERPL-MCNC: 118 MG/DL
NRBC BLD AUTO-RTO: 0 /100 WBCS
PLATELET # BLD AUTO: 307 THOUSANDS/UL (ref 149–390)
PMV BLD AUTO: 10.7 FL (ref 8.9–12.7)
POTASSIUM SERPL-SCNC: 4.2 MMOL/L (ref 3.5–5.3)
PROT SERPL-MCNC: 7.4 G/DL (ref 6.4–8.4)
RBC # BLD AUTO: 5.07 MILLION/UL (ref 3.88–5.62)
SODIUM SERPL-SCNC: 138 MMOL/L (ref 135–147)
TRIGL SERPL-MCNC: 117 MG/DL
TSH SERPL DL<=0.05 MIU/L-ACNC: 1.15 UIU/ML (ref 0.45–4.5)
WBC # BLD AUTO: 6.48 THOUSAND/UL (ref 4.31–10.16)

## 2023-08-24 PROCEDURE — 83036 HEMOGLOBIN GLYCOSYLATED A1C: CPT

## 2023-08-24 PROCEDURE — 36415 COLL VENOUS BLD VENIPUNCTURE: CPT

## 2023-08-24 PROCEDURE — 80061 LIPID PANEL: CPT

## 2023-08-24 PROCEDURE — 80053 COMPREHEN METABOLIC PANEL: CPT

## 2023-08-24 PROCEDURE — 85025 COMPLETE CBC W/AUTO DIFF WBC: CPT

## 2023-08-24 PROCEDURE — 84443 ASSAY THYROID STIM HORMONE: CPT

## 2023-08-28 ENCOUNTER — OFFICE VISIT (OUTPATIENT)
Dept: PHYSICAL THERAPY | Facility: CLINIC | Age: 38
End: 2023-08-28
Payer: COMMERCIAL

## 2023-08-28 DIAGNOSIS — M22.2X1 PATELLOFEMORAL SYNDROME OF RIGHT KNEE: Primary | ICD-10-CM

## 2023-08-28 DIAGNOSIS — M22.2X2 PATELLOFEMORAL SYNDROME OF LEFT KNEE: ICD-10-CM

## 2023-08-28 DIAGNOSIS — M54.50 ACUTE MIDLINE LOW BACK PAIN WITHOUT SCIATICA: ICD-10-CM

## 2023-08-28 DIAGNOSIS — M75.41 IMPINGEMENT SYNDROME OF RIGHT SHOULDER: ICD-10-CM

## 2023-08-28 PROCEDURE — 97112 NEUROMUSCULAR REEDUCATION: CPT

## 2023-08-28 NOTE — PROGRESS NOTES
Daily Note     Today's date: 2023  Patient name: Leatha Ghotra  : 1985  MRN: [id number]  Referring provider: Taylor Johnston DO  Dx:   Encounter Diagnosis     ICD-10-CM    1. Patellofemoral syndrome of right knee  M22.2X1       2. Patellofemoral syndrome of left knee  M22.2X2       3. Impingement syndrome of right shoulder  M75.41       4. Acute midline low back pain without sciatica  M54.50           Start Time: 1505  Stop Time: 1550  Total time in clinic (min): 45 minutes    Subjective: Patient reports overall reduction in low back pain since previous session. States he has been getting the greatest relief with performing cat-camel progressions. Has appointment with Pain Management scheduled for tomorrow. Objective: See treatment diary below      Assessment: Tolerated treatment well. Patient with onset of shoulder soreness with thoracic rotation progressions; initiated repeated shoulder extension which resolved symptoms. Initiated prone swimmers for posterior oblique training during today's session, el[place] well w/ quick fatigue noted. Patient will continue to benefit from skilled PT to improve functional mobility and symptom irritability. Plan: Continue per plan of care. Insurance:  AMA/CMS Eval/ Re-eval POC expires Bonifacio Math #/ Referral # Total visits  Start date  Expiration date Extension  Visit limitation? PT only or  PT+OT?  Co-Insurance   [facility] 7/17/23 10/9/23   SUBMITTED  12                        APPROVED                                                                                                       Date   8-2  8-8  8-10  8--     Used 1 1 1  1  1  1  1  1  1  1  1  1     Remaining  11 10 9  8  7  6  5  4  3  2  1  0           Date                             Used  1                           Remaining   11                               Date 8/10  8/14/23 8/16/23 8/21/23 8/22/23 8/28/23   Visit Number 8  9 10 11 12 13 Manual         T/s PA mob  Inferior HVLA to L innominate x2  Gr II-V mobs to middle, lower thoracic spine 6x30     C/s upglide  Prone PA mobs t/o lumbar spine, gr II-III 5x30 each       C/s downglide          ACJa/p /inf gr 3-4                   TherEx         Open book  x10 5" hold  5" x20 each  Open books in half kneel: 5" x15    Thoracic rotation at wall: 5" x15 *pain on right   Seated t/s extension    10" x15     Squats         Step down         Stand bilat T     RTB 3x8    PPU         LTR  Quad rock backs: 5" x20 Quad rock backs: 5" x20 Cat-camel 5" x20 Cat-camel 5" x20 Cat-camel 5" x20   Piriformis stretch   5x30"      Standing t/s ext at wall    10" x10 10" x10    Quadruped rotations     5" x20 each Thread the needle: 5" x15 each   Repeated shld extension      5" x20 total                     Neuro Re-Ed         Wall slides + lift off X-light loop 2*10 X-light loop 3x8   X-light loop 3x10 X-light loop 3x10   TA progressions TA isos: 5" x20  TA w/ march: x30 each TA isos: 5" x20  TA w/ march: x30 each TA isos: 5" x20  TA w/ march: x30 each  TA isos: 5" x20  TA w/ LE ext: x20 each    Bridge W/ ad sq: 2*10, 5" W/ ad sq + TA brace 2x10   Clamshells: 3x10 each    Palloff press 3*10 ea.  Blue TB In half kneel: RTB 3x8 each       Bent over rows 3*10, 9# KB ea hand 9# 2x10 each *stopped 2/2 pain       Dead lift 2*10, 9# KB in front        B/L shld extn 3*10 Blue TB        Seated p-ball march 1*5, pain - held        [employer]   Modified 1 arm/leg at a time 2x10 ea    Prone swimmers: 2x15 each   TherAct         Patient education                   Gait Training                   Modalities         CP

## 2023-08-29 ENCOUNTER — OFFICE VISIT (OUTPATIENT)
Dept: PAIN MEDICINE | Facility: CLINIC | Age: 38
End: 2023-08-29
Payer: COMMERCIAL

## 2023-08-29 VITALS
DIASTOLIC BLOOD PRESSURE: 86 MMHG | SYSTOLIC BLOOD PRESSURE: 126 MMHG | BODY MASS INDEX: 44.17 KG/M2 | TEMPERATURE: 98.2 F | WEIGHT: 282 LBS | HEART RATE: 107 BPM

## 2023-08-29 DIAGNOSIS — M51.16 LUMBAR DISC DISEASE WITH RADICULOPATHY: ICD-10-CM

## 2023-08-29 DIAGNOSIS — M54.40 ACUTE RIGHT-SIDED LOW BACK PAIN WITH SCIATICA, SCIATICA LATERALITY UNSPECIFIED: Primary | ICD-10-CM

## 2023-08-29 PROCEDURE — 99204 OFFICE O/P NEW MOD 45 MIN: CPT | Performed by: PHYSICAL MEDICINE & REHABILITATION

## 2023-08-29 RX ORDER — DICLOFENAC SODIUM 75 MG/1
75 TABLET, DELAYED RELEASE ORAL 2 TIMES DAILY
Qty: 60 TABLET | Refills: 1 | Status: SHIPPED | OUTPATIENT
Start: 2023-08-29

## 2023-08-29 NOTE — PROGRESS NOTES
Pain Medicine Follow-Up Note    Assessment:  1. Acute right-sided low back pain with sciatica, sciatica laterality unspecified    2. Lumbar disc disease with radiculopathy        Plan:  Mr. Bon Paniagua is a pleasant 79-year-old male who presents to [facility] for initial evaluation regarding 1 month duration of low back pain with radiating symptoms into the right lower extremity. During today's evaluation he is demonstrating clinical evidence of lumbar radiculopathy in the L3 and L4 dermatomal distribution unrelieved with conservative measures including home exercises and physical therapy for at least 6 weeks in the last 6 months. At this time further diagnostic work-up will be beneficial and warranted. As such we will  1. Order MRI lumbar spine without contrast to better identify disc and spine pathology contributing to symptoms  2. We will start the patient on diclofenac 75 mg twice a day for acute inflammatory changes. Depending upon MRI results would consider interventional approaches with epidural steroid injection. For now we will await MRI. History of Present Illness:    Michael Maldonado is a 44 y.o. male who presents to [address] for interval re-evaluation of the above stated pain complaints. The patient has a past medical and chronic pain history as outlined in the assessment section. Patient presents for initial evaluation regarding 1 months duration of low back pain with radiating symptoms into the right posterior thigh. Denies any significant inciting event or recent trauma. Today reports moderate to severe pain rated 3-7 out of 10 and interfere with activities. Pain is nearly constant 60 to 95% of the time that is present throughout the day and night. Describes symptoms as sharp, pressure-like, throbbing pain. Denies any significant lower extremity weakness or falls. Does not use any durable medical equipment for ambulation.   Symptoms are unchanged with posture and position. Reports no relief with physical therapy or heat. Denies smoking, marijuana or alcohol use. Not currently take anything for pain. Presents today for initial evaluation. Other than as stated above, the patient denies any interval changes in medications, medical condition, mental condition, symptoms, or allergies since the last office visit. Review of Systems:    Review of Systems   Constitutional: Negative for chills and fatigue. HENT: Negative for ear pain, mouth sores and sinus pressure. Eyes: Negative for pain, redness and visual disturbance. Respiratory: Negative for shortness of breath and wheezing. Cardiovascular: Negative for chest pain and palpitations. Gastrointestinal: Negative for abdominal pain and nausea. Endocrine: Negative for polyphagia. Musculoskeletal: Positive for back pain and gait problem. Negative for arthralgias and neck pain. Joint swelling,  Pain in the lower back   Skin: Negative for wound. Neurological: Positive for weakness and numbness. Negative for seizures. Psychiatric/Behavioral: Negative for dysphoric mood and sleep disturbance.          Past Medical History:   Diagnosis Date   • Diabetes mellitus ([address])    • Hypertension    • Nasal congestion     Frequently   • PONV (postoperative nausea and vomiting)    • Sleep apnea     5.5 apnea scale       Past Surgical History:   Procedure Laterality Date   • NM SEPTOPLASTY/SUBMUCOUS RESECJ W/WO CARTILAGE GRF N/A 11/28/2022    Procedure: SEPTOPLASTY;  Surgeon: Day Ty MD;  Location: [facility];  Service: ENT   • NM SUBMUCOUS [place] PRTL/COMPL Bilateral 11/28/2022    Procedure: TURBINECTOMY;  Surgeon: Day Ty MD;  Location: [facility];  Service: ENT       Family History   Problem Relation Age of Onset   • Diabetes type II Mother    • Diabetes Mother    • Diabetes Maternal Grandmother    • Diabetes type II Maternal Grandmother    • Arthritis Maternal Grandmother    • Cancer Maternal Grandfather         Liver cancer       Social History     Occupational History   • Not on file   Tobacco Use   • Smoking status: Some Days     Types: Cigars     Passive exposure: Never   • Smokeless tobacco: Never   • Tobacco comments:     Wife just had baby not smoking right now   Vaping Use   • Vaping Use: Never used   Substance and Sexual Activity   • Alcohol use: Not Currently     Comment: rare   • Drug use: Never   • Sexual activity: Yes     Partners: Female     Birth control/protection: None         Current Outpatient Medications:   •  Azelastine HCl 137 MCG/SPRAY SOLN, 1 SPRAY INTO EACH NOSTRIL 2 (TWO) TIMES A DAY USE IN EACH NOSTRIL AS DIRECTED, Disp: 30 mL, Rfl: 4  •  diclofenac (VOLTAREN) 75 mg EC tablet, Take 1 tablet (75 mg total) by mouth 2 (two) times a day, Disp: 60 tablet, Rfl: 1  •  ketoconazole (NIZORAL) 2 % cream, Apply topically daily, Disp: 60 g, Rfl: 1  •  lisinopril (ZESTRIL) 10 mg tablet, Take 10 mg by mouth daily, Disp: , Rfl:   •  semaglutide, 2 mg/dose, (Ozempic, 2 MG/DOSE,) 8 mg/ mL injection pen, Inject 0.75 mL (2 mg total) under the skin every 7 days, Disp: 15 mL, Rfl: 0  •  tadalafil (CIALIS) 10 MG tablet, Take 1 tablet (10 mg total) by mouth daily as needed for erectile dysfunction, Disp: 30 tablet, Rfl: 0  •  terbinafine (LamISIL) 250 mg tablet, 1 tab p.o. every other day., Disp: 15 tablet, Rfl: 0  •  pantoprazole (PROTONIX) 20 mg tablet, Take 20 mg by mouth daily (Patient not taking: Reported on 6/19/2023), Disp: , Rfl:     Allergies   Allergen Reactions   • Other Angioedema   • Shellfish-Derived Products - Food Allergy Anaphylaxis   • Penicillins Hives   • Penicillin G Hives       Physical Exam:    /86   Pulse (!) 107   Temp 98.2 °F (36.8 °C)   Wt 128 kg (282 lb)   BMI 44.17 kg/m²     Constitutional:normal, well developed, well nourished, alert, in no distress and non-toxic and no overt pain behavior. Eyes:anicteric  HEENT:grossly intact  Neck:supple, symmetric, trachea midline and no masses   Pulmonary:even and unlabored  Cardiovascular:No edema or pitting edema present  Skin:Normal without rashes or lesions and well hydrated  Psychiatric:Mood and affect appropriate  Neurologic:Cranial Nerves II-XII grossly intact  Musculoskeletal:normal gait, tenderness to palpation right-sided lumbar paraspinals, decreased active and passive range of motion with lumbar flexion and extension limited by pain, MMT 5 out of 5 bilateral lower extremities, sensation decreased to light touch in patchy distribution right lower extremity anterolateral thigh, positive straight leg raise in the supine position with radicular pain into the right leg      Imaging   XR spine lumbar 2 or 3 views injury  Status: Final result     PACS Images     Show images for XR spine lumbar 2 or 3 views injury  Study Result    Narrative & Impression   LUMBAR SPINE     INDICATION:   M54.50: Low back pain, unspecified.     COMPARISON:  None     VIEWS:  XR SPINE LUMBAR 2 OR 3 VIEWS INJURY  Images: 3     FINDINGS:     There are 5 non rib bearing lumbar vertebral bodies.     There is no evidence of acute fracture or destructive osseous lesion.     Alignment is unremarkable.     Small endplate osteophytes at L3-L5.     The pedicles appear intact. Facet arthropathy at L4-5, L5-S1.     Soft tissues are unremarkable.     IMPRESSION:     Mild degenerative changes of the lower lumbar spine.        Workstation performed: [id number]        Imaging    XR spine lumbar 2 or 3 views injury (Order: [id number]) - 7/31/2023    Result History    XR spine lumbar 2 or 3 views injury (Order #[id number]) on 8/10/2023 - Order Result History Report    Order Report     Order Details  Result Information    Status Priority Source   Final result (8/10/2023  5:57 AM) Routine      Reason for Exam    Dx:  Low back pain, unspecified back pain laterality, unspecified chronicity, unspecified whether sciatica present [M54.50 (ICD-10-CM)]     All Reviewers List    Gil Elias DO on 8/10/2023 11:22 AM       XR spine lumbar 2 or 3 views injury: Patient Communication     Add Comments   Seen     Signed by    Signed Date/Time  Phone Pager   [facility] 8/10/2023 05:57 [phone]      Exam Information    Status Exam Begun  Exam Ended  Performing Tech   Final [99] 7/31/2023 15:23 7/31/2023 15:24 Mane Sharp     External Results Report    Open External Results Report    Encounter    View Encounter             Patient Care Timeline    No data selected in time range  Pre-op Summary    Pre-op           Recovery Summary    Recovery             Routing History    None           MRI lumbar spine without contrast    (Results Pending)         Orders Placed This Encounter   Procedures   • MRI lumbar spine without contrast

## 2023-08-30 ENCOUNTER — CONSULT (OUTPATIENT)
Dept: BARIATRICS | Facility: CLINIC | Age: 38
End: 2023-08-30
Payer: COMMERCIAL

## 2023-08-30 VITALS
WEIGHT: 278.2 LBS | BODY MASS INDEX: 44.71 KG/M2 | DIASTOLIC BLOOD PRESSURE: 70 MMHG | HEIGHT: 66 IN | SYSTOLIC BLOOD PRESSURE: 118 MMHG | HEART RATE: 96 BPM

## 2023-08-30 DIAGNOSIS — K76.0 HEPATIC STEATOSIS: ICD-10-CM

## 2023-08-30 DIAGNOSIS — E66.01 MORBID OBESITY (HCC): Primary | ICD-10-CM

## 2023-08-30 DIAGNOSIS — E11.65 TYPE 2 DIABETES MELLITUS WITH HYPERGLYCEMIA, WITHOUT LONG-TERM CURRENT USE OF INSULIN (HCC): ICD-10-CM

## 2023-08-30 DIAGNOSIS — Z72.0 TOBACCO ABUSE: ICD-10-CM

## 2023-08-30 PROCEDURE — 99204 OFFICE O/P NEW MOD 45 MIN: CPT | Performed by: SURGERY

## 2023-08-30 NOTE — LETTER
August 30, 2023     Barbie Calhoun DO  [facility]    Patient: Radha Marroquin   YOB: 1985   Date of Visit: 8/30/2023       Dear Dr. Livia Mendez: Thank you for referring Radha Marroquin to me for evaluation for bariatric surgery. Below are my notes for this consultation. If you have questions, please do not hesitate to call me on my cell phone at [phone] or via [facility] Text. I look forward to following your patient along with you. Sincerely,    Karl Boston MD, Lawanda Forbes, [facility]  Metabolic & Bariatric Surgery Director  [facility]   8/30/2023  4:01 PM        CC: No Recipients    Ana Stapleton MD  8/30/2023  4:00 PM  Sign when Signing Visit      [address] 44 y.o. male MRN: [id number]  Unit/Bed#:  Encounter: [id number]      HPI:  Radha Marroquin is a very pleasant 44 y.o. male who presents with a longstanding history of morbid obesity and inability to sustain a meaningful weight loss. Here today to discuss bariatric options. He is a   Body mass index is 44.56 kg/m². ++Suffers from DM2 (3 years, Ozempic), HTN, NAFLD, ED  S/p deviated septum  **quit smoking cigars    Visit type: consultation     Symptoms: excess weight, weight increase, inability to loss weight and fatigue    Associated Symptoms: sexual dysfunction    Associated Conditions: glucose intolerance, abdominal obesity and hypergycemia  Disease Complications: diabetes, hypertension and liver steatosis  Weight Loss Interest: high  Previous Diet Trials: low carb    Exercise Frequency:infrequency  Types of Exercise: walking    Review of Systems   Constitutional: Negative. Respiratory: Negative. Cardiovascular: Negative. Gastrointestinal: Negative. Musculoskeletal: Negative. Neurological: Negative. All other systems reviewed and are negative.       Historical Information Past Medical History:   Diagnosis Date   • Diabetes mellitus ([address])    • Hypertension    • Nasal congestion     Frequently   • PONV (postoperative nausea and vomiting)    • Sleep apnea     5.5 apnea scale     Past Surgical History:   Procedure Laterality Date   • [place] SEPTOPLASTY/SUBMUCOUS RESECJ W/WO CARTILAGE GRF N/A 11/28/2022    Procedure: SEPTOPLASTY;  Surgeon: Margie Bustillo MD;  Location: [facility];  Service: ENT   • [place] SUBMUCOUS [place] PRTL/COMPL Bilateral 11/28/2022    Procedure: TURBINECTOMY;  Surgeon: Margie Bustillo MD;  Location: [facility];  Service: ENT     Social History   Social History     Substance and Sexual Activity   Alcohol Use Not Currently    Comment: rare     Social History     Substance and Sexual Activity   Drug Use Never     Social History     Tobacco Use   Smoking Status Some Days   • Types: Cigars   • Passive exposure: Never   Smokeless Tobacco Never   Tobacco Comments    Wife just had baby not smoking right now    Pt stated stopped smoking cigars over a month ago     Family History:   Family History   Problem Relation Age of Onset   • Diabetes type II Mother    • Diabetes Mother    • Diabetes Maternal Grandmother    • Diabetes type II Maternal Grandmother    • Arthritis Maternal Grandmother    • Cancer Maternal Grandfather         Liver cancer       Meds/Allergies   all medications and allergies reviewed  Allergies   Allergen Reactions   • Other Angioedema   • Shellfish-Derived Products - Food Allergy Anaphylaxis   • Penicillins Hives   • Penicillin G Hives       Objective     Current Vitals:   /70   Pulse 96   Ht 5' 6.25" (1.683 m)   Wt 126 kg (278 lb 3.2 oz)   BMI 44.56 kg/m²       Physical Exam  Vitals reviewed. Constitutional:       Appearance: He is well-developed. HENT:      Head: Normocephalic. Eyes:      Extraocular Movements: Extraocular movements intact. Cardiovascular:      Rate and Rhythm: Normal rate.    Pulmonary:      Effort: Pulmonary effort is normal.   Abdominal:      General: There is no distension. Musculoskeletal:         General: Normal range of motion. Cervical back: Normal range of motion. Skin:     General: Skin is warm and dry. Neurological:      Mental Status: He is alert and oriented to person, place, and time. Psychiatric:         Mood and Affect: Mood normal.         Behavior: Behavior normal.         Thought Content: Thought content normal.         Judgment: Judgment normal.         Lab Results: I have personally reviewed pertinent lab results. Imaging: I have personally reviewed pertinent reports. EKG, Pathology, and Other Studies: I have personally reviewed pertinent reports. Assessment/PLAN:    44 y.o. yo male with a long standing h/o of obesity and inability to sustain any meaningful weight loss on his own despite several attempts. He is interested in the Laparoscopic bibiana-en-y gastric bypass. ++Suffers from DM2 (3 years, Ozempic), HTN, NAFLD, ED  S/p deviated septum  **quit smoking cigars    I have explained our Enhanced Recovery After Bariatric Surgery (ERABS) protocol and benefits including preoperative, intraoperative and postoperative elements. As a part of his pre op evaluation, he will be referred to a cardiologist     He needs an EGD to evaluate the anatomy of his GI tract. I have spent over 45 minutes with him face to face in the office today discussing his options and details of the surgery. We have seen an animation of the surgery on the computer that illustrates how the operation is done and how the anatomy will be altered with the procedure. Over 50% of this was coordinating care. I have discussed and educated the patient with regards to the components of our multidisciplinary program and the importance of compliance and follow up in the post operative period. He was given the opportunity to ask questions and I have answered all of them.     The patient was also instructed with regards to the importance of behavior modification, nutritional counseling, support meeting attendance and lifestyle changes that are important to ensure success. Although there is a great statistical chance of improvement or even resolution of most of his associated comorbidities, the results vary from patient to patient and they largely depend on his commitment and compliance.        Scout Proctor MD, FACS, [facility]  8/30/2023  3:11 PM

## 2023-08-30 NOTE — PROGRESS NOTES
BARIATRIC INITIAL CONSULT - BARIATRIC SURGERY    Ba Navarrete 44 y.o. male MRN: [id number]  Unit/Bed#:  Encounter: [id number]      HPI:  Ba Navarrete is a very pleasant 44 y.o. male who presents with a longstanding history of morbid obesity and inability to sustain a meaningful weight loss. Here today to discuss bariatric options. He is a   Body mass index is 44.56 kg/m². ++Suffers from DM2 (3 years, Ozempic), HTN, NAFLD, ED  S/p deviated septum  **quit smoking cigars    Visit type: consultation     Symptoms: excess weight, weight increase, inability to loss weight and fatigue    Associated Symptoms: sexual dysfunction    Associated Conditions: glucose intolerance, abdominal obesity and hypergycemia  Disease Complications: diabetes, hypertension and liver steatosis  Weight Loss Interest: high  Previous Diet Trials: low carb    Exercise Frequency:infrequency  Types of Exercise: walking    Review of Systems   Constitutional: Negative. Respiratory: Negative. Cardiovascular: Negative. Gastrointestinal: Negative. Musculoskeletal: Negative. Neurological: Negative. All other systems reviewed and are negative.       Historical Information   Past Medical History:   Diagnosis Date   • Diabetes mellitus ([address])    • Hypertension    • Nasal congestion     Frequently   • PONV (postoperative nausea and vomiting)    • Sleep apnea     5.5 apnea scale     Past Surgical History:   Procedure Laterality Date   • [place] SEPTOPLASTY/SUBMUCOUS RESECJ W/WO CARTILAGE GRF N/A 11/28/2022    Procedure: SEPTOPLASTY;  Surgeon: David Toro MD;  Location: [facility];  Service: ENT   • [place] SUBMUCOUS [place] PRTL/COMPL Bilateral 11/28/2022    Procedure: TURBINECTOMY;  Surgeon: David Toro MD;  Location: [facility];  Service: ENT     Social History   Social History     Substance and Sexual Activity   Alcohol Use Not Currently    Comment: rare     Social History     Substance and Sexual Activity   Drug Use Never     Social History     Tobacco Use   Smoking Status Some Days   • Types: Cigars   • Passive exposure: Never   Smokeless Tobacco Never   Tobacco Comments    Wife just had baby not smoking right now    Pt stated stopped smoking cigars over a month ago     Family History:   Family History   Problem Relation Age of Onset   • Diabetes type II Mother    • Diabetes Mother    • Diabetes Maternal Grandmother    • Diabetes type II Maternal Grandmother    • Arthritis Maternal Grandmother    • Cancer Maternal Grandfather         Liver cancer       Meds/Allergies   all medications and allergies reviewed  Allergies   Allergen Reactions   • Other Angioedema   • Shellfish-Derived Products - Food Allergy Anaphylaxis   • Penicillins Hives   • Penicillin G Hives       Objective     Current Vitals:   /70   Pulse 96   Ht 5' 6.25" (1.683 m)   Wt 126 kg (278 lb 3.2 oz)   BMI 44.56 kg/m²       Physical Exam  Vitals reviewed. Constitutional:       Appearance: He is well-developed. HENT:      Head: Normocephalic. Eyes:      Extraocular Movements: Extraocular movements intact. Cardiovascular:      Rate and Rhythm: Normal rate. Pulmonary:      Effort: Pulmonary effort is normal.   Abdominal:      General: There is no distension. Musculoskeletal:         General: Normal range of motion. Cervical back: Normal range of motion. Skin:     General: Skin is warm and dry. Neurological:      Mental Status: He is alert and oriented to person, place, and time. Psychiatric:         Mood and Affect: Mood normal.         Behavior: Behavior normal.         Thought Content: Thought content normal.         Judgment: Judgment normal.         Lab Results: I have personally reviewed pertinent lab results. Imaging: I have personally reviewed pertinent reports. EKG, Pathology, and Other Studies: I have personally reviewed pertinent reports.         Assessment/PLAN:    44 y.o. yo male with a long standing h/o of obesity and inability to sustain any meaningful weight loss on his own despite several attempts. He is interested in the Laparoscopic bibiana-en-y gastric bypass. ++Suffers from DM2 (3 years, Ozempic), HTN, NAFLD, ED  S/p deviated septum  **quit smoking cigars    I have explained our Enhanced Recovery After Bariatric Surgery (ERABS) protocol and benefits including preoperative, intraoperative and postoperative elements. As a part of his pre op evaluation, he will be referred to a cardiologist     He needs an EGD to evaluate the anatomy of his GI tract. I have spent over 45 minutes with him face to face in the office today discussing his options and details of the surgery. We have seen an animation of the surgery on the computer that illustrates how the operation is done and how the anatomy will be altered with the procedure. Over 50% of this was coordinating care. I have discussed and educated the patient with regards to the components of our multidisciplinary program and the importance of compliance and follow up in the post operative period. He was given the opportunity to ask questions and I have answered all of them. The patient was also instructed with regards to the importance of behavior modification, nutritional counseling, support meeting attendance and lifestyle changes that are important to ensure success. Although there is a great statistical chance of improvement or even resolution of most of his associated comorbidities, the results vary from patient to patient and they largely depend on his commitment and compliance.        Rosalina Vasquez MD, FACS, [facility]  8/30/2023  3:11 PM

## 2023-09-05 ENCOUNTER — OFFICE VISIT (OUTPATIENT)
Dept: PHYSICAL THERAPY | Facility: CLINIC | Age: 38
End: 2023-09-05
Payer: COMMERCIAL

## 2023-09-05 DIAGNOSIS — M75.41 IMPINGEMENT SYNDROME OF RIGHT SHOULDER: ICD-10-CM

## 2023-09-05 DIAGNOSIS — M22.2X2 PATELLOFEMORAL SYNDROME OF LEFT KNEE: ICD-10-CM

## 2023-09-05 DIAGNOSIS — M22.2X1 PATELLOFEMORAL SYNDROME OF RIGHT KNEE: Primary | ICD-10-CM

## 2023-09-05 DIAGNOSIS — M54.50 ACUTE MIDLINE LOW BACK PAIN WITHOUT SCIATICA: ICD-10-CM

## 2023-09-05 PROCEDURE — 97112 NEUROMUSCULAR REEDUCATION: CPT

## 2023-09-05 PROCEDURE — 97110 THERAPEUTIC EXERCISES: CPT

## 2023-09-05 NOTE — PROGRESS NOTES
Daily Note     Today's date: 2023  Patient name: Mikhail Garrison  : 1985  MRN: [id number]  Referring provider: Susan Salazar DO  Dx:   Encounter Diagnosis     ICD-10-CM    1. Patellofemoral syndrome of right knee  M22.2X1       2. Patellofemoral syndrome of left knee  M22.2X2       3. Impingement syndrome of right shoulder  M75.41       4. Acute midline low back pain without sciatica  M54.50           Start Time: 1500  Stop Time: 1540  Total time in clinic (min): 40 minutes    Subjective: Patient reports continued low back pain since previous session. States he believes work demands are big contributing factor to pain as he has to lean forward most of the day. States he saw Pain Management; will be getting MRI on 23. Objective: See treatment diary below      Assessment: Tolerated treatment well. Patient cont to demo irritability of symptoms during today's session indicative of muscular endurance deficits. Initiated modified planks to strong tolerance; provided pt w/ graded vc to promote pelvic positioning and core activation. Patient will continue to benefit from skilled PT to improve functional mobility and muscular endurance. Plan: Continue per plan of care. Insurance:  AMA/CMS Eval/ Re-eval POC expires [facility] #/ Referral # Total visits  Start date  Expiration date Extension  Visit limitation? PT only or  PT+OT?  Co-Insurance   [facility] 7/17/23 10/9/23   SUBMITTED  12                        APPROVED                                                                                                       Date -  8-2  8-8  8-10  ---     Used 1 1 1  1  1  1  1  1  1  1  1  1     Remaining  11 10 9  8  7  6  5  4  3  2  1  0           Date    6-9                         Used  1  1                         Remaining   11  10                             Date  23   Visit Number  9 10 11 12 13 14 Manual         T/s PA mob Inferior HVLA to L innominate x2  Gr II-V mobs to middle, lower thoracic spine 6x30      C/s upglide Prone PA mobs t/o lumbar spine, gr II-III 5x30 each        C/s downglide          ACJa/p /inf gr 3-4                   TherEx         Open book x10 5" hold  5" x20 each  Open books in half kneel: 5" x15    Thoracic rotation at wall: 5" x15 *pain on right Open books in SL: 5" x20 each   Seated t/s extension   10" x15   CORBY w/ hands at wall: 10" x10   Squats         Step down         Stand bilat T    RTB 3x8     PPU         LTR Quad rock backs: 5" x20 Quad rock backs: 5" x20 Cat-camel 5" x20 Cat-camel 5" x20 Cat-camel 5" x20 Cat-camel 5" x20, 2 rounds   Piriformis stretch  5x30"       Standing t/s ext at wall   10" x10 10" x10     Quadruped rotations    5" x20 each Thread the needle: 5" x15 each Thread the needle: 5" x15 each   Repeated shld extension     5" x20 total                      Neuro Re-Ed         Wall slides + lift off X-light loop 3x8   X-light loop 3x10 X-light loop 3x10 X-light loop 3x10   TA progressions TA isos: 5" x20  TA w/ march: x30 each TA isos: 5" x20  TA w/ march: x30 each  TA isos: 5" x20  TA w/ LE ext: x20 each     Bridge W/ ad sq + TA brace 2x10   Clamshells: 3x10 each     Palloff press In half kneel: RTB 3x8 each        Bent over rows 9# 2x10 each *stopped 2/2 pain        Dead lift         Plank shld taps      In plantigrade: 3x10 each (circuit w/ wall slides)   Seated p-ball march         Bird Dog  Modified 1 arm/leg at a time 2x10 ea    Prone swimmers: 2x15 each Prone swimmers: 2x15 each   TherAct         Patient education                   Gait Training                   Modalities         CP

## 2023-09-20 ENCOUNTER — OFFICE VISIT (OUTPATIENT)
Dept: PHYSICAL THERAPY | Facility: CLINIC | Age: 38
End: 2023-09-20
Payer: COMMERCIAL

## 2023-09-20 DIAGNOSIS — M75.41 IMPINGEMENT SYNDROME OF RIGHT SHOULDER: ICD-10-CM

## 2023-09-20 DIAGNOSIS — M54.50 ACUTE MIDLINE LOW BACK PAIN WITHOUT SCIATICA: ICD-10-CM

## 2023-09-20 DIAGNOSIS — M22.2X1 PATELLOFEMORAL SYNDROME OF RIGHT KNEE: Primary | ICD-10-CM

## 2023-09-20 DIAGNOSIS — M22.2X2 PATELLOFEMORAL SYNDROME OF LEFT KNEE: ICD-10-CM

## 2023-09-20 PROCEDURE — 97110 THERAPEUTIC EXERCISES: CPT

## 2023-09-20 NOTE — PROGRESS NOTES
Daily Note     Today's date: 2023  Patient name: Leatha Ghotra  : 1985  MRN: [id number]  Referring provider: Taylor Johnston DO  Dx:   Encounter Diagnosis     ICD-10-CM    1. Patellofemoral syndrome of right knee  M22.2X1       2. Patellofemoral syndrome of left knee  M22.2X2       3. Impingement syndrome of right shoulder  M75.41       4. Acute midline low back pain without sciatica  M54.50           Start Time: 1315  Stop Time: 1400  Total time in clinic (min): 45 minutes    Subjective: Patient states he is feeling under the weather to start today’s session. Reports 4/10 central back pain at start of visit, which he notes has remained relatively unchanged since starting physical therapy. Objective: See treatment diary below    Mechanical Exam:  - Cat-Camel - decreasing, no better  - Sustained EIL with wedge, 1 pillow - increasing, worse (2x5 min)  - Manual FISupine - decreasing, better  - Seated FISit - no effect      Assessment: Tolerated treatment fair. Limited intensity of today’s session as the patient is under the weather. Emphasis placed on mechanical exam as noted above. Patient encouraged to trial reps of repeated supine flexion for home exercise program over the next 48 hours to assess symptom response, patient verbalized good understanding and agreement. Plan: Continue per plan of care. Insurance:  AMA/CMS Eval/ Re-eval POC expires [place] #/ Referral # Total visits  Start date  Expiration date Extension  Visit limitation? PT only or  PT+OT?  Co-Insurance   [facility] 7/17/23 10/9/23   SUBMITTED  12                        APPROVED                                                                                                       Date /  8-2  8-8  8-10  8-14  8-16  8-21  8-22     Used 1 1 1  1  1  1  1  1  1  1  1  1     Remaining  11 10 9  8  7  6  5  4  3  2  1  0           Date  8-28  9-5  9-20                       Used  1  1  1                       Remaining   11  10  9                           Date 8/16/23 8/21/23 8/22/23 8/28/23 9/5/23 9/20/23   Visit Number 10 11 12 13 14 15   Manual         T/s PA mob  Gr II-V mobs to middle, lower thoracic spine 6x30       C/s upglide         C/s downglide          ACJa/p /inf gr 3-4                   TherEx         Open book  5" x20 each  Open books in half kneel: 5" x15    Thoracic rotation at wall: 5" x15 *pain on right Open books in SL: 5" x20 each Mechanical Exam, as noted above   Seated t/s extension  10" x15   CORBY w/ hands at wall: 10" x10    Squats         Step down         Stand bilat T   RTB 3x8      PPU         LTR Quad rock backs: 5" x20 Cat-camel 5" x20 Cat-camel 5" x20 Cat-camel 5" x20 Cat-camel 5" x20, 2 rounds    Piriformis stretch 5x30"        Standing t/s ext at wall  10" x10 10" x10      Quadruped rotations   5" x20 each Thread the needle: 5" x15 each Thread the needle: 5" x15 each    Repeated shld extension    5" x20 total                       Neuro Re-Ed         Wall slides + lift off   X-light loop 3x10 X-light loop 3x10 X-light loop 3x10    TA progressions TA isos: 5" x20  TA w/ march: x30 each  TA isos: 5" x20  TA w/ LE ext: x20 each      Bridge   Clamshells: 3x10 each      Palloff press         Bent over rows         Dead lift         Plank shld taps     In plantigrade: 3x10 each (circuit w/ wall slides)    Seated p-ball march         Bird Dog     Prone swimmers: 2x15 each Prone swimmers: 2x15 each    TherAct         Patient education                   Gait Training                   Modalities         CP

## 2023-09-21 ENCOUNTER — OFFICE VISIT (OUTPATIENT)
Dept: FAMILY MEDICINE CLINIC | Facility: CLINIC | Age: 38
End: 2023-09-21
Payer: COMMERCIAL

## 2023-09-21 VITALS
DIASTOLIC BLOOD PRESSURE: 80 MMHG | OXYGEN SATURATION: 96 % | HEART RATE: 96 BPM | SYSTOLIC BLOOD PRESSURE: 138 MMHG | HEIGHT: 66 IN | BODY MASS INDEX: 45.16 KG/M2 | TEMPERATURE: 98.7 F | RESPIRATION RATE: 14 BRPM | WEIGHT: 281 LBS

## 2023-09-21 DIAGNOSIS — J02.9 SORE THROAT: Primary | ICD-10-CM

## 2023-09-21 DIAGNOSIS — R05.1 ACUTE COUGH: ICD-10-CM

## 2023-09-21 DIAGNOSIS — R09.81 SINUS CONGESTION: ICD-10-CM

## 2023-09-21 DIAGNOSIS — J06.9 UPPER RESPIRATORY TRACT INFECTION, UNSPECIFIED TYPE: Primary | ICD-10-CM

## 2023-09-21 DIAGNOSIS — E11.65 TYPE 2 DIABETES MELLITUS WITH HYPERGLYCEMIA, WITHOUT LONG-TERM CURRENT USE OF INSULIN (HCC): ICD-10-CM

## 2023-09-21 DIAGNOSIS — J31.0 RHINITIS, CHRONIC: ICD-10-CM

## 2023-09-21 LAB
SARS-COV-2 AG UPPER RESP QL IA: NEGATIVE
SL AMB POCT RAPID FLU A: NORMAL
SL AMB POCT RAPID FLU B: NORMAL
VALID CONTROL: NORMAL

## 2023-09-21 PROCEDURE — 99214 OFFICE O/P EST MOD 30 MIN: CPT | Performed by: STUDENT IN AN ORGANIZED HEALTH CARE EDUCATION/TRAINING PROGRAM

## 2023-09-21 PROCEDURE — 87804 INFLUENZA ASSAY W/OPTIC: CPT | Performed by: STUDENT IN AN ORGANIZED HEALTH CARE EDUCATION/TRAINING PROGRAM

## 2023-09-21 PROCEDURE — 87811 SARS-COV-2 COVID19 W/OPTIC: CPT | Performed by: STUDENT IN AN ORGANIZED HEALTH CARE EDUCATION/TRAINING PROGRAM

## 2023-09-21 RX ORDER — METHYLPREDNISOLONE 4 MG/1
TABLET ORAL
Qty: 21 EACH | Refills: 0 | Status: SHIPPED | OUTPATIENT
Start: 2023-09-21

## 2023-09-21 RX ORDER — AZELASTINE HYDROCHLORIDE 137 UG/1
SPRAY, METERED NASAL
Qty: 30 ML | Refills: 3 | Status: SHIPPED | OUTPATIENT
Start: 2023-09-21

## 2023-09-21 RX ORDER — CHLORHEXIDINE GLUCONATE ORAL RINSE 1.2 MG/ML
15 SOLUTION DENTAL 2 TIMES DAILY
Qty: 120 ML | Refills: 0 | Status: SHIPPED | OUTPATIENT
Start: 2023-09-21 | End: 2023-10-23

## 2023-09-21 RX ORDER — DOXYCYCLINE HYCLATE 100 MG/1
100 CAPSULE ORAL EVERY 12 HOURS SCHEDULED
Qty: 14 CAPSULE | Refills: 0 | Status: SHIPPED | OUTPATIENT
Start: 2023-09-21 | End: 2023-09-28

## 2023-09-21 NOTE — PROGRESS NOTES
Clinic Visit Note  Issa Gross 44 y.o. male   MRN: [id number]    Assessment and Plan      Diagnoses and all orders for this visit:    Upper respiratory tract infection, unspecified type  Recommend antibiotic treatment given duration, positive wheezing, steroid taper to help with inflammatory response. Symptomatic management with Magic mouthwash, if symptoms worsen or not improving reevaluation recommended. -     doxycycline hyclate (VIBRAMYCIN) 100 mg capsule; Take 1 capsule (100 mg total) by mouth every 12 (twelve) hours for 7 days    Acute cough  -     POCT Rapid Covid Ag  -     POCT rapid flu A and B  -     al mag oxide-diphenhydramine-lidocaine viscous (MAGIC MOUTHWASH) 1:1:1 suspension; Swish and spit 10 mL every 4 (four) hours as needed for mouth pain or discomfort    Type 2 diabetes mellitus with hyperglycemia, without long-term current use of insulin (HCC)  Continue Ozempic, follow-up with weight loss management    Sinus congestion  -     methylPREDNISolone 4 MG tablet therapy pack; Use as directed on package      My impressions and treatment recommendations were discussed in detail with the patient who verbalized understanding and had no further questions. Discharge instructions were provided. Subjective     Chief Complaint: Acute care visit    History of Present Illness:    Patient is a pleasant 65-year-old male coming in for acute care visit secondary to upper/lower respiratory tract infection symptoms that have been going on since Thursday, positive sick contacts at home. The following portions of the patient's history were reviewed and updated as appropriate: allergies, current medications, past family history, past medical history, past social history, past surgical history and problem list.    REVIEW OF SYSTEMS:  A complete 12-point review of systems is negative other than that noted in the HPI. Review of Systems   Constitutional: Negative for chills, fatigue and fever.    HENT: Positive for congestion, postnasal drip and sore throat. Respiratory: Positive for cough. Negative for shortness of breath and wheezing. Cardiovascular: Negative for chest pain, palpitations and leg swelling. Neurological: Negative for dizziness and headaches.          Current Outpatient Medications:   •  al mag oxide-diphenhydramine-lidocaine viscous (MAGIC MOUTHWASH) 1:1:1 suspension, Swish and spit 10 mL every 4 (four) hours as needed for mouth pain or discomfort, Disp: 90 mL, Rfl: 0  •  Azelastine HCl 137 MCG/SPRAY SOLN, 1 SPRAY INTO EACH NOSTRIL 2 (TWO) TIMES A DAY USE IN EACH NOSTRIL AS DIRECTED, Disp: 30 mL, Rfl: 4  •  diclofenac (VOLTAREN) 75 mg EC tablet, Take 1 tablet (75 mg total) by mouth 2 (two) times a day, Disp: 60 tablet, Rfl: 1  •  doxycycline hyclate (VIBRAMYCIN) 100 mg capsule, Take 1 capsule (100 mg total) by mouth every 12 (twelve) hours for 7 days, Disp: 14 capsule, Rfl: 0  •  lisinopril (ZESTRIL) 10 mg tablet, Take 10 mg by mouth daily, Disp: , Rfl:   •  methylPREDNISolone 4 MG tablet therapy pack, Use as directed on package, Disp: 21 each, Rfl: 0  •  semaglutide, 2 mg/dose, (Ozempic, 2 MG/DOSE,) 8 mg/ mL injection pen, Inject 0.75 mL (2 mg total) under the skin every 7 days, Disp: 15 mL, Rfl: 0  •  tadalafil (CIALIS) 10 MG tablet, Take 1 tablet (10 mg total) by mouth daily as needed for erectile dysfunction, Disp: 30 tablet, Rfl: 0  •  ketoconazole (NIZORAL) 2 % cream, Apply topically daily, Disp: 60 g, Rfl: 1  Past Medical History:   Diagnosis Date   • Diabetes mellitus (HCC)    • Hypertension    • Nasal congestion     Frequently   • PONV (postoperative nausea and vomiting)    • Sleep apnea     5.5 apnea scale     Past Surgical History:   Procedure Laterality Date   • AR SEPTOPLASTY/SUBMUCOUS RESECJ W/WO CARTILAGE GRF N/A 11/28/2022    Procedure: SEPTOPLASTY;  Surgeon: Mnady Edmondson MD;  Location: [facility];  Service: ENT   • AR SUBMUCOUS RESCJ INFERIOR TURBINATE PRTL/COMPL Bilateral 11/28/2022    Procedure: TURBINECTOMY;  Surgeon: Luci Zayas MD;  Location: [facility];  Service: ENT     Social History     Socioeconomic History   • Marital status: Single     Spouse name: Not on file   • Number of children: Not on file   • Years of education: Not on file   • Highest education level: Not on file   Occupational History   • Not on file   Tobacco Use   • Smoking status: Some Days     Types: Cigars     Passive exposure: Never   • Smokeless tobacco: Never   • Tobacco comments:     Wife just had baby not smoking right now     Pt stated stopped smoking cigars over a month ago   Vaping Use   • Vaping Use: Never used   Substance and Sexual Activity   • Alcohol use: Not Currently     Comment: rare   • Drug use: Never   • Sexual activity: Yes     Partners: Female     Birth control/protection: None   Other Topics Concern   • Not on file   Social History Narrative   • Not on file     Social Determinants of Health     Financial Resource Strain: Not on file   Food Insecurity: Not on file   Transportation Needs: Not on file   Physical Activity: Not on file   Stress: Not on file   Social Connections: Not on file   Intimate Partner Violence: Not on file   Housing Stability: Not on file     Family History   Problem Relation Age of Onset   • Diabetes type II Mother    • Diabetes Mother    • Diabetes Maternal Grandmother    • Diabetes type II Maternal Grandmother    • Arthritis Maternal Grandmother    • Cancer Maternal Grandfather         Liver cancer     Allergies   Allergen Reactions   • Other Angioedema   • Shellfish-Derived Products - Food Allergy Anaphylaxis   • Penicillins Hives   • Penicillin G Hives       Objective     Vitals:    09/21/23 1250   BP: 138/80   BP Location: Left arm   Patient Position: Sitting   Cuff Size: Adult   Pulse: 96   Resp: 14   Temp: 98.7 °F (37.1 °C)   TempSrc: Temporal   SpO2: 96%   Weight: 127 kg (281 lb)   Height: 5' 6.25" (1.683 m)       Physical Exam:     GENERAL: NAD, pleasant   HEENT:  NC/AT, PERRL, EOMI, no scleral icterus  CARDIAC:  RRR, +S1/S2, no S3/S4 appreciated, no m/g/r  PULMONARY:  CTA B/L, no wheezing/rales/rhonci, non-labored breathing  ABDOMEN:  Soft, NT/ND, no rebound/guarding/rigidity  Extremities:. No edema, cyanosis, or clubbing  Musculoskeletal:  Full range of motion intact in all extremities   NEUROLOGIC: Grossly intact, no focal deficits  SKIN:  No rashes or erythema noted on exposed skin  Psych: Normal affect, mood stable    ==  PLEASE NOTE:  This encounter was completed utilizing the [employer] Direct Speech Voice Recognition Software. Grammatical errors, random word insertions, pronoun errors and incomplete sentences are occasional consequences of the system due to software limitations, ambient noise and hardware issues. These may be missed by proof reading prior to affixing electronic signature. Any questions or concerns about the content, text or information contained within the body of this dictation should be directly addressed to the physician for clarification. Please do not hesitate to call me directly if you have any any questions or concerns.     DO [facility]   [facility]

## 2023-09-22 ENCOUNTER — APPOINTMENT (OUTPATIENT)
Dept: PHYSICAL THERAPY | Facility: CLINIC | Age: 38
End: 2023-09-22
Payer: COMMERCIAL

## 2023-09-22 ENCOUNTER — OFFICE VISIT (OUTPATIENT)
Dept: PAIN MEDICINE | Facility: CLINIC | Age: 38
End: 2023-09-22
Payer: COMMERCIAL

## 2023-09-22 VITALS — BODY MASS INDEX: 45.33 KG/M2 | WEIGHT: 283 LBS | TEMPERATURE: 98.3 F

## 2023-09-22 DIAGNOSIS — M62.838 MUSCLE SPASM: ICD-10-CM

## 2023-09-22 DIAGNOSIS — M54.40 ACUTE RIGHT-SIDED LOW BACK PAIN WITH SCIATICA, SCIATICA LATERALITY UNSPECIFIED: Primary | ICD-10-CM

## 2023-09-22 PROCEDURE — 99214 OFFICE O/P EST MOD 30 MIN: CPT | Performed by: PHYSICAL MEDICINE & REHABILITATION

## 2023-09-22 RX ORDER — METHOCARBAMOL 500 MG/1
500 TABLET, FILM COATED ORAL 2 TIMES DAILY PRN
Qty: 60 TABLET | Refills: 0 | Status: SHIPPED | OUTPATIENT
Start: 2023-09-22

## 2023-09-22 NOTE — PROGRESS NOTES
Pain Medicine Follow-Up Note    Assessment:  1. Acute right-sided low back pain with sciatica, sciatica laterality unspecified    2. Muscle spasm        Plan:    Mr. Hill Thornton is a pleasant 42-year-old male presents to [facility] for follow-up and reevaluation regarding ongoing low back pain. Since last evaluation he has now completed 7 weeks of physical therapy in the last 6 months with no significant improvements in his pain. He does report diclofenac has at least taken the edge off of his pain. At this time we will continue with diclofenac, start the patient on Robaxin for muscle spasms and myofascial pain twice a day as needed as well as order MRI lumbar spine without contrast to better identify disc and spine pathology contributing symptoms. Pending imaging results would consider interventional approaches to manage his pain but for now we will await MRI    History of Present Illness:    Gene Pyle is a 44 y.o. male who presents to [address] [place] [address] [facility] for interval re-evaluation of the above stated pain complaints. The patient has a past medical and chronic pain history as outlined in the assessment section. Patient presents to [facility] spine pain [facility] for follow-up and reevaluation regarding ongoing low back pain currently rated 4 out of 10 and described as a constant cramping, dull aching, throbbing pain that is worse in the morning in the evening. Presents today for follow-up and reevaluation. Attempted to order an MRI which was denied as patient did not complete adequate amount of physical therapy. He has now completed greater than 7 weeks of physical therapy and continues to report pain    Other than as stated above, the patient denies any interval changes in medications, medical condition, mental condition, symptoms, or allergies since the last office visit.          Review of Systems:    Review of Systems   Constitutional: Negative for chills and fatigue. HENT: Negative for ear pain, mouth sores and sinus pressure. Eyes: Negative for pain, redness and visual disturbance. Respiratory: Negative for shortness of breath and wheezing. Cardiovascular: Negative for chest pain and palpitations. Gastrointestinal: Negative for abdominal pain and nausea. Endocrine: Negative for polyphagia. Musculoskeletal: Positive for back pain and gait problem. Negative for arthralgias and neck pain. Skin: Negative for wound. Neurological: Positive for numbness (back). Negative for seizures and weakness. Psychiatric/Behavioral: Negative for dysphoric mood and sleep disturbance.          Past Medical History:   Diagnosis Date   • Diabetes mellitus ([address])    • Hypertension    • Nasal congestion     Frequently   • PONV (postoperative nausea and vomiting)    • Sleep apnea     5.5 apnea scale       Past Surgical History:   Procedure Laterality Date   • LA SEPTOPLASTY/SUBMUCOUS RESECJ W/WO CARTILAGE GRF N/A 11/28/2022    Procedure: SEPTOPLASTY;  Surgeon: Alysia Gallardo MD;  Location: [facility];  Service: ENT   • LA SUBMUCOUS [place] PRTL/COMPL Bilateral 11/28/2022    Procedure: TURBINECTOMY;  Surgeon: Alysia Gallardo MD;  Location: [facility];  Service: ENT       Family History   Problem Relation Age of Onset   • Diabetes type II Mother    • Diabetes Mother    • Diabetes Maternal Grandmother    • Diabetes type II Maternal Grandmother    • Arthritis Maternal Grandmother    • Cancer Maternal Grandfather         Liver cancer       Social History     Occupational History   • Not on file   Tobacco Use   • Smoking status: Some Days     Types: Cigars     Passive exposure: Never   • Smokeless tobacco: Never   • Tobacco comments:     Wife just had baby not smoking right now     Pt stated stopped smoking cigars over a month ago   Vaping Use   • Vaping Use: Never used   Substance and Sexual Activity   • Alcohol use: Not Currently     Comment: rare   • Drug use: Never   • Sexual activity: Yes     Partners: Female     Birth control/protection: None         Current Outpatient Medications:   •  Azelastine HCl 137 MCG/SPRAY SOLN, 1 SPRAY INTO EACH NOSTRIL 2 (TWO) TIMES A DAY USE IN EACH NOSTRIL AS DIRECTED, Disp: 30 mL, Rfl: 3  •  chlorhexidine (PERIDEX) 0.12 % solution, Apply 15 mL to the mouth or throat 2 (two) times a day, Disp: 120 mL, Rfl: 0  •  diclofenac (VOLTAREN) 75 mg EC tablet, Take 1 tablet (75 mg total) by mouth 2 (two) times a day, Disp: 60 tablet, Rfl: 1  •  doxycycline hyclate (VIBRAMYCIN) 100 mg capsule, Take 1 capsule (100 mg total) by mouth every 12 (twelve) hours for 7 days, Disp: 14 capsule, Rfl: 0  •  lisinopril (ZESTRIL) 10 mg tablet, Take 10 mg by mouth daily, Disp: , Rfl:   •  methocarbamol (ROBAXIN) 500 mg tablet, Take 1 tablet (500 mg total) by mouth 2 (two) times a day as needed for muscle spasms, Disp: 60 tablet, Rfl: 0  •  methylPREDNISolone 4 MG tablet therapy pack, Use as directed on package, Disp: 21 each, Rfl: 0  •  semaglutide, 2 mg/dose, (Ozempic, 2 MG/DOSE,) 8 mg/ mL injection pen, Inject 0.75 mL (2 mg total) under the skin every 7 days, Disp: 15 mL, Rfl: 0  •  tadalafil (CIALIS) 10 MG tablet, Take 1 tablet (10 mg total) by mouth daily as needed for erectile dysfunction, Disp: 30 tablet, Rfl: 0  •  al mag oxide-diphenhydramine-lidocaine viscous (MAGIC MOUTHWASH) 1:1:1 suspension, Swish and spit 10 mL every 4 (four) hours as needed for mouth pain or discomfort (Patient not taking: Reported on 9/22/2023), Disp: 90 mL, Rfl: 0  •  ketoconazole (NIZORAL) 2 % cream, Apply topically daily, Disp: 60 g, Rfl: 1    Allergies   Allergen Reactions   • Other Angioedema   • Shellfish-Derived Products - Food Allergy Anaphylaxis   • Penicillins Hives   • Penicillin G Hives       Physical Exam:    Temp 98.3 °F (36.8 °C)   Wt 128 kg (283 lb)   BMI 45.33 kg/m²     Constitutional:normal, well developed, well nourished, alert, in no distress and non-toxic and no overt pain behavior.   Eyes:anicteric  HEENT:grossly intact  Neck:supple, symmetric, trachea midline and no masses    Pulmonary:even and unlabored  Cardiovascular:No edema or pitting edema present  Skin:Normal without rashes or lesions and well hydrated  Psychiatric:Mood and affect appropriate  Neurologic:Cranial Nerves II-XII grossly intact  Musculoskeletal:normal      Imaging  MRI lumbar spine without contrast    (Results Pending)         Orders Placed This Encounter   Procedures   • MRI lumbar spine without contrast

## 2023-09-25 ENCOUNTER — OFFICE VISIT (OUTPATIENT)
Dept: PHYSICAL THERAPY | Facility: CLINIC | Age: 38
End: 2023-09-25
Payer: COMMERCIAL

## 2023-09-25 DIAGNOSIS — M22.2X2 PATELLOFEMORAL SYNDROME OF LEFT KNEE: ICD-10-CM

## 2023-09-25 DIAGNOSIS — M22.2X1 PATELLOFEMORAL SYNDROME OF RIGHT KNEE: Primary | ICD-10-CM

## 2023-09-25 DIAGNOSIS — M54.50 ACUTE MIDLINE LOW BACK PAIN WITHOUT SCIATICA: ICD-10-CM

## 2023-09-25 DIAGNOSIS — M75.41 IMPINGEMENT SYNDROME OF RIGHT SHOULDER: ICD-10-CM

## 2023-09-25 PROCEDURE — 97112 NEUROMUSCULAR REEDUCATION: CPT

## 2023-09-25 PROCEDURE — 97110 THERAPEUTIC EXERCISES: CPT

## 2023-09-25 NOTE — PROGRESS NOTES
Daily Note     Today's date: 2023  Patient name: Froylan Mustafa  : 1985  MRN: [id number]  Referring provider: Ericka Paul DO  Dx:   Encounter Diagnosis     ICD-10-CM    1. Patellofemoral syndrome of right knee  M22.2X1       2. Patellofemoral syndrome of left knee  M22.2X2       3. Impingement syndrome of right shoulder  M75.41       4. Acute midline low back pain without sciatica  M54.50           Start Time: 1415  Stop Time: 1445  Total time in clinic (min): 30 minutes    Subjective: Patient states he has not gotten to try HEP from previous session as he has been busy and under the weather. Had follow up with pain management, will be rescheduling MRI, was given robaxin which has not changed symptoms. States he came straight from work to today's session, reports moderate discomfort across low back to start visit. Objective: See treatment diary below      Assessment: Patient demo quick fatigability with functional strengthening progressions during today's session, particularly with prone swimmers. Due to minimal change in LBP thus far, PT episode is being put on hold in favor of follow up with Pain Management and additional imaging. Patient encouraged to continue with mobility based HEP. Patient expressed no questions/concerns with updates made to PT POC and/or aforementioned education. Plan: PT episode on hold in favor of additional imaging. Insurance:  AMA/CMS Eval/ Re-eval POC expires Ashwini Malik #/ Referral # Total visits  Start date  Expiration date Extension  Visit limitation? PT only or  PT+OT?  Co-Insurance   [facility] 7/17/23 10/9/23   SUBMITTED  12                        APPROVED                                                                                                       Date /  8-2  8-8  8-10  8-14  8-16  8-  8-     Used 1 1 1  1  1  1  1  1  1  1  1  1     Remaining  11 10 9  8  7  6  5  4  3  2  1  0           Date    9-5    9-25                     Used  1  1  1  1                     Remaining   11  10  9  8                         Date 8/22/23 8/28/23 9/5/23 9/20/23 9/25/23   Visit Number 12 13 14 15 16   Manual        T/s PA mob        C/s upglide        C/s downglide         ACJa/p /inf gr 3-4                 TherEx        Open book  Open books in half kneel: 5" x15    Thoracic rotation at wall: 5" x15 *pain on right Open books in SL: 5" x20 each Mechanical Exam, as noted above PB roll out: flexion 5" x10; 3 ways 5" x5 each   Seated t/s extension   CORBY w/ hands at wall: 10" x10     Squats        Step down        Stand bilat T RTB 3x8       PPU        LTR Cat-camel 5" x20 Cat-camel 5" x20 Cat-camel 5" x20, 2 rounds  Cat-camel 5" x15   Standing t/s ext at wall 10" x10       Quadruped rotations 5" x20 each Thread the needle: 5" x15 each Thread the needle: 5" x15 each     Repeated shld extension  5" x20 total                      Neuro Re-Ed        Wall slides + lift off X-light loop 3x10 X-light loop 3x10 X-light loop 3x10     TA progressions TA isos: 5" x20  TA w/ LE ext: x20 each    Shld ext w/ march: GTB 3x10 each   Bridge Clamshells: 3x10 each    Bridge w/ ad sq: 3x10   Palloff press        Bent over rows     9# KB 3x5 each   Dead lift        Plank shld taps   In plantigrade: 3x10 each (circuit w/ wall slides)     Seated p-ball march        Bird Dog   Prone swimmers: 2x15 each Prone swimmers: 2x15 each  Prone swimmers: 3x8 each   TherAct        Patient education                 Gait Training                 Modalities        CP

## 2023-09-26 ENCOUNTER — OFFICE VISIT (OUTPATIENT)
Dept: BARIATRICS | Facility: CLINIC | Age: 38
End: 2023-09-26

## 2023-09-26 VITALS — WEIGHT: 277 LBS | BODY MASS INDEX: 44.52 KG/M2 | HEIGHT: 66 IN

## 2023-09-26 DIAGNOSIS — K76.0 HEPATIC STEATOSIS: ICD-10-CM

## 2023-09-26 DIAGNOSIS — E66.01 MORBID OBESITY (HCC): ICD-10-CM

## 2023-09-26 PROCEDURE — RECHECK

## 2023-09-26 NOTE — PROGRESS NOTES
Bariatric Nutrition Assessment Note    Type of surgery    Preop (3 + 1 wt checks)--2 of 3 wt check today (+1 90 days after surgeon consult). Surgery Date: TBD  Surgeon: Dr. Cris Stovall (consult completed 8/30/23)    Nutrition Assessment   Khushbu Bailey  44 y.o.  male     Wt with BMI of 25: 155.6#  Pre-Op Excess Wt: 125.4#  Ht 5' 6.25" (1.683 m)   Wt 126 kg (277 lb)   BMI 44.37 kg/m²     [facility] Equation:    Estimated calories for weight loss 1598-6105 (1-2# per wk wt loss - sedentary )  Estimated protein needs 65-97gm (1.0-1.5 gms/kg IBW (65kg))   Estimated fluid needs 1950-2275ml (30-35 ml/kg IBW (65kg))      NAFLD Fibrosis Score is: -1.227--indeterminate score    NAFLD Score Correlated Fibrosis Severity   <-1.455 F0-F2   -1.455-0.676 Indeterminate Score   >0.676 F3-F4   **Fibrosis Severity Scale: F0 = no fibrosis; F1= mild fibrosis; F2 = moderate fibrosis; F3 = severe fibrosis; F4 = cirrhosis    NAFLD Score Component Values:  Component Value Date   Age: 44 y.o.     BMI: 44.37 kg/m²    IFG or DM: Yes    AST: 28 U/L 8/24/2023   ALT: 55 U/L 8/24/2023   Platelet: 243 Thousands/uL 8/24/2023   Albumin: 4.2 g/dL 8/24/2023       Weight History   Onset of Obesity: Childhood  Family history of obesity: Yes  Wt Loss Attempts: Commercial Programs ([employer], [employer], etc.)  Exercise  High Protein/Low CHO diets (Atkins, [place], etc.)  Meal Replacements (Medifast, Slim Fast, etc.)  OTC meds/supplements  Self Created Diets (Portion Control, Healthy Food Choices, etc.)  Patient has tried the above for 6 months or more with insufficient weight loss or weight regain, which is why patient has requested to be evaluated for weight loss surgery today  Highest weight is 280#  180# in [address], when got out in 2016 started gaining more  Maximum Wt Lost: 0-5lbs    Review of History and Medications   DM dx in 2020 when A1c was 13, now on Ozempic.   Pt reports that he was unable to get the Ozempic for one month recently therefore his A1c went up from 6.3 to 7.3.     Past Medical History:   Diagnosis Date   • Diabetes mellitus ([address])    • Hypertension    • Nasal congestion     Frequently   • PONV (postoperative nausea and vomiting)    • Sleep apnea     5.5 apnea scale     Past Surgical History:   Procedure Laterality Date   • [place] SEPTOPLASTY/SUBMUCOUS RESECJ W/WO CARTILAGE GRF N/A 11/28/2022    Procedure: SEPTOPLASTY;  Surgeon: Aminata Hannah MD;  Location: [facility];  Service: ENT   • ID SUBMUCOUS [place] PRTL/COMPL Bilateral 11/28/2022    Procedure: TURBINECTOMY;  Surgeon: Aminata Hannah MD;  Location: [facility];  Service: ENT     Social History     Socioeconomic History   • Marital status: Single     Spouse name: Not on file   • Number of children: Not on file   • Years of education: Not on file   • Highest education level: Not on file   Occupational History   • Not on file   Tobacco Use   • Smoking status: Some Days     Types: Cigars     Passive exposure: Never   • Smokeless tobacco: Never   • Tobacco comments:     Wife just had baby not smoking right now     Pt stated stopped smoking cigars over a month ago   Vaping Use   • Vaping Use: Never used   Substance and Sexual Activity   • Alcohol use: Not Currently     Comment: rare   • Drug use: Never   • Sexual activity: Yes     Partners: Female     Birth control/protection: None   Other Topics Concern   • Not on file   Social History Narrative   • Not on file     Social Determinants of Health     Financial Resource Strain: Not on file   Food Insecurity: Not on file   Transportation Needs: Not on file   Physical Activity: Not on file   Stress: Not on file   Social Connections: Not on file   Intimate Partner Violence: Not on file   Housing Stability: Not on file       Current Outpatient Medications:   •  al mag oxide-diphenhydramine-lidocaine viscous (MAGIC MOUTHWASH) 1:1:1 suspension, Swish and spit 10 mL every 4 (four) hours as needed for mouth pain or discomfort (Patient not taking: Reported on 9/22/2023), Disp: 90 mL, Rfl: 0  •  Azelastine HCl 137 MCG/SPRAY SOLN, 1 SPRAY INTO EACH NOSTRIL 2 (TWO) TIMES A DAY USE IN EACH NOSTRIL AS DIRECTED, Disp: 30 mL, Rfl: 3  •  chlorhexidine (PERIDEX) 0.12 % solution, Apply 15 mL to the mouth or throat 2 (two) times a day, Disp: 120 mL, Rfl: 0  •  diclofenac (VOLTAREN) 75 mg EC tablet, Take 1 tablet (75 mg total) by mouth 2 (two) times a day, Disp: 60 tablet, Rfl: 1  •  doxycycline hyclate (VIBRAMYCIN) 100 mg capsule, Take 1 capsule (100 mg total) by mouth every 12 (twelve) hours for 7 days, Disp: 14 capsule, Rfl: 0  •  ketoconazole (NIZORAL) 2 % cream, Apply topically daily, Disp: 60 g, Rfl: 1  •  lisinopril (ZESTRIL) 10 mg tablet, Take 10 mg by mouth daily, Disp: , Rfl:   •  methocarbamol (ROBAXIN) 500 mg tablet, Take 1 tablet (500 mg total) by mouth 2 (two) times a day as needed for muscle spasms, Disp: 60 tablet, Rfl: 0  •  methylPREDNISolone 4 MG tablet therapy pack, Use as directed on package, Disp: 21 each, Rfl: 0  •  semaglutide, 2 mg/dose, (Ozempic, 2 MG/DOSE,) 8 mg/ mL injection pen, Inject 0.75 mL (2 mg total) under the skin every 7 days, Disp: 15 mL, Rfl: 0  •  tadalafil (CIALIS) 10 MG tablet, Take 1 tablet (10 mg total) by mouth daily as needed for erectile dysfunction, Disp: 30 tablet, Rfl: 0    Food Intake and Lifestyle Assessment   Food Intake Assessment completed via usual diet recall  :  3:30am to 11:30am or if does overtime will get home at 3pm  Also volunteer     Wakes at 3am  coffee  Breakfast: 5-6am:  647 bread with lyles, cheese and lunch meat (1 at this time) during the work week.   On the weekends 3 eggs, sausage (chicken or pork) + eggo waffle  Snack: 7-8am:  Fruit--applesauce, banana, veggie straws, cheese sticks   Lunch: 10am: other sandwich OR on weekends will just more snack on mozzarella sticks or sandwich OR falafle   Snack: on way home will eat rest of snacks  Dinner: 6pm- Stuffed clams from [employer] (2) OR [employer] OR likes a rice (meat and beans), not great with veggies OR fish or burger OR 1/2 of a ring kielbasa. Reports weaning off of the Ramen--could to 2 bricks at a time (800 cals, 3200mg sodium, 100mg carbs! ! )  Snack: -  Bed:  8-9pm  *weakness: portions are larger and high fat protein    Beverage intake: water, sugar free beverages (diet snapple or gatorade zero) and coffee/tea  Protein supplement: not at this time  Estimated protein intake per day: 80gm  Estimated fluid intake per day: 80oz water, 8-10oz cup coffee with 3 equal + creamer + whipped cream (SF sugar)  Meals eaten away from home: 2 dinners, 2 lunch, 2 breakfast-- per week  Typical meal pattern: 3 meals per day and 2 snacks per day  Eating Behaviors: Consumption of high calorie/ high fat foods, Large portion sizes, Frequent snacking/ grazing and Mindless eating    Food allergies or intolerances: Allergies   Allergen Reactions   • Other Angioedema   • Shellfish-Derived Products - Food Allergy Anaphylaxis   • Penicillins Hives   • Penicillin G Hives     Cultural or Hindu considerations: -    Physical Assessment  Physical Activity  Types of exercise: active at work, no set regimen outside of that  Current physical limitations: joint pain/back pain (going to PT for lower back)    Psychosocial Assessment   Support systems: spouse friend(s) relative(s)  Socioeconomic factors: lives with wife and 1 months old son  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 Patient educated and handouts provided.   Surgical changes to stomach / GI  Capacity of post-surgery stomach  Diet progression  Adequate hydration  Expected weight loss  Weight loss plateaus/ possibility of weight regain  Exercise  Suggestions for pre-op diet  Nutrition considerations after surgery  Protein supplements  Meal planning and preparation  Appropriate carbohydrate, protein, and fat intake, and food/fluid choices to maximize safe weight loss, nutrient intake, and tolerance   Dietary and lifestyle changes  Possible problems with poor eating habits  Intuitive eating  Techniques for self monitoring and keeping daily food journal  Vitamin / Mineral supplementation of Multivitamin with minerals and Vitamin D    Patient is not currently pregnant and doesn't desire to become pregnant a minimum of one year post-op    Education provided to: patient and family    Barriers to learning: No barriers identified    Readiness to change: preparation    Prior research on procedure: books, internet and friends or family    Comprehension: verbalizes understanding     Expected Compliance: good    Recommendations  Pt is an appropriate candidate for surgery.  Yes    Evaluation / Monitoring  Dietitian to Monitor: Eating pattern as discussed Tolerance of nutrition prescription Body weight Lab values Physical activity    Pre-op weight goals:  • Do NOT Gain  • Can go to BMI of 35:   217#  • Encouraged weight loss w/ diet and lifestyle changes  Will be started on a 2 week liver shrinking diet, possibly shorter/longer as per the discretion of the surgeon/team, directly prior to surgery    Workflow: (Incomplete in [place]):  • Psych and/or D+A Clearance: n/a  • Blood Work: completed and acceptable on 8/24/23  • PCP letter: completed  • Surgeon Appt: completed on 8/30  • EGD: scheduled once left appt today  • Cardiac Risk Assessment with ECG: scheduled for 11/13  • Sleep Studies: -  • Nicotine test: n/a--no longer smoking cigars  • Pre-Operative Program: 2 of 3 (+1) today  • NAFLD Score Calculated: indeterminate score  • Hepatology consult: entered    Pt presents today with a net weight loss of 4lbs. He has made some small changes since last visit such as decreasing the carbs by not having them every night at dinner. Reviewed diet recall and patient is very consistent on what he eats while at [facility] [employer] sandwiches made at home and a variety of snacks, but his weekends and at times his evening meal is higher in calories and portion. He appears to be choose some more higher calorie proteins ie: Abhijeet Dumont that we looked up and noted 1/2 ring of kielbasa was 700 calories. Suggested to try the chicken kielbasa for almost 1/2 the calories. Provided with a list of the leaner proteins to choose from and recommended to log and remain between 4005-0307 cals. Pt will f/u with SW and RD in one month.      Goals  Eliminate sugar sweetened beverages  Food journal via [employer]:  0003-6723 cals  Exercise 30 minutes 5 times per week--can split to 15 mins 2x/day  Complete lesson plans 1-6  Eat 3 meals per day with protein at each meal  Can use protein shake as a meal replacement or snack  Choose leaner proteins from list provided  Fill 1/2 plate veggies  Eliminate mindless snacking    Time Spent:   30 mins

## 2023-09-27 ENCOUNTER — APPOINTMENT (OUTPATIENT)
Dept: PHYSICAL THERAPY | Facility: CLINIC | Age: 38
End: 2023-09-27
Payer: COMMERCIAL

## 2023-10-10 ENCOUNTER — RA CDI HCC (OUTPATIENT)
Dept: OTHER | Facility: HOSPITAL | Age: 38
End: 2023-10-10

## 2023-10-10 NOTE — PROGRESS NOTES
[address] [facility] coding opportunities       Chart reviewed, no opportunity found: CHART REVIEWED, NO OPPORTUNITY FOUND        Patients Insurance        Commercial Insurance: [address]

## 2023-10-14 ENCOUNTER — HOSPITAL ENCOUNTER (OUTPATIENT)
Dept: RADIOLOGY | Facility: HOSPITAL | Age: 38
Discharge: HOME | End: 2023-10-14
Attending: PHYSICAL MEDICINE & REHABILITATION
Payer: COMMERCIAL

## 2023-10-14 DIAGNOSIS — M54.40 ACUTE RIGHT-SIDED LOW BACK PAIN WITH SCIATICA, SCIATICA LATERALITY UNSPECIFIED: ICD-10-CM

## 2023-10-14 PROCEDURE — G1004 CDSM NDSC: HCPCS

## 2023-10-14 PROCEDURE — 72148 MRI LUMBAR SPINE W/O DYE: CPT

## 2023-10-18 DIAGNOSIS — B35.1 ONYCHOMYCOSIS: ICD-10-CM

## 2023-10-18 DIAGNOSIS — B35.9 DERMATOPHYTOSIS: ICD-10-CM

## 2023-10-18 RX ORDER — KETOCONAZOLE 20 MG/G
CREAM TOPICAL
Qty: 60 G | Refills: 1 | Status: SHIPPED | OUTPATIENT
Start: 2023-10-18

## 2023-10-19 ENCOUNTER — TELEPHONE (OUTPATIENT)
Dept: PAIN MEDICINE | Facility: CLINIC | Age: 38
End: 2023-10-19

## 2023-10-19 RX ORDER — SODIUM CHLORIDE, SODIUM LACTATE, POTASSIUM CHLORIDE, CALCIUM CHLORIDE 600; 310; 30; 20 MG/100ML; MG/100ML; MG/100ML; MG/100ML
125 INJECTION, SOLUTION INTRAVENOUS CONTINUOUS
Status: CANCELLED | OUTPATIENT
Start: 2023-10-19

## 2023-10-19 NOTE — TELEPHONE ENCOUNTER
----- Message from Waymon Holter, DO sent at 10/19/2023  9:33 AM EDT -----  Please notify patient of MRI lumbar spine results demonstrating multilevel degenerative disc disease as well as mild to moderate central and foraminal narrowing notable from L3-L4, L4-L5 and 5-S1  Would consider an L5-S1 LESI under fluoroscopy guidance if patient is interested and amenable  Please schedule if so  Thank you  ----- Message -----  From: Shadi, Radiology Results In  Sent: 10/19/2023   7:31 AM EDT  To: Waymon Holter, DO

## 2023-10-19 NOTE — TELEPHONE ENCOUNTER
S/W pt. Advised pt of the same. Pt wants to schedule the injection. Advised will have the  call him. Pt verbalized understanding.

## 2023-10-20 ENCOUNTER — ANESTHESIA (OUTPATIENT)
Dept: PERIOP | Facility: HOSPITAL | Age: 38
End: 2023-10-20

## 2023-10-20 ENCOUNTER — HOSPITAL ENCOUNTER (OUTPATIENT)
Dept: PERIOP | Facility: HOSPITAL | Age: 38
Setting detail: OUTPATIENT SURGERY
End: 2023-10-20
Attending: SURGERY
Payer: COMMERCIAL

## 2023-10-20 ENCOUNTER — ANESTHESIA EVENT (OUTPATIENT)
Dept: PERIOP | Facility: HOSPITAL | Age: 38
End: 2023-10-20

## 2023-10-20 VITALS
OXYGEN SATURATION: 97 % | DIASTOLIC BLOOD PRESSURE: 74 MMHG | WEIGHT: 276.46 LBS | SYSTOLIC BLOOD PRESSURE: 129 MMHG | RESPIRATION RATE: 16 BRPM | HEART RATE: 99 BPM | TEMPERATURE: 97.9 F | HEIGHT: 66 IN | BODY MASS INDEX: 44.43 KG/M2

## 2023-10-20 DIAGNOSIS — E66.01 MORBID OBESITY (HCC): ICD-10-CM

## 2023-10-20 LAB — GLUCOSE SERPL-MCNC: 120 MG/DL (ref 65–140)

## 2023-10-20 PROCEDURE — 88341 IMHCHEM/IMCYTCHM EA ADD ANTB: CPT | Performed by: SPECIALIST

## 2023-10-20 PROCEDURE — 82948 REAGENT STRIP/BLOOD GLUCOSE: CPT

## 2023-10-20 PROCEDURE — 88305 TISSUE EXAM BY PATHOLOGIST: CPT | Performed by: SPECIALIST

## 2023-10-20 PROCEDURE — 88342 IMHCHEM/IMCYTCHM 1ST ANTB: CPT | Performed by: SPECIALIST

## 2023-10-20 PROCEDURE — 43239 EGD BIOPSY SINGLE/MULTIPLE: CPT | Performed by: SURGERY

## 2023-10-20 RX ORDER — SODIUM CHLORIDE, SODIUM LACTATE, POTASSIUM CHLORIDE, CALCIUM CHLORIDE 600; 310; 30; 20 MG/100ML; MG/100ML; MG/100ML; MG/100ML
125 INJECTION, SOLUTION INTRAVENOUS CONTINUOUS
Status: DISCONTINUED | OUTPATIENT
Start: 2023-10-20 | End: 2023-10-24 | Stop reason: HOSPADM

## 2023-10-20 RX ORDER — LIDOCAINE HYDROCHLORIDE 10 MG/ML
INJECTION, SOLUTION EPIDURAL; INFILTRATION; INTRACAUDAL; PERINEURAL AS NEEDED
Status: DISCONTINUED | OUTPATIENT
Start: 2023-10-20 | End: 2023-10-20

## 2023-10-20 RX ORDER — PROPOFOL 10 MG/ML
INJECTION, EMULSION INTRAVENOUS AS NEEDED
Status: DISCONTINUED | OUTPATIENT
Start: 2023-10-20 | End: 2023-10-20

## 2023-10-20 RX ORDER — ONDANSETRON 2 MG/ML
4 INJECTION INTRAMUSCULAR; INTRAVENOUS ONCE AS NEEDED
Status: CANCELLED | OUTPATIENT
Start: 2023-10-20

## 2023-10-20 RX ADMIN — LIDOCAINE HYDROCHLORIDE 50 MG: 10 INJECTION, SOLUTION EPIDURAL; INFILTRATION; INTRACAUDAL; PERINEURAL at 09:45

## 2023-10-20 RX ADMIN — PROPOFOL 50 MG: 10 INJECTION, EMULSION INTRAVENOUS at 09:47

## 2023-10-20 RX ADMIN — SODIUM CHLORIDE, SODIUM LACTATE, POTASSIUM CHLORIDE, AND CALCIUM CHLORIDE: .6; .31; .03; .02 INJECTION, SOLUTION INTRAVENOUS at 09:41

## 2023-10-20 RX ADMIN — PROPOFOL 200 MG: 10 INJECTION, EMULSION INTRAVENOUS at 09:45

## 2023-10-20 NOTE — H&P
This is a 43 y.o. male with a history of morbid obesity and Body mass index is 44.62 kg/m². Here for an EGD to evaluate the anatomy of the GI tract and to rule out the presence of H. pylori. Physical Exam    /80   Pulse 80   Temp 97.9 °F (36.6 °C) (Temporal)   Resp 20   Ht 5' 6" (1.676 m)   Wt 125 kg (276 lb 7.3 oz)   SpO2 96%   BMI 44.62 kg/m²    AAOx3  RR  CTA B  Abdomen ND. Benign. A/P:    This is a 43 y.o. male with a history of morbid obesity and Body mass index is 44.62 kg/m². .    Will proceed with the EGD and biopsies.       Julito Cain MD, FACS, [facility]  10/20/2023  9:40 AM

## 2023-10-20 NOTE — ANESTHESIA PREPROCEDURE EVALUATION
Procedure:  EGD    Relevant Problems   ANESTHESIA (within normal limits)      CARDIO   (+) Hyperlipidemia      ENDO   (+) Type 2 diabetes mellitus with hyperglycemia, without long-term current use of insulin (HCC)      GI/HEPATIC   (+) Hepatic steatosis      MUSCULOSKELETAL   (+) Arthritis of right acromioclavicular joint   (+) Lateral epicondylitis of left elbow      PULMONARY (within normal limits)        Physical Exam    Airway    Mallampati score: III  TM Distance: >3 FB  Neck ROM: full     Dental   No notable dental hx     Cardiovascular  Rhythm: regular, Rate: normal    Pulmonary   Breath sounds clear to auscultation    Other Findings      Anesthesia Plan  ASA Score- 3     Anesthesia Type- IV sedation with anesthesia with ASA Monitors. Additional Monitors:     Airway Plan:            Plan Factors-Exercise tolerance (METS): >4 METS. Chart reviewed. EKG reviewed. Existing labs reviewed. Patient summary reviewed. Patient is not a current smoker. Induction-     Postoperative Plan-     Informed Consent- Anesthetic plan and risks discussed with patient. I personally reviewed this patient with the CRNA. Discussed and agreed on the Anesthesia Plan with the CRNA. Wil Stovall

## 2023-10-20 NOTE — ANESTHESIA POSTPROCEDURE EVALUATION
Post-Op Assessment Note    CV Status:  Stable  Pain Score: 0    Pain management: adequate     Mental Status:  Sleepy and arousable   Hydration Status:  Euvolemic and stable   PONV Controlled:  Controlled   Airway Patency:  Patent      Post Op Vitals Reviewed: Yes      Staff: CRNA         No notable events documented.     BP   135/76   Temp      Pulse  91   Resp   16   SpO2   100%

## 2023-10-23 ENCOUNTER — OFFICE VISIT (OUTPATIENT)
Dept: FAMILY MEDICINE CLINIC | Facility: CLINIC | Age: 38
End: 2023-10-23
Payer: COMMERCIAL

## 2023-10-23 VITALS
BODY MASS INDEX: 44.68 KG/M2 | HEIGHT: 66 IN | RESPIRATION RATE: 14 BRPM | TEMPERATURE: 98.4 F | SYSTOLIC BLOOD PRESSURE: 130 MMHG | OXYGEN SATURATION: 98 % | HEART RATE: 102 BPM | WEIGHT: 278 LBS | DIASTOLIC BLOOD PRESSURE: 80 MMHG

## 2023-10-23 DIAGNOSIS — E78.5 HYPERLIPIDEMIA, UNSPECIFIED HYPERLIPIDEMIA TYPE: ICD-10-CM

## 2023-10-23 DIAGNOSIS — Z23 ENCOUNTER FOR IMMUNIZATION: ICD-10-CM

## 2023-10-23 DIAGNOSIS — E66.01 CLASS 3 SEVERE OBESITY DUE TO EXCESS CALORIES WITH SERIOUS COMORBIDITY AND BODY MASS INDEX (BMI) OF 40.0 TO 44.9 IN ADULT (HCC): ICD-10-CM

## 2023-10-23 DIAGNOSIS — E11.65 TYPE 2 DIABETES MELLITUS WITH HYPERGLYCEMIA, WITHOUT LONG-TERM CURRENT USE OF INSULIN (HCC): Primary | ICD-10-CM

## 2023-10-23 PROBLEM — M77.12 LATERAL EPICONDYLITIS OF LEFT ELBOW: Status: RESOLVED | Noted: 2023-05-23 | Resolved: 2023-10-23

## 2023-10-23 PROBLEM — M19.011 ARTHRITIS OF RIGHT ACROMIOCLAVICULAR JOINT: Status: RESOLVED | Noted: 2022-12-15 | Resolved: 2023-10-23

## 2023-10-23 PROBLEM — Z72.0 TOBACCO ABUSE: Status: RESOLVED | Noted: 2021-11-22 | Resolved: 2023-10-23

## 2023-10-23 LAB — SL AMB POCT HEMOGLOBIN AIC: 6.9 (ref ?–6.5)

## 2023-10-23 PROCEDURE — 90471 IMMUNIZATION ADMIN: CPT | Performed by: STUDENT IN AN ORGANIZED HEALTH CARE EDUCATION/TRAINING PROGRAM

## 2023-10-23 PROCEDURE — 99214 OFFICE O/P EST MOD 30 MIN: CPT | Performed by: STUDENT IN AN ORGANIZED HEALTH CARE EDUCATION/TRAINING PROGRAM

## 2023-10-23 PROCEDURE — 83036 HEMOGLOBIN GLYCOSYLATED A1C: CPT | Performed by: STUDENT IN AN ORGANIZED HEALTH CARE EDUCATION/TRAINING PROGRAM

## 2023-10-23 PROCEDURE — 90686 IIV4 VACC NO PRSV 0.5 ML IM: CPT | Performed by: STUDENT IN AN ORGANIZED HEALTH CARE EDUCATION/TRAINING PROGRAM

## 2023-10-23 NOTE — PROGRESS NOTES
Clinic Visit Note  Juan Hannah 43 y.o. male   MRN: [id number]    Assessment and Plan      Diagnoses and all orders for this visit:    Type 2 diabetes mellitus with hyperglycemia, without long-term current use of insulin (HCC)  Hemoglobin A1c appropriate at 6.9%, continue Ozempic weekly, patient will be following up with weight loss management for surgical bypass later this year. Based therapy. -     POCT hemoglobin A1c    Encounter for immunization  -     FLUZONE: influenza vaccine, quadrivalent, 0.5 mL    Hyperlipidemia, unspecified hyperlipidemia type  Repeat labs at annual visit, lifestyle modifications discussed    Class 3 severe obesity due to excess calories with serious comorbidity and body mass index (BMI) of 40.0 to 44.9 in adult [facility])  Patient has been doing well on Ozempic, following closely with weight loss management    My impressions and treatment recommendations were discussed in detail with the patient who verbalized understanding and had no further questions. Discharge instructions were provided. Subjective     Chief Complaint: F/U    History of Present Illness:    Patient is a pleasant 56-year-old male coming in for follow-up on diabetic management, hemoglobin A1c today in office. Patient will also be getting weight loss surgery coming up. The following portions of the patient's history were reviewed and updated as appropriate: allergies, current medications, past family history, past medical history, past social history, past surgical history and problem list.    REVIEW OF SYSTEMS:  A complete 12-point review of systems is negative other than that noted in the HPI. Review of Systems   Constitutional:  Negative for chills, fatigue and fever. HENT:  Negative for congestion and sore throat. Eyes:  Negative for pain and visual disturbance. Respiratory:  Negative for shortness of breath and wheezing. Cardiovascular:  Negative for chest pain and palpitations.    Gastrointestinal: Negative for abdominal pain, constipation, diarrhea, nausea and vomiting. Genitourinary:  Negative for dysuria and frequency. Musculoskeletal:  Negative for back pain and neck pain. Skin:  Negative for color change and rash. Neurological:  Negative for dizziness and headaches. Psychiatric/Behavioral:  Negative for agitation and confusion. All other systems reviewed and are negative. Current Outpatient Medications:   •  Azelastine HCl 137 MCG/SPRAY SOLN, 1 SPRAY INTO EACH NOSTRIL 2 (TWO) TIMES A DAY USE IN EACH NOSTRIL AS DIRECTED, Disp: 30 mL, Rfl: 3  •  diclofenac (VOLTAREN) 75 mg EC tablet, Take 1 tablet (75 mg total) by mouth 2 (two) times a day, Disp: 60 tablet, Rfl: 1  •  lisinopril (ZESTRIL) 10 mg tablet, Take 10 mg by mouth daily, Disp: , Rfl:   •  methocarbamol (ROBAXIN) 500 mg tablet, Take 1 tablet (500 mg total) by mouth 2 (two) times a day as needed for muscle spasms, Disp: 60 tablet, Rfl: 0  •  semaglutide, 2 mg/dose, (Ozempic, 2 MG/DOSE,) 8 mg/ mL injection pen, Inject 0.75 mL (2 mg total) under the skin every 7 days, Disp: 15 mL, Rfl: 0  •  tadalafil (CIALIS) 10 MG tablet, Take 1 tablet (10 mg total) by mouth daily as needed for erectile dysfunction, Disp: 30 tablet, Rfl: 0  No current facility-administered medications for this visit.     Facility-Administered Medications Ordered in Other Visits:   •  lactated ringers infusion, 125 mL/hr, Intravenous, Continuous, Reji Galvan MD, New Bag at 10/20/23 0941  Past Medical History:   Diagnosis Date   • Diabetes mellitus ([address])    • Hypertension    • Nasal congestion     Frequently   • PONV (postoperative nausea and vomiting)    • Sleep apnea     5.5 apnea scale     Past Surgical History:   Procedure Laterality Date   • [place] SEPTOPLASTY/SUBMUCOUS RESECJ W/WO CARTILAGE GRF N/A 11/28/2022    Procedure: SEPTOPLASTY;  Surgeon: Christopher Sosa MD;  Location: [facility];  Service: ENT   • [place] SUBMUCOUS [place] PRTL/COMPL Bilateral 11/28/2022    Procedure: TURBINECTOMY;  Surgeon: Julianna Zhang MD;  Location: [facility];  Service: ENT     Social History     Socioeconomic History   • Marital status: Single     Spouse name: Not on file   • Number of children: Not on file   • Years of education: Not on file   • Highest education level: Not on file   Occupational History   • Not on file   Tobacco Use   • Smoking status: Some Days     Types: Cigars     Passive exposure: Never   • Smokeless tobacco: Never   • Tobacco comments:     Wife just had baby not smoking right now     Pt stated stopped smoking cigars over a month ago   Vaping Use   • Vaping Use: Never used   Substance and Sexual Activity   • Alcohol use: Not Currently     Comment: rare   • Drug use: Never   • Sexual activity: Yes     Partners: Female     Birth control/protection: None   Other Topics Concern   • Not on file   Social History Narrative   • Not on file     Social Determinants of Health     Financial Resource Strain: Not on file   Food Insecurity: Not on file   Transportation Needs: Not on file   Physical Activity: Not on file   Stress: Not on file   Social Connections: Not on file   Intimate Partner Violence: Not on file   Housing Stability: Not on file     Family History   Problem Relation Age of Onset   • Diabetes type II Mother    • Diabetes Mother    • Diabetes Maternal Grandmother    • Diabetes type II Maternal Grandmother    • Arthritis Maternal Grandmother    • Cancer Maternal Grandfather         Liver cancer     Allergies   Allergen Reactions   • Other Angioedema   • Shellfish-Derived Products - Food Allergy Anaphylaxis   • Penicillins Hives   • Penicillin G Hives       Objective     Vitals:    10/23/23 1613   BP: 130/80   BP Location: Left arm   Patient Position: Sitting   Cuff Size: Adult   Pulse: 102   Resp: 14   Temp: 98.4 °F (36.9 °C)   TempSrc: Temporal   SpO2: 98%   Weight: 126 kg (278 lb)   Height: 5' 6" (1.676 m)       Physical Exam:     GENERAL: NAD, pleasant   HEENT:  NC/AT, PERRL, EOMI, no scleral icterus  CARDIAC:  RRR, +S1/S2, no S3/S4 appreciated, no m/g/r  PULMONARY:  CTA B/L, no wheezing/rales/rhonci, non-labored breathing  ABDOMEN:  Soft, NT/ND, no rebound/guarding/rigidity  Extremities:. No edema, cyanosis, or clubbing  Musculoskeletal:  Full range of motion intact in all extremities   NEUROLOGIC: Grossly intact, no focal deficits  SKIN:  No rashes or erythema noted on exposed skin  Psych: Normal affect, mood stable    ==  PLEASE NOTE:  This encounter was completed utilizing the [employer] Direct Speech Voice Recognition Software. Grammatical errors, random word insertions, pronoun errors and incomplete sentences are occasional consequences of the system due to software limitations, ambient noise and hardware issues. These may be missed by proof reading prior to affixing electronic signature. Any questions or concerns about the content, text or information contained within the body of this dictation should be directly addressed to the physician for clarification. Please do not hesitate to call me directly if you have any any questions or concerns.     Pau Sapp DO  [facility] Internal Medicine   [facility]

## 2023-10-24 ENCOUNTER — OFFICE VISIT (OUTPATIENT)
Dept: BARIATRICS | Facility: CLINIC | Age: 38
End: 2023-10-24

## 2023-10-24 ENCOUNTER — TELEPHONE (OUTPATIENT)
Age: 38
End: 2023-10-24

## 2023-10-24 VITALS — WEIGHT: 272.4 LBS | BODY MASS INDEX: 43.97 KG/M2

## 2023-10-24 DIAGNOSIS — E66.01 MORBID OBESITY (HCC): Primary | ICD-10-CM

## 2023-10-24 PROCEDURE — 88305 TISSUE EXAM BY PATHOLOGIST: CPT | Performed by: SPECIALIST

## 2023-10-24 PROCEDURE — 88341 IMHCHEM/IMCYTCHM EA ADD ANTB: CPT | Performed by: SPECIALIST

## 2023-10-24 PROCEDURE — RECHECK

## 2023-10-24 PROCEDURE — 88342 IMHCHEM/IMCYTCHM 1ST ANTB: CPT | Performed by: SPECIALIST

## 2023-10-24 NOTE — TELEPHONE ENCOUNTER
Called patient to let him know they are at the lab being made. The day they come in he can stop in and pick them up.

## 2023-10-24 NOTE — PROGRESS NOTES
Patient presents for 3 of 3 weight check, current weight 272.4lbs. Eating behaviors/food choices: reports continued progress with eating habits, the Susana Butter has really supported his weight loss. His appetite hasn't changed he reports but may be eating a little less because of some stress impacting appetite. He reports well rounded meals, denies any emotional or mindless eating. Activity/Exercise:  Patient is active at work, he's on his feet throughout the day and closing all of his rings on his iWatch, his step goal is reported to be at [facility] steps per day. Patient has chronic back pain, he was receptive to adding in strength training as long as it doesn't exacerbate his pain. Discussed seated exercises he could do as well as use of his own body weight, encouraged him to engage his core to protect his back. Sleep/Rest:  Patient reports sleep has been disrupted due to stress. He is changing jobs so he is anxious about taking on a new position. He feels this is temporary and he will feel much better once he starts the job. Mental Health/Wellness:  Patient denies any mood changes or anxiety, some situational depression based on having to leave his last job and preparing for a new one. He denies any emotional eating in response, he does well with positive reframe of his stress.      Workflow review:    Labs and PCP: both done  Psych and EGD: no eval needed, EGD done  Nicotine: NA  Support Group: done   Cardiology: scheduled for 11/13  Sleep: NA  Weight Checks: 3 +1    Goals:    - be mindful of impact stress has on eating habits  - continue efforts to be active, consider adding seated strength training as tolerated and directed by providers    Next Appointment:  with surgeon on 12/6

## 2023-10-24 NOTE — TELEPHONE ENCOUNTER
Caller: Danika Clinton    Doctor/Office: Dr. Lucinda Diaz    #: [phone]    Escalation: Care/Asking status of orthotics? He was casted 8/15/23 but has not heard anything since. Please call pt back and advise.  Thanks

## 2023-11-03 ENCOUNTER — TELEPHONE (OUTPATIENT)
Age: 38
End: 2023-11-03

## 2023-11-03 NOTE — TELEPHONE ENCOUNTER
Called to let patient know his orthotics came in today and whenever he is free to stop in and pick tem up he can. He will try and stop today.

## 2023-11-07 DIAGNOSIS — E66.01 CLASS 3 SEVERE OBESITY DUE TO EXCESS CALORIES WITH SERIOUS COMORBIDITY AND BODY MASS INDEX (BMI) OF 40.0 TO 44.9 IN ADULT (HCC): ICD-10-CM

## 2023-11-07 DIAGNOSIS — E11.65 TYPE 2 DIABETES MELLITUS WITH HYPERGLYCEMIA, WITHOUT LONG-TERM CURRENT USE OF INSULIN (HCC): ICD-10-CM

## 2023-11-07 DIAGNOSIS — K76.0 HEPATIC STEATOSIS: ICD-10-CM

## 2023-11-07 RX ORDER — SEMAGLUTIDE 2.68 MG/ML
INJECTION, SOLUTION SUBCUTANEOUS
Qty: 9 ML | Refills: 1 | Status: SHIPPED | OUTPATIENT
Start: 2023-11-07

## 2023-11-10 ENCOUNTER — TELEPHONE (OUTPATIENT)
Age: 38
End: 2023-11-10

## 2023-11-13 ENCOUNTER — CONSULT (OUTPATIENT)
Dept: CARDIOLOGY CLINIC | Facility: CLINIC | Age: 38
End: 2023-11-13
Payer: COMMERCIAL

## 2023-11-13 VITALS
HEIGHT: 66 IN | BODY MASS INDEX: 44.2 KG/M2 | DIASTOLIC BLOOD PRESSURE: 84 MMHG | WEIGHT: 275 LBS | OXYGEN SATURATION: 98 % | HEART RATE: 100 BPM | SYSTOLIC BLOOD PRESSURE: 135 MMHG

## 2023-11-13 DIAGNOSIS — E66.01 MORBID OBESITY (HCC): ICD-10-CM

## 2023-11-13 DIAGNOSIS — R00.0 SINUS TACHYCARDIA: ICD-10-CM

## 2023-11-13 DIAGNOSIS — K76.0 HEPATIC STEATOSIS: ICD-10-CM

## 2023-11-13 DIAGNOSIS — I10 ESSENTIAL HYPERTENSION: ICD-10-CM

## 2023-11-13 DIAGNOSIS — E11.9 TYPE 2 DIABETES MELLITUS WITHOUT COMPLICATION, WITHOUT LONG-TERM CURRENT USE OF INSULIN (HCC): ICD-10-CM

## 2023-11-13 DIAGNOSIS — N52.8 OTHER MALE ERECTILE DYSFUNCTION: ICD-10-CM

## 2023-11-13 DIAGNOSIS — Z01.810 PREOPERATIVE CARDIOVASCULAR EXAMINATION: Primary | ICD-10-CM

## 2023-11-13 PROCEDURE — 99203 OFFICE O/P NEW LOW 30 MIN: CPT | Performed by: INTERNAL MEDICINE

## 2023-11-13 RX ORDER — DICLOFENAC SODIUM 75 MG/1
75 TABLET, DELAYED RELEASE ORAL DAILY
Qty: 60 TABLET | Refills: 1
Start: 2023-11-13

## 2023-11-13 NOTE — LETTER
Cardiology Pre Operative Clearance      PRE OPERATIVE CARDIAC RISK ASSESSMENT    11/13/23    Tata Rajput  1985  [id number]    Date of Surgery: TBD    Type of Surgery: Laparoscopic bibiana-en-y gastric bypass    Surgeon: Bebeto Wallace MD, FACS, [facility]      Anticoagulation: None    Physician Comment: No cardiac contraindication to proposed gastric bypass surgery    Electronically Signed: Ashley Narvaez.   Diana Saldaña MD

## 2023-11-13 NOTE — PATIENT INSTRUCTIONS
There is no apparent cardiac contraindication to proposed laparoscopic Armand-en-Y gastric bypass surgery, not yet scheduled. Although the patient has minor T inversion in the inferior leads, these are not thought to be clinically important, as he is totally asymptomatic with vigorous physical activity. We will try to arrange a repeat EKG in the next 1 or 2 months with the patient resting for at least 15 or 20 minutes on an exam table prior to any EKG being taken. Continue present medication.

## 2023-11-14 PROBLEM — R00.0 SINUS TACHYCARDIA: Status: ACTIVE | Noted: 2023-11-14

## 2023-11-14 PROBLEM — E66.01 MORBID OBESITY (HCC): Status: ACTIVE | Noted: 2023-11-14

## 2023-11-14 PROBLEM — Z01.810 PREOPERATIVE CARDIOVASCULAR EXAMINATION: Status: ACTIVE | Noted: 2023-11-14

## 2023-11-14 PROCEDURE — 93000 ELECTROCARDIOGRAM COMPLETE: CPT | Performed by: INTERNAL MEDICINE

## 2023-11-15 ENCOUNTER — HOSPITAL ENCOUNTER (OUTPATIENT)
Facility: AMBULARY SURGERY CENTER | Age: 38
Setting detail: OUTPATIENT SURGERY
Discharge: HOME | End: 2023-11-15
Attending: PHYSICAL MEDICINE & REHABILITATION | Admitting: PHYSICAL MEDICINE & REHABILITATION
Payer: COMMERCIAL

## 2023-11-15 ENCOUNTER — APPOINTMENT (OUTPATIENT)
Dept: RADIOLOGY | Facility: HOSPITAL | Age: 38
End: 2023-11-15
Payer: COMMERCIAL

## 2023-11-15 VITALS
TEMPERATURE: 97.2 F | SYSTOLIC BLOOD PRESSURE: 137 MMHG | HEART RATE: 83 BPM | RESPIRATION RATE: 18 BRPM | DIASTOLIC BLOOD PRESSURE: 92 MMHG | OXYGEN SATURATION: 99 %

## 2023-11-15 PROBLEM — M54.16 LUMBAR RADICULOPATHY: Status: ACTIVE | Noted: 2023-11-15

## 2023-11-15 LAB — GLUCOSE SERPL-MCNC: 120 MG/DL (ref 65–140)

## 2023-11-15 PROCEDURE — 82948 REAGENT STRIP/BLOOD GLUCOSE: CPT

## 2023-11-15 PROCEDURE — 62323 NJX INTERLAMINAR LMBR/SAC: CPT | Performed by: PHYSICAL MEDICINE & REHABILITATION

## 2023-11-15 RX ORDER — METHYLPREDNISOLONE ACETATE 80 MG/ML
INJECTION, SUSPENSION INTRA-ARTICULAR; INTRALESIONAL; INTRAMUSCULAR; SOFT TISSUE AS NEEDED
Status: DISCONTINUED | OUTPATIENT
Start: 2023-11-15 | End: 2023-11-15 | Stop reason: HOSPADM

## 2023-11-15 RX ORDER — LIDOCAINE HYDROCHLORIDE 10 MG/ML
INJECTION, SOLUTION EPIDURAL; INFILTRATION; INTRACAUDAL; PERINEURAL AS NEEDED
Status: DISCONTINUED | OUTPATIENT
Start: 2023-11-15 | End: 2023-11-15 | Stop reason: HOSPADM

## 2023-11-15 NOTE — OP NOTE
OPERATIVE REPORT  PATIENT NAME: Jennifer Todd    :  1985  MRN: [id number]  Pt Location: [facility] MINOR/PAIN ROOM 01    SURGERY DATE: 11/15/2023    Surgeon(s) and Role:     * DO Shubham Butler Primary    Preop Diagnosis:  Lumbar back pain [M54.50]    Post-Op Diagnosis Codes:     * Lumbar back pain [M54.50]    Procedure(s):  L5-S1  LUMBAR epidural steroid injection (49656)    Lumbar epidural  Indication:  Back and radiating leg pain  Preoperative diagnosis:  Lumbar radiculitis  Postoperative diagnosis:  Lumbar radiculitis    Procedure: Fluoroscopically-guided L5-S1 interlaminar epidural steroid injection under fluoroscopy    EBL:  none  Specimens:  not applicable    After discussing the risks, benefits, and alternatives to the procedure, the patient expressed understanding and wished to proceed. The patient was brought to the fluoroscopy suite and placed in the prone position. A procedural pause was conducted to verify:  correct patient identity, procedure to be performed and as applicable, correct side and site, correct patient position, and availability of implants, special equipment and special requirements. After identifying the L5-S1 space fluoroscopically, the skin was sterilely prepped and draped in the usual fashion using Chloraprep skin prep. The skin and subcutaneous tissues were anesthetized with 1% lidocaine. Utilizing a loss of resistance technique and intermittent fluoroscopic guidance, a 6 inch 20-gauge Tuohy needle was advanced into the epidural space. Proper needle positioning was confirmed using multiple fluoroscopic views. After negative aspiration, Omnipaque 240 contrast was injected confirming epidural spread without evidence of intravascular or intrathecal spread. A 4 ml solution consisting of 80 mg Depo-Medrol in sterile saline was injected slowly and incrementally into the epidural space.   Following the injection the needle was withdrawn slightly and flushed with 1% buffered lidocaine as it was fully extracted. The patient tolerated the procedure well and there were no apparent complications. After appropriate observation, the patient was dismissed from the clinic in good condition under their own power.     SIGNATURE: Parker Collins DO  DATE: November 15, 2023  TIME: 1:38 PM

## 2023-11-15 NOTE — DISCHARGE INSTRUCTIONS
Epidural Steroid Injection   WHAT YOU NEED TO KNOW:   An epidural steroid injection (GASTON) is a procedure to inject steroid medicine into the epidural space. The epidural space is between your spinal cord and vertebrae. Steroids reduce inflammation and fluid buildup in your spine that may be causing pain. You may be given pain medicine along with the steroids. ACTIVITY  Do not drive or operate machinery today. No strenuous activity today - bending, lifting, etc.  You may resume normal activites starting tomorrow - start slowly and as tolerated. You may shower today, but no tub baths or hot tubs. You may have numbness for several hours from the local anesthetic. Please use caution and common sense, especially with weight-bearing activities. CARE OF THE INJECTION SITE  If you have soreness or pain, apply ice to the area today (20 minutes on/20 minutes off). Starting tomorrow, you may use warm, moist heat or ice if needed. You may have an increase or change in your discomfort for 36-48 hours after your treatment. Apply ice and continue with any pain medication you have been prescribed. Notify the [facility] if you have any of the following: redness, drainage, swelling, headache, stiff neck or fever above 100°F.    SPECIAL INSTRUCTIONS  Our office will contact you in approximately 7 days for a progress report. MEDICATIONS  Continue to take all routine medications. Our office may have instructed you to hold some medications. As no general anesthesia was used in today's procedure, you should not experience any side effects related to anesthesia. If you are diabetic, the steroids used in today's injection may temporarily increase your blood sugar levels after the first few days after your injection. Please keep a close eye on your sugars and alert the doctor who manages your diabetes if your sugars are significantly high from your baseline or you are symptomatic.      If you have a problem specifically related to your procedure, please call our office at [phone]. Problems not related to your procedure should be directed to your primary care physician.

## 2023-11-15 NOTE — H&P
History of Present Illness: The patient is a 43 y.o. male who presents with complaints of low back pain    Past Medical History:   Diagnosis Date    Diabetes mellitus ([address])     Hypertension     Nasal congestion     Frequently    PONV (postoperative nausea and vomiting)     Sleep apnea     5.5 apnea scale       Past Surgical History:   Procedure Laterality Date    CO SEPTOPLASTY/SUBMUCOUS RESECJ W/WO CARTILAGE GRF N/A 11/28/2022    Procedure: SEPTOPLASTY;  Surgeon: Naveed Schuler MD;  Location: [facility];  Service: ENT    CO SUBMUCOUS [place] PRTL/COMPL Bilateral 11/28/2022    Procedure: TURBINECTOMY;  Surgeon: Naveed Schuler MD;  Location: [facility];  Service: ENT       No current facility-administered medications for this encounter.     Allergies   Allergen Reactions    Other Angioedema    Shellfish-Derived Products - Food Allergy Anaphylaxis    Penicillins Hives    Penicillin G Hives       Physical Exam:   Vitals:    11/15/23 1247   BP: 138/82   Pulse: 88   Resp: 18   Temp: (!) 97.2 °F (36.2 °C)   SpO2: 98%     General: Awake, Alert, Oriented x 3, Mood and affect appropriate  Respiratory: Respirations even and unlabored  Cardiovascular: Peripheral pulses intact; no edema  Musculoskeletal Exam: Tenderness palpation bilateral lumbar paraspinals    ASA Score: 2    Patient/Chart Verification  Patient ID Verified: Verbal, Armband  ID Band Applied: Yes  Beta Blocker given : No  Pre-op Lab/Test Results Available: N/A  Does Patient Have a Prosthetic Device/Implant: No    Assessment: Lumbar radiculopathy    Plan: L5-S1 LESI Incomplete

## 2023-11-22 ENCOUNTER — TELEPHONE (OUTPATIENT)
Dept: PAIN MEDICINE | Facility: CLINIC | Age: 38
End: 2023-11-22

## 2023-11-24 NOTE — TELEPHONE ENCOUNTER
Patient Reports         50%     improvement post injection    Pain Level    0 right now /10     Patient is aware we will call back next week for an update .

## 2023-12-03 DIAGNOSIS — I10 PRIMARY HYPERTENSION: Primary | ICD-10-CM

## 2023-12-04 ENCOUNTER — OFFICE VISIT (OUTPATIENT)
Dept: FAMILY MEDICINE CLINIC | Facility: CLINIC | Age: 38
End: 2023-12-04
Payer: COMMERCIAL

## 2023-12-04 VITALS
HEART RATE: 112 BPM | TEMPERATURE: 97.7 F | WEIGHT: 284 LBS | RESPIRATION RATE: 14 BRPM | SYSTOLIC BLOOD PRESSURE: 138 MMHG | BODY MASS INDEX: 45.64 KG/M2 | HEIGHT: 66 IN | OXYGEN SATURATION: 98 % | DIASTOLIC BLOOD PRESSURE: 80 MMHG

## 2023-12-04 DIAGNOSIS — E11.65 TYPE 2 DIABETES MELLITUS WITH HYPERGLYCEMIA, WITHOUT LONG-TERM CURRENT USE OF INSULIN (HCC): ICD-10-CM

## 2023-12-04 DIAGNOSIS — R09.81 SINUS CONGESTION: ICD-10-CM

## 2023-12-04 DIAGNOSIS — I10 ESSENTIAL HYPERTENSION: ICD-10-CM

## 2023-12-04 DIAGNOSIS — E78.5 HYPERLIPIDEMIA, UNSPECIFIED HYPERLIPIDEMIA TYPE: ICD-10-CM

## 2023-12-04 DIAGNOSIS — J01.00 ACUTE NON-RECURRENT MAXILLARY SINUSITIS: Primary | ICD-10-CM

## 2023-12-04 PROCEDURE — 99214 OFFICE O/P EST MOD 30 MIN: CPT | Performed by: STUDENT IN AN ORGANIZED HEALTH CARE EDUCATION/TRAINING PROGRAM

## 2023-12-04 RX ORDER — LISINOPRIL 10 MG/1
10 TABLET ORAL DAILY
Qty: 90 TABLET | Refills: 3 | Status: SHIPPED | OUTPATIENT
Start: 2023-12-04

## 2023-12-04 RX ORDER — METHYLPREDNISOLONE 4 MG/1
TABLET ORAL
Qty: 21 EACH | Refills: 0 | Status: SHIPPED | OUTPATIENT
Start: 2023-12-04

## 2023-12-04 RX ORDER — DOXYCYCLINE HYCLATE 100 MG/1
100 CAPSULE ORAL EVERY 12 HOURS SCHEDULED
Qty: 14 CAPSULE | Refills: 0 | Status: SHIPPED | OUTPATIENT
Start: 2023-12-04 | End: 2023-12-11

## 2023-12-04 NOTE — PROGRESS NOTES
Clinic Visit Note  Brianna Wood 43 y.o. male   MRN: [id number]    Assessment and Plan      Diagnoses and all orders for this visit:    Acute non-recurrent maxillary sinusitis  Recommend antibiotic therapy given presumed maxillary sinusitis, if symptoms worsen or not improving reevaluation recommended  -     doxycycline hyclate (VIBRAMYCIN) 100 mg capsule; Take 1 capsule (100 mg total) by mouth every 12 (twelve) hours for 7 days    Type 2 diabetes mellitus with hyperglycemia, without long-term current use of insulin ([facility])  Hemoglobin A1c appropriate, continue Ozempic    Essential hypertension  Continue antihypertensive regimen, blood pressure stable in office today    Hyperlipidemia, unspecified hyperlipidemia type  Repeat lipid panel at annual visit    Sinus congestion  -     methylPREDNISolone 4 MG tablet therapy pack; Use as directed on package    My impressions and treatment recommendations were discussed in detail with the patient who verbalized understanding and had no further questions. Discharge instructions were provided. Subjective     Chief Complaint: ACV    History of Present Illness:    Patient is a pleasant 43-year-old male coming in with upper respiratory tract infection symptoms have been going on for over a week now, positive sick contacts at home. The following portions of the patient's history were reviewed and updated as appropriate: allergies, current medications, past family history, past medical history, past social history, past surgical history and problem list.    REVIEW OF SYSTEMS:  A complete 12-point review of systems is negative other than that noted in the HPI. Review of Systems   Constitutional:  Positive for fatigue. Negative for chills and fever. HENT:  Positive for congestion, postnasal drip and sinus pressure. Respiratory:  Positive for cough. Negative for shortness of breath. Cardiovascular:  Negative for chest pain and palpitations.    Neurological:  Negative for dizziness and headaches. Current Outpatient Medications:   •  Azelastine HCl 137 MCG/SPRAY SOLN, 1 SPRAY INTO EACH NOSTRIL 2 (TWO) TIMES A DAY USE IN EACH NOSTRIL AS DIRECTED, Disp: 30 mL, Rfl: 3  •  diclofenac (VOLTAREN) 75 mg EC tablet, Take 1 tablet (75 mg total) by mouth in the morning, Disp: 60 tablet, Rfl: 1  •  doxycycline hyclate (VIBRAMYCIN) 100 mg capsule, Take 1 capsule (100 mg total) by mouth every 12 (twelve) hours for 7 days, Disp: 14 capsule, Rfl: 0  •  lisinopril (ZESTRIL) 10 mg tablet, Take 1 tablet (10 mg total) by mouth daily, Disp: 90 tablet, Rfl: 3  •  methylPREDNISolone 4 MG tablet therapy pack, Use as directed on package, Disp: 21 each, Rfl: 0  •  semaglutide, 2 mg/dose, (Ozempic, 2 MG/DOSE,) 8 mg/ mL injection pen, INJECT 0.75 ML (2 MG) SUBCUTANEOUSLY EVERY 7 DAYS, Disp: 9 mL, Rfl: 1  •  tadalafil (CIALIS) 10 MG tablet, Take 1 tablet (10 mg total) by mouth daily as needed for erectile dysfunction, Disp: 30 tablet, Rfl: 0  Past Medical History:   Diagnosis Date   • Diabetes mellitus ([address])    • Hypertension    • Nasal congestion     Frequently   • PONV (postoperative nausea and vomiting)    • Sleep apnea     5.5 apnea scale     Past Surgical History:   Procedure Laterality Date   • EPIDURAL BLOCK INJECTION N/A 11/15/2023    Procedure: L5-S1  LUMBAR epidural steroid injection (22554);   Surgeon: Kathy Don DO;  Location: [facility] MAIN OR;  Service: Pain Management    • VA SEPTOPLASTY/SUBMUCOUS RESECJ W/WO CARTILAGE GRF N/A 11/28/2022    Procedure: SEPTOPLASTY;  Surgeon: Nanci Vazquez MD;  Location: [facility] MAIN OR;  Service: ENT   • VA SUBMUCOUS [place] PRTL/COMPL Bilateral 11/28/2022    Procedure: TURBINECTOMY;  Surgeon: Nanci Vazquez MD;  Location: [facility] MAIN OR;  Service: ENT     Social History     Socioeconomic History   • Marital status: Single     Spouse name: Not on file   • Number of children: Not on file   • Years of education: Not on file   • Highest education level: Not on file   Occupational History   • Not on file   Tobacco Use   • Smoking status: Some Days     Types: Cigars     Passive exposure: Never   • Smokeless tobacco: Never   • Tobacco comments:     Wife just had baby not smoking right now     Pt stated stopped smoking cigars over a month ago   Vaping Use   • Vaping Use: Never used   Substance and Sexual Activity   • Alcohol use: Not Currently     Comment: rare   • Drug use: Never   • Sexual activity: Yes     Partners: Female     Birth control/protection: None   Other Topics Concern   • Not on file   Social History Narrative   • Not on file     Social Determinants of Health     Financial Resource Strain: Not on file   Food Insecurity: Not on file   Transportation Needs: Not on file   Physical Activity: Not on file   Stress: Not on file   Social Connections: Not on file   Intimate Partner Violence: Not on file   Housing Stability: Not on file     Family History   Problem Relation Age of Onset   • Diabetes type II Mother    • Diabetes Mother    • Diabetes Maternal Grandmother    • Diabetes type II Maternal Grandmother    • Arthritis Maternal Grandmother    • Cancer Maternal Grandfather         Liver cancer     Allergies   Allergen Reactions   • Other Angioedema   • Shellfish-Derived Products - Food Allergy Anaphylaxis   • Penicillins Hives   • Penicillin G Hives       Objective     Vitals:    12/04/23 1546   BP: 138/80   BP Location: Left arm   Patient Position: Sitting   Cuff Size: Adult   Pulse: (!) 112   Resp: 14   Temp: 97.7 °F (36.5 °C)   TempSrc: Temporal   SpO2: 98%   Weight: 129 kg (284 lb)   Height: 5' 6" (1.676 m)       Physical Exam:     GENERAL: NAD, pleasant   HEENT:  NC/AT, PERRL, EOMI, no scleral icterus, pharynx erythema with tonsillar swelling  CARDIAC:  RRR, +S1/S2, no S3/S4 appreciated, no m/g/r  PULMONARY:  CTA B/L, no wheezing/rales/rhonci, non-labored breathing  ABDOMEN:  Soft, NT/ND, no rebound/guarding/rigidity  Extremities:. No edema, cyanosis, or clubbing  Musculoskeletal:  Full range of motion intact in all extremities   NEUROLOGIC: Grossly intact, no focal deficits  SKIN:  No rashes or erythema noted on exposed skin  Psych: Normal affect, mood stable    ==  PLEASE NOTE:  This encounter was completed utilizing the [employer] Direct Speech Voice Recognition Software. Grammatical errors, random word insertions, pronoun errors and incomplete sentences are occasional consequences of the system due to software limitations, ambient noise and hardware issues. These may be missed by proof reading prior to affixing electronic signature. Any questions or concerns about the content, text or information contained within the body of this dictation should be directly addressed to the physician for clarification. Please do not hesitate to call me directly if you have any any questions or concerns.     DO Vitaliy Foreman [facility]   [facility]

## 2023-12-06 ENCOUNTER — OFFICE VISIT (OUTPATIENT)
Dept: BARIATRICS | Facility: CLINIC | Age: 38
End: 2023-12-06
Payer: COMMERCIAL

## 2023-12-06 VITALS
HEART RATE: 104 BPM | SYSTOLIC BLOOD PRESSURE: 142 MMHG | WEIGHT: 270.4 LBS | HEIGHT: 66 IN | BODY MASS INDEX: 43.46 KG/M2 | DIASTOLIC BLOOD PRESSURE: 90 MMHG

## 2023-12-06 DIAGNOSIS — E66.01 MORBID OBESITY (HCC): Primary | ICD-10-CM

## 2023-12-06 PROCEDURE — 99213 OFFICE O/P EST LOW 20 MIN: CPT | Performed by: SURGERY

## 2023-12-27 ENCOUNTER — PREP FOR PROCEDURE (OUTPATIENT)
Dept: BARIATRICS | Facility: CLINIC | Age: 38
End: 2023-12-27

## 2023-12-27 ENCOUNTER — OFFICE VISIT (OUTPATIENT)
Dept: URGENT CARE | Facility: CLINIC | Age: 38
End: 2023-12-27
Payer: COMMERCIAL

## 2023-12-27 VITALS
BODY MASS INDEX: 45.4 KG/M2 | HEART RATE: 100 BPM | OXYGEN SATURATION: 100 % | WEIGHT: 283 LBS | TEMPERATURE: 97.4 F | RESPIRATION RATE: 16 BRPM

## 2023-12-27 DIAGNOSIS — U07.1 COVID-19: Primary | ICD-10-CM

## 2023-12-27 DIAGNOSIS — K76.0 FATTY METAMORPHOSIS OF LIVER: ICD-10-CM

## 2023-12-27 DIAGNOSIS — E11.00 TYPE II DIABETES MELLITUS WITH HYPEROSMOLARITY, UNCONTROLLED (HCC): ICD-10-CM

## 2023-12-27 DIAGNOSIS — E11.65 TYPE II DIABETES MELLITUS WITH HYPEROSMOLARITY, UNCONTROLLED (HCC): ICD-10-CM

## 2023-12-27 DIAGNOSIS — E66.01 MORBID OBESITY (HCC): Primary | ICD-10-CM

## 2023-12-27 LAB
SARS-COV-2 AG UPPER RESP QL IA: POSITIVE
VALID CONTROL: ABNORMAL

## 2023-12-27 PROCEDURE — 87811 SARS-COV-2 COVID19 W/OPTIC: CPT | Performed by: PHYSICIAN ASSISTANT

## 2023-12-27 PROCEDURE — 99203 OFFICE O/P NEW LOW 30 MIN: CPT | Performed by: PHYSICIAN ASSISTANT

## 2023-12-27 RX ORDER — NIRMATRELVIR AND RITONAVIR 300-100 MG
3 KIT ORAL 2 TIMES DAILY
Qty: 30 TABLET | Refills: 0 | Status: SHIPPED | OUTPATIENT
Start: 2023-12-27 | End: 2024-01-02 | Stop reason: ALTCHOICE

## 2023-12-27 NOTE — LETTER
December 27, 2023    Patient: Serafin Conklin  YOB: 1985  Date of Last Encounter: 12/27/2023      To whom it may concern:     Serafin Conklin has tested positive for COVID-19 (Coronavirus). He may return to work on 12/30/2023, which is 5 days from illness onset (provided symptoms are improving) and 24 hours without fever.  He must then adhere to strict masking for an additional 5 days.    Sincerely,         Raisa Chiang PA-C

## 2023-12-27 NOTE — PROGRESS NOTES
[facility]        NAME: Serafin Conklin is a 38 y.o. male  : 1985    MRN: [id number]  DATE: 2023  TIME: 5:27 PM    Assessment and Plan   COVID-19 [U07.1]  1. COVID-19  Poct Covid 19 Rapid Antigen Test    nirmatrelvir & ritonavir (Paxlovid, 300/100,) tablet therapy pack        Because of patient's medical history he is at higher risk of progression to severe illness.  Discussed risks versus benefits of treatment with Paxlovid and patient would like to proceed with treatment.  Last GFR was from 4 months ago and was 116.  Advised patient not to take Cialis while on Paxlovid.    Discussed strict return to care precautions as well as red flag symptoms which should prompt immediate ED referral. Pt verbalized understanding and is in agreement with plan.  Please follow up with your primary care provider within the next week. Please remember that your visit today was with an urgent care provider and should not replace follow up with your primary care provider for chronic medical issues or annual physicals.   (Directly from CDC website)  IF YOU TEST POSITIVE FOR COVID-19:  If you have symptoms, you can end isolation after 5 full days if you are fever-free for 24 hours without the use of fever-reducing medication and your other symptoms have improved. Loss of taste and/or smell may persist for weeks or more and should not delay the end of isolation. Your first day of symptoms is DAY ZERO. You should continue to wear a well-fitting mask (like N95, KN95) both at home and in public for 5 additional days. Avoid people who are at high risk for severe disease for at least 10 days. DO NOT go to places where you are unable to wear a mask until a full 10 days from your first symptom.  If you have no symptoms, quarantine for 5 days from the day you were tested. The day you were tested is DAY ZERO. Continue wearing a mask around others until day 10. If you develop symptoms, your 5-day isolation period  starts over.  If you have/had severe symptoms and/or a compromised immune system, you may need to isolate longer. Consult with your primary care provider about when you can resume being around other people.    IF YOU HAVE HAD CLOSE EXPOSURE:  QUARANTINE IF: You are ages 18 or older and either have not been vaccinated, or have been vaccinated with your primary series but not the booster. You must quarantine for at least 5 days after your last contact. If you develop symptoms, get tested immediately. If you do not develop symptoms, get tested at least 5 days after your last exposure. Avoid people who are immunocompromised at high risk for severe disease until at least after 10 days.  YOU DO NOT HAVE TO QUARANTINE IF:   You are 18 years or older and have received all recommended vaccine doses, including boosters and additional primary shots for some immunocompromised people.  You are ages 5-17 and completed the primary series of COVID-19 vaccines.  You had confirmed COVID-19 within the last 90 days on a viral test.  You should still wear a well-fitting mask around others for 10 days from the date of your last close exposure to COVID-19, and get tested at least 5 days later.  Quarantine and isolation guidelines differ for healthcare professionals.      Patient Instructions       Follow up with PCP in 3-5 days.  Proceed to  ER if symptoms worsen.    Chief Complaint     Chief Complaint   Patient presents with    Cold Like Symptoms     Pt presents with runny nose, head congestion that started on Monday, now cough, scratchy throat started; positive covid exposure         History of Present Illness       Serafin Conklin is a(n) 38 y.o. male presenting with URI symptoms x 2 days  Past medical history: DM2, HTN, morbid obesity  Congestion: yes  Sore throat: yes  Cough: yes  Sputum production: no  Fever: no  Body aches: yes  Loss of smell/taste: no  GI symptoms: no  Known sick contacts: yes, coworkers have covid  No cp, sob, leg  swelling, wheezing.        Review of Systems   Review of Systems   Constitutional:         Negative except as noted in HPI   Respiratory:  Negative for shortness of breath.    Cardiovascular:  Negative for chest pain.         Current Medications       Current Outpatient Medications:     Azelastine HCl 137 MCG/SPRAY SOLN, 1 SPRAY INTO EACH NOSTRIL 2 (TWO) TIMES A DAY USE IN EACH NOSTRIL AS DIRECTED, Disp: 30 mL, Rfl: 3    lisinopril (ZESTRIL) 10 mg tablet, Take 1 tablet (10 mg total) by mouth daily, Disp: 90 tablet, Rfl: 3    nirmatrelvir & ritonavir (Paxlovid, 300/100,) tablet therapy pack, Take 3 tablets by mouth 2 (two) times a day for 5 days Take 2 nirmatrelvir tablets + 1 ritonavir tablet together per dose, Disp: 30 tablet, Rfl: 0    semaglutide, 2 mg/dose, (Ozempic, 2 MG/DOSE,) 8 mg/ mL injection pen, INJECT 0.75 ML (2 MG) SUBCUTANEOUSLY EVERY 7 DAYS, Disp: 9 mL, Rfl: 1    tadalafil (CIALIS) 10 MG tablet, Take 1 tablet (10 mg total) by mouth daily as needed for erectile dysfunction, Disp: 30 tablet, Rfl: 0    diclofenac (VOLTAREN) 75 mg EC tablet, Take 1 tablet (75 mg total) by mouth in the morning (Patient not taking: Reported on 12/6/2023), Disp: 60 tablet, Rfl: 1    methylPREDNISolone 4 MG tablet therapy pack, Use as directed on package (Patient not taking: Reported on 12/27/2023), Disp: 21 each, Rfl: 0    Current Allergies     Allergies as of 12/27/2023 - Reviewed 12/27/2023   Allergen Reaction Noted    Other Angioedema 10/23/2019    Shellfish-derived products - food allergy Anaphylaxis 04/14/2022    Penicillins Hives 07/14/2015    Penicillin g Hives 08/08/2022            The following portions of the patient's history were reviewed and updated as appropriate: allergies, current medications, past family history, past medical history, past social history, past surgical history and problem list.     Past Medical History:   Diagnosis Date    Diabetes mellitus (HCC)     Hypertension     Nasal congestion      Frequently    PONV (postoperative nausea and vomiting)     Sleep apnea     5.5 apnea scale       Past Surgical History:   Procedure Laterality Date    EPIDURAL BLOCK INJECTION N/A 11/15/2023    Procedure: L5-S1  LUMBAR epidural steroid injection (29780);  Surgeon: Lewis Pittman DO;  Location: [facility] MAIN OR;  Service: Pain Management     SC SEPTOPLASTY/SUBMUCOUS RESECJ W/WO CARTILAGE GRF N/A 11/28/2022    Procedure: SEPTOPLASTY;  Surgeon: Abhi Couch MD;  Location: [facility] MAIN OR;  Service: ENT    SC SUBMUCOUS RESCJ INFERIOR TURBINATE PRTL/COMPL Bilateral 11/28/2022    Procedure: TURBINECTOMY;  Surgeon: Abhi Couch MD;  Location: [facility] MAIN OR;  Service: ENT       Family History   Problem Relation Age of Onset    Diabetes type II Mother     Diabetes Mother     Diabetes Maternal Grandmother     Diabetes type II Maternal Grandmother     Arthritis Maternal Grandmother     Cancer Maternal Grandfather         Liver cancer         Medications have been verified.        Objective   Pulse 100   Temp (!) 97.4 °F (36.3 °C)   Resp 16   Wt 128 kg (283 lb)   SpO2 100%   BMI 45.40 kg/m²        Physical Exam     Physical Exam  Vitals and nursing note reviewed.   Constitutional:       General: He is not in acute distress.     Appearance: Normal appearance. He is not toxic-appearing.   HENT:      Head: Normocephalic and atraumatic.      Nose: No congestion.      Mouth/Throat:      Mouth: Mucous membranes are moist.      Pharynx: Oropharynx is clear. No oropharyngeal exudate or posterior oropharyngeal erythema.   Eyes:      Conjunctiva/sclera: Conjunctivae normal.      Pupils: Pupils are equal, round, and reactive to light.   Cardiovascular:      Rate and Rhythm: Normal rate and regular rhythm.      Heart sounds: Normal heart sounds.   Pulmonary:      Effort: Pulmonary effort is normal. No respiratory distress.      Breath sounds: Normal breath sounds. No wheezing, rhonchi or rales.   Musculoskeletal:      Cervical  back: Normal range of motion and neck supple.   Skin:     General: Skin is warm and dry.      Capillary Refill: Capillary refill takes less than 2 seconds.   Neurological:      Mental Status: He is alert and oriented to person, place, and time.   Psychiatric:         Behavior: Behavior normal.

## 2023-12-28 ENCOUNTER — TELEPHONE (OUTPATIENT)
Dept: BARIATRICS | Facility: CLINIC | Age: 38
End: 2023-12-28

## 2023-12-28 NOTE — TELEPHONE ENCOUNTER
Called pt to review preop diet for bariatric surgery scheduled on 1/15/2024. Discussed diet regimen and possible side effects. Questions answered. Pt to start diet 1/1/2024. Will attend PreOp class 1/5/2024  Copy of diet emailed for pt's reference

## 2023-12-28 NOTE — TELEPHONE ENCOUNTER
Spoke with Serafin.  Discussed his Ozempic use.  He states he takes it for diabetes and weight loss.  I reviewed with him that  patients  must stop these medications prior to surgery anywhere from 2-4 weeks prior.  Since he takes this for diabetes management; he will speak with his PCP about how to handle stopping it and his diabetes care.    He also had questions on the liquid diet and when to start.  I told him I would ask Marci to call him next Tuesday to instruct him on what to do and that we would review again in class on 1/5.

## 2024-01-02 ENCOUNTER — ANESTHESIA EVENT (OUTPATIENT)
Dept: PERIOP | Facility: HOSPITAL | Age: 39
DRG: 621 | End: 2024-01-02
Payer: COMMERCIAL

## 2024-01-02 NOTE — PRE-PROCEDURE INSTRUCTIONS
Pre-Surgery Instructions:   Medication Instructions    Azelastine HCl 137 MCG/SPRAY SOLN May use day of surgery if needed      lisinopril (ZESTRIL) 10 mg tablet Hold day of surgery      tadalafil (CIALIS) 10 MG tablet Hold day of surgery      Medication instructions for day surgery reviewed. Please use only a sip of water to take your instructed medications. Avoid all over the counter vitamins, supplements and NSAIDS for one week prior to surgery per anesthesia guidelines. Tylenol is ok to take as needed.     You will receive a call one business day prior to surgery with an arrival time and hospital directions. If your surgery is scheduled on a Monday, the hospital will be calling you on the Friday prior to your surgery. If you have not heard from anyone by 8pm, please call the hospital supervisor through the hospital  at [phone]. (Ronald [phone]).    Do not eat or drink anything after midnight the night before your surgery, including candy, mints, lifesavers, or chewing gum. Do not drink alcohol 24hrs before your surgery. Try not to smoke at least 24hrs before your surgery.       Follow the pre surgery showering instructions as listed in the “My Surgical Experience Booklet” or otherwise provided by your surgeon's office. Do not use a blade to shave the surgical area 1 week before surgery. It is okay to use a clean electric clippers up to 24 hours before surgery. Do not apply any lotions, creams, including makeup, cologne, deodorant, or perfumes after showering on the day of your surgery. Do not use dry shampoo, hair spray, hair gel, or any type of hair products.     No contact lenses, eye make-up, or artificial eyelashes. Remove nail polish, including gel polish, and any artificial, gel, or acrylic nails if possible. Remove all jewelry including rings and body piercing jewelry.     Wear causal clothing that is easy to take on and off. Consider your type of surgery.    Keep any valuables,  jewelry, piercings at home. Please bring any specially ordered equipment (sling, braces) if indicated.    Arrange for a responsible person to drive you to and from the hospital on the day of your surgery. Visitor Guidelines discussed.     Call the surgeon's office with any new illnesses, exposures, or additional questions prior to surgery.    Please reference your “My Surgical Experience Booklet” for additional information to prepare for your upcoming surgery.

## 2024-01-03 ENCOUNTER — TELEPHONE (OUTPATIENT)
Dept: BARIATRICS | Facility: CLINIC | Age: 39
End: 2024-01-03

## 2024-01-03 NOTE — TELEPHONE ENCOUNTER
Pt emailed with diet question about pre-op diet. Sent pt following response:    Young Munozson,    I see you have started the pre-op diet on the 1st.  One the pre-op diet the only food you are allowed to consume are the 2 cups of non-starchy veggies, so I am sorry--no Greek yogurt, PB or cheese at this time.  You should be drinking 4 protein shakes per day, 80oz of ranjana free fluids, 2 cups of non-starchy veggies, some SF Jell-O and SF ice pops.     I also see you are on Ozempic for your DM.  Has anyone advised you to talk to your doctor who has prescribed that med in order for it to be stopped for surgery?  Since surgery is on the 15th we would like you to stop the Ozempic as soon as possible and talk to your doctor to see if he/she would like you stop start a substitute med in the meantime.  The reason to stop this med is because it slows the emptying of the food from your stomach and we definitely don't want food in your stomach at the time of surgery.      I will reach out to you to discuss this in more detail.    Talk soon,      Lauren Myhren, RD, LDN  Surgical Dietitian

## 2024-01-03 NOTE — TELEPHONE ENCOUNTER
Pt emailed back stating that he talked to his MD about the Ozempic and he last took it on Dec 23rd.

## 2024-01-05 ENCOUNTER — OFFICE VISIT (OUTPATIENT)
Dept: BARIATRICS | Facility: CLINIC | Age: 39
End: 2024-01-05
Payer: COMMERCIAL

## 2024-01-05 ENCOUNTER — TELEPHONE (OUTPATIENT)
Dept: BARIATRICS | Facility: CLINIC | Age: 39
End: 2024-01-05

## 2024-01-05 ENCOUNTER — CLINICAL SUPPORT (OUTPATIENT)
Dept: BARIATRICS | Facility: CLINIC | Age: 39
End: 2024-01-05

## 2024-01-05 VITALS
HEART RATE: 91 BPM | SYSTOLIC BLOOD PRESSURE: 118 MMHG | HEIGHT: 66 IN | BODY MASS INDEX: 43.71 KG/M2 | DIASTOLIC BLOOD PRESSURE: 84 MMHG | WEIGHT: 272 LBS

## 2024-01-05 DIAGNOSIS — E66.01 MORBID (SEVERE) OBESITY DUE TO EXCESS CALORIES (HCC): Primary | ICD-10-CM

## 2024-01-05 DIAGNOSIS — E66.01 MORBID OBESITY (HCC): Primary | ICD-10-CM

## 2024-01-05 DIAGNOSIS — Z98.84 BARIATRIC SURGERY STATUS: ICD-10-CM

## 2024-01-05 PROCEDURE — RECHECK

## 2024-01-05 PROCEDURE — 99213 OFFICE O/P EST LOW 20 MIN: CPT | Performed by: SURGERY

## 2024-01-05 RX ORDER — CELECOXIB 200 MG/1
200 CAPSULE ORAL ONCE
OUTPATIENT
Start: 2024-01-05 | End: 2024-01-05

## 2024-01-05 RX ORDER — HEPARIN SODIUM 5000 [USP'U]/ML
5000 INJECTION, SOLUTION INTRAVENOUS; SUBCUTANEOUS ONCE
OUTPATIENT
Start: 2024-01-05 | End: 2024-01-05

## 2024-01-05 RX ORDER — ACETAMINOPHEN 10 MG/ML
1000 INJECTION, SOLUTION INTRAVENOUS ONCE
OUTPATIENT
Start: 2024-01-05 | End: 2024-01-05

## 2024-01-05 RX ORDER — OXYCODONE HYDROCHLORIDE 5 MG/1
5 TABLET ORAL EVERY 4 HOURS PRN
Qty: 10 TABLET | Refills: 0 | Status: SHIPPED | OUTPATIENT
Start: 2024-01-05

## 2024-01-05 RX ORDER — LEVOFLOXACIN 5 MG/ML
750 INJECTION, SOLUTION INTRAVENOUS ONCE
OUTPATIENT
Start: 2024-01-05 | End: 2024-01-05

## 2024-01-05 RX ORDER — PANTOPRAZOLE SODIUM 40 MG/1
40 TABLET, DELAYED RELEASE ORAL DAILY
Qty: 90 TABLET | Refills: 1 | Status: SHIPPED | OUTPATIENT
Start: 2024-01-05

## 2024-01-05 NOTE — H&P (VIEW-ONLY)
H&P Exam - Bariatric Surgery   Serafin Conklin 38 y.o. male MRN: [id number]   Encounter: [id number]      HPI:    38 y.o. yo morbidly obese male  found to be a good candidate to undergo a weight loss operation upon being enrolled here at the [facility].  He has been pre certified to undergo a Laparoscopic bibiana-en-y gastric bypass.    ++Suffers from DM2 (3 years, Ozempic), HTN, NAFLD, ED  S/p deviated septum    Here today to review his pre op test results.    Has been medically cleared for the procedure.    I have explained our Enhanced Recovery After Bariatric Surgery (ERABS) protocol and benefits including preoperative, intraoperative and postoperative elements.     I have discussed with him at length the risks and benefits of the operation and reiterated the components of our multidisciplinary program and the importance of compliance and follow up in the post operative period. Although there is a great statistical chance of improvement or even resolution of most of his associated comorbidities, the results vary from patient to patient and they largely depend on his commitment.     The patient was also instructed with regards to the importance of behavior modification, nutritional counseling, support meeting attendance and lifestyle changes that are important to ensure success.    He was given the opportunity to ask questions and I have answered all of them.    I have addressed with the patient the level of CODE STATUS for this hospital stay and after explaining the different options currently he wishes to be a Level I.   He understands and wishes to proceed.    He has lost all the weight required prior to surgery.    Review of Systems   Constitutional: Negative.    Respiratory: Negative.     Cardiovascular: Negative.    Gastrointestinal: Negative.    Musculoskeletal: Negative.    Neurological: Negative.    All other systems reviewed and are negative.      Historical Information   Past Medical  "History:   Diagnosis Date    Asthma     as child    Diabetes mellitus (HCC)     Fatty liver     Hypertension     Nasal congestion     Frequently    PONV (postoperative nausea and vomiting)     Sleep apnea     5.5 apnea scale// no cpap     Past Surgical History:   Procedure Laterality Date    EPIDURAL BLOCK INJECTION N/A 11/15/2023    Procedure: L5-S1  LUMBAR epidural steroid injection (30673);  Surgeon: Lewis Pittman DO;  Location: [facility] MAIN OR;  Service: Pain Management     CT SEPTOPLASTY/SUBMUCOUS RESECJ W/WO CARTILAGE GRF N/A 2022    Procedure: SEPTOPLASTY;  Surgeon: Abhi Couch MD;  Location: [facility] MAIN OR;  Service: ENT    CT SUBMUCOUS RESCJ INFERIOR TURBINATE PRTL/COMPL Bilateral 2022    Procedure: TURBINECTOMY;  Surgeon: Abhi Couch MD;  Location: [facility] MAIN OR;  Service: ENT     Social History   Social History     Substance and Sexual Activity   Alcohol Use Yes    Comment: rare     Social History     Substance and Sexual Activity   Drug Use Never     Social History     Tobacco Use   Smoking Status Former    Types: Cigars    Quit date: 2023    Years since quittin.3    Passive exposure: Past   Smokeless Tobacco Never   Tobacco Comments    Wife just had baby not smoking right now    Pt stated stopped smoking cigars over a month ago     Family History: non-contributory    Meds/Allergies   all medications and allergies reviewed  Allergies   Allergen Reactions    Other Angioedema    Shellfish-Derived Products - Food Allergy Anaphylaxis    Penicillins Hives    Penicillin G Hives       Objective     Current Vitals:   Blood Pressure: 118/84 (24 1312)  Pulse: 91 (24 1312)  Height: 5' 6.2\" (168.1 cm) (24 1312)  Weight - Scale: 123 kg (272 lb) (24 1312)    Physical Exam  Vitals reviewed.   Constitutional:       Appearance: He is well-developed.   HENT:      Head: Normocephalic.   Eyes:      Extraocular Movements: Extraocular movements intact.   Cardiovascular: "      Rate and Rhythm: Normal rate.      Heart sounds: Normal heart sounds.   Pulmonary:      Effort: Pulmonary effort is normal.      Breath sounds: Normal breath sounds.   Abdominal:      General: There is no distension.   Musculoskeletal:         General: Normal range of motion.      Cervical back: Normal range of motion.   Neurological:      Mental Status: He is alert and oriented to person, place, and time.   Psychiatric:         Mood and Affect: Mood normal.         Behavior: Behavior normal.         Thought Content: Thought content normal.         Judgment: Judgment normal.         Lab Results: I have personally reviewed pertinent lab results.    Imaging: I have personally reviewed pertinent reports.    EKG, Pathology, and Other Studies: I have personally reviewed pertinent reports.      Code Status: Level 1    Assessment:  38 y.o. yo morbidly obese male found to be a good candidate to undergo a weight loss operation upon being enrolled here at the [facility]. He has completed all of the preoperative process for bariatric surgery.    Plan:  - Plan for laparoscopic possible open RYGB with intraoperative EGD  - I discussed the risk of rescheduling/canceling the case if goal weight not met.  - consent signed  - preop orders placed

## 2024-01-05 NOTE — TELEPHONE ENCOUNTER
Spoke to the SOC MD/ NP d/t COVID dx on 12/27. Pt is able to keep surgery scheduled for 1/15. No clearances needed.   Advised patient there will be no changes.

## 2024-01-05 NOTE — PROGRESS NOTES
Pt attended pre-op education session. Standardized packet of information for bariatric surgery was provided and reviewed with pt. Importance of lifestyle change and development of regular exercise routine stressed.   Pt. educated on two-week pre operative liquid protein liver shrinking diet.  Pt understands that the diet needs to be followed for 2 weeks prior to surgery. Handout reviewed.   Emphasized the need to drink 80 ounces of fluid per day while on the diet and to contact PCP to adjust any diabetes or blood pressure medicines prior to starting the diet.  Patient will not eat any solid food for 2 days prior to surgery, including 2 cups of non starchy vegetables to ensure that the stomach will be empty day of surgery. Reviewed Ensure pre-surgery ERAS drink instructions, protein supplement suggestions, post-operative clear liquid, full liquid, and pureed diet stages, post-operative nutrition rules and facts, and post-operative bariatric multivitamin/mineral recommendations and brand comparison. Reviewed instructions for stopping or tapering anti-obesity medications prior to surgery.      Pt given the opportunity to ask questions. Questions were answered. Pt verbalized understanding of all information provided. Pt appeared prepared for upcoming surgery. Contact information provided for any questions/concerns.      Pt started pre-op diet on Jan 1st, last Ozempic was on Dec 23rd.

## 2024-01-05 NOTE — PROGRESS NOTES
H&P Exam - Bariatric Surgery   Serafin Conklin 38 y.o. male MRN: [id number]   Encounter: [id number]      HPI:    38 y.o. yo morbidly obese male  found to be a good candidate to undergo a weight loss operation upon being enrolled here at the [facility].  He has been pre certified to undergo a Laparoscopic bibiana-en-y gastric bypass.    ++Suffers from DM2 (3 years, Ozempic), HTN, NAFLD, ED  S/p deviated septum    Here today to review his pre op test results.    Has been medically cleared for the procedure.    I have explained our Enhanced Recovery After Bariatric Surgery (ERABS) protocol and benefits including preoperative, intraoperative and postoperative elements.     I have discussed with him at length the risks and benefits of the operation and reiterated the components of our multidisciplinary program and the importance of compliance and follow up in the post operative period. Although there is a great statistical chance of improvement or even resolution of most of his associated comorbidities, the results vary from patient to patient and they largely depend on his commitment.     The patient was also instructed with regards to the importance of behavior modification, nutritional counseling, support meeting attendance and lifestyle changes that are important to ensure success.    He was given the opportunity to ask questions and I have answered all of them.    I have addressed with the patient the level of CODE STATUS for this hospital stay and after explaining the different options currently he wishes to be a Level I.   He understands and wishes to proceed.    He has lost all the weight required prior to surgery.    Review of Systems   Constitutional: Negative.    Respiratory: Negative.     Cardiovascular: Negative.    Gastrointestinal: Negative.    Musculoskeletal: Negative.    Neurological: Negative.    All other systems reviewed and are negative.      Historical Information   Past Medical  "History:   Diagnosis Date    Asthma     as child    Diabetes mellitus (HCC)     Fatty liver     Hypertension     Nasal congestion     Frequently    PONV (postoperative nausea and vomiting)     Sleep apnea     5.5 apnea scale// no cpap     Past Surgical History:   Procedure Laterality Date    EPIDURAL BLOCK INJECTION N/A 11/15/2023    Procedure: L5-S1  LUMBAR epidural steroid injection (29229);  Surgeon: Lewis Pittman DO;  Location: [facility] MAIN OR;  Service: Pain Management     [place] SEPTOPLASTY/SUBMUCOUS RESECJ W/WO CARTILAGE GRF N/A 2022    Procedure: SEPTOPLASTY;  Surgeon: Abhi Couch MD;  Location: [facility] MAIN OR;  Service: ENT    NJ SUBMUCOUS RESCJ INFERIOR TURBINATE PRTL/COMPL Bilateral 2022    Procedure: TURBINECTOMY;  Surgeon: Abhi Couch MD;  Location: [facility] MAIN OR;  Service: ENT     Social History   Social History     Substance and Sexual Activity   Alcohol Use Yes    Comment: rare     Social History     Substance and Sexual Activity   Drug Use Never     Social History     Tobacco Use   Smoking Status Former    Types: Cigars    Quit date: 2023    Years since quittin.3    Passive exposure: Past   Smokeless Tobacco Never   Tobacco Comments    Wife just had baby not smoking right now    Pt stated stopped smoking cigars over a month ago     Family History: non-contributory    Meds/Allergies   all medications and allergies reviewed  Allergies   Allergen Reactions    Other Angioedema    Shellfish-Derived Products - Food Allergy Anaphylaxis    Penicillins Hives    Penicillin G Hives       Objective     Current Vitals:   Blood Pressure: 118/84 (24 1312)  Pulse: 91 (24 1312)  Height: 5' 6.2\" (168.1 cm) (24 1312)  Weight - Scale: 123 kg (272 lb) (24 1312)    Physical Exam  Vitals reviewed.   Constitutional:       Appearance: He is well-developed.   HENT:      Head: Normocephalic.   Eyes:      Extraocular Movements: Extraocular movements intact.   Cardiovascular: "      Rate and Rhythm: Normal rate.      Heart sounds: Normal heart sounds.   Pulmonary:      Effort: Pulmonary effort is normal.      Breath sounds: Normal breath sounds.   Abdominal:      General: There is no distension.   Musculoskeletal:         General: Normal range of motion.      Cervical back: Normal range of motion.   Neurological:      Mental Status: He is alert and oriented to person, place, and time.   Psychiatric:         Mood and Affect: Mood normal.         Behavior: Behavior normal.         Thought Content: Thought content normal.         Judgment: Judgment normal.         Lab Results: I have personally reviewed pertinent lab results.    Imaging: I have personally reviewed pertinent reports.    EKG, Pathology, and Other Studies: I have personally reviewed pertinent reports.      Code Status: Level 1    Assessment:  38 y.o. yo morbidly obese male found to be a good candidate to undergo a weight loss operation upon being enrolled here at the [facility]. He has completed all of the preoperative process for bariatric surgery.    Plan:  - Plan for laparoscopic possible open RYGB with intraoperative EGD  - I discussed the risk of rescheduling/canceling the case if goal weight not met.  - consent signed  - preop orders placed

## 2024-01-05 NOTE — H&P
H&P Exam - Bariatric Surgery   Serafin Conklin 38 y.o. male MRN: [id number]   Encounter: [id number]      HPI:    38 y.o. yo morbidly obese male  found to be a good candidate to undergo a weight loss operation upon being enrolled here at the [facility].  He has been pre certified to undergo a Laparoscopic bibiana-en-y gastric bypass.    ++Suffers from DM2 (3 years, Ozempic), HTN, NAFLD, ED  S/p deviated septum    Here today to review his pre op test results.    Has been medically cleared for the procedure.    I have explained our Enhanced Recovery After Bariatric Surgery (ERABS) protocol and benefits including preoperative, intraoperative and postoperative elements.     I have discussed with him at length the risks and benefits of the operation and reiterated the components of our multidisciplinary program and the importance of compliance and follow up in the post operative period. Although there is a great statistical chance of improvement or even resolution of most of his associated comorbidities, the results vary from patient to patient and they largely depend on his commitment.     The patient was also instructed with regards to the importance of behavior modification, nutritional counseling, support meeting attendance and lifestyle changes that are important to ensure success.    He was given the opportunity to ask questions and I have answered all of them.    I have addressed with the patient the level of CODE STATUS for this hospital stay and after explaining the different options currently he wishes to be a Level I.   He understands and wishes to proceed.    He has lost all the weight required prior to surgery.    Review of Systems   Constitutional: Negative.    Respiratory: Negative.     Cardiovascular: Negative.    Gastrointestinal: Negative.    Musculoskeletal: Negative.    Neurological: Negative.    All other systems reviewed and are negative.      Historical Information   Past Medical  "History:   Diagnosis Date    Asthma     as child    Diabetes mellitus (HCC)     Fatty liver     Hypertension     Nasal congestion     Frequently    PONV (postoperative nausea and vomiting)     Sleep apnea     5.5 apnea scale// no cpap     Past Surgical History:   Procedure Laterality Date    EPIDURAL BLOCK INJECTION N/A 11/15/2023    Procedure: L5-S1  LUMBAR epidural steroid injection ([id number]);  Surgeon: Lewis Pittman DO;  Location: [facility] MAIN OR;  Service: Pain Management     OK SEPTOPLASTY/SUBMUCOUS RESECJ W/WO CARTILAGE GRF N/A 2022    Procedure: SEPTOPLASTY;  Surgeon: Abhi Couch MD;  Location: [facility] MAIN OR;  Service: ENT    OK SUBMUCOUS RESCJ INFERIOR TURBINATE PRTL/COMPL Bilateral 2022    Procedure: TURBINECTOMY;  Surgeon: Abhi Couch MD;  Location: [facility] MAIN OR;  Service: ENT     Social History   Social History     Substance and Sexual Activity   Alcohol Use Yes    Comment: rare     Social History     Substance and Sexual Activity   Drug Use Never     Social History     Tobacco Use   Smoking Status Former    Types: Cigars    Quit date: 2023    Years since quittin.3    Passive exposure: Past   Smokeless Tobacco Never   Tobacco Comments    Wife just had baby not smoking right now    Pt stated stopped smoking cigars over a month ago     Family History: non-contributory    Meds/Allergies   all medications and allergies reviewed  Allergies   Allergen Reactions    Other Angioedema    Shellfish-Derived Products - Food Allergy Anaphylaxis    Penicillins Hives    Penicillin G Hives       Objective     Current Vitals:   Blood Pressure: 118/84 (24 1312)  Pulse: 91 (24 1312)  Height: 5' 6.2\" (168.1 cm) (24 1312)  Weight - Scale: 123 kg (272 lb) (24 1312)    Physical Exam  Vitals reviewed.   Constitutional:       Appearance: He is well-developed.   HENT:      Head: Normocephalic.   Eyes:      Extraocular Movements: Extraocular movements intact.   Cardiovascular: "      Rate and Rhythm: Normal rate.      Heart sounds: Normal heart sounds.   Pulmonary:      Effort: Pulmonary effort is normal.      Breath sounds: Normal breath sounds.   Abdominal:      General: There is no distension.   Musculoskeletal:         General: Normal range of motion.      Cervical back: Normal range of motion.   Neurological:      Mental Status: He is alert and oriented to person, place, and time.   Psychiatric:         Mood and Affect: Mood normal.         Behavior: Behavior normal.         Thought Content: Thought content normal.         Judgment: Judgment normal.         Lab Results: I have personally reviewed pertinent lab results.    Imaging: I have personally reviewed pertinent reports.    EKG, Pathology, and Other Studies: I have personally reviewed pertinent reports.      Code Status: Level 1    Assessment:  38 y.o. yo morbidly obese male found to be a good candidate to undergo a weight loss operation upon being enrolled here at the [facility]. He has completed all of the preoperative process for bariatric surgery.    Plan:  - Plan for laparoscopic possible open RYGB with intraoperative EGD  - I discussed the risk of rescheduling/canceling the case if goal weight not met.  - consent signed  - preop orders placed

## 2024-01-10 ENCOUNTER — TELEPHONE (OUTPATIENT)
Dept: BARIATRICS | Facility: CLINIC | Age: 39
End: 2024-01-10

## 2024-01-10 NOTE — TELEPHONE ENCOUNTER
Pre-op call was made to patient to follow up on how they are doing and to remind them to continue with all medical and dietary directions that were given at pre-op class regarding liver shrinking diet and hydration. Advised to stop eating all vegetables 48hrs prior to surgery unless directed otherwise by surgeon or RD. They were encouraged to purchase all vitamins and protein shakes for post op use as well as to begin Miralax three days prior to surgery as directed in Section 6 of their manual.  They were reminded of the Ensure Pre-surgery drinks protocol and to bring their completed yellow form with them to surgery as well as their CPAP-BiPAP machine if they use one.  Lastly, they were informed that they would be weighed the morning of surgery and to give the office a call if they had any further questions or concerns.

## 2024-01-15 ENCOUNTER — HOSPITAL ENCOUNTER (INPATIENT)
Facility: HOSPITAL | Age: 39
LOS: 1 days | Discharge: HOME | DRG: 621 | End: 2024-01-16
Attending: SURGERY | Admitting: SURGERY
Payer: COMMERCIAL

## 2024-01-15 ENCOUNTER — ANESTHESIA (OUTPATIENT)
Dept: PERIOP | Facility: HOSPITAL | Age: 39
DRG: 621 | End: 2024-01-15
Payer: COMMERCIAL

## 2024-01-15 DIAGNOSIS — E11.65 TYPE 2 DIABETES MELLITUS WITH HYPERGLYCEMIA, WITHOUT LONG-TERM CURRENT USE OF INSULIN (HCC): Primary | ICD-10-CM

## 2024-01-15 DIAGNOSIS — L76.82 INCISIONAL PAIN: ICD-10-CM

## 2024-01-15 DIAGNOSIS — I10 ESSENTIAL HYPERTENSION: ICD-10-CM

## 2024-01-15 PROBLEM — G47.30 SLEEP APNEA: Status: ACTIVE | Noted: 2024-01-15

## 2024-01-15 LAB
GLUCOSE SERPL-MCNC: 144 MG/DL (ref 65–140)
GLUCOSE SERPL-MCNC: 145 MG/DL (ref 65–140)
GLUCOSE SERPL-MCNC: 168 MG/DL (ref 65–140)
GLUCOSE SERPL-MCNC: 177 MG/DL (ref 65–140)
GLUCOSE SERPL-MCNC: 239 MG/DL (ref 65–140)

## 2024-01-15 PROCEDURE — C9113 INJ PANTOPRAZOLE SODIUM, VIA: HCPCS | Performed by: PHYSICIAN ASSISTANT

## 2024-01-15 PROCEDURE — 94760 N-INVAS EAR/PLS OXIMETRY 1: CPT

## 2024-01-15 PROCEDURE — 43644 LAP GASTRIC BYPASS/ROUX-EN-Y: CPT | Performed by: PHYSICIAN ASSISTANT

## 2024-01-15 PROCEDURE — 99253 IP/OBS CNSLTJ NEW/EST LOW 45: CPT | Performed by: INTERNAL MEDICINE

## 2024-01-15 PROCEDURE — 43644 LAP GASTRIC BYPASS/ROUX-EN-Y: CPT | Performed by: SURGERY

## 2024-01-15 PROCEDURE — 0D164ZA BYPASS STOMACH TO JEJUNUM, PERCUTANEOUS ENDOSCOPIC APPROACH: ICD-10-PCS | Performed by: SURGERY

## 2024-01-15 PROCEDURE — 82948 REAGENT STRIP/BLOOD GLUCOSE: CPT

## 2024-01-15 PROCEDURE — C9290 INJ, BUPIVACAINE LIPOSOME: HCPCS | Performed by: SURGERY

## 2024-01-15 RX ORDER — DEXAMETHASONE SODIUM PHOSPHATE 10 MG/ML
INJECTION, SOLUTION INTRAMUSCULAR; INTRAVENOUS AS NEEDED
Status: DISCONTINUED | OUTPATIENT
Start: 2024-01-15 | End: 2024-01-15

## 2024-01-15 RX ORDER — OXYCODONE HCL 5 MG/5 ML
10 SOLUTION, ORAL ORAL EVERY 4 HOURS PRN
Status: DISCONTINUED | OUTPATIENT
Start: 2024-01-15 | End: 2024-01-16 | Stop reason: HOSPADM

## 2024-01-15 RX ORDER — CELECOXIB 100 MG/1
200 CAPSULE ORAL ONCE
Status: COMPLETED | OUTPATIENT
Start: 2024-01-15 | End: 2024-01-15

## 2024-01-15 RX ORDER — LISINOPRIL 10 MG/1
10 TABLET ORAL DAILY
Status: DISCONTINUED | OUTPATIENT
Start: 2024-01-15 | End: 2024-01-15

## 2024-01-15 RX ORDER — SODIUM CHLORIDE, SODIUM LACTATE, POTASSIUM CHLORIDE, CALCIUM CHLORIDE 600; 310; 30; 20 MG/100ML; MG/100ML; MG/100ML; MG/100ML
100 INJECTION, SOLUTION INTRAVENOUS CONTINUOUS
Status: DISCONTINUED | OUTPATIENT
Start: 2024-01-15 | End: 2024-01-16 | Stop reason: HOSPADM

## 2024-01-15 RX ORDER — ONDANSETRON 2 MG/ML
4 INJECTION INTRAMUSCULAR; INTRAVENOUS ONCE AS NEEDED
Status: COMPLETED | OUTPATIENT
Start: 2024-01-15 | End: 2024-01-15

## 2024-01-15 RX ORDER — ROCURONIUM BROMIDE 10 MG/ML
INJECTION, SOLUTION INTRAVENOUS AS NEEDED
Status: DISCONTINUED | OUTPATIENT
Start: 2024-01-15 | End: 2024-01-15

## 2024-01-15 RX ORDER — ONDANSETRON 2 MG/ML
INJECTION INTRAMUSCULAR; INTRAVENOUS AS NEEDED
Status: DISCONTINUED | OUTPATIENT
Start: 2024-01-15 | End: 2024-01-15

## 2024-01-15 RX ORDER — MAGNESIUM HYDROXIDE 1200 MG/15ML
LIQUID ORAL AS NEEDED
Status: DISCONTINUED | OUTPATIENT
Start: 2024-01-15 | End: 2024-01-15 | Stop reason: HOSPADM

## 2024-01-15 RX ORDER — SODIUM CHLORIDE 9 MG/ML
INJECTION, SOLUTION INTRAVENOUS CONTINUOUS PRN
Status: DISCONTINUED | OUTPATIENT
Start: 2024-01-15 | End: 2024-01-15

## 2024-01-15 RX ORDER — INSULIN LISPRO 100 [IU]/ML
1-6 INJECTION, SOLUTION INTRAVENOUS; SUBCUTANEOUS
Status: DISCONTINUED | OUTPATIENT
Start: 2024-01-15 | End: 2024-01-16 | Stop reason: HOSPADM

## 2024-01-15 RX ORDER — HYDROMORPHONE HCL/PF 1 MG/ML
SYRINGE (ML) INJECTION AS NEEDED
Status: DISCONTINUED | OUTPATIENT
Start: 2024-01-15 | End: 2024-01-15

## 2024-01-15 RX ORDER — FAMOTIDINE 10 MG/ML
20 INJECTION, SOLUTION INTRAVENOUS EVERY 12 HOURS SCHEDULED
Status: DISCONTINUED | OUTPATIENT
Start: 2024-01-15 | End: 2024-01-16 | Stop reason: HOSPADM

## 2024-01-15 RX ORDER — ACETAMINOPHEN 325 MG/1
1000 TABLET ORAL EVERY 8 HOURS SCHEDULED
Start: 2024-01-15 | End: 2024-01-24

## 2024-01-15 RX ORDER — PROMETHAZINE HYDROCHLORIDE 25 MG/ML
25 INJECTION, SOLUTION INTRAMUSCULAR; INTRAVENOUS EVERY 6 HOURS PRN
Status: DISCONTINUED | OUTPATIENT
Start: 2024-01-15 | End: 2024-01-16 | Stop reason: HOSPADM

## 2024-01-15 RX ORDER — DIPHENHYDRAMINE HCL 25 MG
25 TABLET ORAL
Status: DISCONTINUED | OUTPATIENT
Start: 2024-01-15 | End: 2024-01-16 | Stop reason: HOSPADM

## 2024-01-15 RX ORDER — PANTOPRAZOLE SODIUM 40 MG/10ML
40 INJECTION, POWDER, LYOPHILIZED, FOR SOLUTION INTRAVENOUS ONCE
Status: COMPLETED | OUTPATIENT
Start: 2024-01-15 | End: 2024-01-15

## 2024-01-15 RX ORDER — ACETAMINOPHEN 10 MG/ML
1000 INJECTION, SOLUTION INTRAVENOUS EVERY 8 HOURS SCHEDULED
Status: DISCONTINUED | OUTPATIENT
Start: 2024-01-15 | End: 2024-01-16 | Stop reason: HOSPADM

## 2024-01-15 RX ORDER — LIDOCAINE HYDROCHLORIDE 20 MG/ML
INJECTION, SOLUTION EPIDURAL; INFILTRATION; INTRACAUDAL; PERINEURAL AS NEEDED
Status: DISCONTINUED | OUTPATIENT
Start: 2024-01-15 | End: 2024-01-15

## 2024-01-15 RX ORDER — SIMETHICONE 80 MG
80 TABLET,CHEWABLE ORAL EVERY 12 HOURS SCHEDULED
Status: DISCONTINUED | OUTPATIENT
Start: 2024-01-15 | End: 2024-01-16 | Stop reason: HOSPADM

## 2024-01-15 RX ORDER — INSULIN LISPRO 100 [IU]/ML
2-12 INJECTION, SOLUTION INTRAVENOUS; SUBCUTANEOUS EVERY 6 HOURS SCHEDULED
Status: DISCONTINUED | OUTPATIENT
Start: 2024-01-15 | End: 2024-01-15

## 2024-01-15 RX ORDER — INSULIN LISPRO 100 [IU]/ML
1-5 INJECTION, SOLUTION INTRAVENOUS; SUBCUTANEOUS
Status: DISCONTINUED | OUTPATIENT
Start: 2024-01-15 | End: 2024-01-16 | Stop reason: HOSPADM

## 2024-01-15 RX ORDER — LEVOFLOXACIN 5 MG/ML
750 INJECTION, SOLUTION INTRAVENOUS ONCE
Status: COMPLETED | OUTPATIENT
Start: 2024-01-15 | End: 2024-01-16

## 2024-01-15 RX ORDER — OXYCODONE HCL 5 MG/5 ML
5 SOLUTION, ORAL ORAL EVERY 4 HOURS PRN
Status: DISCONTINUED | OUTPATIENT
Start: 2024-01-15 | End: 2024-01-16 | Stop reason: HOSPADM

## 2024-01-15 RX ORDER — SODIUM CHLORIDE, SODIUM LACTATE, POTASSIUM CHLORIDE, CALCIUM CHLORIDE 600; 310; 30; 20 MG/100ML; MG/100ML; MG/100ML; MG/100ML
INJECTION, SOLUTION INTRAVENOUS CONTINUOUS PRN
Status: DISCONTINUED | OUTPATIENT
Start: 2024-01-15 | End: 2024-01-15

## 2024-01-15 RX ORDER — BUPIVACAINE HYDROCHLORIDE 5 MG/ML
INJECTION, SOLUTION EPIDURAL; INTRACAUDAL AS NEEDED
Status: DISCONTINUED | OUTPATIENT
Start: 2024-01-15 | End: 2024-01-15 | Stop reason: HOSPADM

## 2024-01-15 RX ORDER — METOCLOPRAMIDE HYDROCHLORIDE 5 MG/ML
10 INJECTION INTRAMUSCULAR; INTRAVENOUS EVERY 6 HOURS PRN
Status: DISCONTINUED | OUTPATIENT
Start: 2024-01-15 | End: 2024-01-16 | Stop reason: HOSPADM

## 2024-01-15 RX ORDER — MORPHINE SULFATE 4 MG/ML
4 INJECTION, SOLUTION INTRAMUSCULAR; INTRAVENOUS EVERY 2 HOUR PRN
Status: DISCONTINUED | OUTPATIENT
Start: 2024-01-15 | End: 2024-01-16 | Stop reason: HOSPADM

## 2024-01-15 RX ORDER — LIDOCAINE HYDROCHLORIDE 10 MG/ML
0.5 INJECTION, SOLUTION EPIDURAL; INFILTRATION; INTRACAUDAL; PERINEURAL ONCE AS NEEDED
Status: DISCONTINUED | OUTPATIENT
Start: 2024-01-15 | End: 2024-01-15 | Stop reason: HOSPADM

## 2024-01-15 RX ORDER — FENTANYL CITRATE 50 UG/ML
INJECTION, SOLUTION INTRAMUSCULAR; INTRAVENOUS AS NEEDED
Status: DISCONTINUED | OUTPATIENT
Start: 2024-01-15 | End: 2024-01-15

## 2024-01-15 RX ORDER — ACETAMINOPHEN 10 MG/ML
1000 INJECTION, SOLUTION INTRAVENOUS ONCE
Status: COMPLETED | OUTPATIENT
Start: 2024-01-15 | End: 2024-01-16

## 2024-01-15 RX ORDER — HEPARIN SODIUM 5000 [USP'U]/ML
5000 INJECTION, SOLUTION INTRAVENOUS; SUBCUTANEOUS ONCE
Status: COMPLETED | OUTPATIENT
Start: 2024-01-15 | End: 2024-01-15

## 2024-01-15 RX ORDER — HYDROMORPHONE HCL/PF 1 MG/ML
0.5 SYRINGE (ML) INJECTION
Status: DISCONTINUED | OUTPATIENT
Start: 2024-01-15 | End: 2024-01-15 | Stop reason: HOSPADM

## 2024-01-15 RX ORDER — KETOROLAC TROMETHAMINE 30 MG/ML
15 INJECTION, SOLUTION INTRAMUSCULAR; INTRAVENOUS EVERY 6 HOURS SCHEDULED
Status: DISCONTINUED | OUTPATIENT
Start: 2024-01-15 | End: 2024-01-16 | Stop reason: HOSPADM

## 2024-01-15 RX ORDER — LISINOPRIL 10 MG/1
10 TABLET ORAL DAILY
Status: DISCONTINUED | OUTPATIENT
Start: 2024-01-16 | End: 2024-01-16 | Stop reason: HOSPADM

## 2024-01-15 RX ORDER — PROPOFOL 10 MG/ML
INJECTION, EMULSION INTRAVENOUS CONTINUOUS PRN
Status: DISCONTINUED | OUTPATIENT
Start: 2024-01-15 | End: 2024-01-15

## 2024-01-15 RX ORDER — PROPOFOL 10 MG/ML
INJECTION, EMULSION INTRAVENOUS AS NEEDED
Status: DISCONTINUED | OUTPATIENT
Start: 2024-01-15 | End: 2024-01-15

## 2024-01-15 RX ORDER — MIDAZOLAM HYDROCHLORIDE 2 MG/2ML
INJECTION, SOLUTION INTRAMUSCULAR; INTRAVENOUS AS NEEDED
Status: DISCONTINUED | OUTPATIENT
Start: 2024-01-15 | End: 2024-01-15

## 2024-01-15 RX ORDER — ONDANSETRON 2 MG/ML
4 INJECTION INTRAMUSCULAR; INTRAVENOUS EVERY 6 HOURS PRN
Status: DISCONTINUED | OUTPATIENT
Start: 2024-01-15 | End: 2024-01-16 | Stop reason: HOSPADM

## 2024-01-15 RX ORDER — SODIUM CHLORIDE, SODIUM LACTATE, POTASSIUM CHLORIDE, CALCIUM CHLORIDE 600; 310; 30; 20 MG/100ML; MG/100ML; MG/100ML; MG/100ML
50 INJECTION, SOLUTION INTRAVENOUS CONTINUOUS
Status: DISCONTINUED | OUTPATIENT
Start: 2024-01-15 | End: 2024-01-15

## 2024-01-15 RX ADMIN — SODIUM CHLORIDE, SODIUM LACTATE, POTASSIUM CHLORIDE, AND CALCIUM CHLORIDE: .6; .31; .03; .02 INJECTION, SOLUTION INTRAVENOUS at 09:53

## 2024-01-15 RX ADMIN — METOCLOPRAMIDE 10 MG: 5 INJECTION, SOLUTION INTRAMUSCULAR; INTRAVENOUS at 17:10

## 2024-01-15 RX ADMIN — LIDOCAINE HYDROCHLORIDE 100 MG: 20 INJECTION, SOLUTION EPIDURAL; INFILTRATION; INTRACAUDAL; PERINEURAL at 10:16

## 2024-01-15 RX ADMIN — KETOROLAC TROMETHAMINE 15 MG: 30 INJECTION, SOLUTION INTRAMUSCULAR at 18:22

## 2024-01-15 RX ADMIN — ROCURONIUM BROMIDE 30 MG: 10 INJECTION, SOLUTION INTRAVENOUS at 11:01

## 2024-01-15 RX ADMIN — SIMETHICONE 80 MG: 80 TABLET, CHEWABLE ORAL at 21:31

## 2024-01-15 RX ADMIN — DEXMEDETOMIDINE 0.1 MCG/KG/HR: 100 INJECTION, SOLUTION, CONCENTRATE INTRAVENOUS at 10:19

## 2024-01-15 RX ADMIN — LEVOFLOXACIN 750 MG: 750 INJECTION, SOLUTION INTRAVENOUS at 09:45

## 2024-01-15 RX ADMIN — SUGAMMADEX 200 MG: 100 INJECTION, SOLUTION INTRAVENOUS at 12:02

## 2024-01-15 RX ADMIN — SIMETHICONE 80 MG: 80 TABLET, CHEWABLE ORAL at 17:02

## 2024-01-15 RX ADMIN — PROPOFOL 140 MCG/KG/MIN: 10 INJECTION, EMULSION INTRAVENOUS at 10:19

## 2024-01-15 RX ADMIN — ONDANSETRON 4 MG: 2 INJECTION INTRAMUSCULAR; INTRAVENOUS at 12:51

## 2024-01-15 RX ADMIN — DEXAMETHASONE SODIUM PHOSPHATE 10 MG: 10 INJECTION INTRAMUSCULAR; INTRAVENOUS at 10:16

## 2024-01-15 RX ADMIN — HEPARIN SODIUM 5000 UNITS: 5000 INJECTION INTRAVENOUS; SUBCUTANEOUS at 08:56

## 2024-01-15 RX ADMIN — PANTOPRAZOLE SODIUM 40 MG: 40 INJECTION, POWDER, FOR SOLUTION INTRAVENOUS at 12:44

## 2024-01-15 RX ADMIN — ACETAMINOPHEN 1000 MG: 10 INJECTION INTRAVENOUS at 18:22

## 2024-01-15 RX ADMIN — ROCURONIUM BROMIDE 50 MG: 10 INJECTION, SOLUTION INTRAVENOUS at 10:16

## 2024-01-15 RX ADMIN — ROCURONIUM BROMIDE 10 MG: 10 INJECTION, SOLUTION INTRAVENOUS at 11:40

## 2024-01-15 RX ADMIN — CELECOXIB 200 MG: 100 CAPSULE ORAL at 08:57

## 2024-01-15 RX ADMIN — SODIUM CHLORIDE, SODIUM LACTATE, POTASSIUM CHLORIDE, AND CALCIUM CHLORIDE 50 ML/HR: .6; .31; .03; .02 INJECTION, SOLUTION INTRAVENOUS at 09:22

## 2024-01-15 RX ADMIN — FOSAPREPITANT 150 MG: 150 INJECTION, POWDER, LYOPHILIZED, FOR SOLUTION INTRAVENOUS at 08:34

## 2024-01-15 RX ADMIN — HYDROMORPHONE HYDROCHLORIDE 0.5 MG: 1 INJECTION, SOLUTION INTRAMUSCULAR; INTRAVENOUS; SUBCUTANEOUS at 10:40

## 2024-01-15 RX ADMIN — ONDANSETRON 4 MG: 2 INJECTION INTRAMUSCULAR; INTRAVENOUS at 15:34

## 2024-01-15 RX ADMIN — MIDAZOLAM 2 MG: 1 INJECTION INTRAMUSCULAR; INTRAVENOUS at 10:11

## 2024-01-15 RX ADMIN — PROPOFOL 200 MG: 10 INJECTION, EMULSION INTRAVENOUS at 10:16

## 2024-01-15 RX ADMIN — ONDANSETRON 4 MG: 2 INJECTION INTRAMUSCULAR; INTRAVENOUS at 10:21

## 2024-01-15 RX ADMIN — SODIUM CHLORIDE: 0.9 INJECTION, SOLUTION INTRAVENOUS at 10:20

## 2024-01-15 RX ADMIN — INSULIN LISPRO 2 UNITS: 100 INJECTION, SOLUTION INTRAVENOUS; SUBCUTANEOUS at 17:21

## 2024-01-15 RX ADMIN — SODIUM CHLORIDE, SODIUM LACTATE, POTASSIUM CHLORIDE, AND CALCIUM CHLORIDE 100 ML/HR: .6; .31; .03; .02 INJECTION, SOLUTION INTRAVENOUS at 15:03

## 2024-01-15 RX ADMIN — FENTANYL CITRATE 100 MCG: 50 INJECTION INTRAMUSCULAR; INTRAVENOUS at 10:21

## 2024-01-15 RX ADMIN — ACETAMINOPHEN 1000 MG: 10 INJECTION INTRAVENOUS at 09:22

## 2024-01-15 RX ADMIN — FAMOTIDINE 20 MG: 10 INJECTION, SOLUTION INTRAVENOUS at 18:22

## 2024-01-15 NOTE — DISCHARGE INSTR - AVS FIRST PAGE
Bariatric/Weight Loss Surgery  Hospital Discharge Instructions  ACTIVITY:  Progress as feels comfortable - a good rule is:  if you are doing something and it begins to hurt, stop doing the activity. Walk every hour while at home.  You may walk stairs if you do so slowly  You may shower 48 hours after surgery.  Use your incentive spirometer 10 times per hour while awake for 1 week  Do NOT drive for 48 hours after surgery. No driving 24 hours after taking certain prescription pain medications . Examples of such medication are Percocet, Darvocet, Oxycodone, Tylenol #3, and Tylenol with Codeine. Follow your pharmacist’s orders.    DIET  Stay on a liquid diet for 7 days after your surgery date, sipping slowly. Refer to your manual for examples of choices. Remember to keep your liquids sugar free or low calorie. You may have protein drinks. Make sure to drink 48 to 64 ounces per day of fluids.   You may advance to a pureed diet one week after surgery as instructed by your diet progression pamphlet. Once you get approval from your surgeon at your first post operative visit you may advance to the soft diet.     MEDICATIONS:  The abdominal nerve block will wear off during the first 1-2 days that you are home, and you may become sore. Continue to take your Tylenol and your pain medication as instructed.   Start vitamins and minerals per Ibrgit's instructions   Anti-acid Medication as per prescription.  Other medications as indicated on the Physician Patient Discharge Instructions form given to you at the time of discharge.  Make sure that you are splitting your pill or tablet medications in halves or fourths or even crushing them before you take them. Capsules should be opened and mixed with water or jello. You need to do this for at least 4 weeks after surgery. Eventually you will be able to take your medications the regular way as they were prescribed.   You will need to consult with your Family Doctor in regards to all  your prescribed medication, particularly those for blood pressure and diabetes.  As you lose weight, medical conditions may change, requiring an alteration or elimination of the drug dose.   DO NOT TAKE BIRTH CONTROL(BC) MEDICATIONS, INSERT BC VAGINAL RINGS, OR PLACE IUD OR ANY OTHER BC METHODS UNTIL 31 DAYS FROM DAY OF DISCHARGE FROM HOSPITAL. THIS PLACES YOU AT HIGH RISK FOR A POTENTIALLY LIFE THREATENING BLOOD CLOT. Remember to always use barrier methods for birth control and speak to your GYN about using two forms of birth control to start 31 days after surgery. It is very important to avoid pregnancy until at least 18-24 months after surgery.       INCISION CARE  You may shower and get incisions wet 2 days after surgery. No soaking tub baths or swimming for 30 days after surgery. Keep abdominal area and incisions clean. Use soap and water to create a good lather and rinse off. Do not scrub incisions.   If you have a drain, empty the drain as the nurses instructed.    FOLLOW-UP APPOINTMENT should be made for one week after discharge. Call surgeon’s office at [phone] to schedule an appointment.    CALL YOUR DOCTOR FOR:  pain not controlled by pain medications, a temperature greater than 101.5° F, any increase or change in drainage or redness from any incision, any vomiting or inability to keep liquids down, shortness of breath, shoulder pain, or bleeding      Additional Information for Providers and Patients                      Vitamins After Armand en Y Gastric Bypass or Sleeve Gastrectomy Surgery    Due to the decreased absorption of nutrients and the decreased amount of food eaten it is difficult to obtain all the nutrients needed consuming food.  We recommend a bariatric formulated vitamin for the rest of your life.  If you wish to use an over the counter vitamins please understand you may not get all the recommended daily requirements.  Use the following guidelines for over the counter vitamins.       Multivitamins   We recommend 2 chewable multivitamins with iron (do not take gummy chewable as they do not contain thiamine)    You can continue with the chewable or take any well formulated, high potency multivitamin containing 22 nutrients including zinc and copper.   If you decide to take a bariatric vitamin the number of vitamins that you need to take will vary.  Refer to the chart provided at team meeting    Calcium - Calcium is absorbed in the part of the small bowel that is bypassed in gastric bypass patients. In addition, as you lose weight, you are more at risk for loss of bone density leading to osteoporosis.   The best form of calcium is Calcium Citrate.  This form of calcium is better absorbed after your surgery.   Recommended daily dose is 1500 mg.  Take 500 mg in (3) divided doses.  You can only absorb about 500 mg at a time.  We recommend 2000 IU of vitamin D3 per day, in addition to what is in your calcium supplement.  If you were instructed to take a higher dose based on a deficiency, then continue to take the higher vitamin D dose.     Iron - Iron is absorbed in the part of the small bowel that is bypassed.   You will need to take extra iron in addition to what is already in the multivitamin if you are a menstruating woman (25-45 mg of additional elemental iron) or have been diagnosed with an iron deficiency.  We recommend iron in the form of Ferrous Fumarate with Vitamin C.  Follow the instructions on the package or bottle unless your physician has given other dosage amounts.    B12 (Cyanocobalamin) may also be decreased.   Additional Vitamin B12 is recommended if you are not taking a bariatric vitamin.  Take 350 to 500 micrograms (mcg) per day of B12 (Cyanocobalamin) in a sublingual form (for under the tongue).   Note:  Calcium interferes with the absorption of iron, so it is recommended that you take the calcium at least 2 hours apart from iron.  The tannins in tea also interfere with the  absorption of iron.    Note: Anti-ulcer medications interfere with the absorption of calcium iron and B12.  Space your anti-ulcer medication 2 hours apart from your vitamins.     * Based on the recommendations of the ASMBS and the National Osteoporosis foundation    Non-steroidal anti-inflammatory drugs or medications containing them.     You should take caution or avoid these medications as they could harm your pouch or sleeve.   **This is a sample list and is not all inclusive.  Please read labels carefully.**      Non Steroidal anti-inflammatory drugs  Advil (ibuprofen)  Aleve (naproxen)  Anaprox (naproxen)  Ansaid (flurbiprofen)  Azolid (phenylbutazone)  Bextra (valdecoxib)  Butazolidin (phenylbutazone)  Celebrex (celecoxib)  Clinoril (sulindac)  Dolobid (diflunisal)  Excedrin IB (ibuprofen)  Feldene (piroxicam)  Ibuprin (ibuprofen)  Indocin (indomethacin)  Lodine (etodolac)  Meclomen (meclofenamate)  Midol IB (ibuprofen)  Motrin IB (ibuprofen)  Nalfon (fenoprofen)  Naprosyn (naproxen)  Nuprin (ibuprofen)  Orudis (ketoprofen)  Oruvail (ketoprofen)  Pamprin - IB (ibuprofen)  Ponstel (mefenamic acid)  Rexolate (sodium thiosalicylate)  Tandearil (oxyphenbutazone)  Tolectin (tolmetin)  Voltaren (diclofenac)      Barbiturate  Fiorinal (butalbital/aspirin/caffeine)    Salicylates  Amigesic (salsalate)  Anacin (aspirin)  Arthropan (choline salicylate)  Ascriptin (buffered aspirin)  Aspirin (aspirin)  Aspirtab (aspirin)  Bufferin (buffered aspirin)  Disalcid (salsalate)  Ecotrin (aspirin)  Uracel (sodium salicylate)    Analgesics  Equagesic (meprobamate/aspirin)  Micrainin (meprobamate/aspirin)  Percodan (oxycodone/aspirin)    OTC  Pepto-Bismol®  Nakia-Dixon®  Excedrin®    For Gastric Bypass Patients  Extended Release Medications  Sustained Release Medications  Time Released Medications.

## 2024-01-15 NOTE — OP NOTE
OPERATIVE REPORT  PATIENT NAME: Serafin Conklin    :  1985  MRN: [id number]  Pt Location: [facility] OR ROOM 02    SURGERY DATE: 1/15/2024    Surgeons and Role:     * Jaime Robert MD - Primary     * Mary Fraser PA-C - Assisting    Preop Diagnosis:  Morbid obesity (HCC) [E66.01]  Type II diabetes mellitus with hyperosmolarity, uncontrolled (HCC) [E11.00, E11.65]  Fatty metamorphosis of liver [K76.0]    Post-Op Diagnosis Codes:     * Morbid obesity (HCC) [E66.01]     * Type II diabetes mellitus with hyperosmolarity, uncontrolled (HCC) [E11.00, E11.65]     * Fatty metamorphosis of liver [K76.0]    Procedure(s):  LAPAROSCOPIC TIFFANI-EN-Y GASTRIC BYPASS    Specimen(s):  * No specimens in log *    Estimated Blood Loss:   20 mL    Drains:  * No LDAs found *    Anesthesia Type:   General    Operative Indications:  Morbid obesity (HCC) [E66.01]  Type II diabetes mellitus with hyperosmolarity, uncontrolled (HCC) [E11.00, E11.65]  Fatty metamorphosis of liver [K76.0]      Operative Findings:  Normal anatomy    Complications:   None    Procedure and Technique:  INDICATION:    Serafin Conklin is a 38 y.o. male with a Body mass index is 41.99 kg/m². and a long standing history of morbid obesity and inability to lose a significant amount of weight on its own.  This patient was found to be a good candidate to undergo a bariatric procedure upon being enrolled here at the [facility] in [place].    OPERATIVE TECHNIQUE    The patient was taken to the operating room and placed in a supine position. A dose of IV antibiotic prophylaxis that consisted of Ancef 2g was given. Also, 5000 units of subcutaneous unfractionated heparin to prevent DVT were administered. Sequential compression devices were placed on both lower extremities.    After satisfactory general anesthesia induction and endotracheal intubation was achieved, the extremities were secured to prevent neurovascular and musculoskeletal injuries as best as  possible. Subsequently, the abdominal wall was prepped and draped in the standard sterile fashion.  After a timeout was done and the patient was properly identified and the type of procedure was confirmed a LUQ skin incision was made. A veress needle technique was used for access to the peritoneal cavity and pneumoperitoneum was created to 15mmHg.  Access to the peritoneal cavity was gained with an 12mm optiview trocar. With this device, we were able to visualize the layers of the abdominal wall, and enter the peritoneal cavity under direct visualization.       A 12-mm port was placed in the suprumbilical position to the patient's left of the midline for the camera.     A four quadrant transversus abdominis plane block was performed under direct laparoscopic vision.    Under direct laparoscopic visualization, additional trocars were placed: a 12 mm in the right upper quadrant subcostal position in the anterior axillary line, a 12-mm port was placed in the right flank midclavicular line, a 12-mm port was placed in the left flank position in the midclavicular line .     The omentum of the transverse colon was identified and elevated, this allowed for the ligament of Treitz to be visualized.  The small bowel was run about 60 cm to a point distal from the ligament of Treitz and was divided with a stapler and a 60 mm cartridge.      The Armand limb was then measured at 160 cm, and the 150 cm ivy was brought in side-to-side opposition to the biliopancreatic limb. A side-to-side jejunojejunostomy was then created. This was accomplished by first making an antimesenteric enterotomy with cautery energy device. We then positioned the laparoscopic stapler with a 60-mm cartridge within the lumen of the bowel to create a stapled side-to-side anastomosis. The enterotomy was then approximated with a 2-0 surgidac suture, subsequently elevated and closed transversely utilizing an additional 60-mm cartridge. The resulting mesenteric  defect was then closed with a running nonabsorbable suture. A Brolin stitch was placed to prevent kinking.     We proceeded to divide the omentum all the way to the transverse colon.      At this point we repositioned the patient into a reverse Trendelenburg and the T-Echevarria liver retractor was placed in the subxiphoid position through the use of a 5-mm trocar incision.    We then turned our attention to the gastroesophageal junction. The left chris was skeletonized dissecting at the angle of His. The lesser sac was entered in the perigastric fashion at the level just inferior to the take off of the left gastric artery.  The left gastric artery and hepatic vagal branches were preserved.      We then created a gastric pouch. To accomplish this, serial firings of a laparoscopic stapler 60-mm cartridge were utilized. This was accomplished by a  transverse firing of the stapler along the inferior edge of the pouch and then vertical serial firings of the stapler to the angle of His. This completely  the pouch from the gastric remnant.    The Armand limb was then passed in a antecolic and antegastric position to the pouch. This was accomplished without tension and without twist.    A 2-0 absorbable suture was used to create the posterior strength layer of the GJ from the armand limb and posterior gastric pouch in a running fashion. Adjacent gastrotomy and enterotomy were then created using harmonic scalpel.   A 45mm linear purple staple load was then introduced into the gastrotomy and enterotomy up to about the 2.5cm ivy. The stapler was fired to make the posterior layer.   The anterior layer was then closed in the running fashion using a 2-0 absorbable suture beginning at the medial and lateral corners and meeting in the middle.   The 34 Ivorian gastric lavage tube was passed across the anastomosis for the final suture placements and tied. The second layer anteriorly was then created running from the lateral corner to  the medial corner.     The distal Armand limb was occluded and an air insufflation test was performed. No intraoperative bleeding nor leaks were detected.      The Cortes's defect between the Armand limb mesentery and the mesocolon and colon was closed in a running fashion with a 2-0 surgidac suture.   The sponge, needle and instrument count was reported complete.     The periumbilical 12-mm camera trocar site was then closed with the use of a suture closure device and a 0 absorbable suture. The liver retractor was removed under direct laparoscopic visualization, and no bleeding was noted.   The remaining ports were then also removed under laparoscopic visualization. The skin incisions were all closed with 4-0 absorbable subcuticular suture. The patient tolerated the procedure well, was extubated uneventfully and was transferred to the recovery room in stable condition.     I was present for the entire length of the procedure as the attending of record.  No qualified resident was available to assist.  The presence of an assistant was necessary for camera holding, traction and counter traction and for help with suturing and stapling in addition to performing the intraop-EGD.   I was present for the entire procedure. and I was present for all critical portions of the procedure.    Patient Disposition:  PACU  and extubated and stable        SIGNATURE: Jaime Robert MD  DATE: January 15, 2024  TIME: 12:13 PM

## 2024-01-15 NOTE — ANESTHESIA PREPROCEDURE EVALUATION
Procedure:  LAPAROSCOPIC TIFFANI-EN-Y GASTRIC BYPASS AND INTRAOPERATIVE EGD (Abdomen)    Relevant Problems   CARDIO   (+) Essential hypertension   (+) Hyperlipidemia      ENDO   (+) Type 2 diabetes mellitus with hyperglycemia, without long-term current use of insulin (HCC)      GI/HEPATIC   (+) Hepatic steatosis      COVID-19 diagnosed 12/27/23. Mild symptoms - predominately sore throat, minimal cough, all since resolved.    Confirmed NPO appropriate. Hx of PONV    Cormack I with Reese 4 on 11/28/22    Physical Exam    Airway    Mallampati score: III  TM Distance: >3 FB  Neck ROM: full     Dental   No notable dental hx     Cardiovascular      Pulmonary      Other Findings        Anesthesia Plan  ASA Score- 2     Anesthesia Type- general with ASA Monitors.         Additional Monitors:     Airway Plan: ETT.           Plan Factors-Exercise tolerance (METS): >4 METS.Exercise comment: Able to climb two flights of stairs without cardiopulmonary limitation.    Chart reviewed. EKG reviewed.  Existing labs reviewed.     Patient is not a current smoker.  Patient did not smoke on day of surgery.            Induction- intravenous.    Postoperative Plan- Plan for postoperative opioid use. Planned trial extubation    Informed Consent- Anesthetic plan and risks discussed with patient.

## 2024-01-15 NOTE — ANESTHESIA POSTPROCEDURE EVALUATION
Post-Op Assessment Note    CV Status:  Stable  Pain Score: 0    Pain management: adequate       Mental Status:  Sleepy and arousable   Hydration Status:  Euvolemic   PONV Controlled:  Controlled   Airway Patency:  Patent     Post Op Vitals Reviewed: Yes      Staff: Anesthesiologist, CRNA               /67 (01/15/24 1223)    Temp 98.1 °F (36.7 °C) (01/15/24 1223)    Pulse 85 (01/15/24 1223)   Resp 12 (01/15/24 1223)    SpO2 96 % (01/15/24 1223)

## 2024-01-16 VITALS
DIASTOLIC BLOOD PRESSURE: 77 MMHG | WEIGHT: 260.14 LBS | HEART RATE: 88 BPM | BODY MASS INDEX: 41.81 KG/M2 | OXYGEN SATURATION: 98 % | HEIGHT: 66 IN | RESPIRATION RATE: 18 BRPM | SYSTOLIC BLOOD PRESSURE: 124 MMHG | TEMPERATURE: 97.8 F

## 2024-01-16 LAB
ANION GAP SERPL CALCULATED.3IONS-SCNC: 7 MMOL/L
BUN SERPL-MCNC: 10 MG/DL (ref 5–25)
CALCIUM SERPL-MCNC: 9.1 MG/DL (ref 8.4–10.2)
CHLORIDE SERPL-SCNC: 104 MMOL/L (ref 96–108)
CO2 SERPL-SCNC: 24 MMOL/L (ref 21–32)
CREAT SERPL-MCNC: 0.73 MG/DL (ref 0.6–1.3)
ERYTHROCYTE [DISTWIDTH] IN BLOOD BY AUTOMATED COUNT: 12.6 % (ref 11.6–15.1)
GFR SERPL CREATININE-BSD FRML MDRD: 116 ML/MIN/1.73SQ M
GLUCOSE SERPL-MCNC: 118 MG/DL (ref 65–140)
GLUCOSE SERPL-MCNC: 123 MG/DL (ref 65–140)
GLUCOSE SERPL-MCNC: 131 MG/DL (ref 65–140)
HCT VFR BLD AUTO: 38 % (ref 36.5–49.3)
HGB BLD-MCNC: 12.8 G/DL (ref 12–17)
MCH RBC QN AUTO: 27.8 PG (ref 26.8–34.3)
MCHC RBC AUTO-ENTMCNC: 33.7 G/DL (ref 31.4–37.4)
MCV RBC AUTO: 82 FL (ref 82–98)
PLATELET # BLD AUTO: 299 THOUSANDS/UL (ref 149–390)
PMV BLD AUTO: 10.2 FL (ref 8.9–12.7)
POTASSIUM SERPL-SCNC: 4.1 MMOL/L (ref 3.5–5.3)
RBC # BLD AUTO: 4.61 MILLION/UL (ref 3.88–5.62)
SODIUM SERPL-SCNC: 135 MMOL/L (ref 135–147)
WBC # BLD AUTO: 9.91 THOUSAND/UL (ref 4.31–10.16)

## 2024-01-16 PROCEDURE — 99024 POSTOP FOLLOW-UP VISIT: CPT | Performed by: SURGERY

## 2024-01-16 PROCEDURE — 99222 1ST HOSP IP/OBS MODERATE 55: CPT | Performed by: STUDENT IN AN ORGANIZED HEALTH CARE EDUCATION/TRAINING PROGRAM

## 2024-01-16 PROCEDURE — 80048 BASIC METABOLIC PNL TOTAL CA: CPT | Performed by: PHYSICIAN ASSISTANT

## 2024-01-16 PROCEDURE — 82948 REAGENT STRIP/BLOOD GLUCOSE: CPT

## 2024-01-16 PROCEDURE — 85027 COMPLETE CBC AUTOMATED: CPT | Performed by: PHYSICIAN ASSISTANT

## 2024-01-16 PROCEDURE — NC001 PR NO CHARGE: Performed by: PHYSICIAN ASSISTANT

## 2024-01-16 RX ADMIN — ACETAMINOPHEN 1000 MG: 10 INJECTION INTRAVENOUS at 10:51

## 2024-01-16 RX ADMIN — OXYCODONE HYDROCHLORIDE 5 MG: 5 SOLUTION ORAL at 01:15

## 2024-01-16 RX ADMIN — SIMETHICONE 80 MG: 80 TABLET, CHEWABLE ORAL at 08:18

## 2024-01-16 RX ADMIN — ACETAMINOPHEN 1000 MG: 10 INJECTION INTRAVENOUS at 02:06

## 2024-01-16 RX ADMIN — SODIUM CHLORIDE, SODIUM LACTATE, POTASSIUM CHLORIDE, AND CALCIUM CHLORIDE 100 ML/HR: .6; .31; .03; .02 INJECTION, SOLUTION INTRAVENOUS at 04:07

## 2024-01-16 RX ADMIN — KETOROLAC TROMETHAMINE 15 MG: 30 INJECTION, SOLUTION INTRAMUSCULAR at 11:25

## 2024-01-16 RX ADMIN — KETOROLAC TROMETHAMINE 15 MG: 30 INJECTION, SOLUTION INTRAMUSCULAR at 05:33

## 2024-01-16 RX ADMIN — FAMOTIDINE 20 MG: 10 INJECTION, SOLUTION INTRAVENOUS at 08:19

## 2024-01-16 NOTE — DISCHARGE SUMMARY
"  Discharge Summary - Serafin Conklin 38 y.o. male MRN: [id number]    Unit/Bed#: [address] [place] [address] Encounter: [id number]      Pre-Operative Diagnosis: Pre-Op Diagnosis Codes:     * Morbid obesity (HCC) [E66.01]     * Type II diabetes mellitus with hyperosmolarity, uncontrolled (HCC) [E11.00, E11.65]     * Fatty metamorphosis of liver [K76.0]    Post-Operative Diagnosis: Post-Op Diagnosis Codes:     * Morbid obesity (HCC) [E66.01]     * Type II diabetes mellitus with hyperosmolarity, uncontrolled (HCC) [E11.00, E11.65]     * Fatty metamorphosis of liver [K76.0]    Procedures Performed:  Procedure(s):  LAPAROSCOPIC TIFFANI-EN-Y GASTRIC BYPASS    Surgeon: Jaime Robert MD    See H & P for full details of admission and Operative Note for full details of operations performed.     Hospital Course:  Patient was admitted for a Laparoscopic Tiffani-En-Y Gastric Bypass. Post operatively pain was controlled with oral analgesics and the patient is ambulating/micturating without difficulty. Vital signs and lab work were stable. The patient is tolerating clear liquid diet without nausea or vomiting. The patient is cleared for D/C by the surgeon on POD1.    Patient was seen and examined prior to discharge.      Provisions for Follow-Up Care:  See After Visit Summary for information related to follow-up care and home orders.      Disposition: Home, in stable condition. Patient should refer to \"Discharge Instructions\" for further information.    Planned Readmission: No    Discharge Medications:  See After Visit Summary for reconciled discharge medications provided to patient and family.      Post Operative instructions: Reviewed with patient and/or family.    This text is generated with voice recognition software. There may be translation, syntax,  or grammatical errors. If you have any questions, please contact the dictating provider.     Signature:   Mary Fraser PA-C  Date: 1/16/2024 Time: 7:38 AM         "

## 2024-01-16 NOTE — ASSESSMENT & PLAN NOTE
Lab Results   Component Value Date    HGBA1C 6.9 (A) 10/23/2023       Recent Labs     01/15/24  1233 01/15/24  1333 01/15/24  1713 01/15/24  2057   POCGLU 144* 177* 168* 145*       Blood Sugar Average: Last 72 hrs:  (P) 174.6    On semaglutide  Monitor on sliding scale  Monitor blood glucose  Monitor

## 2024-01-16 NOTE — PROGRESS NOTES
[facility]  Progress Note  Name: Serafin Conklin I  MRN: [id number]  Unit/Bed#: 2 [address] Date of Admission: 1/15/2024   Date of Service: 1/16/2024 I Hospital Day: 1    Assessment/Plan   Sleep apnea  Assessment & Plan  Not on CPAP      Class 3 severe obesity due to excess calories with serious comorbidity and body mass index (BMI) of 40.0 to 44.9 in adult ([facility])  Assessment & Plan  Morbid obesity with BMI of 41.99 kg/m²  Status post Armand-en-Y gastric bypass, 1/15  Doing well postoperatively  Monitor symptoms  Postoperative care as per primary team  Incentive spirometry  Follow-up labs in a.m.  Increase activity as tolerated  Continue IV fluids    Type 2 diabetes mellitus with hyperglycemia, without long-term current use of insulin ([facility])  Assessment & Plan  Lab Results   Component Value Date    HGBA1C 6.9 (A) 10/23/2023       Recent Labs     01/15/24  1333 01/15/24  1713 01/15/24  2057 01/16/24  0657   POCGLU 177* 168* 145* 118       Blood Sugar Average: Last 72 hrs:  (P) 165.8319007504293877    On semaglutide  Monitor on sliding scale  Monitor blood glucose  Monitor      * Hypertension  Assessment & Plan  On lisinopril 10 mg daily  Continue lisinopril 10 mg daily hold for systolic blood pressure less than 130 mmHg           VTE Pharmacologic Prophylaxis: VTE Score: 4 Moderate Risk (Score 3-4) - Pharmacological DVT Prophylaxis Ordered: heparin.    Mobility:   Basic Mobility Inpatient Raw Score: 23  JH-HLM Goal: 7: Walk 25 feet or more  JH-HLM Achieved: 8: Walk 250 feet ot more  HLM Goal achieved. Continue to encourage appropriate mobility.    Patient Centered Rounds: I performed bedside rounds with nursing staff today.   Discussions with Specialists or Other Care Team Provider: primary Bariatrics    Education and Discussions with Family / Patient: Patient declined call to .  Reported wife just left    Total Time Spent on Date of Encounter in care of patient: 35 mins. This time  was spent on one or more of the following: performing physical exam; counseling and coordination of care; obtaining or reviewing history; documenting in the medical record; reviewing/ordering tests, medications or procedures; communicating with other healthcare professionals and discussing with patient's family/caregivers.    Current Length of Stay: 1 day(s)  Current Patient Status: Inpatient   Certification Statement:  Patient discharge per primary team  Discharge Plan:  Discharge today    Code Status: No Order    Subjective:   Patient seen and examined at bedside, reported no nausea, vomiting, reported tolerating Jell-O but has to smash it in his mouth, reported drinking majority of the cups recommended fluid.  Reported gas, denied any bowel movement.  Reported walking and have a simethicone drops earlier.    Objective:     Vitals:   Temp (24hrs), Av.5 °F (36.4 °C), Min:96.3 °F (35.7 °C), Max:98.4 °F (36.9 °C)    Temp:  [96.3 °F (35.7 °C)-98.4 °F (36.9 °C)] 97.8 °F (36.6 °C)  HR:  [65-91] 88  Resp:  [10-23] 18  BP: (118-144)/(66-79) 124/77  SpO2:  [94 %-100 %] 98 %  Body mass index is 41.99 kg/m².     Input and Output Summary (last 24 hours):     Intake/Output Summary (Last 24 hours) at 2024 1140  Last data filed at 2024 0438  Gross per 24 hour   Intake 2078.33 ml   Output 20 ml   Net 2058.33 ml         Physical Exam  Vitals and nursing note reviewed.   Constitutional:       General: He is not in acute distress.     Appearance: He is well-developed. He is obese. He is not ill-appearing.   HENT:      Head: Normocephalic and atraumatic.   Eyes:      Conjunctiva/sclera: Conjunctivae normal.   Cardiovascular:      Rate and Rhythm: Normal rate and regular rhythm.      Heart sounds: No murmur heard.  Pulmonary:      Effort: Pulmonary effort is normal. No respiratory distress.      Breath sounds: Normal breath sounds. No wheezing or rhonchi.   Abdominal:      Palpations: Abdomen is soft.      Tenderness:  There is abdominal tenderness. There is no guarding or rebound.   Musculoskeletal:         General: No swelling.      Cervical back: Neck supple.   Skin:     General: Skin is warm and dry.      Capillary Refill: Capillary refill takes less than 2 seconds.   Neurological:      Mental Status: He is alert and oriented to person, place, and time.      Motor: No weakness.   Psychiatric:         Mood and Affect: Mood normal.         Behavior: Behavior normal.          Additional Data:     Labs:  Results from last 7 days   Lab Units 01/16/24  0540   WBC Thousand/uL 9.91   HEMOGLOBIN g/dL 12.8   HEMATOCRIT % 38.0   PLATELETS Thousands/uL 299     Results from last 7 days   Lab Units 01/16/24  0540   SODIUM mmol/L 135   POTASSIUM mmol/L 4.1   CHLORIDE mmol/L 104   CO2 mmol/L 24   BUN mg/dL 10   CREATININE mg/dL 0.73   ANION GAP mmol/L 7   CALCIUM mg/dL 9.1   GLUCOSE RANDOM mg/dL 123         Results from last 7 days   Lab Units 01/16/24  1054 01/16/24  0657 01/15/24  2057 01/15/24  1713 01/15/24  1333 01/15/24  1233 01/15/24  0829   POC GLUCOSE mg/dl 131 118 145* 168* 177* 144* 239*               Lines/Drains:  Invasive Devices       None                         Recent Cultures (last 7 days):         Last 24 Hours Medication List:   Current Facility-Administered Medications   Medication Dose Route Frequency Provider Last Rate    acetaminophen  1,000 mg Intravenous Q8H LYUDMILA Fraser PA-C 1,000 mg (01/16/24 1051)    diphenhydrAMINE  25 mg Oral HS PRN Mary Fraser PA-C      famotidine  20 mg Intravenous Q12H LYUDMILA Fraser PA-C      insulin lispro  1-5 Units Subcutaneous HS Yemi Suarez MD      insulin lispro  1-6 Units Subcutaneous TID AC Yemi Suarez MD      ketorolac  15 mg Intravenous Q6H LYUDMILA Fraser PA-C      lactated ringers  1,000 mL Intravenous Once PRN Mary Fraser PA-C      And    lactated ringers  1,000 mL Intravenous Once PRN Mary Fraser PA-C      lactated ringers  100 mL/hr Intravenous  Continuous Mary Fraser PA-C 100 mL/hr (01/16/24 0407)    lisinopril  10 mg Oral Daily Yemi Suarez MD      metoclopramide  10 mg Intravenous Q6H PRN Mary Fraser PA-C      morphine injection  2 mg Intravenous Q2H PRN Mary Fraser, JAD      morphine injection  4 mg Intravenous Q2H PRN Mary Fraser PA-C      ondansetron  4 mg Intravenous Q6H PRN Mary Fraser PA-C      oxyCODONE  10 mg Oral Q4H PRN Mary Fraser, JAD      oxyCODONE  5 mg Oral Q4H PRN Mary Fraser, JAD      phenol  1 spray Mouth/Throat Q2H PRN Mary Fraser PA-C      promethazine  25 mg Intramuscular Q6H PRN Mary Fraser, JAD      simethicone  80 mg Oral Q12H LYUDMILA Mary Fraser PA-C      sodium chloride  1,000 mL Intravenous Once PRN Mary Fraser PA-C      And    sodium chloride  1,000 mL Intravenous Once PRN Mary Fraser PA-C          Today, Patient Was Seen By: Meghana Lambert MD    **Please Note: This note may have been constructed using a voice recognition system.**

## 2024-01-16 NOTE — ASSESSMENT & PLAN NOTE
Morbid obesity with BMI of 41.99 kg/m²  Status post Armand-en-Y gastric bypass, 1/15  Doing well postoperatively  Monitor symptoms  Postoperative care as per primary team  Incentive spirometry  Follow-up labs in a.m.  Increase activity as tolerated  Continue IV fluids

## 2024-01-16 NOTE — ASSESSMENT & PLAN NOTE
Lab Results   Component Value Date    HGBA1C 6.9 (A) 10/23/2023       Recent Labs     01/15/24  1333 01/15/24  1713 01/15/24  2057 01/16/24  0657   POCGLU 177* 168* 145* 118       Blood Sugar Average: Last 72 hrs:  (P) 165.3813898003623123    On semaglutide  Monitor on sliding scale  Monitor blood glucose  Monitor

## 2024-01-16 NOTE — ASSESSMENT & PLAN NOTE
On lisinopril 10 mg daily  Continue lisinopril 10 mg daily hold for systolic blood pressure less than 130 mmHg

## 2024-01-16 NOTE — UTILIZATION REVIEW
"Initial Clinical Review    Elective inpatient surgical procedure  Age/Sex: 38 y.o. male  Surgery Date: 1/15/24  Procedure:   LAPAROSCOPIC TIFFANI-EN-Y GASTRIC BYPASS   Anesthesia: general  Operative Findings:   Normal anatomy    POD#1 Progress Note:   D/c home today    Admission Orders: Date/Time/Statement:   Admission Orders (From admission, onward)       Ordered        01/15/24 1236  Inpatient Admission  Once                          Orders Placed This Encounter   Procedures    Inpatient Admission     Standing Status:   Standing     Number of Occurrences:   1     Order Specific Question:   Level of Care     Answer:   Med Surg [16]     Order Specific Question:   Bed Type     Answer:   Bariatric [1]     Order Specific Question:   Estimated length of stay     Answer:   Inpatient Only Surgery     Vital Signs: /77   Pulse 88   Temp 97.8 °F (36.6 °C)   Resp 18   Ht 5' 6\" (1.676 m)   Wt 118 kg (260 lb 2.3 oz)   SpO2 98%   BMI 41.99 kg/m²     Pertinent Labs/Diagnostic Test Results:     Results from last 7 days   Lab Units 01/16/24  0540   WBC Thousand/uL 9.91   HEMOGLOBIN g/dL 12.8   HEMATOCRIT % 38.0   PLATELETS Thousands/uL 299     Results from last 7 days   Lab Units 01/16/24  0540   SODIUM mmol/L 135   POTASSIUM mmol/L 4.1   CHLORIDE mmol/L 104   CO2 mmol/L 24   ANION GAP mmol/L 7   BUN mg/dL 10   CREATININE mg/dL 0.73   EGFR ml/min/1.73sq m 116   CALCIUM mg/dL 9.1     Results from last 7 days   Lab Units 01/16/24  0657 01/15/24  2057 01/15/24  1713 01/15/24  1333 01/15/24  1233 01/15/24  0829   POC GLUCOSE mg/dl 118 145* 168* 177* 144* 239*     Results from last 7 days   Lab Units 01/16/24  0540   GLUCOSE RANDOM mg/dL 123     Admission orders:  Diet: bariatric clear liquids  Mobility: ambulate  DVT Prophylaxis: scd  Cont pulse ox  O2 to keep sats>94%  Accuchecks    Medications/Pain Control:   Scheduled Medications:  acetaminophen, 1,000 mg, Intravenous, Q8H LYUDMILA  famotidine, 20 mg, Intravenous, Q12H " LYUDMILA  insulin lispro, 1-5 Units, Subcutaneous, HS  insulin lispro, 1-6 Units, Subcutaneous, TID AC  ketorolac, 15 mg, Intravenous, Q6H LYUDMILA  lisinopril, 10 mg, Oral, Daily  simethicone, 80 mg, Oral, Q12H LYUDMILA    Continuous IV Infusions:  lactated ringers, 100 mL/hr, Intravenous, Continuous    PRN Meds:  diphenhydrAMINE, 25 mg, Oral, HS PRN  lactated ringers, 1,000 mL, Intravenous, Once PRN   And  lactated ringers, 1,000 mL, Intravenous, Once PRN  metoclopramide, 10 mg, Intravenous, Q6H PRN  morphine injection, 2 mg, Intravenous, Q2H PRN  morphine injection, 4 mg, Intravenous, Q2H PRN  ondansetron, 4 mg, Intravenous, Q6H PRN  oxyCODONE, 10 mg, Oral, Q4H PRN  oxyCODONE, 5 mg, Oral, Q4H PRN  phenol, 1 spray, Mouth/Throat, Q2H PRN  promethazine, 25 mg, Intramuscular, Q6H PRN  sodium chloride, 1,000 mL, Intravenous, Once PRN   And  sodium chloride, 1,000 mL, Intravenous, Once PRN    Network Utilization Review Department  ATTENTION: Please call with any questions or concerns to [phone] and carefully listen to the prompts so that you are directed to the right person. All voicemails are confidential.   For Discharge needs, contact Care Management DC Support Team at [phone] opt. 2  Send all requests for admission clinical reviews, approved or denied determinations and any other requests to dedicated fax number below belonging to the [facility] where the patient is receiving treatment. List of dedicated fax numbers for the Facilities:  FACILITY NAME UR FAX NUMBER   ADMISSION DENIALS (Administrative/Medical Necessity) [phone]   DISCHARGE SUPPORT TEAM (NETWORK) [phone]   PARENT CHILD HEALTH (Maternity/NICU/Pediatrics) [phone]   [facility] [phone]   [facility] [phone]   [facility] [phone]   [facility] [phone]   [facility] [phone]   [facility]  [facility] [phone]   [facility] [phone]   [facility] [phone]   [facility] [phone]   [facility] [phone]   [facility] [phone]

## 2024-01-16 NOTE — PROGRESS NOTES
"Progress Note - Bariatric Surgery   Serafin Conklin 38 y.o. male MRN: [id number]  Unit/Bed#: 2 [place] [address] Encounter: [id number]      Subjective/Objective     Subjective:   No complaints, feeling well overall. Tolerating liquid diet without nausea or vomiting, pain adequately controlled on oral pain medication, ambulating without assistance, voiding well, using incentive spirometer. Denies fevers, chills, sweats, SOB, CP, calf pain.    Objective:    /77   Pulse 88   Temp 97.8 °F (36.6 °C)   Resp 18   Ht 5' 6\" (1.676 m)   Wt 118 kg (260 lb 2.3 oz)   SpO2 98%   BMI 41.99 kg/m²       Intake/Output Summary (Last 24 hours) at 1/16/2024 0737  Last data filed at 1/16/2024 0438  Gross per 24 hour   Intake 2078.33 ml   Output 20 ml   Net 2058.33 ml       Invasive Devices       Peripheral Intravenous Line  Duration             Peripheral IV 01/15/24 Right Antecubital <1 day                    ROS: 10-point system completed. All negative except see HPI.      Physical Exam    General Appearance:    Alert, cooperative, no distress, appears stated age   Head:    Normocephalic, without obvious abnormality, atraumatic   Lungs:     respirations unlabored   Heart:    Regular rate and rhythm   Abdomen:     Soft, appropriate tenderness, non distended, incisions clean, dry, and intact   Extremities:   Extremities normal, atraumatic, no cyanosis or edema                   Lab, Imaging and other studies:I have personally reviewed pertinent lab results.  , CBC:   Lab Results   Component Value Date    WBC 9.91 01/16/2024    HGB 12.8 01/16/2024    HCT 38.0 01/16/2024    MCV 82 01/16/2024     01/16/2024    RBC 4.61 01/16/2024    MCH 27.8 01/16/2024    MCHC 33.7 01/16/2024    RDW 12.6 01/16/2024    MPV 10.2 01/16/2024   , CMP:   Lab Results   Component Value Date    SODIUM 135 01/16/2024    K 4.1 01/16/2024     01/16/2024    CO2 24 01/16/2024    BUN 10 01/16/2024    CREATININE 0.73 01/16/2024    CALCIUM 9.1 01/16/2024    " EGFR 116 01/16/2024        VTE Mechanical Prophylaxis: sequential compression device    Assessment/Plan  39 yo M s/p lap RYGB POD1 with stable post op course. Encourage PO fluids, ambulation, and incentive spirometry.  Will plan for D/C home today    Plan of care was discussed with patient and patient's nurse  Care plan discussed with Dr. Robert.    Dispo: Continue bariatric clear liquid diet, ambulation, incentive spirometry.

## 2024-01-16 NOTE — PLAN OF CARE
Problem: PAIN - ADULT  Goal: Verbalizes/displays adequate comfort level or baseline comfort level  Description: Interventions:  - Encourage patient to monitor pain and request assistance  - Assess pain using appropriate pain scale  - Administer analgesics based on type and severity of pain and evaluate response  - Implement non-pharmacological measures as appropriate and evaluate response  - Consider cultural and social influences on pain and pain management  - Notify physician/advanced practitioner if interventions unsuccessful or patient reports new pain  Outcome: Progressing     Problem: SAFETY ADULT  Goal: Maintain or return to baseline ADL function  Description: INTERVENTIONS:  -  Assess patient's ability to carry out ADLs; assess patient's baseline for ADL function and identify physical deficits which impact ability to perform ADLs (bathing, care of mouth/teeth, toileting, grooming, dressing, etc.)  - Assess/evaluate cause of self-care deficits   - Assess range of motion  - Assess patient's mobility; develop plan if impaired  - Assess patient's need for assistive devices and provide as appropriate  - Encourage maximum independence but intervene and supervise when necessary  - Involve family in performance of ADLs  - Assess for home care needs following discharge   - Consider OT consult to assist with ADL evaluation and planning for discharge  - Provide patient education as appropriate  Outcome: Progressing     Problem: GASTROINTESTINAL - ADULT  Goal: Minimal or absence of nausea and/or vomiting  Description: INTERVENTIONS:  - Administer IV fluids if ordered to ensure adequate hydration  - Administer ordered antiemetic medications as needed  - Provide nonpharmacologic comfort measures as appropriate  - Advance diet as tolerated, if ordered  - Consider nutrition services referral to assist patient with adequate nutrition and appropriate food choices  Outcome: Progressing     Problem: SKIN/TISSUE INTEGRITY -  ADULT  Goal: Incision(s), wounds(s) or drain site(s) healing without S/S of infection  Description: INTERVENTIONS  - Assess and document dressing, incision, wound bed, drain sites and surrounding tissue  - Provide patient and family education  - Perform skin care/dressing changes every shift  Outcome: Progressing

## 2024-01-16 NOTE — PLAN OF CARE
Problem: GASTROINTESTINAL - ADULT  Goal: Minimal or absence of nausea and/or vomiting  Description: INTERVENTIONS:  - Administer IV fluids if ordered to ensure adequate hydration  - Maintain NPO status until nausea and vomiting are resolved  - Nasogastric tube if ordered  - Administer ordered antiemetic medications as needed  - Provide nonpharmacologic comfort measures as appropriate  - Advance diet as tolerated, if ordered  - Consider nutrition services referral to assist patient with adequate nutrition and appropriate food choices  Outcome: Progressing     Problem: SKIN/TISSUE INTEGRITY - ADULT  Goal: Incision(s), wounds(s) or drain site(s) healing without S/S of infection  Description: INTERVENTIONS  - Assess and document dressing, incision, wound bed, drain sites and surrounding tissue  - Provide patient and family education  - Perform skin care/dressing changes  Outcome: Progressing     Problem: SAFETY ADULT  Goal: Maintain or return to baseline ADL function  Description: INTERVENTIONS:  -  Assess patient's ability to carry out ADLs; assess patient's baseline for ADL function and identify physical deficits which impact ability to perform ADLs (bathing, care of mouth/teeth, toileting, grooming, dressing, etc.)  - Assess/evaluate cause of self-care deficits   - Assess range of motion  - Assess patient's mobility; develop plan if impaired  - Assess patient's need for assistive devices and provide as appropriate  - Encourage maximum independence but intervene and supervise when necessary  - Involve family in performance of ADLs  - Assess for home care needs following discharge   - Consider OT consult to assist with ADL evaluation and planning for discharge  - Provide patient education as appropriate  Outcome: Progressing     Problem: PAIN - ADULT  Goal: Verbalizes/displays adequate comfort level or baseline comfort level  Description: Interventions:  - Encourage patient to monitor pain and request assistance  -  Assess pain using appropriate pain scale  - Administer analgesics based on type and severity of pain and evaluate response  - Implement non-pharmacological measures as appropriate and evaluate response  - Consider cultural and social influences on pain and pain management  - Notify physician/advanced practitioner if interventions unsuccessful or patient reports new pain  Outcome: Progressing

## 2024-01-16 NOTE — UTILIZATION REVIEW
"Initial Clinical Review    Elective inpatient surgical procedure  Age/Sex: 38 y.o. male  Surgery Date: 1/15/24  Procedure:   LAPAROSCOPIC TIFFANI-EN-Y GASTRIC BYPASS   Anesthesia: general  Operative Findings:   Normal anatomy    POD#1 Progress Note:   D/c home today    Admission Orders: Date/Time/Statement:   Admission Orders (From admission, onward)       Ordered        01/15/24 1236  Inpatient Admission  Once                          Orders Placed This Encounter   Procedures    Inpatient Admission     Standing Status:   Standing     Number of Occurrences:   1     Order Specific Question:   Level of Care     Answer:   Med Surg [16]     Order Specific Question:   Bed Type     Answer:   Bariatric [1]     Order Specific Question:   Estimated length of stay     Answer:   Inpatient Only Surgery     Vital Signs: /77   Pulse 88   Temp 97.8 °F (36.6 °C)   Resp 18   Ht 5' 6\" (1.676 m)   Wt 118 kg (260 lb 2.3 oz)   SpO2 98%   BMI 41.99 kg/m²     Pertinent Labs/Diagnostic Test Results:     Results from last 7 days   Lab Units 01/16/24  0540   WBC Thousand/uL 9.91   HEMOGLOBIN g/dL 12.8   HEMATOCRIT % 38.0   PLATELETS Thousands/uL 299     Results from last 7 days   Lab Units 01/16/24  0540   SODIUM mmol/L 135   POTASSIUM mmol/L 4.1   CHLORIDE mmol/L 104   CO2 mmol/L 24   ANION GAP mmol/L 7   BUN mg/dL 10   CREATININE mg/dL 0.73   EGFR ml/min/1.73sq m 116   CALCIUM mg/dL 9.1     Results from last 7 days   Lab Units 01/16/24  0657 01/15/24  2057 01/15/24  1713 01/15/24  1333 01/15/24  1233 01/15/24  0829   POC GLUCOSE mg/dl 118 145* 168* 177* 144* 239*     Results from last 7 days   Lab Units 01/16/24  0540   GLUCOSE RANDOM mg/dL 123     Admission orders:  Diet: bariatric clear liquids  Mobility: ambulate  DVT Prophylaxis: scd  Cont pulse ox  O2 to keep sats>94%  Accuchecks    Medications/Pain Control:   Scheduled Medications:  acetaminophen, 1,000 mg, Intravenous, Q8H LYUDMILA  famotidine, 20 mg, Intravenous, Q12H " LYUDMILA  insulin lispro, 1-5 Units, Subcutaneous, HS  insulin lispro, 1-6 Units, Subcutaneous, TID AC  ketorolac, 15 mg, Intravenous, Q6H LYUDMILA  lisinopril, 10 mg, Oral, Daily  simethicone, 80 mg, Oral, Q12H LYUDMILA    Continuous IV Infusions:  lactated ringers, 100 mL/hr, Intravenous, Continuous    PRN Meds:  diphenhydrAMINE, 25 mg, Oral, HS PRN  lactated ringers, 1,000 mL, Intravenous, Once PRN   And  lactated ringers, 1,000 mL, Intravenous, Once PRN  metoclopramide, 10 mg, Intravenous, Q6H PRN  morphine injection, 2 mg, Intravenous, Q2H PRN  morphine injection, 4 mg, Intravenous, Q2H PRN  ondansetron, 4 mg, Intravenous, Q6H PRN  oxyCODONE, 10 mg, Oral, Q4H PRN  oxyCODONE, 5 mg, Oral, Q4H PRN  phenol, 1 spray, Mouth/Throat, Q2H PRN  promethazine, 25 mg, Intramuscular, Q6H PRN  sodium chloride, 1,000 mL, Intravenous, Once PRN   And  sodium chloride, 1,000 mL, Intravenous, Once PRN    Network Utilization Review Department  ATTENTION: Please call with any questions or concerns to [phone] and carefully listen to the prompts so that you are directed to the right person. All voicemails are confidential.   For Discharge needs, contact Care Management DC Support Team at [phone] opt. [phone]  Send all requests for admission clinical reviews, approved or denied determinations and any other requests to dedicated fax number below belonging to the [facility] where the patient is receiving treatment. List of dedicated fax numbers for the Facilities:  FACILITY NAME UR FAX NUMBER   ADMISSION DENIALS (Administrative/Medical Necessity) [phone]   DISCHARGE SUPPORT TEAM (NETWORK) [phone]   PARENT CHILD HEALTH (Maternity/NICU/Pediatrics) [phone]   [facility] [phone]   [facility] [phone]   [facility] [phone]   [facility] [phone]   [facility] [phone]   [facility]  [facility] [phone]   [facility] [phone]   [facility] [phone]   [facility] [phone]   [facility] [phone]   [facility] [phone]

## 2024-01-17 ENCOUNTER — TELEPHONE (OUTPATIENT)
Dept: BARIATRICS | Facility: CLINIC | Age: 39
End: 2024-01-17

## 2024-01-17 ENCOUNTER — TRANSITIONAL CARE MANAGEMENT (OUTPATIENT)
Dept: FAMILY MEDICINE CLINIC | Facility: CLINIC | Age: 39
End: 2024-01-17

## 2024-01-17 NOTE — UTILIZATION REVIEW
NOTIFICATION OF ADMISSION DISCHARGE   This is a Notification of Discharge from [facility]. Please be advised that this patient has been discharge from our facility. Below you will find the admission and discharge date and time including the patient’s disposition.   UTILIZATION REVIEW CONTACT:  Inés White  Utilization   Network Utilization Review Department  Phone: [phone] x carefully listen to the prompts. All voicemails are confidential.  Email: [email or web address]     ADMISSION INFORMATION  PRESENTATION DATE: 1/15/2024  7:59 AM  OBERVATION ADMISSION DATE:   INPATIENT ADMISSION DATE: 1/15/24 12:36 PM   DISCHARGE DATE: 1/16/2024 12:25 PM   DISPOSITION:Home/Self Care    Network Utilization Review Department  ATTENTION: Please call with any questions or concerns to [phone] and carefully listen to the prompts so that you are directed to the right person. All voicemails are confidential.   For Discharge needs, contact Care Management DC Support Team at [phone] opt. 2  Send all requests for admission clinical reviews, approved or denied determinations and any other requests to dedicated fax number below belonging to the campus where the patient is receiving treatment. List of dedicated fax numbers for the Facilities:  FACILITY NAME UR FAX NUMBER   ADMISSION DENIALS (Administrative/Medical Necessity) [phone]   DISCHARGE SUPPORT TEAM ([facility]) [phone]   PARENT CHILD HEALTH (Maternity/NICU/Pediatrics) [phone]   [facility] [phone]   [facility] [phone]   [facility] [phone]   [facility] [phone]   [facility] [phone]   [facility] [phone]   [facility] [phone]   [facility] [phone]    [facility] [phone]   [facility] [phone]   [facility] [phone]

## 2024-01-17 NOTE — TELEPHONE ENCOUNTER
Post op follow up phone call completed.  Pt is sipping liquids, using IS as instructed, reinforced importance of using IS to help prevent pneumonia. Ambulating about home without difficulty.  Pain controlled with analgesia.  Reaffirmed examples of liquid diet over the next week.  Pt stated understanding about discharge instructions and medication adjustments.  Follow up appt with surgeon scheduled for next week.   Instructed to call with any additional questions or concerns.

## 2024-01-18 NOTE — TELEPHONE ENCOUNTER
Pt phoned with concerns of feeling very cold since surgery.  He denies chills or fevers.  Discussed that it's likely a response to surgery and losing weight already on the liver shrink and that this is a common response for bariatric patients.  He will monitor and let us know of any other concerns.

## 2024-01-23 ENCOUNTER — TELEPHONE (OUTPATIENT)
Dept: BARIATRICS | Facility: CLINIC | Age: 39
End: 2024-01-23

## 2024-01-23 ENCOUNTER — OFFICE VISIT (OUTPATIENT)
Dept: FAMILY MEDICINE CLINIC | Facility: CLINIC | Age: 39
End: 2024-01-23
Payer: COMMERCIAL

## 2024-01-23 VITALS
SYSTOLIC BLOOD PRESSURE: 144 MMHG | WEIGHT: 254.2 LBS | TEMPERATURE: 98.2 F | HEART RATE: 96 BPM | BODY MASS INDEX: 40.85 KG/M2 | HEIGHT: 66 IN | RESPIRATION RATE: 16 BRPM | OXYGEN SATURATION: 99 % | DIASTOLIC BLOOD PRESSURE: 90 MMHG

## 2024-01-23 DIAGNOSIS — E78.5 HYPERLIPIDEMIA, UNSPECIFIED HYPERLIPIDEMIA TYPE: ICD-10-CM

## 2024-01-23 DIAGNOSIS — Z98.84 S/P GASTRIC BYPASS: ICD-10-CM

## 2024-01-23 DIAGNOSIS — R36.1 BLOOD IN SEMEN: ICD-10-CM

## 2024-01-23 DIAGNOSIS — E11.65 TYPE 2 DIABETES MELLITUS WITH HYPERGLYCEMIA, WITHOUT LONG-TERM CURRENT USE OF INSULIN (HCC): Primary | ICD-10-CM

## 2024-01-23 DIAGNOSIS — I10 PRIMARY HYPERTENSION: ICD-10-CM

## 2024-01-23 PROBLEM — Z01.810 PREOPERATIVE CARDIOVASCULAR EXAMINATION: Status: RESOLVED | Noted: 2023-11-14 | Resolved: 2024-01-23

## 2024-01-23 PROBLEM — H69.91 ETD (EUSTACHIAN TUBE DYSFUNCTION), RIGHT: Status: RESOLVED | Noted: 2023-02-27 | Resolved: 2024-01-23

## 2024-01-23 PROBLEM — R00.0 SINUS TACHYCARDIA: Status: RESOLVED | Noted: 2023-11-14 | Resolved: 2024-01-23

## 2024-01-23 LAB — SL AMB POCT HEMOGLOBIN AIC: 6.3 (ref ?–6.5)

## 2024-01-23 PROCEDURE — 83036 HEMOGLOBIN GLYCOSYLATED A1C: CPT | Performed by: STUDENT IN AN ORGANIZED HEALTH CARE EDUCATION/TRAINING PROGRAM

## 2024-01-23 PROCEDURE — 99495 TRANSJ CARE MGMT MOD F2F 14D: CPT | Performed by: STUDENT IN AN ORGANIZED HEALTH CARE EDUCATION/TRAINING PROGRAM

## 2024-01-23 RX ORDER — SILDENAFIL 25 MG/1
25 TABLET, FILM COATED ORAL DAILY PRN
COMMUNITY

## 2024-01-23 NOTE — TELEPHONE ENCOUNTER
Pt called stated he is having lower abdominal pain after he walks. Started 2 days ago. No other symptoms. Surgery date 1/15/24.    Phone# [phone]    Thank you

## 2024-01-23 NOTE — PROGRESS NOTES
Assessment & Plan     1. Type 2 diabetes mellitus with hyperglycemia, without long-term current use of insulin (HCC)  -     POCT hemoglobin A1c    2. Primary hypertension    3. Hyperlipidemia, unspecified hyperlipidemia type    4. S/P gastric bypass    5. Blood in semen  -     Ambulatory Referral to Urology; Future      Patient is a pleasant 38-year-old male coming in for transition of care management after successful gastric bypass procedure, patient hemoglobin A1c 6.3% today in office today, continue medication, medications reviewed.  Patient is currently asymptomatic and doing well, of note patient does note 2 separate instances of blood in semen, if this does happen again we will follow-up with urology, no red flag symptoms.       Subjective     Transitional Care Management Review:   Serafin Conklin is a 38 y.o. male here for TCM follow up.     During the TCM phone call patient stated:  TCM Call     Date and time call was made  1/23/2024  8:38 AM    Hospital care reviewed  Records reviewed    Patient was hospitialized at  [facility]    Date of Admission  01/15/24    Date of discharge  12/16/23    Diagnosis  hypertension DM    Disposition  Home    Were the patients medications reviewed and updated  Yes    Current Symptoms  None      TCM Call     Post hospital issues  None    Should patient be enrolled in anticoag monitoring?  No    Scheduled for follow up?  Yes    Referrals needed  Dr Charles -post op    Did you obtain your prescribed medications  Yes    Do you need help managing your prescriptions or medications  No    Is transportation to your appointment needed  No    I have advised the patient to call PCP with any new or worsening symptoms  Shira márquez 1/23/24    Living Arrangements  Spouse or Significiant other    Are you recieving any outpatient services  No    Are you recieving home care services  No    Are you using any community resources  No    Current waiver services  No    Have you fallen in the  "last 12 months  No    Interperter language line needed  No        HPI  Review of Systems   Constitutional:  Negative for chills, fatigue and fever.   HENT:  Negative for congestion and sore throat.    Eyes:  Negative for pain and visual disturbance.   Respiratory:  Negative for shortness of breath and wheezing.    Cardiovascular:  Negative for chest pain and palpitations.   Gastrointestinal:  Negative for abdominal pain, constipation, diarrhea, nausea and vomiting.   Genitourinary:  Negative for dysuria and frequency.   Musculoskeletal:  Negative for back pain and neck pain.   Skin:  Negative for color change and rash.   Neurological:  Negative for dizziness and headaches.   Psychiatric/Behavioral:  Negative for agitation and confusion.    All other systems reviewed and are negative.      Objective     /90 (BP Location: Left arm, Patient Position: Sitting, Cuff Size: Large)   Pulse 96   Temp 98.2 °F (36.8 °C)   Resp 16   Ht 5' 6\" (1.676 m)   Wt 115 kg (254 lb 3.2 oz)   SpO2 99%   BMI 41.03 kg/m²      Physical Exam  Vitals and nursing note reviewed.   Constitutional:       General: He is not in acute distress.     Appearance: He is well-developed.   HENT:      Head: Normocephalic and atraumatic.   Eyes:      General: No scleral icterus.     Conjunctiva/sclera: Conjunctivae normal.   Cardiovascular:      Rate and Rhythm: Normal rate and regular rhythm.      Heart sounds: No murmur heard.  Pulmonary:      Effort: Pulmonary effort is normal. No respiratory distress.      Breath sounds: Normal breath sounds.   Abdominal:      General: Bowel sounds are normal. There is no distension.      Palpations: Abdomen is soft.      Tenderness: There is no abdominal tenderness.   Musculoskeletal:         General: No swelling. Normal range of motion.      Cervical back: Neck supple.   Skin:     General: Skin is warm and dry.      Capillary Refill: Capillary refill takes less than 2 seconds.   Neurological:      General: " No focal deficit present.      Mental Status: He is alert and oriented to person, place, and time. Mental status is at baseline.   Psychiatric:         Mood and Affect: Mood normal.         Behavior: Behavior normal.       Medications have been reviewed by provider in current encounter    Dejan Jeffrey DO

## 2024-01-24 ENCOUNTER — CLINICAL SUPPORT (OUTPATIENT)
Dept: BARIATRICS | Facility: CLINIC | Age: 39
End: 2024-01-24

## 2024-01-24 ENCOUNTER — OFFICE VISIT (OUTPATIENT)
Dept: BARIATRICS | Facility: CLINIC | Age: 39
End: 2024-01-24

## 2024-01-24 VITALS — WEIGHT: 248.4 LBS | HEIGHT: 66 IN | BODY MASS INDEX: 39.92 KG/M2

## 2024-01-24 VITALS
HEIGHT: 66 IN | WEIGHT: 248.4 LBS | DIASTOLIC BLOOD PRESSURE: 60 MMHG | BODY MASS INDEX: 39.92 KG/M2 | RESPIRATION RATE: 18 BRPM | SYSTOLIC BLOOD PRESSURE: 122 MMHG | HEART RATE: 92 BPM

## 2024-01-24 DIAGNOSIS — K91.2 POSTSURGICAL MALABSORPTION: Primary | ICD-10-CM

## 2024-01-24 DIAGNOSIS — Z98.84 STATUS POST GASTRIC BYPASS FOR OBESITY: Primary | ICD-10-CM

## 2024-01-24 PROCEDURE — 99024 POSTOP FOLLOW-UP VISIT: CPT | Performed by: SURGERY

## 2024-01-24 PROCEDURE — RECHECK

## 2024-01-24 NOTE — PROGRESS NOTES
FIRST POST-OPERATIVE VISIT - BARIATRIC SURGERY  Serafin Conklin 38 y.o. male MRN: [id number]  Unit/Bed#:  Encounter: [id number]      HPI:  Serafin Conklin is a 38 y.o. male who presents for the 1st postoperative visit following a laparoscopic Tiffani-en-Y gastric bypass. Tolerating adequate PO intake, ambulating regularly, using IS, voiding, +F/BM. Denies n/v/cp/sob/dizziness    Review of Systems   Constitutional: Negative.    Respiratory: Negative.     Cardiovascular: Negative.    Gastrointestinal: Negative.    Musculoskeletal: Negative.    Neurological: Negative.    All other systems reviewed and are negative.      Historical Information   Past Medical History:   Diagnosis Date    Asthma     as child    Diabetes mellitus (HCC)     Fatty liver     Hypertension     Nasal congestion     Frequently    PONV (postoperative nausea and vomiting)     Sleep apnea     5.5 apnea scale// no cpap     Past Surgical History:   Procedure Laterality Date    EPIDURAL BLOCK INJECTION N/A 11/15/2023    Procedure: L5-S1  LUMBAR epidural steroid injection (74952);  Surgeon: Lewis Pittman DO;  Location: [facility] MAIN OR;  Service: Pain Management     ME LAPS GSTR RSTCV PX W/BYP TIFFANI-EN-Y LIMB <150 CM N/A 1/15/2024    Procedure: LAPAROSCOPIC TIFFANI-EN-Y GASTRIC BYPASS;  Surgeon: Jaime Robert MD;  Location: [facility] MAIN OR;  Service: Bariatrics    ME SEPTOPLASTY/SUBMUCOUS RESECJ W/WO CARTILAGE GRF N/A 11/28/2022    Procedure: SEPTOPLASTY;  Surgeon: Abhi Couch MD;  Location: [facility] MAIN OR;  Service: ENT    ME SUBMUCOUS RESCJ INFERIOR TURBINATE PRTL/COMPL Bilateral 11/28/2022    Procedure: TURBINECTOMY;  Surgeon: Abhi Couch MD;  Location: [facility] MAIN OR;  Service: ENT     Social History   Social History     Substance and Sexual Activity   Alcohol Use Yes    Comment: rare     Social History     Substance and Sexual Activity   Drug Use Never     Social History     Tobacco Use   Smoking Status Former    Types: Cigars    Passive exposure: Past  "  Smokeless Tobacco Never   Tobacco Comments    Wife just had baby not smoking right now    Pt stated stopped smoking cigars over a month ago     Family History: non-contributory    Meds/Allergies   all medications and allergies reviewed  Allergies   Allergen Reactions    Other Angioedema    Shellfish-Derived Products - Food Allergy Anaphylaxis    Penicillins Hives    Penicillin G Hives       Objective     Current Vitals:   Blood Pressure: 122/60 (01/24/24 1307)  Pulse: 92 (01/24/24 1307)  Respirations: 18 (01/24/24 1307)  Height: 5' 6\" (167.6 cm) (01/24/24 1307)  Weight - Scale: 113 kg (248 lb 6.4 oz) (01/24/24 1307)     Physical Exam  Vitals reviewed.   Constitutional:       Appearance: He is well-developed.   HENT:      Head: Normocephalic.   Eyes:      Extraocular Movements: Extraocular movements intact.   Cardiovascular:      Rate and Rhythm: Normal rate.   Pulmonary:      Effort: Pulmonary effort is normal.   Abdominal:      General: There is no distension.      Palpations: Abdomen is soft. There is no mass.      Tenderness: There is no abdominal tenderness.      Hernia: No hernia is present.      Comments: Incisions c/d/I. No signs of infection   Musculoskeletal:         General: Normal range of motion.      Cervical back: Normal range of motion.   Neurological:      Mental Status: He is alert and oriented to person, place, and time.   Psychiatric:         Mood and Affect: Mood normal.         Behavior: Behavior normal.         Thought Content: Thought content normal.         Judgment: Judgment normal.           Assessment/Plan :    Patient is presenting for the first postoperative visit, patient hospital stay was uneventful without any complications, patient is doing well, has no complaints, is taking vitamins as instructed, currently tolerating the blenderized diet, will advance to soft diet.     Patient will also be meeting with our dietician today to review her vitamin and mineral supplements and also go " over her diet and emphasize postoperative commitment and compliance. The patient was also instructed to start exercising on a regular basis. However, I recommended no heavy lifting, or weight exercises for another 2 weeks. F/U at the 5 week global class and 3 month appointment. Labs for the 3 month appointment were ordered. Patient was instructed to call if develops nausea, vomiting, fever or chills.

## 2024-01-24 NOTE — PROGRESS NOTES
Weight Management Nutrition Class     Diagnosis: Morbid Obesity    Bariatric Surgeon: Dr. Jaime Robert     Surgery: Gastric Bypass Laparoscopic    Class: first post op note    Topics discussed today include:     fluid goals post op, protein goals post op, constipation, chew food well, exercise, avoidance of alcohol, PPI use, diet progression, hypoglycemia, dumping syndrome, protein supplems, vitamin/mineral supplements, and calcium supplements    Patient was able to verbalize basic diet (protein, fluid, vitamin and mineral) recommendations and possible nutrition-related complications. Yes     Pureed diet:  pureed deviled eggs and chicken salad, oatmeal, refried beans  Protein shakes:  1-2 per day (Muscle milk (25gm) and Ensure Max (30gm))  Getting max 60gm protein --provided samples of unflavored powder and other ideas to meet 75gm protein  Fluids: 50oz  Keeping to 1/4 cup    Vitamin:  Going through the sample and likes the Procare the best and will start the calcium also

## 2024-01-30 ENCOUNTER — OFFICE VISIT (OUTPATIENT)
Dept: FAMILY MEDICINE CLINIC | Facility: CLINIC | Age: 39
End: 2024-01-30
Payer: COMMERCIAL

## 2024-01-30 VITALS
RESPIRATION RATE: 16 BRPM | HEIGHT: 66 IN | HEART RATE: 90 BPM | DIASTOLIC BLOOD PRESSURE: 82 MMHG | WEIGHT: 248.5 LBS | SYSTOLIC BLOOD PRESSURE: 120 MMHG | OXYGEN SATURATION: 98 % | BODY MASS INDEX: 39.94 KG/M2 | TEMPERATURE: 97.3 F

## 2024-01-30 DIAGNOSIS — H01.9: ICD-10-CM

## 2024-01-30 DIAGNOSIS — S00.201A: ICD-10-CM

## 2024-01-30 DIAGNOSIS — E11.65 TYPE 2 DIABETES MELLITUS WITH HYPERGLYCEMIA, WITHOUT LONG-TERM CURRENT USE OF INSULIN (HCC): Primary | ICD-10-CM

## 2024-01-30 DIAGNOSIS — Z98.84 S/P GASTRIC BYPASS: ICD-10-CM

## 2024-01-30 DIAGNOSIS — I10 PRIMARY HYPERTENSION: ICD-10-CM

## 2024-01-30 PROCEDURE — 3079F DIAST BP 80-89 MM HG: CPT | Performed by: STUDENT IN AN ORGANIZED HEALTH CARE EDUCATION/TRAINING PROGRAM

## 2024-01-30 PROCEDURE — 99214 OFFICE O/P EST MOD 30 MIN: CPT | Performed by: STUDENT IN AN ORGANIZED HEALTH CARE EDUCATION/TRAINING PROGRAM

## 2024-01-30 PROCEDURE — 1111F DSCHRG MED/CURRENT MED MERGE: CPT | Performed by: STUDENT IN AN ORGANIZED HEALTH CARE EDUCATION/TRAINING PROGRAM

## 2024-01-30 PROCEDURE — 3074F SYST BP LT 130 MM HG: CPT | Performed by: STUDENT IN AN ORGANIZED HEALTH CARE EDUCATION/TRAINING PROGRAM

## 2024-01-30 RX ORDER — GENTAMICIN SULFATE 3 MG/ML
1 SOLUTION/ DROPS OPHTHALMIC 3 TIMES DAILY
Qty: 5 ML | Refills: 0 | Status: SHIPPED | OUTPATIENT
Start: 2024-01-30 | End: 2024-02-02

## 2024-01-31 NOTE — PROGRESS NOTES
Clinic Visit Note  Serafin Conklin 38 y.o. male   MRN: [id number]    Assessment and Plan      Diagnoses and all orders for this visit:    Type 2 diabetes mellitus with hyperglycemia, without long-term current use of insulin (HCC)  Most recent hemoglobin A1c 6.3%, continue lifestyle modifications, s/p gastric bypass, healing appropriately    Primary hypertension  Blood pressure 120/82, continue low-dose lisinopril    S/P gastric bypass    Superficial injury of right eyelid with infection, initial encounter  Etiology appears to be blocked upper eyelid tear duct, warm compresses to area as needed, gentamicin ophthalmologic solution coverage.  If symptoms worsen or not improving reevaluation encouraged.  -     gentamicin (GARAMYCIN) 0.3 % ophthalmic solution; Administer 1 drop to the right eye 3 (three) times a day    My impressions and treatment recommendations were discussed in detail with the patient who verbalized understanding and had no further questions.  Discharge instructions were provided.    Subjective     Chief Complaint: Acute care visit    History of Present Illness:    Patient is a pleasant 38-year-old male coming in for acute care visit secondary to right upper eyelid swelling.    The following portions of the patient's history were reviewed and updated as appropriate: allergies, current medications, past family history, past medical history, past social history, past surgical history and problem list.    REVIEW OF SYSTEMS:  A complete 12-point review of systems is negative other than that noted in the HPI.    Review of Systems   Constitutional:  Negative for chills, fatigue and unexpected weight change.   HENT:  Negative for congestion, postnasal drip and sore throat.    Eyes:  Positive for discharge. Negative for photophobia, pain, redness, itching and visual disturbance.   Respiratory:  Negative for cough, shortness of breath and wheezing.    Cardiovascular:  Negative for chest pain, palpitations and  leg swelling.   Neurological:  Negative for dizziness and headaches.         Current Outpatient Medications:   •  Azelastine HCl 137 MCG/SPRAY SOLN, 1 SPRAY INTO EACH NOSTRIL 2 (TWO) TIMES A DAY USE IN EACH NOSTRIL AS DIRECTED, Disp: 30 mL, Rfl: 3  •  gentamicin (GARAMYCIN) 0.3 % ophthalmic solution, Administer 1 drop to the right eye 3 (three) times a day, Disp: 5 mL, Rfl: 0  •  lisinopril (ZESTRIL) 10 mg tablet, Take 1 tablet (10 mg total) by mouth daily, Disp: 90 tablet, Rfl: 3  •  oxyCODONE (Roxicodone) 5 immediate release tablet, Take 1 tablet (5 mg total) by mouth every 4 (four) hours as needed for moderate pain Max Daily Amount: 30 mg, Disp: 10 tablet, Rfl: 0  •  pantoprazole (PROTONIX) 40 mg tablet, Take 1 tablet (40 mg total) by mouth daily, Disp: 90 tablet, Rfl: 1  •  sildenafil (VIAGRA) 25 MG tablet, Take 25 mg by mouth daily as needed for erectile dysfunction, Disp: , Rfl:   Past Medical History:   Diagnosis Date   • Asthma     as child   • Diabetes mellitus (HCC)    • Fatty liver    • Hypertension    • Nasal congestion     Frequently   • PONV (postoperative nausea and vomiting)    • Sleep apnea     5.5 apnea scale// no cpap     Past Surgical History:   Procedure Laterality Date   • EPIDURAL BLOCK INJECTION N/A 11/15/2023    Procedure: L5-S1  LUMBAR epidural steroid injection (46466);  Surgeon: Lewis Pittman DO;  Location: [facility] MAIN OR;  Service: Pain Management    • UT LAPS GSTR RSTCV PX W/BYP TIFFANI-EN-Y LIMB <150 CM N/A 1/15/2024    Procedure: LAPAROSCOPIC TIFFANI-EN-Y GASTRIC BYPASS;  Surgeon: Jaime Robert MD;  Location: [facility] MAIN OR;  Service: Bariatrics   • UT SEPTOPLASTY/SUBMUCOUS RESECJ W/WO CARTILAGE GRF N/A 11/28/2022    Procedure: SEPTOPLASTY;  Surgeon: Abhi Couch MD;  Location: [facility]IN [facility];  Service: ENT   • UT SUBMUCOUS RESCJ INFERIOR TURBINATE PRTL/COMPL Bilateral 11/28/2022    Procedure: TURBINECTOMY;  Surgeon: Abhi Couch MD;  Location: [facility] MAIN OR;  Service: ENT      Social History     Socioeconomic History   • Marital status: /Civil Union     Spouse name: Not on file   • Number of children: Not on file   • Years of education: Not on file   • Highest education level: Not on file   Occupational History   • Not on file   Tobacco Use   • Smoking status: Former     Types: Cigars     Passive exposure: Past   • Smokeless tobacco: Never   • Tobacco comments:     Wife just had baby not smoking right now     Pt stated stopped smoking cigars over a month ago   Vaping Use   • Vaping status: Never Used   Substance and Sexual Activity   • Alcohol use: Yes     Comment: rare   • Drug use: Never   • Sexual activity: Yes     Partners: Female     Birth control/protection: None   Other Topics Concern   • Not on file   Social History Narrative   • Not on file     Social Determinants of Health     Financial Resource Strain: Not on file   Food Insecurity: No Food Insecurity (6/5/2021)    Received from [facility]    Hunger Vital Sign    • Worried About Running Out of Food in the Last Year: Never true    • Ran Out of Food in the Last Year: Never true   Transportation Needs: Not on file   Physical Activity: Not on file   Stress: Not on file   Social Connections: Not on file   Intimate Partner Violence: Not on file   Housing Stability: Not on file     Family History   Problem Relation Age of Onset   • Diabetes type II Mother    • Diabetes Mother    • Diabetes Maternal Grandmother    • Diabetes type II Maternal Grandmother    • Arthritis Maternal Grandmother    • Cancer Maternal Grandfather         Liver cancer     Allergies   Allergen Reactions   • Other Angioedema   • Shellfish-Derived Products - Food Allergy Anaphylaxis   • Penicillins Hives   • Penicillin G Hives       Objective     Vitals:    01/30/24 1907   BP: 120/82   BP Location: Left arm   Patient Position: Sitting   Cuff Size: Large   Pulse: 90   Resp: 16   Temp: (!) 97.3 °F (36.3 °C)   SpO2: 98%   Weight: 113 kg (248 lb 8 oz)  "  Height: 5' 6\" (1.676 m)       Physical Exam:     GENERAL: NAD, pleasant   HEENT:  NC/AT, PERRL, EOMI, no scleral icterus  CARDIAC:  RRR, +S1/S2, no S3/S4 appreciated, no m/g/r  PULMONARY:  CTA B/L, no wheezing/rales/rhonci, non-labored breathing  ABDOMEN:  Soft, NT/ND, no rebound/guarding/rigidity  Extremities:. No edema, cyanosis, or clubbing  Musculoskeletal:  Full range of motion intact in all extremities   NEUROLOGIC: Grossly intact, no focal deficits  SKIN:  No rashes or erythema noted on exposed skin  Psych: Normal affect, mood stable    Right upper eyelid swelling with mild redness, clear conjunctiva    ==  PLEASE NOTE:  This encounter was completed utilizing the [employer] Direct Speech Voice Recognition Software. Grammatical errors, random word insertions, pronoun errors and incomplete sentences are occasional consequences of the system due to software limitations, ambient noise and hardware issues.These may be missed by proof reading prior to affixing electronic signature. Any questions or concerns about the content, text or information contained within the body of this dictation should be directly addressed to the physician for clarification. Please do not hesitate to call me directly if you have any any questions or concerns.    Dejan Jeffrey, DO  [facility] Internal Medicine   [facility]     "

## 2024-02-02 DIAGNOSIS — S00.201A: Primary | ICD-10-CM

## 2024-02-02 DIAGNOSIS — H01.9: Primary | ICD-10-CM

## 2024-02-02 RX ORDER — POLYMYXIN B SULFATE AND TRIMETHOPRIM 1; 10000 MG/ML; [USP'U]/ML
1 SOLUTION OPHTHALMIC 3 TIMES DAILY
Qty: 10 ML | Refills: 0 | Status: SHIPPED | OUTPATIENT
Start: 2024-02-02

## 2024-02-06 ENCOUNTER — TELEPHONE (OUTPATIENT)
Age: 39
End: 2024-02-06

## 2024-02-06 NOTE — TELEPHONE ENCOUNTER
Pt called in regards to an note pt needs stating pt is cleared to return to work. Pt requested a call back to  the doc.

## 2024-02-12 ENCOUNTER — APPOINTMENT (OUTPATIENT)
Dept: LAB | Facility: CLINIC | Age: 39
End: 2024-02-12
Payer: COMMERCIAL

## 2024-02-12 DIAGNOSIS — K91.2 POSTSURGICAL MALABSORPTION: ICD-10-CM

## 2024-02-12 LAB
25(OH)D3 SERPL-MCNC: 31.9 NG/ML (ref 30–100)
ALBUMIN SERPL BCP-MCNC: 4.5 G/DL (ref 3.5–5)
ALP SERPL-CCNC: 79 U/L (ref 34–104)
ALT SERPL W P-5'-P-CCNC: 50 U/L (ref 7–52)
ANION GAP SERPL CALCULATED.3IONS-SCNC: 6 MMOL/L
AST SERPL W P-5'-P-CCNC: 24 U/L (ref 13–39)
BILIRUB SERPL-MCNC: 1.31 MG/DL (ref 0.2–1)
BUN SERPL-MCNC: 15 MG/DL (ref 5–25)
CALCIUM SERPL-MCNC: 9.5 MG/DL (ref 8.4–10.2)
CHLORIDE SERPL-SCNC: 104 MMOL/L (ref 96–108)
CHOLEST SERPL-MCNC: 109 MG/DL
CO2 SERPL-SCNC: 31 MMOL/L (ref 21–32)
CREAT SERPL-MCNC: 0.76 MG/DL (ref 0.6–1.3)
ERYTHROCYTE [DISTWIDTH] IN BLOOD BY AUTOMATED COUNT: 13.2 % (ref 11.6–15.1)
GFR SERPL CREATININE-BSD FRML MDRD: 114 ML/MIN/1.73SQ M
GLUCOSE P FAST SERPL-MCNC: 127 MG/DL (ref 65–99)
HCT VFR BLD AUTO: 41.8 % (ref 36.5–49.3)
HDLC SERPL-MCNC: 29 MG/DL
HGB BLD-MCNC: 14 G/DL (ref 12–17)
IRON SATN MFR SERPL: 27 % (ref 15–50)
IRON SERPL-MCNC: 92 UG/DL (ref 50–212)
LDLC SERPL CALC-MCNC: 66 MG/DL (ref 0–100)
MCH RBC QN AUTO: 27.7 PG (ref 26.8–34.3)
MCHC RBC AUTO-ENTMCNC: 33.5 G/DL (ref 31.4–37.4)
MCV RBC AUTO: 83 FL (ref 82–98)
NONHDLC SERPL-MCNC: 80 MG/DL
PLATELET # BLD AUTO: 339 THOUSANDS/UL (ref 149–390)
PMV BLD AUTO: 11.3 FL (ref 8.9–12.7)
POTASSIUM SERPL-SCNC: 4.2 MMOL/L (ref 3.5–5.3)
PROT SERPL-MCNC: 7.6 G/DL (ref 6.4–8.4)
PTH-INTACT SERPL-MCNC: 38.5 PG/ML (ref 12–88)
RBC # BLD AUTO: 5.06 MILLION/UL (ref 3.88–5.62)
SODIUM SERPL-SCNC: 141 MMOL/L (ref 135–147)
TIBC SERPL-MCNC: 338 UG/DL (ref 250–450)
TRIGL SERPL-MCNC: 72 MG/DL
UIBC SERPL-MCNC: 246 UG/DL (ref 155–355)
WBC # BLD AUTO: 6.11 THOUSAND/UL (ref 4.31–10.16)

## 2024-02-12 PROCEDURE — 80061 LIPID PANEL: CPT

## 2024-02-12 PROCEDURE — 83036 HEMOGLOBIN GLYCOSYLATED A1C: CPT

## 2024-02-12 PROCEDURE — 84630 ASSAY OF ZINC: CPT

## 2024-02-12 PROCEDURE — 83540 ASSAY OF IRON: CPT

## 2024-02-12 PROCEDURE — 82607 VITAMIN B-12: CPT

## 2024-02-12 PROCEDURE — 84590 ASSAY OF VITAMIN A: CPT

## 2024-02-12 PROCEDURE — 82306 VITAMIN D 25 HYDROXY: CPT

## 2024-02-12 PROCEDURE — 84425 ASSAY OF VITAMIN B-1: CPT

## 2024-02-12 PROCEDURE — 82728 ASSAY OF FERRITIN: CPT

## 2024-02-12 PROCEDURE — 83550 IRON BINDING TEST: CPT

## 2024-02-12 PROCEDURE — 80053 COMPREHEN METABOLIC PANEL: CPT

## 2024-02-12 PROCEDURE — 36415 COLL VENOUS BLD VENIPUNCTURE: CPT

## 2024-02-12 PROCEDURE — 85027 COMPLETE CBC AUTOMATED: CPT

## 2024-02-12 PROCEDURE — 83970 ASSAY OF PARATHORMONE: CPT

## 2024-02-12 PROCEDURE — 82746 ASSAY OF FOLIC ACID SERUM: CPT

## 2024-02-13 LAB
EST. AVERAGE GLUCOSE BLD GHB EST-MCNC: 140 MG/DL
FERRITIN SERPL-MCNC: 284 NG/ML (ref 24–336)
FOLATE SERPL-MCNC: 16.2 NG/ML
HBA1C MFR BLD: 6.5 %

## 2024-02-15 LAB — VIT B12 SERPL-MCNC: 474 PG/ML (ref 180–914)

## 2024-02-17 LAB — VIT B1 BLD-SCNC: 136.3 NMOL/L (ref 66.5–200)

## 2024-02-19 ENCOUNTER — TELEPHONE (OUTPATIENT)
Age: 39
End: 2024-02-19

## 2024-02-19 LAB — VIT A SERPL-MCNC: 41 UG/DL (ref 18.9–57.3)

## 2024-02-19 NOTE — RESULT ENCOUNTER NOTE
LMOM stating Mary has reviewed labs. Message can be found in [employer] under labs. Requested CB to confirm pt received message from Mary.

## 2024-02-19 NOTE — RESULT ENCOUNTER NOTE
Please update Serafin on his labs from 02/19/24:    -HgbA1C trending up some from 3 weeks ago but overall down from 3 months ago - continue weight loss progress and f/u with PCP    -HDL low/stable - increase exercise as able    -Your vitamin D is low normal:  Please add an additional 2,000 IU of Vitamin D3 per day in addition to the 3,000IU of Vitamin D you are currently taking in your Bariatric MVI.  Vitamin D is best absorbed with food, so take it with your largest meal of the day. It can be found inexpensively over the counter at your pharmacy or online.    Remember to also take 1500 mg calcium citrate per day total (taken 500 mg at a time, three times per day). It is very important that you separate each dose by at least 2 hours and take calcium at least 2 hours apart from MVI and iron.    Thank you!

## 2024-02-19 NOTE — TELEPHONE ENCOUNTER
Spoke with patient and reviewed lab results and gave providers recommendations. Patient verbalized understanding.

## 2024-02-20 LAB — ZINC SERPL-MCNC: 78 UG/DL (ref 44–115)

## 2024-02-28 ENCOUNTER — CLINICAL SUPPORT (OUTPATIENT)
Dept: BARIATRICS | Facility: CLINIC | Age: 39
End: 2024-02-28

## 2024-02-28 VITALS — BODY MASS INDEX: 38.06 KG/M2 | HEIGHT: 66 IN | WEIGHT: 236.8 LBS

## 2024-02-28 DIAGNOSIS — K91.2 POSTSURGICAL MALABSORPTION: Primary | ICD-10-CM

## 2024-02-28 PROCEDURE — RECHECK

## 2024-02-28 NOTE — PROGRESS NOTES
Weight Management Nutrition Class     Diagnosis: Morbid Obesity    Bariatric Surgeon: Dr. Jaime Robert     Surgery: Gastric Bypass Laparoscopic    Class: 5 week post op     Topics discussed today include:     fluid goals post op, protein goals post op, constipation, chew food well, exercise, avoidance of alcohol, PPI use, diet progression, hypoglycemia, dumping syndrome, protein supplems, vitamin/mineral supplements, and calcium supplements    Patient was able to verbalize basic diet (protein, fluid, vitamin and mineral) recommendations and possible nutrition-related complications. Yes     Foods has tried: chili, 2oz burger itzel, chicken but sits heavy, impossible burger, greek yogurt, cheese, 1-2 chicken nuggets from son, fish/salmon  Core power elite x 2=84gm protein per day, BA protein shake/powder--advised to limit to 2 shakes per day, no need for 3.   Doing three food meals per day, sometimes a BA meal replacement shake for breakfast + 2 shakes + 1 yogurt  Follow 30/60 rule  Fluids:  at least 45oz (36oz water/sf crystal light) + 28oz protein shake    Some vomiting when eating too fast    Vitamins:  Baritatric fusion one a day cap   Vitamin D  Calcium chewys 1 TID

## 2024-03-18 ENCOUNTER — TELEPHONE (OUTPATIENT)
Dept: PAIN MEDICINE | Facility: CLINIC | Age: 39
End: 2024-03-18

## 2024-03-18 ENCOUNTER — OFFICE VISIT (OUTPATIENT)
Dept: PAIN MEDICINE | Facility: CLINIC | Age: 39
End: 2024-03-18
Payer: COMMERCIAL

## 2024-03-18 VITALS
HEIGHT: 66 IN | SYSTOLIC BLOOD PRESSURE: 127 MMHG | BODY MASS INDEX: 38.35 KG/M2 | DIASTOLIC BLOOD PRESSURE: 87 MMHG | WEIGHT: 238.6 LBS | HEART RATE: 91 BPM

## 2024-03-18 DIAGNOSIS — M54.16 LUMBAR RADICULOPATHY: ICD-10-CM

## 2024-03-18 DIAGNOSIS — M48.061 LUMBAR FORAMINAL STENOSIS: ICD-10-CM

## 2024-03-18 DIAGNOSIS — M54.42 CHRONIC BILATERAL LOW BACK PAIN WITH BILATERAL SCIATICA: Primary | ICD-10-CM

## 2024-03-18 DIAGNOSIS — M54.41 CHRONIC BILATERAL LOW BACK PAIN WITH BILATERAL SCIATICA: Primary | ICD-10-CM

## 2024-03-18 DIAGNOSIS — G89.29 CHRONIC BILATERAL LOW BACK PAIN WITH BILATERAL SCIATICA: Primary | ICD-10-CM

## 2024-03-18 PROCEDURE — 99214 OFFICE O/P EST MOD 30 MIN: CPT

## 2024-03-18 NOTE — H&P (VIEW-ONLY)
"Pain Medicine Follow-Up Note    Assessment:  1. Chronic bilateral low back pain with bilateral sciatica    2. Lumbar radiculopathy    3. Lumbar foraminal stenosis        Plan:    My impressions and treatment recommendations were discussed in detail with the patient who verbalized understanding and had no further questions.      Patient returns the office with worsening low back pain.  He does state that he continues to do physical therapy maneuvers and stretches and to try to control his pain.  Patient also recently had bariatric surgery and is unable to use \"a lot of meds.\"  Patient wishes to be reevaluated for another lumbar epidural steroid injection.  On 11/15/2023 patient had an L5-S1 lumbar epidural steroid injection which she has found extremely beneficial up until about 2 weeks ago achieving 3 months of pain relief.  Patient states the same symptoms have returned.  I will order a repeat L5-S1 lumbar epidural steroid injection. Complete risks and benefits including bleeding, infection, tissue reaction, nerve injury and allergic reaction were discussed. The approach was demonstrated using models and literature was provided. Verbal and written consent was obtained.    Follow-up is planned in 4 weeks after injection time or sooner as warranted.  Discharge instructions were provided. I personally saw and examined the patient and I agree with the above discussed plan of care.    History of Present Illness:    Serafin Conklin is a 38 y.o. male who presents to [facility] for interval re-evaluation of the above stated pain complaints. The patient has a past medical and chronic pain history as outlined in the assessment section. He was last seen on 11/15/2023.    At today's visit patient states that their pain symptoms are the same with a pain score of 3/10 on the verbal numeric pain scale.  The patient's pain is worse in the morning.  The patient's pain is intermittent in nature.  And the " quality of the patient's pain is described as pressure/compression.  The patient's pain is located in the in his low back radiating into his buttocks.    Other than as stated above, the patient denies any interval changes in medications, medical condition, mental condition, symptoms, or allergies since the last office visit.         Review of Systems:    Review of Systems   Respiratory:  Negative for shortness of breath.    Cardiovascular:  Negative for chest pain.   Gastrointestinal:  Negative for constipation, diarrhea, nausea and vomiting.   Musculoskeletal:  Positive for arthralgias and back pain. Negative for gait problem, joint swelling and myalgias.   Skin:  Negative for rash.   Neurological:  Negative for dizziness, seizures and weakness.   All other systems reviewed and are negative.        Past Medical History:   Diagnosis Date    Asthma     as child    Diabetes mellitus (HCC)     Fatty liver     Hypertension     Nasal congestion     Frequently    PONV (postoperative nausea and vomiting)     Sleep apnea     5.5 apnea scale// no cpap       Past Surgical History:   Procedure Laterality Date    EPIDURAL BLOCK INJECTION N/A 11/15/2023    Procedure: L5-S1  LUMBAR epidural steroid injection (18935);  Surgeon: Lewis Pittman DO;  Location: [facility] MAIN OR;  Service: Pain Management     [place] LAPS GSTR RSTCV PX W/BYP TIFFANI-EN-Y LIMB <150 CM N/A 1/15/2024    Procedure: LAPAROSCOPIC TIFFANI-EN-Y GASTRIC BYPASS;  Surgeon: Jaime Robert MD;  Location: [facility] MAIN OR;  Service: Bariatrics    [place] SEPTOPLASTY/SUBMUCOUS RESECJ W/WO CARTILAGE GRF N/A 11/28/2022    Procedure: SEPTOPLASTY;  Surgeon: Abhi Couch MD;  Location: [facility] MAIN OR;  Service: ENT    [place] SUBMUCOUS RESCJ INFERIOR TURBINATE PRTL/COMPL Bilateral 11/28/2022    Procedure: TURBINECTOMY;  Surgeon: Abhi Couch MD;  Location: [facility] MAIN OR;  Service: ENT       Family History   Problem Relation Age of Onset    Diabetes type II Mother     Diabetes Mother      "Diabetes Maternal Grandmother     Diabetes type II Maternal Grandmother     Arthritis Maternal Grandmother     Cancer Maternal Grandfather         Liver cancer       Social History     Occupational History    Not on file   Tobacco Use    Smoking status: Former     Types: Cigars     Start date: 2019     Quit date: 2023     Years since quittin.4     Passive exposure: Past    Smokeless tobacco: Never    Tobacco comments:     Wife just had baby not smoking right now     Pt stated stopped smoking cigars over a month ago   Vaping Use    Vaping status: Never Used   Substance and Sexual Activity    Alcohol use: Yes     Comment: rare    Drug use: Never    Sexual activity: Yes     Partners: Female     Birth control/protection: None         Current Outpatient Medications:     pantoprazole (PROTONIX) 40 mg tablet, Take 1 tablet (40 mg total) by mouth daily, Disp: 90 tablet, Rfl: 1    sildenafil (VIAGRA) 25 MG tablet, Take 25 mg by mouth daily as needed for erectile dysfunction, Disp: , Rfl:     azithromycin (Zithromax) 250 mg tablet, Take 2 tablets (500 mg total) by mouth daily for 1 day, THEN 1 tablet (250 mg total) daily for 4 days., Disp: 6 tablet, Rfl: 0    chlorhexidine (PERIDEX) 0.12 % solution, Apply 15 mL to the mouth or throat 2 (two) times a day, Disp: 120 mL, Rfl: 0    methylPREDNISolone 4 MG tablet therapy pack, Use as directed on package, Disp: 21 each, Rfl: 0    Allergies   Allergen Reactions    Other Angioedema    Shellfish-Derived Products - Food Allergy Anaphylaxis    Penicillins Hives    Penicillin G Hives       Physical Exam:    /87   Pulse 91   Ht 5' 6\" (1.676 m)   Wt 108 kg (238 lb 9.6 oz)   BMI 38.51 kg/m²     Constitutional:normal, well developed, well nourished, alert, in no distress and non-toxic and no overt pain behavior.  Eyes:anicteric  HEENT:grossly intact  Neck:supple, symmetric, trachea midline and no masses   Pulmonary:even and unlabored  Cardiovascular:No edema or " pitting edema present  Skin:Normal without rashes or lesions and well hydrated  Psychiatric:Mood and affect appropriate  Neurologic:Cranial Nerves II-XII grossly intact  Musculoskeletal:antalgic negative slump test bilaterally.      This document was created using speech voice recognition software.   Grammatical errors, random word insertions, pronoun errors, and incomplete sentences are an occasional consequence of this system due to software limitations, ambient noise, and hardware issues.   Any formal questions or concerns about content, text, or information contained within the body of this dictation should be directly addressed to the provider for clarification.

## 2024-03-18 NOTE — PATIENT INSTRUCTIONS
Epidural Steroid Injection, Ambulatory Care   GENERAL INFORMATION:   What do I need to know about an epidural steroid injection?  An epidural steroid injection (GASTON) is a procedure to inject steroid medicine into the epidural space. The epidural space is between your spinal cord and vertebrae. Steroids reduce inflammation and fluid buildup in your spine that may be causing pain. You may be given pain medicine along with the steroids.   How do I prepare for an GASTON?  Your healthcare provider will talk to you about how to prepare for your procedure. He will tell you what medicines to take or not take on the day of your procedure. You may need to stop taking blood thinners or other medicines several days before your procedure. You may need to adjust any diabetes medicine you take on the day of your procedure. Steroid medicine can increase your blood sugar level.   What will happen during an GASTON?   You will be given medicine to numb the procedure area. You will be awake for the procedure, but you will not feel pain. You may also be given medicine to help you relax during the procedure. Contrast liquid will be used to help your healthcare provider see the area better. Tell the healthcare provider if you have ever had an allergic reaction to contrast liquid.    Your healthcare provider may place the needle into your neck area, middle of your back, or tailbone area. He may inject the medicine next to the nerves that are causing your pain. He may instead inject the medicine into a larger area of the epidural space. This helps the medicine spread to more nerves. Your healthcare provider will use a fluoroscope to help guide the needle to the right place. A fluoroscope is a type of x-ray. After the procedure, a bandage will be placed over the injection site to prevent infection.  What are the risks of an GASTON?  You may have temporary or permanent nerve damage or paralysis. You may have bleeding or develop a serious infection,  such as meningitis (swelling of the brain coverings). An abscess may also develop. You may need surgery to fix the abscess. You may have a seizure, anxiety, or trouble sleeping. If you are a man, you may have temporary erectile dysfunction (not able to have an erection).   CARE AGREEMENT:   You have the right to help plan your care. Learn about your health condition and how it may be treated. Discuss treatment options with your caregivers to decide what care you want to receive. You always have the right to refuse treatment. The above information is an  only. It is not intended as medical advice for individual conditions or treatments. Talk to your doctor, nurse or pharmacist before following any medical regimen to see if it is safe and effective for you.  © 2014 [employer] Information is for End User's use only and may not be sold, redistributed or otherwise used for commercial purposes. All illustrations and images included in CareNotes® are the copyrighted property of A.D.A[employer]M., Inc[employer] or [employer].

## 2024-03-18 NOTE — TELEPHONE ENCOUNTER
Scheduled patient for LESI 03/27/2024  Patient denies RX blood thinners/ NSAIDS  Nothing to eat or drink 1 hour prior to procedure  Needs to arrange transportation  Proper clothing for procedure  No vaccines 2 weeks prior or after procedure  If ill or place on antibiotics, please call to reschedule

## 2024-03-18 NOTE — PROGRESS NOTES
"Pain Medicine Follow-Up Note    Assessment:  1. Chronic bilateral low back pain with bilateral sciatica    2. Lumbar radiculopathy    3. Lumbar foraminal stenosis        Plan:    My impressions and treatment recommendations were discussed in detail with the patient who verbalized understanding and had no further questions.      Patient returns the office with worsening low back pain.  He does state that he continues to do physical therapy maneuvers and stretches and to try to control his pain.  Patient also recently had bariatric surgery and is unable to use \"a lot of meds.\"  Patient wishes to be reevaluated for another lumbar epidural steroid injection.  On 11/15/2023 patient had an L5-S1 lumbar epidural steroid injection which she has found extremely beneficial up until about 2 weeks ago achieving 3 months of pain relief.  Patient states the same symptoms have returned.  I will order a repeat L5-S1 lumbar epidural steroid injection. Complete risks and benefits including bleeding, infection, tissue reaction, nerve injury and allergic reaction were discussed. The approach was demonstrated using models and literature was provided. Verbal and written consent was obtained.    Follow-up is planned in 4 weeks after injection time or sooner as warranted.  Discharge instructions were provided. I personally saw and examined the patient and I agree with the above discussed plan of care.    History of Present Illness:    Serafin Conklin is a 38 y.o. male who presents to [facility] for interval re-evaluation of the above stated pain complaints. The patient has a past medical and chronic pain history as outlined in the assessment section. He was last seen on 11/15/2023.    At today's visit patient states that their pain symptoms are the same with a pain score of 3/10 on the verbal numeric pain scale.  The patient's pain is worse in the morning.  The patient's pain is intermittent in nature.  And the " quality of the patient's pain is described as pressure/compression.  The patient's pain is located in the in his low back radiating into his buttocks.    Other than as stated above, the patient denies any interval changes in medications, medical condition, mental condition, symptoms, or allergies since the last office visit.         Review of Systems:    Review of Systems   Respiratory:  Negative for shortness of breath.    Cardiovascular:  Negative for chest pain.   Gastrointestinal:  Negative for constipation, diarrhea, nausea and vomiting.   Musculoskeletal:  Positive for arthralgias and back pain. Negative for gait problem, joint swelling and myalgias.   Skin:  Negative for rash.   Neurological:  Negative for dizziness, seizures and weakness.   All other systems reviewed and are negative.        Past Medical History:   Diagnosis Date    Asthma     as child    Diabetes mellitus (HCC)     Fatty liver     Hypertension     Nasal congestion     Frequently    PONV (postoperative nausea and vomiting)     Sleep apnea     5.5 apnea scale// no cpap       Past Surgical History:   Procedure Laterality Date    EPIDURAL BLOCK INJECTION N/A 11/15/2023    Procedure: L5-S1  LUMBAR epidural steroid injection (77103);  Surgeon: Lewis Pittman DO;  Location: [facility] MAIN OR;  Service: Pain Management     [place] LAPS GSTR RSTCV PX W/BYP TIFFANI-EN-Y LIMB <150 CM N/A 1/15/2024    Procedure: LAPAROSCOPIC TIFFANI-EN-Y GASTRIC BYPASS;  Surgeon: Jaime Robert MD;  Location: [facility] MAIN OR;  Service: Bariatrics    SC SEPTOPLASTY/SUBMUCOUS RESECJ W/WO CARTILAGE GRF N/A 11/28/2022    Procedure: SEPTOPLASTY;  Surgeon: Abhi Couch MD;  Location: [facility] MAIN OR;  Service: ENT    SC SUBMUCOUS RESCJ INFERIOR TURBINATE PRTL/COMPL Bilateral 11/28/2022    Procedure: TURBINECTOMY;  Surgeon: Abhi Couch MD;  Location: [facility]IN OR;  Service: ENT       Family History   Problem Relation Age of Onset    Diabetes type II Mother     Diabetes Mother      "Diabetes Maternal Grandmother     Diabetes type II Maternal Grandmother     Arthritis Maternal Grandmother     Cancer Maternal Grandfather         Liver cancer       Social History     Occupational History    Not on file   Tobacco Use    Smoking status: Former     Types: Cigars     Start date: 2019     Quit date: 2023     Years since quittin.4     Passive exposure: Past    Smokeless tobacco: Never    Tobacco comments:     Wife just had baby not smoking right now     Pt stated stopped smoking cigars over a month ago   Vaping Use    Vaping status: Never Used   Substance and Sexual Activity    Alcohol use: Yes     Comment: rare    Drug use: Never    Sexual activity: Yes     Partners: Female     Birth control/protection: None         Current Outpatient Medications:     pantoprazole (PROTONIX) 40 mg tablet, Take 1 tablet (40 mg total) by mouth daily, Disp: 90 tablet, Rfl: 1    sildenafil (VIAGRA) 25 MG tablet, Take 25 mg by mouth daily as needed for erectile dysfunction, Disp: , Rfl:     azithromycin (Zithromax) 250 mg tablet, Take 2 tablets (500 mg total) by mouth daily for 1 day, THEN 1 tablet (250 mg total) daily for 4 days., Disp: 6 tablet, Rfl: 0    chlorhexidine (PERIDEX) 0.12 % solution, Apply 15 mL to the mouth or throat 2 (two) times a day, Disp: 120 mL, Rfl: 0    methylPREDNISolone 4 MG tablet therapy pack, Use as directed on package, Disp: 21 each, Rfl: 0    Allergies   Allergen Reactions    Other Angioedema    Shellfish-Derived Products - Food Allergy Anaphylaxis    Penicillins Hives    Penicillin G Hives       Physical Exam:    /87   Pulse 91   Ht 5' 6\" (1.676 m)   Wt 108 kg (238 lb 9.6 oz)   BMI 38.51 kg/m²     Constitutional:normal, well developed, well nourished, alert, in no distress and non-toxic and no overt pain behavior.  Eyes:anicteric  HEENT:grossly intact  Neck:supple, symmetric, trachea midline and no masses   Pulmonary:even and unlabored  Cardiovascular:No edema or " pitting edema present  Skin:Normal without rashes or lesions and well hydrated  Psychiatric:Mood and affect appropriate  Neurologic:Cranial Nerves II-XII grossly intact  Musculoskeletal:antalgic negative slump test bilaterally.      This document was created using speech voice recognition software.   Grammatical errors, random word insertions, pronoun errors, and incomplete sentences are an occasional consequence of this system due to software limitations, ambient noise, and hardware issues.   Any formal questions or concerns about content, text, or information contained within the body of this dictation should be directly addressed to the provider for clarification.

## 2024-03-19 ENCOUNTER — APPOINTMENT (OUTPATIENT)
Dept: RADIOLOGY | Facility: CLINIC | Age: 39
End: 2024-03-19
Payer: COMMERCIAL

## 2024-03-19 ENCOUNTER — OFFICE VISIT (OUTPATIENT)
Dept: OBGYN CLINIC | Facility: CLINIC | Age: 39
End: 2024-03-19
Payer: COMMERCIAL

## 2024-03-19 VITALS — BODY MASS INDEX: 38.25 KG/M2 | HEIGHT: 66 IN | WEIGHT: 238 LBS

## 2024-03-19 DIAGNOSIS — Z01.89 ENCOUNTER FOR LOWER EXTREMITY COMPARISON IMAGING STUDY: ICD-10-CM

## 2024-03-19 DIAGNOSIS — M25.552 LEFT HIP PAIN: ICD-10-CM

## 2024-03-19 DIAGNOSIS — M25.562 LEFT KNEE PAIN, UNSPECIFIED CHRONICITY: ICD-10-CM

## 2024-03-19 DIAGNOSIS — G89.29 CHRONIC PAIN OF LEFT KNEE: ICD-10-CM

## 2024-03-19 DIAGNOSIS — M70.62 GREATER TROCHANTERIC BURSITIS OF LEFT HIP: ICD-10-CM

## 2024-03-19 DIAGNOSIS — M22.2X2 PATELLOFEMORAL SYNDROME OF LEFT KNEE: Primary | ICD-10-CM

## 2024-03-19 DIAGNOSIS — M25.562 CHRONIC PAIN OF LEFT KNEE: ICD-10-CM

## 2024-03-19 PROCEDURE — 73564 X-RAY EXAM KNEE 4 OR MORE: CPT

## 2024-03-19 PROCEDURE — 99214 OFFICE O/P EST MOD 30 MIN: CPT | Performed by: ORTHOPAEDIC SURGERY

## 2024-03-19 PROCEDURE — 73502 X-RAY EXAM HIP UNI 2-3 VIEWS: CPT

## 2024-03-19 PROCEDURE — 73562 X-RAY EXAM OF KNEE 3: CPT

## 2024-03-19 NOTE — PROGRESS NOTES
Assessment/Plan:  1. Patellofemoral syndrome of left knee  MRI knee left  wo contrast    Ambulatory Referral to Physical Therapy      2. Chronic pain of left knee  XR knee 4+ vw left injury    MRI knee left  wo contrast      3. Greater trochanteric bursitis of left hip  Ambulatory Referral to Physical Therapy      4. Left hip pain  XR hip/pelv 2-3 vws left if performed      5. Encounter for lower extremity comparison imaging study  XR knee 3 vw right non injury        Scribe Attestation    I,:  Kaykay Manuel am acting as a scribe while in the presence of the attending physician.:       I,:  Pb Diaz MD personally performed the services described in this documentation    as scribed in my presence.:             38-year-old male presents for evaluation of the left knee and left hip.  I believe his symptoms are consistent with greater trochanteric bursitis of the left hip and patellofemoral pain syndrome of the left knee. I have provided him with home exercises for the left hip today as well as a referral to physical therapy. For the knee, he has tried physical therapy in the past with not much relief. I recommend he obtain an MRI of the left knee as his pain his been ongoing for nearly a year now, to look at his cartilage to make sure there is no cartilage injury.. The radiologist will have to confirm he is a candidate for MRI as he has a history of gastric bypass surgery with staples present. He may take Tylenol as needed for pain control. He may use Voltaren gel topically for pain control as well. He will follow-up after obtaining the MRI of the left knee.    Subjective:   Serafin Conklin is a 38 y.o. male who presents for initial evaluation of the left hip and knee. He states the left knee and hip have been bothering him for the past 2-3 weeks. He is unsure if it is related to his low back pain as the symptoms all appeared at the same time. He states his hip pain is intermittent and located posteriorly. He  describes the hip pain as sharp. He states his knee pain is located mainly anteriorly. He noticed increased knee pain when getting up from sitting on the floor.  He describes the knee pain as dull and achy with some throbbing. He notes occasional sharp pain in the knee. He is a  and constantly up and down for his job.      Review of Systems   Constitutional:  Positive for activity change. Negative for chills and fever.   HENT:  Negative for ear pain and sore throat.    Eyes:  Negative for pain and visual disturbance.   Respiratory:  Negative for cough and shortness of breath.    Cardiovascular:  Negative for chest pain and palpitations.   Gastrointestinal:  Negative for abdominal pain and vomiting.   Genitourinary:  Negative for dysuria and hematuria.   Musculoskeletal:  Positive for arthralgias and myalgias. Negative for back pain.   Skin:  Negative for color change and rash.   Neurological:  Negative for seizures and syncope.   All other systems reviewed and are negative.        Past Medical History:   Diagnosis Date   • Asthma     as child   • Diabetes mellitus (HCC)    • Fatty liver    • Hypertension    • Nasal congestion     Frequently   • PONV (postoperative nausea and vomiting)    • Sleep apnea     5.5 apnea scale// no cpap       Past Surgical History:   Procedure Laterality Date   • EPIDURAL BLOCK INJECTION N/A 11/15/2023    Procedure: L5-S1  LUMBAR epidural steroid injection (94168);  Surgeon: Lewis Pittman DO;  Location: [facility] MAIN OR;  Service: Pain Management    • CA LAPS GSTR RSTCV PX W/BYP TIFFANI-EN-Y LIMB <150 CM N/A 1/15/2024    Procedure: LAPAROSCOPIC TIFFANI-EN-Y GASTRIC BYPASS;  Surgeon: Jaime Robert MD;  Location: [facility] MAIN OR;  Service: Bariatrics   • CA SEPTOPLASTY/SUBMUCOUS RESECJ W/WO CARTILAGE GRF N/A 11/28/2022    Procedure: SEPTOPLASTY;  Surgeon: Abhi Couch MD;  Location: [facility] MAIN OR;  Service: ENT   • CA SUBMUCOUS RESCJ INFERIOR TURBINATE PRTL/COMPL Bilateral 11/28/2022     Procedure: TURBINECTOMY;  Surgeon: Abhi Couch MD;  Location: [facility];  Service: ENT       Family History   Problem Relation Age of Onset   • Diabetes type II Mother    • Diabetes Mother    • Diabetes Maternal Grandmother    • Diabetes type II Maternal Grandmother    • Arthritis Maternal Grandmother    • Cancer Maternal Grandfather         Liver cancer       Social History     Occupational History   • Not on file   Tobacco Use   • Smoking status: Former     Types: Cigars     Start date: 2019     Quit date: 2023     Years since quittin.4     Passive exposure: Past   • Smokeless tobacco: Never   • Tobacco comments:     Wife just had baby not smoking right now     Pt stated stopped smoking cigars over a month ago   Vaping Use   • Vaping status: Never Used   Substance and Sexual Activity   • Alcohol use: Yes     Comment: rare   • Drug use: Never   • Sexual activity: Yes     Partners: Female     Birth control/protection: None         Current Outpatient Medications:   •  lisinopril (ZESTRIL) 10 mg tablet, Take 1 tablet (10 mg total) by mouth daily, Disp: 90 tablet, Rfl: 3  •  pantoprazole (PROTONIX) 40 mg tablet, Take 1 tablet (40 mg total) by mouth daily, Disp: 90 tablet, Rfl: 1  •  sildenafil (VIAGRA) 25 MG tablet, Take 25 mg by mouth daily as needed for erectile dysfunction, Disp: , Rfl:   •  Azelastine HCl 137 MCG/SPRAY SOLN, 1 SPRAY INTO EACH NOSTRIL 2 (TWO) TIMES A DAY USE IN EACH NOSTRIL AS DIRECTED (Patient not taking: Reported on 3/18/2024), Disp: 30 mL, Rfl: 3    Allergies   Allergen Reactions   • Other Angioedema   • Shellfish-Derived Products - Food Allergy Anaphylaxis   • Penicillins Hives   • Penicillin G Hives       Objective:  There were no vitals filed for this visit.    Left Knee Exam     Tenderness   The patient is experiencing tenderness in the medial joint line and patella.    Range of Motion   The patient has normal left knee ROM.    Tests   Roman:  Medial - negative  Lateral - negative  Varus: negative Valgus: negative  Lachman:  Anterior - negative      Drawer:  Anterior - negative     Posterior - negative  Patellar apprehension: negative (Slight pain.)    Other   Erythema: absent  Scars: absent  Sensation: normal  Pulse: present  Swelling: none  Effusion: no effusion present    Comments:  Parapatellar crepitance present      Left Hip Exam     Tenderness   The patient is experiencing tenderness in the greater trochanter.    Range of Motion   Flexion:  110   Internal rotation: 20 (painful laterally)     Muscle Strength   Abduction: 4/5   Flexion: 4/5     Tests   Hector: positive    Other   Erythema: absent  Scars: absent  Sensation: normal  Pulse: present    Comments:  Negative Stinchfield          Observations   Left Knee   Negative for effusion.       Physical Exam  Vitals and nursing note reviewed.   Constitutional:       Appearance: Normal appearance.   HENT:      Head: Normocephalic and atraumatic.      Right Ear: External ear normal.      Left Ear: External ear normal.      Nose: Nose normal.   Eyes:      General: No scleral icterus.     Extraocular Movements: Extraocular movements intact.      Conjunctiva/sclera: Conjunctivae normal.   Cardiovascular:      Rate and Rhythm: Normal rate.   Pulmonary:      Effort: Pulmonary effort is normal. No respiratory distress.   Musculoskeletal:      Cervical back: Normal range of motion and neck supple.      Left knee: No effusion.      Instability Tests: Medial Roman test negative and lateral Roman test negative.      Comments: See ortho exam   Skin:     General: Skin is warm and dry.   Neurological:      Mental Status: He is alert and oriented to person, place, and time.   Psychiatric:         Mood and Affect: Mood normal.         Behavior: Behavior normal.         I have personally reviewed pertinent films in PACS and my interpretation is as follows:  X-rays of the left knee obtained in the office today demonstrate no  significant degenerative changes. No fractures or dislocations.    X-rays of the left hip obtained in the office today demonstrate no significant degenerative changes. No fractures or dislocations.      This document was created using speech voice recognition software.   Grammatical errors, random word insertions, pronoun errors, and incomplete sentences are an occasional consequence of this system due to software limitations, ambient noise, and hardware issues.   Any formal questions or concerns about content, text, or information contained within the body of this dictation should be directly addressed to the provider for clarification.

## 2024-03-21 ENCOUNTER — OFFICE VISIT (OUTPATIENT)
Dept: FAMILY MEDICINE CLINIC | Facility: CLINIC | Age: 39
End: 2024-03-21
Payer: COMMERCIAL

## 2024-03-21 ENCOUNTER — TELEPHONE (OUTPATIENT)
Dept: FAMILY MEDICINE CLINIC | Facility: CLINIC | Age: 39
End: 2024-03-21

## 2024-03-21 VITALS
HEIGHT: 66 IN | TEMPERATURE: 98.5 F | WEIGHT: 182 LBS | SYSTOLIC BLOOD PRESSURE: 120 MMHG | RESPIRATION RATE: 14 BRPM | BODY MASS INDEX: 29.25 KG/M2 | OXYGEN SATURATION: 96 % | HEART RATE: 84 BPM | DIASTOLIC BLOOD PRESSURE: 80 MMHG

## 2024-03-21 DIAGNOSIS — R09.89 CHEST CONGESTION: ICD-10-CM

## 2024-03-21 DIAGNOSIS — J22 LOWER RESPIRATORY TRACT INFECTION: Primary | ICD-10-CM

## 2024-03-21 DIAGNOSIS — J02.9 SORE THROAT: ICD-10-CM

## 2024-03-21 DIAGNOSIS — Z98.84 S/P GASTRIC BYPASS: ICD-10-CM

## 2024-03-21 DIAGNOSIS — E78.5 HYPERLIPIDEMIA, UNSPECIFIED HYPERLIPIDEMIA TYPE: ICD-10-CM

## 2024-03-21 DIAGNOSIS — I10 PRIMARY HYPERTENSION: ICD-10-CM

## 2024-03-21 DIAGNOSIS — E11.65 TYPE 2 DIABETES MELLITUS WITH HYPERGLYCEMIA, WITHOUT LONG-TERM CURRENT USE OF INSULIN (HCC): ICD-10-CM

## 2024-03-21 PROBLEM — E66.01 CLASS 3 SEVERE OBESITY DUE TO EXCESS CALORIES WITH SERIOUS COMORBIDITY AND BODY MASS INDEX (BMI) OF 40.0 TO 44.9 IN ADULT (HCC): Status: RESOLVED | Noted: 2022-10-20 | Resolved: 2024-03-21

## 2024-03-21 PROBLEM — E66.813 CLASS 3 SEVERE OBESITY DUE TO EXCESS CALORIES WITH SERIOUS COMORBIDITY AND BODY MASS INDEX (BMI) OF 40.0 TO 44.9 IN ADULT (HCC): Status: RESOLVED | Noted: 2022-10-20 | Resolved: 2024-03-21

## 2024-03-21 PROCEDURE — 99214 OFFICE O/P EST MOD 30 MIN: CPT | Performed by: STUDENT IN AN ORGANIZED HEALTH CARE EDUCATION/TRAINING PROGRAM

## 2024-03-21 RX ORDER — CHLORHEXIDINE GLUCONATE ORAL RINSE 1.2 MG/ML
15 SOLUTION DENTAL 2 TIMES DAILY
Qty: 120 ML | Refills: 0 | Status: SHIPPED | OUTPATIENT
Start: 2024-03-21

## 2024-03-21 RX ORDER — AZITHROMYCIN 250 MG/1
TABLET, FILM COATED ORAL DAILY
Qty: 6 TABLET | Refills: 0 | Status: SHIPPED | OUTPATIENT
Start: 2024-03-21 | End: 2024-03-26

## 2024-03-21 RX ORDER — METHYLPREDNISOLONE 4 MG/1
TABLET ORAL
Qty: 21 EACH | Refills: 0 | Status: SHIPPED | OUTPATIENT
Start: 2024-03-21

## 2024-03-21 RX ORDER — DOXYCYCLINE HYCLATE 100 MG/1
100 CAPSULE ORAL EVERY 12 HOURS SCHEDULED
Qty: 14 CAPSULE | Refills: 0 | Status: SHIPPED | OUTPATIENT
Start: 2024-03-21 | End: 2024-03-21

## 2024-03-21 NOTE — TELEPHONE ENCOUNTER
----- Message from Dejan Jeffrey, DO sent at 3/21/2024  4:29 PM EDT -----  Can we call [facility] and let them know to discontinue doxycycline, we will use Z-Cecil azithromycin instead.  Appreciate the help.

## 2024-03-21 NOTE — PROGRESS NOTES
Clinic Visit Note  Serafin Conklin 38 y.o. male   MRN: [id number]    Assessment and Plan      Diagnoses and all orders for this visit:    Lower respiratory tract infection  Given symptoms recommend atypical coverage with azithromycin, there may be decreased absorption but studies are unclear in the setting of gastric bypass.  Recommend steroid to decrease chest congestion/inflammation, follow-up if symptoms worsen or not improving  -     azithromycin (Zithromax) 250 mg tablet; Take 2 tablets (500 mg total) by mouth daily for 1 day, THEN 1 tablet (250 mg total) daily for 4 days.    Type 2 diabetes mellitus with hyperglycemia, without long-term current use of insulin ([facility])  Hemoglobin A1c 6.5%, continue appropriate lifestyle modifications    Hyperlipidemia, unspecified hyperlipidemia type    S/P gastric bypass  Patient is doing well without complications    Chest congestion  -     methylPREDNISolone 4 MG tablet therapy pack; Use as directed on package    Sore throat  -     chlorhexidine (PERIDEX) 0.12 % solution; Apply 15 mL to the mouth or throat 2 (two) times a day    Primary hypertension  Blood pressure appropriate off antihypertensive medications    My impressions and treatment recommendations were discussed in detail with the patient who verbalized understanding and had no further questions.  Discharge instructions were provided.    Subjective     Chief Complaint: F/U    History of Present Illness:    Patient is a pleasant 38-year-old male presenting for lower respiratory tract infection with nonproductive cough, has been going on for a week and a half now.    The following portions of the patient's history were reviewed and updated as appropriate: allergies, current medications, past family history, past medical history, past social history, past surgical history and problem list.    REVIEW OF SYSTEMS:  A complete 12-point review of systems is negative other than that noted in the HPI.    Review of Systems    Constitutional:  Positive for fatigue. Negative for chills and fever.   HENT:  Positive for congestion. Negative for postnasal drip and sore throat.    Respiratory:  Positive for cough. Negative for shortness of breath and wheezing.    Cardiovascular:  Negative for chest pain, palpitations and leg swelling.   Neurological:  Negative for dizziness and headaches.         Current Outpatient Medications:   •  azithromycin (Zithromax) 250 mg tablet, Take 2 tablets (500 mg total) by mouth daily for 1 day, THEN 1 tablet (250 mg total) daily for 4 days., Disp: 6 tablet, Rfl: 0  •  chlorhexidine (PERIDEX) 0.12 % solution, Apply 15 mL to the mouth or throat 2 (two) times a day, Disp: 120 mL, Rfl: 0  •  methylPREDNISolone 4 MG tablet therapy pack, Use as directed on package, Disp: 21 each, Rfl: 0  •  pantoprazole (PROTONIX) 40 mg tablet, Take 1 tablet (40 mg total) by mouth daily, Disp: 90 tablet, Rfl: 1  •  sildenafil (VIAGRA) 25 MG tablet, Take 25 mg by mouth daily as needed for erectile dysfunction, Disp: , Rfl:   Past Medical History:   Diagnosis Date   • Asthma     as child   • Diabetes mellitus (HCC)    • Fatty liver    • Hypertension    • Nasal congestion     Frequently   • PONV (postoperative nausea and vomiting)    • Sleep apnea     5.5 apnea scale// no cpap     Past Surgical History:   Procedure Laterality Date   • EPIDURAL BLOCK INJECTION N/A 11/15/2023    Procedure: L5-S1  LUMBAR epidural steroid injection (73771);  Surgeon: Lewis Pittman DO;  Location: [facility] MAIN OR;  Service: Pain Management    • PA LAPS GSTR RSTCV PX W/BYP TIFFANI-EN-Y LIMB <150 CM N/A 1/15/2024    Procedure: LAPAROSCOPIC TIFFANI-EN-Y GASTRIC BYPASS;  Surgeon: Jaime Robert MD;  Location: [facility] MAIN OR;  Service: Bariatrics   • PA SEPTOPLASTY/SUBMUCOUS RESECJ W/WO CARTILAGE GRF N/A 11/28/2022    Procedure: SEPTOPLASTY;  Surgeon: Abhi Couch MD;  Location: [facility] MAIN OR;  Service: ENT   • PA SUBMUCOUS RESCJ INFERIOR TURBINATE PRTL/COMPL  Bilateral 2022    Procedure: TURBINECTOMY;  Surgeon: Abhi Couch MD;  Location: [facility] MAIN OR;  Service: ENT     Social History     Socioeconomic History   • Marital status: /Civil Union     Spouse name: Not on file   • Number of children: Not on file   • Years of education: Not on file   • Highest education level: Not on file   Occupational History   • Not on file   Tobacco Use   • Smoking status: Former     Types: Cigars     Start date: 2019     Quit date: 2023     Years since quittin.4     Passive exposure: Past   • Smokeless tobacco: Never   • Tobacco comments:     Wife just had baby not smoking right now     Pt stated stopped smoking cigars over a month ago   Vaping Use   • Vaping status: Never Used   Substance and Sexual Activity   • Alcohol use: Yes     Comment: rare   • Drug use: Never   • Sexual activity: Yes     Partners: Female     Birth control/protection: None   Other Topics Concern   • Not on file   Social History Narrative   • Not on file     Social Determinants of Health     Financial Resource Strain: Not on file   Food Insecurity: No Food Insecurity (2021)    Received from [facility]    Hunger Vital Sign    • Worried About Running Out of Food in the Last Year: Never true    • Ran Out of Food in the Last Year: Never true   Transportation Needs: Not on file   Physical Activity: Not on file   Stress: Not on file   Social Connections: Not on file   Intimate Partner Violence: Not on file   Housing Stability: Not on file     Family History   Problem Relation Age of Onset   • Diabetes type II Mother    • Diabetes Mother    • Diabetes Maternal Grandmother    • Diabetes type II Maternal Grandmother    • Arthritis Maternal Grandmother    • Cancer Maternal Grandfather         Liver cancer     Allergies   Allergen Reactions   • Other Angioedema   • Shellfish-Derived Products - Food Allergy Anaphylaxis   • Penicillins Hives   • Penicillin G Hives       Objective     Vitals:     "03/21/24 1605   BP: 120/80   BP Location: Left arm   Patient Position: Sitting   Cuff Size: Adult   Pulse: 84   Resp: 14   Temp: 98.5 °F (36.9 °C)   TempSrc: Temporal   SpO2: 96%   Weight: 82.6 kg (182 lb)   Height: 5' 6\" (1.676 m)       Physical Exam:     GENERAL: NAD, pleasant   HEENT:  NC/AT, PERRL, EOMI, no scleral icterus  CARDIAC:  RRR, +S1/S2, no S3/S4 appreciated, no m/g/r  PULMONARY:  CTA B/L, no wheezing/rales/rhonci, non-labored breathing  ABDOMEN:  Soft, NT/ND, no rebound/guarding/rigidity  Extremities:. No edema, cyanosis, or clubbing  Musculoskeletal:  Full range of motion intact in all extremities   NEUROLOGIC: Grossly intact, no focal deficits  SKIN:  No rashes or erythema noted on exposed skin  Psych: Normal affect, mood stable    Rhonchorous breathing bilaterally    ==  PLEASE NOTE:  This encounter was completed utilizing the M- Modal/Fluency Direct Speech Voice Recognition Software. Grammatical errors, random word insertions, pronoun errors and incomplete sentences are occasional consequences of the system due to software limitations, ambient noise and hardware issues.These may be missed by proof reading prior to affixing electronic signature. Any questions or concerns about the content, text or information contained within the body of this dictation should be directly addressed to the physician for clarification. Please do not hesitate to call me directly if you have any any questions or concerns.    Dejan Jeffrey, DO  [facility] Internal Medicine   [facility]     "

## 2024-03-28 ENCOUNTER — TELEPHONE (OUTPATIENT)
Dept: OBGYN CLINIC | Facility: CLINIC | Age: 39
End: 2024-03-28

## 2024-03-28 NOTE — TELEPHONE ENCOUNTER
Called LM for patient advising insurance has denied MRI due to lack of PT.  MRI will need to be canceled.  Dr. Diaz has placed referral for PT

## 2024-04-03 ENCOUNTER — APPOINTMENT (OUTPATIENT)
Dept: RADIOLOGY | Facility: HOSPITAL | Age: 39
End: 2024-04-03
Payer: COMMERCIAL

## 2024-04-03 ENCOUNTER — HOSPITAL ENCOUNTER (OUTPATIENT)
Facility: AMBULARY SURGERY CENTER | Age: 39
Setting detail: OUTPATIENT SURGERY
Discharge: HOME | End: 2024-04-03
Attending: PHYSICAL MEDICINE & REHABILITATION | Admitting: PHYSICAL MEDICINE & REHABILITATION
Payer: COMMERCIAL

## 2024-04-03 VITALS
OXYGEN SATURATION: 99 % | RESPIRATION RATE: 18 BRPM | SYSTOLIC BLOOD PRESSURE: 133 MMHG | TEMPERATURE: 98.6 F | HEART RATE: 61 BPM | DIASTOLIC BLOOD PRESSURE: 78 MMHG

## 2024-04-03 PROCEDURE — NC001 PR NO CHARGE: Performed by: PHYSICAL MEDICINE & REHABILITATION

## 2024-04-03 PROCEDURE — 62323 NJX INTERLAMINAR LMBR/SAC: CPT | Performed by: PHYSICAL MEDICINE & REHABILITATION

## 2024-04-03 RX ORDER — METHYLPREDNISOLONE ACETATE 80 MG/ML
INJECTION, SUSPENSION INTRA-ARTICULAR; INTRALESIONAL; INTRAMUSCULAR; SOFT TISSUE AS NEEDED
Status: DISCONTINUED | OUTPATIENT
Start: 2024-04-03 | End: 2024-04-03 | Stop reason: HOSPADM

## 2024-04-03 RX ORDER — LIDOCAINE HYDROCHLORIDE 10 MG/ML
INJECTION, SOLUTION EPIDURAL; INFILTRATION; INTRACAUDAL; PERINEURAL AS NEEDED
Status: DISCONTINUED | OUTPATIENT
Start: 2024-04-03 | End: 2024-04-03 | Stop reason: HOSPADM

## 2024-04-03 NOTE — INTERVAL H&P NOTE
H&P reviewed. After examining the patient I find no changes in the patients condition since the H&P had been written.    Vitals:    04/03/24 1250   BP: 124/80   Pulse: 62   Resp: 18   Temp: 98.6 °F (37 °C)   SpO2: 98%

## 2024-04-03 NOTE — DISCHARGE INSTRUCTIONS
Epidural Steroid Injection   WHAT YOU NEED TO KNOW:   An epidural steroid injection (GASTON) is a procedure to inject steroid medicine into the epidural space. The epidural space is between your spinal cord and vertebrae. Steroids reduce inflammation and fluid buildup in your spine that may be causing pain. You may be given pain medicine along with the steroids.          ACTIVITY  Do not drive or operate machinery today.  No strenuous activity today - bending, lifting, etc.  You may resume normal activites starting tomorrow - start slowly and as tolerated.  You may shower today, but no tub baths or hot tubs.  You may have numbness for several hours from the local anesthetic. Please use caution and common sense, especially with weight-bearing activities.    CARE OF THE INJECTION SITE  If you have soreness or pain, apply ice to the area today (20 minutes on/20 minutes off).  Starting tomorrow, you may use warm, moist heat or ice if needed.  You may have an increase or change in your discomfort for 36-48 hours after your treatment.  Apply ice and continue with any pain medication you have been prescribed.  Notify the [facility] if you have any of the following: redness, drainage, swelling, headache, stiff neck or fever above 100°F.    SPECIAL INSTRUCTIONS  Our office will contact you in approximately 7 days for a progress report.    MEDICATIONS  Continue to take all routine medications.  Our office may have instructed you to hold some medications.    As no general anesthesia was used in today's procedure, you should not experience any side effects related to anesthesia.     If you are diabetic, the steroids used in today's injection may temporarily increase your blood sugar levels after the first few days after your injection. Please keep a close eye on your sugars and alert the doctor who manages your diabetes if your sugars are significantly high from your baseline or you are symptomatic.     If you have a  problem specifically related to your procedure, please call our office at [phone].  Problems not related to your procedure should be directed to your primary care physician.

## 2024-04-03 NOTE — OP NOTE
OPERATIVE REPORT  PATIENT NAME: Serafin Conklin    :  1985  MRN: [id number]  Pt Location: [facility] MINOR/PAIN ROOM 01    SURGERY DATE: 4/3/2024    Surgeons and Role:     * Lewis Pittman DO - Primary    Preop Diagnosis:  Lumbar radiculopathy [M54.16]    Post-Op Diagnosis Codes:     * Lumbar radiculopathy [M54.16]    Procedure(s):  L5-S1 LUMBAR EPIDURAL STEROID INJECTION    Lumbar epidural  Indication:  Back and radiating leg pain  Preoperative diagnosis:  Lumbar radiculitis  Postoperative diagnosis:  Lumbar radiculitis    Procedure: Fluoroscopically-guided L5-S1 interlaminar epidural steroid injection under fluoroscopy    EBL:  none  Specimens:  not applicable    After discussing the risks, benefits, and alternatives to the procedure, the patient expressed understanding and wished to proceed.  The patient was brought to the fluoroscopy suite and placed in the prone position.  A procedural pause was conducted to verify:  correct patient identity, procedure to be performed and as applicable, correct side and site, correct patient position, and availability of implants, special equipment and special requirements.  After identifying the L5-S1 space fluoroscopically, the skin was sterilely prepped and draped in the usual fashion using Chloraprep skin prep.  The skin and subcutaneous tissues were anesthetized with 1% lidocaine.  Utilizing a loss of resistance technique and intermittent fluoroscopic guidance, a 3.5 inch 20-gauge Tuohy needle was advanced into the epidural space.  Proper needle positioning was confirmed using multiple fluoroscopic views.  After negative aspiration, Omnipaque 240 contrast was injected confirming epidural spread without evidence of intravascular or intrathecal spread.  A 4 ml solution consisting of 80 mg Depo-Medrol in sterile saline was injected slowly and incrementally into the epidural space.  Following the injection the needle was withdrawn slightly and flushed with 1% buffered  lidocaine as it was fully extracted.  The patient tolerated the procedure well and there were no apparent complications.  After appropriate observation, the patient was dismissed from the clinic in good condition under their own power.        SIGNATURE: Lewis Pittman,   DATE: April 3, 2024  TIME: 1:13 PM

## 2024-04-08 ENCOUNTER — TELEPHONE (OUTPATIENT)
Age: 39
End: 2024-04-08

## 2024-04-08 NOTE — TELEPHONE ENCOUNTER
Pt calling back.  RN reached out to office, but pt was unable to hold.  States he will keep his phone on him.     [phone]

## 2024-04-08 NOTE — TELEPHONE ENCOUNTER
Called & Lmsg for the patient  to get him the right info on the extension on his short term diability form.

## 2024-04-10 ENCOUNTER — TELEPHONE (OUTPATIENT)
Dept: PAIN MEDICINE | Facility: CLINIC | Age: 39
End: 2024-04-10

## 2024-04-10 NOTE — TELEPHONE ENCOUNTER
Pt reports 40-50% improvement post inj   Pain level 1/10  Pt aware I will call next week for an update

## 2024-04-10 NOTE — TELEPHONE ENCOUNTER
Pt returning Anita's call about short term disability paperwork.  RN reached out to office, but pt unable to hold longer.  Pt agreeable to a call back.     [phone]

## 2024-04-15 PROBLEM — Z48.815 ENCOUNTER FOR SURGICAL AFTERCARE FOLLOWING SURGERY OF DIGESTIVE SYSTEM: Status: ACTIVE | Noted: 2024-04-15

## 2024-04-15 PROBLEM — K91.2 POSTSURGICAL MALABSORPTION: Status: ACTIVE | Noted: 2024-04-15

## 2024-04-15 NOTE — ASSESSMENT & PLAN NOTE
Lab Results   Component Value Date    HGBA1C 6.5 (H) 02/12/2024     -no longer on DM meds; repeat in 3 months  -continue healthy diet and exercise; f/u with RD

## 2024-04-15 NOTE — ASSESSMENT & PLAN NOTE
-s/p Armand-En-Y Gastric Bypass with Dr. Robert on 1/15/24. Overall doing Well. EWL on schedule for expected post surgical weight loss at this time. He agrees to drink more water.    Initial: 281lbs  Current: 223.2lbs  EWL: 46%  Cyrus: current  Current BMI is Body mass index is 36.03 kg/m².    Tolerating a regular diet-yes  Eating at least 60 grams of protein per day-yes  Following 30/60 minute rule with liquids-yes  Drinking at least 64 ounces of fluid per day-no  Drinking carbonated beverages-no  Sufficient exercise-yes, no active at work though - encouraged him to start exercising  Using NSAIDs regularly-no  Using nicotine-no  Using alcohol-no. Advised about the risks of alcohol s/p bariatric surgery and recommend avoiding all alcohol

## 2024-04-15 NOTE — ASSESSMENT & PLAN NOTE
-At risk for malabsorption of vitamins/minerals secondary to malabsorption and restriction of intake from bariatric surgery  -Currently taking adequate postop bariatric surgery vitamin supplementation: [place] Fusion MVI w/ 45mg iron, calcium citrate 500mg TID, Vitamin D 2,000IU    -Last set of bariatric labs completed on 02/12/24 and showed vitamin D is low normal - add 2,000IU D3 daily    -Next set of bariatric labs ordered for approximately 1 month  -Patient received education about the importance of adhering to a lifelong supplementation regimen to avoid vitamin/mineral deficiencies

## 2024-04-17 ENCOUNTER — OFFICE VISIT (OUTPATIENT)
Dept: BARIATRICS | Facility: CLINIC | Age: 39
End: 2024-04-17
Payer: COMMERCIAL

## 2024-04-17 VITALS
HEIGHT: 66 IN | HEART RATE: 90 BPM | RESPIRATION RATE: 18 BRPM | OXYGEN SATURATION: 98 % | WEIGHT: 223.2 LBS | SYSTOLIC BLOOD PRESSURE: 116 MMHG | DIASTOLIC BLOOD PRESSURE: 66 MMHG | BODY MASS INDEX: 35.87 KG/M2

## 2024-04-17 DIAGNOSIS — E11.65 TYPE 2 DIABETES MELLITUS WITH HYPERGLYCEMIA, WITHOUT LONG-TERM CURRENT USE OF INSULIN (HCC): ICD-10-CM

## 2024-04-17 DIAGNOSIS — E66.9 OBESITY, CLASS II, BMI 35-39.9: ICD-10-CM

## 2024-04-17 DIAGNOSIS — I10 PRIMARY HYPERTENSION: ICD-10-CM

## 2024-04-17 DIAGNOSIS — Z48.815 ENCOUNTER FOR SURGICAL AFTERCARE FOLLOWING SURGERY OF DIGESTIVE SYSTEM: Primary | ICD-10-CM

## 2024-04-17 DIAGNOSIS — E66.01 MORBID (SEVERE) OBESITY DUE TO EXCESS CALORIES (HCC): ICD-10-CM

## 2024-04-17 DIAGNOSIS — Z98.84 BARIATRIC SURGERY STATUS: ICD-10-CM

## 2024-04-17 DIAGNOSIS — K91.2 POSTSURGICAL MALABSORPTION: ICD-10-CM

## 2024-04-17 PROCEDURE — 99214 OFFICE O/P EST MOD 30 MIN: CPT | Performed by: PHYSICIAN ASSISTANT

## 2024-04-17 RX ORDER — PANTOPRAZOLE SODIUM 40 MG/1
40 TABLET, DELAYED RELEASE ORAL DAILY
Qty: 90 TABLET | Refills: 0 | Status: SHIPPED | OUTPATIENT
Start: 2024-04-17

## 2024-04-17 NOTE — PROGRESS NOTES
Assessment/Plan:    Encounter for surgical aftercare following surgery of digestive system  -s/p Armand-En-Y Gastric Bypass with Dr. Robert on 1/15/24. Overall doing Well. EWL on schedule for expected post surgical weight loss at this time. He agrees to drink more water.    Initial: 281lbs  Current: 223.2lbs  EWL: 46%  Cyrus: current  Current BMI is Body mass index is 36.03 kg/m².    Tolerating a regular diet-yes  Eating at least 60 grams of protein per day-yes  Following 30/60 minute rule with liquids-yes  Drinking at least 64 ounces of fluid per day-no  Drinking carbonated beverages-no  Sufficient exercise-yes, no active at work though - encouraged him to start exercising  Using NSAIDs regularly-no  Using nicotine-no  Using alcohol-no. Advised about the risks of alcohol s/p bariatric surgery and recommend avoiding all alcohol      Postsurgical malabsorption  -At risk for malabsorption of vitamins/minerals secondary to malabsorption and restriction of intake from bariatric surgery  -Currently taking adequate postop bariatric surgery vitamin supplementation: [place] Fusion MVI w/ 45mg iron, calcium citrate 500mg TID, Vitamin D 2,000IU    -Last set of bariatric labs completed on 02/12/24 and showed vitamin D is low normal - add 2,000IU D3 daily    -Next set of bariatric labs ordered for approximately 1 month  -Patient received education about the importance of adhering to a lifelong supplementation regimen to avoid vitamin/mineral deficiencies       Type 2 diabetes mellitus with hyperglycemia, without long-term current use of insulin (HCC)    Lab Results   Component Value Date    HGBA1C 6.5 (H) 02/12/2024     -no longer on DM meds; repeat in 3 months  -continue healthy diet and exercise; f/u with RD    Hypertension  -no longer on lisinopril   -Continue monitoring and management with prescribing provider       Diagnoses and all orders for this visit:    Encounter for surgical aftercare following surgery of digestive  system    Postsurgical malabsorption  -     CBC and differential; Future  -     Comprehensive metabolic panel; Future  -     Folate; Future  -     Hemoglobin A1C; Future  -     Iron Panel (Includes Ferritin, Iron Sat%, Iron, and TIBC); Future  -     Lipid panel; Future  -     PTH, intact; Future  -     Zinc; Future  -     Vitamin D 25 hydroxy; Future  -     Vitamin B12; Future  -     Vitamin B1, whole blood; Future  -     TSH, 3rd generation with Free T4 reflex; Future  -     Vitamin A; Future  -     CBC and differential  -     Comprehensive metabolic panel  -     Folate  -     Hemoglobin A1C  -     Lipid panel  -     PTH, intact  -     Zinc  -     Vitamin D 25 hydroxy  -     Vitamin B12  -     Vitamin B1, whole blood  -     TSH, 3rd generation with Free T4 reflex  -     Vitamin A    Type 2 diabetes mellitus with hyperglycemia, without long-term current use of insulin (HCC)  -     Hemoglobin A1C; Future  -     Hemoglobin A1C    Primary hypertension    Obesity, Class II, BMI 35-39.9  -     Hemoglobin A1C; Future  -     Lipid panel; Future  -     TSH, 3rd generation with Free T4 reflex; Future  -     Hemoglobin A1C  -     Lipid panel  -     TSH, 3rd generation with Free T4 reflex    Morbid (severe) obesity due to excess calories (HCC)  -     pantoprazole (PROTONIX) 40 mg tablet; Take 1 tablet (40 mg total) by mouth daily    Bariatric surgery status  -     pantoprazole (PROTONIX) 40 mg tablet; Take 1 tablet (40 mg total) by mouth daily          Subjective:      Patient ID: Serafin Conklin is a 38 y.o. male.    -s/p Armand-En-Y Gastric Bypass with Dr. Robert on 1/15/24. Presents to the office today for routine follow up. Tolerating diet without issues; denies N/V, dysphagia, reflux. Overall doing very well.    Heavy duty .  Wife is  - injured her back. Son Chandu is almost 1.    Diet Recall:   B - 1 HB egg  Snack - 1 HB egg  L - 1/2 chicken and cheese on low CHO bread  Snack - 1/2 sandwich  D -  "chicken or salmon and veggies    Fluids - 48oz water    The following portions of the patient's history were reviewed and updated as appropriate: allergies, current medications, past family history, past medical history, past social history, past surgical history and problem list.    Review of Systems   Constitutional:  Negative for chills and fever. Unexpected weight change: planned weight loss.  HENT:  Negative for trouble swallowing.    Respiratory:  Negative for cough and shortness of breath.    Cardiovascular:  Negative for chest pain and palpitations.   Gastrointestinal:  Negative for abdominal pain, constipation, diarrhea, nausea and vomiting.   Neurological:  Negative for dizziness.   Psychiatric/Behavioral:          Denies anxiety and depression         Objective:      /66 (BP Location: Right arm, Patient Position: Sitting, Cuff Size: Adult)   Pulse 90   Resp 18   Ht 5' 6\" (1.676 m)   Wt 101 kg (223 lb 3.2 oz)   SpO2 98%   BMI 36.03 kg/m²     Colonoscopy-Not applicable       Physical Exam  Vitals reviewed.   Constitutional:       General: He is not in acute distress.     Appearance: He is well-developed.   HENT:      Head: Normocephalic and atraumatic.   Eyes:      General: No scleral icterus.  Cardiovascular:      Rate and Rhythm: Normal rate and regular rhythm.   Pulmonary:      Effort: Pulmonary effort is normal. No respiratory distress.   Abdominal:      General: There is no distension.      Palpations: Abdomen is soft.   Skin:     General: Skin is warm and dry.   Neurological:      Mental Status: He is alert.   Psychiatric:         Mood and Affect: Mood normal.         Behavior: Behavior normal.           BARRIERS: none identified    GOALS:   Continued/Maintain healthy weight loss with good nutrition intakes.  Adequate hydration with at least 64oz. fluid intake.  Normal vitamin and mineral levels.  Exercise as tolerated.    Follow-up in 3 months. We kindly ask that your arrive 15 minutes " before your scheduled appointment time with your provider to allow our staff to room you, get your vital signs and update your chart.    Follow diet as discussed.      Get lab work done in the next 2 months.  You have been given a lab slip today.  Please call the office if you need a replacement.  It is recommended to check with your insurance BEFORE getting labs done to make sure they are covered by your policy.  Also, please check with your PCP and other providers before getting labs to avoid duplicate labs. Make sure to HOLD any multivitamins that may contain biotin and any biotin supplements FOR 5 DAYS before any labs since it can affect the results.    Follow vitamin and mineral recommendations as reviewed with you.    Call our office if you have any problems with abdominal pain especially associated with fever, chills, nausea, vomiting or any other concerns.    All  Post-bariatric surgery patients should be aware that very small quantities of any alcohol can cause impairment and it is very possible not to feel the effect. The effect can be in the system for several hours.  It is also a stomach irritant.     It is advised to AVOID alcohol, Nonsteroidal antiinflammatory drugs (NSAIDS) and nicotine of all forms . Any of these can cause stomach irritation/pain.

## 2024-04-17 NOTE — PATIENT INSTRUCTIONS
GOALS:   Continued/Maintain healthy weight loss with good nutrition intakes.  Adequate hydration with at least 64oz. fluid intake.  Normal vitamin and mineral levels.  Exercise as tolerated.    Follow-up in 3 months. We kindly ask that your arrive 15 minutes before your scheduled appointment time with your provider to allow our staff to room you, get your vital signs and update your chart.    Follow diet as discussed.      Get lab work done in the next 2 months.  You have been given a lab slip today.  Please call the office if you need a replacement.  It is recommended to check with your insurance BEFORE getting labs done to make sure they are covered by your policy.  Also, please check with your PCP and other providers before getting labs to avoid duplicate labs. Make sure to HOLD any multivitamins that may contain biotin and any biotin supplements FOR 5 DAYS before any labs since it can affect the results.    Follow vitamin and mineral recommendations as reviewed with you.    Call our office if you have any problems with abdominal pain especially associated with fever, chills, nausea, vomiting or any other concerns.    All  Post-bariatric surgery patients should be aware that very small quantities of any alcohol can cause impairment and it is very possible not to feel the effect. The effect can be in the system for several hours.  It is also a stomach irritant.     It is advised to AVOID alcohol, Nonsteroidal antiinflammatory drugs (NSAIDS) and nicotine of all forms . Any of these can cause stomach irritation/pain.

## 2024-04-25 ENCOUNTER — OFFICE VISIT (OUTPATIENT)
Dept: FAMILY MEDICINE CLINIC | Facility: CLINIC | Age: 39
End: 2024-04-25
Payer: COMMERCIAL

## 2024-04-25 VITALS
HEART RATE: 84 BPM | HEIGHT: 66 IN | WEIGHT: 224 LBS | TEMPERATURE: 98.1 F | DIASTOLIC BLOOD PRESSURE: 70 MMHG | BODY MASS INDEX: 36 KG/M2 | RESPIRATION RATE: 14 BRPM | SYSTOLIC BLOOD PRESSURE: 130 MMHG

## 2024-04-25 DIAGNOSIS — I10 PRIMARY HYPERTENSION: ICD-10-CM

## 2024-04-25 DIAGNOSIS — K91.2 POSTSURGICAL MALABSORPTION: ICD-10-CM

## 2024-04-25 DIAGNOSIS — R73.03 PREDIABETES: ICD-10-CM

## 2024-04-25 DIAGNOSIS — E78.2 MIXED HYPERLIPIDEMIA: ICD-10-CM

## 2024-04-25 DIAGNOSIS — Z98.84 S/P GASTRIC BYPASS: ICD-10-CM

## 2024-04-25 DIAGNOSIS — N52.9 ERECTILE DYSFUNCTION, UNSPECIFIED ERECTILE DYSFUNCTION TYPE: ICD-10-CM

## 2024-04-25 DIAGNOSIS — Z00.00 ANNUAL PHYSICAL EXAM: Primary | ICD-10-CM

## 2024-04-25 PROCEDURE — 99395 PREV VISIT EST AGE 18-39: CPT | Performed by: STUDENT IN AN ORGANIZED HEALTH CARE EDUCATION/TRAINING PROGRAM

## 2024-04-25 PROCEDURE — 99214 OFFICE O/P EST MOD 30 MIN: CPT | Performed by: STUDENT IN AN ORGANIZED HEALTH CARE EDUCATION/TRAINING PROGRAM

## 2024-04-25 NOTE — PROGRESS NOTES
ADULT ANNUAL PHYSICAL  [facility]    NAME: Serafin Conklin  AGE: 38 y.o. SEX: male  : 1985     DATE: 2024     Assessment and Plan:     Problem List Items Addressed This Visit        Cardiovascular and Mediastinum    Hypertension       Digestive    Postsurgical malabsorption       Surgery/Wound/Pain    S/P gastric bypass       Other    Erectile dysfunction    Hyperlipidemia   Other Visit Diagnoses     Annual physical exam    -  Primary    Prediabetes            Patient is a pleasant 38-year-old male coming in for annual physical today in office, recent gastric bypass, no acute or chronic concerns at this time, continue to follow-up closely, continue routine blood work.    GERD, continue PPI therapy.    Prediabetes, recheck hemoglobin A1c at next visit, lifestyle modifications reviewed, s/p gastric bypass.    Mixed hyperlipidemia, LDL appropriate on recent blood work.    Follow-up as needed, -.    Immunizations and preventive care screenings were discussed with patient today. Appropriate education was printed on patient's after visit summary.    Counseling:  Alcohol/drug use: discussed moderation in alcohol intake, the recommendations for healthy alcohol use, and avoidance of illicit drug use.  Dental Health: discussed importance of regular tooth brushing, flossing, and dental visits.  Injury prevention: discussed safety/seat belts, safety helmets, smoke detectors, carbon dioxide detectors, and smoking near bedding or upholstery.  Sexual health: discussed sexually transmitted diseases, partner selection, use of condoms, avoidance of unintended pregnancy, and contraceptive alternatives.  Exercise: the importance of regular exercise/physical activity was discussed. Recommend exercise 3-5 times per week for at least 30 minutes.          Return if symptoms worsen or fail to improve.     Chief Complaint:     Chief Complaint   Patient  presents with   • Follow-up      History of Present Illness:     Adult Annual Physical   Patient here for a comprehensive physical exam. The patient reports no problems.    Diet and Physical Activity  Diet/Nutrition: well balanced diet.   Exercise: 3-4 times a week on average.      Depression Screening  PHQ-2/9 Depression Screening         General Health  Sleep: sleeps well.   Hearing: normal - bilateral.  Vision: no vision problems.   Dental: regular dental visits.        Health  History of STDs?: no.       Review of Systems:     Review of Systems   Constitutional:  Negative for chills, fatigue and fever.   HENT:  Negative for congestion and sore throat.    Eyes:  Negative for pain and visual disturbance.   Respiratory:  Negative for shortness of breath and wheezing.    Cardiovascular:  Negative for chest pain and palpitations.   Gastrointestinal:  Negative for abdominal pain, constipation, diarrhea, nausea and vomiting.   Genitourinary:  Negative for dysuria and frequency.   Musculoskeletal:  Negative for back pain and neck pain.   Skin:  Negative for color change and rash.   Neurological:  Negative for dizziness and headaches.   Psychiatric/Behavioral:  Negative for agitation and confusion.    All other systems reviewed and are negative.     Past Medical History:     Past Medical History:   Diagnosis Date   • Asthma     as child   • Diabetes mellitus (HCC)    • Fatty liver    • Hypertension    • Nasal congestion     Frequently   • PONV (postoperative nausea and vomiting)    • Sleep apnea     5.5 apnea scale// no cpap      Past Surgical History:     Past Surgical History:   Procedure Laterality Date   • EPIDURAL BLOCK INJECTION N/A 11/15/2023    Procedure: L5-S1  LUMBAR epidural steroid injection (50146);  Surgeon: Lewis Pittman DO;  Location: [facility] MAIN OR;  Service: Pain Management    • EPIDURAL BLOCK INJECTION N/A 4/3/2024    Procedure: L5-S1 LUMBAR EPIDURAL STEROID INJECTION;  Surgeon: Lewis Andrews  DO Zain;  Location: [facility] MAIN OR;  Service: Pain Management    • AL LAPS GSTR RSTCV PX W/BYP TIFFANI-EN-Y LIMB <150 CM N/A 1/15/2024    Procedure: LAPAROSCOPIC TIFFANI-EN-Y GASTRIC BYPASS;  Surgeon: Jaime Robert MD;  Location: [facility] MAIN OR;  Service: Bariatrics   • AL SEPTOPLASTY/SUBMUCOUS RESECJ W/WO CARTILAGE GRF N/A 2022    Procedure: SEPTOPLASTY;  Surgeon: Abhi Couch MD;  Location: [facility] MAIN OR;  Service: ENT   • AL SUBMUCOUS RESCJ INFERIOR TURBINATE PRTL/COMPL Bilateral 2022    Procedure: TURBINECTOMY;  Surgeon: Abhi Couch MD;  Location: [facility] MAIN OR;  Service: ENT      Social History:     Social History     Socioeconomic History   • Marital status: /Civil Union     Spouse name: None   • Number of children: None   • Years of education: None   • Highest education level: None   Occupational History   • None   Tobacco Use   • Smoking status: Former     Types: Cigars     Start date: 2019     Quit date: 2023     Years since quittin.5     Passive exposure: Past   • Smokeless tobacco: Never   • Tobacco comments:     Wife just had baby not smoking right now     Pt stated stopped smoking cigars over a month ago   Vaping Use   • Vaping status: Never Used   Substance and Sexual Activity   • Alcohol use: Not Currently     Comment: none   • Drug use: Never   • Sexual activity: Yes     Partners: Female     Birth control/protection: None   Other Topics Concern   • None   Social History Narrative   • None     Social Determinants of Health     Financial Resource Strain: Not on file   Food Insecurity: No Food Insecurity (2021)    Received from [email or web address]    Hunger Vital Sign    • Worried About Running Out of Food in the Last Year: Never true    • Ran Out of Food in the Last Year: Never true   Transportation Needs: Not on file   Physical Activity: Not on file   Stress: Not on file   Social Connections: Not on file   Intimate Partner Violence: Not on file   Housing Stability: Not on  "file      Family History:     Family History   Problem Relation Age of Onset   • Diabetes type II Mother    • Diabetes Mother    • Diabetes Maternal Grandmother    • Diabetes type II Maternal Grandmother    • Arthritis Maternal Grandmother    • Cancer Maternal Grandfather         Liver cancer      Current Medications:     Current Outpatient Medications   Medication Sig Dispense Refill   • pantoprazole (PROTONIX) 40 mg tablet Take 1 tablet (40 mg total) by mouth daily 90 tablet 0   • sildenafil (VIAGRA) 25 MG tablet Take 25 mg by mouth daily as needed for erectile dysfunction       No current facility-administered medications for this visit.      Allergies:     Allergies   Allergen Reactions   • Other Angioedema   • Shellfish-Derived Products - Food Allergy Anaphylaxis   • Penicillins Hives   • Penicillin G Hives      Physical Exam:     /70 (BP Location: Left arm, Patient Position: Sitting, Cuff Size: Adult)   Pulse 84   Temp 98.1 °F (36.7 °C) (Temporal)   Resp 14   Ht 5' 6\" (1.676 m)   Wt 102 kg (224 lb)   BMI 36.15 kg/m²     Physical Exam  Vitals and nursing note reviewed.   Constitutional:       General: He is not in acute distress.     Appearance: He is well-developed.   HENT:      Head: Normocephalic and atraumatic.   Eyes:      General: No scleral icterus.     Conjunctiva/sclera: Conjunctivae normal.   Cardiovascular:      Rate and Rhythm: Normal rate and regular rhythm.      Pulses: Normal pulses.      Heart sounds: No murmur heard.  Pulmonary:      Effort: Pulmonary effort is normal. No respiratory distress.      Breath sounds: Normal breath sounds.   Abdominal:      General: Bowel sounds are normal. There is no distension.      Palpations: Abdomen is soft.      Tenderness: There is no abdominal tenderness.   Musculoskeletal:         General: No swelling. Normal range of motion.      Cervical back: Neck supple.   Skin:     General: Skin is warm and dry.      Capillary Refill: Capillary refill " takes less than 2 seconds.   Neurological:      General: No focal deficit present.      Mental Status: He is alert and oriented to person, place, and time. Mental status is at baseline.   Psychiatric:         Mood and Affect: Mood normal.         Behavior: Behavior normal.          Dejan Jeffrey,    [facility]

## 2024-04-26 PROBLEM — K76.0 HEPATIC STEATOSIS: Status: RESOLVED | Noted: 2023-01-20 | Resolved: 2024-04-26

## 2024-04-26 PROBLEM — E11.65 TYPE 2 DIABETES MELLITUS WITH HYPERGLYCEMIA, WITHOUT LONG-TERM CURRENT USE OF INSULIN (HCC): Status: RESOLVED | Noted: 2022-10-20 | Resolved: 2024-04-26

## 2024-04-26 PROBLEM — M54.16 LUMBAR RADICULOPATHY: Status: RESOLVED | Noted: 2023-11-15 | Resolved: 2024-04-26

## 2024-04-26 PROBLEM — H93.A1 PULSATILE TINNITUS OF RIGHT EAR: Status: RESOLVED | Noted: 2023-02-27 | Resolved: 2024-04-26

## 2024-04-26 PROBLEM — G47.30 SLEEP APNEA: Status: RESOLVED | Noted: 2024-01-15 | Resolved: 2024-04-26

## 2024-05-13 ENCOUNTER — APPOINTMENT (OUTPATIENT)
Dept: RADIOLOGY | Facility: CLINIC | Age: 39
End: 2024-05-13
Payer: COMMERCIAL

## 2024-05-13 ENCOUNTER — OFFICE VISIT (OUTPATIENT)
Dept: FAMILY MEDICINE CLINIC | Facility: CLINIC | Age: 39
End: 2024-05-13
Payer: COMMERCIAL

## 2024-05-13 VITALS
RESPIRATION RATE: 14 BRPM | WEIGHT: 224 LBS | DIASTOLIC BLOOD PRESSURE: 90 MMHG | OXYGEN SATURATION: 98 % | HEIGHT: 66 IN | TEMPERATURE: 98 F | BODY MASS INDEX: 36 KG/M2 | HEART RATE: 78 BPM | SYSTOLIC BLOOD PRESSURE: 122 MMHG

## 2024-05-13 DIAGNOSIS — R22.32 LOCALIZED SWELLING ON LEFT HAND: Primary | ICD-10-CM

## 2024-05-13 DIAGNOSIS — Z98.84 S/P GASTRIC BYPASS: ICD-10-CM

## 2024-05-13 DIAGNOSIS — I10 PRIMARY HYPERTENSION: ICD-10-CM

## 2024-05-13 DIAGNOSIS — R22.32 LOCALIZED SWELLING ON LEFT HAND: ICD-10-CM

## 2024-05-13 DIAGNOSIS — K91.2 POSTSURGICAL MALABSORPTION: ICD-10-CM

## 2024-05-13 DIAGNOSIS — N52.9 ERECTILE DYSFUNCTION, UNSPECIFIED ERECTILE DYSFUNCTION TYPE: ICD-10-CM

## 2024-05-13 PROBLEM — Z48.815 ENCOUNTER FOR SURGICAL AFTERCARE FOLLOWING SURGERY OF DIGESTIVE SYSTEM: Status: RESOLVED | Noted: 2024-04-15 | Resolved: 2024-05-13

## 2024-05-13 PROCEDURE — 99214 OFFICE O/P EST MOD 30 MIN: CPT | Performed by: STUDENT IN AN ORGANIZED HEALTH CARE EDUCATION/TRAINING PROGRAM

## 2024-05-13 PROCEDURE — 73130 X-RAY EXAM OF HAND: CPT

## 2024-05-13 NOTE — PROGRESS NOTES
Clinic Visit Note  Serafin Conklin 38 y.o. male   MRN: [id number]    Assessment and Plan      Diagnoses and all orders for this visit:    Localized swelling on left hand  Continue conservative therapy, OTC Voltaren gel up to twice daily for the next few days, x-ray imaging pending.  -     XR hand 3+ vw left; Future    Primary hypertension  Resolved after gastric bypass    S/P gastric bypass    Postsurgical malabsorption  Continue multivitamin supplementation    Erectile dysfunction, unspecified erectile dysfunction type  Viagra as needed    My impressions and treatment recommendations were discussed in detail with the patient who verbalized understanding and had no further questions.  Discharge instructions were provided.    Subjective     Chief Complaint: Follow-up visit    History of Present Illness:    Patient is a pleasant 38-year-old male coming in for follow-up/acute care visit secondary to swelling of left hand, palm region after hitting it with a hammer by accident at work.    The following portions of the patient's history were reviewed and updated as appropriate: allergies, current medications, past family history, past medical history, past social history, past surgical history and problem list.    REVIEW OF SYSTEMS:  A complete 12-point review of systems is negative other than that noted in the HPI.    Review of Systems   Constitutional:  Negative for chills and fever.   HENT:  Negative for ear pain and sore throat.    Eyes:  Negative for pain and visual disturbance.   Respiratory:  Negative for cough and shortness of breath.    Cardiovascular:  Negative for chest pain and palpitations.   Gastrointestinal:  Negative for abdominal pain and vomiting.   Genitourinary:  Negative for dysuria and hematuria.   Musculoskeletal:  Negative for arthralgias and back pain.   Skin:  Negative for color change and rash.   Neurological:  Negative for seizures and syncope.   All other systems reviewed and are  negative.        Current Outpatient Medications:   •  sildenafil (VIAGRA) 25 MG tablet, Take 25 mg by mouth daily as needed for erectile dysfunction, Disp: , Rfl:   Past Medical History:   Diagnosis Date   • Asthma     as child   • Diabetes mellitus (HCC)    • Fatty liver    • Hypertension    • Nasal congestion     Frequently   • PONV (postoperative nausea and vomiting)    • Sleep apnea     5.5 apnea scale// no cpap     Past Surgical History:   Procedure Laterality Date   • EPIDURAL BLOCK INJECTION N/A 11/15/2023    Procedure: L5-S1  LUMBAR epidural steroid injection (09729);  Surgeon: Lewis Pittman DO;  Location: [facility] MAIN OR;  Service: Pain Management    • EPIDURAL BLOCK INJECTION N/A 4/3/2024    Procedure: L5-S1 LUMBAR EPIDURAL STEROID INJECTION;  Surgeon: Lewis Pittman DO;  Location: [facility] MAIN OR;  Service: Pain Management    • HI LAPS GSTR RSTCV PX W/BYP TIFFANI-EN-Y LIMB <150 CM N/A 1/15/2024    Procedure: LAPAROSCOPIC TIFFANI-EN-Y GASTRIC BYPASS;  Surgeon: Jaime Robert MD;  Location: [facility] MAIN OR;  Service: Bariatrics   • HI SEPTOPLASTY/SUBMUCOUS RESECJ W/WO CARTILAGE GRF N/A 2022    Procedure: SEPTOPLASTY;  Surgeon: Abhi Couch MD;  Location: [facility] MAIN OR;  Service: ENT   • HI SUBMUCOUS RESCJ INFERIOR TURBINATE PRTL/COMPL Bilateral 2022    Procedure: TURBINECTOMY;  Surgeon: Abhi Couch MD;  Location: [facility] MAIN OR;  Service: ENT     Social History     Socioeconomic History   • Marital status: /Civil Union     Spouse name: Not on file   • Number of children: Not on file   • Years of education: Not on file   • Highest education level: Not on file   Occupational History   • Not on file   Tobacco Use   • Smoking status: Former     Types: Cigars     Start date: 2019     Quit date: 2023     Years since quittin.6     Passive exposure: Past   • Smokeless tobacco: Never   • Tobacco comments:     Wife just had baby not smoking right now     Pt stated stopped  "smoking cigars over a month ago   Vaping Use   • Vaping status: Never Used   Substance and Sexual Activity   • Alcohol use: Not Currently     Comment: none   • Drug use: Never   • Sexual activity: Yes     Partners: Female     Birth control/protection: None   Other Topics Concern   • Not on file   Social History Narrative   • Not on file     Social Determinants of Health     Financial Resource Strain: Not on file   Food Insecurity: No Food Insecurity (6/5/2021)    Received from [facility]    Hunger Vital Sign    • Worried About Running Out of Food in the Last Year: Never true    • Ran Out of Food in the Last Year: Never true   Transportation Needs: Not on file   Physical Activity: Not on file   Stress: Not on file   Social Connections: Not on file   Intimate Partner Violence: Not on file   Housing Stability: Not on file     Family History   Problem Relation Age of Onset   • Diabetes type II Mother    • Diabetes Mother    • Diabetes Maternal Grandmother    • Diabetes type II Maternal Grandmother    • Arthritis Maternal Grandmother    • Cancer Maternal Grandfather         Liver cancer     Allergies   Allergen Reactions   • Other Angioedema   • Shellfish-Derived Products - Food Allergy Anaphylaxis   • Penicillins Hives   • Penicillin G Hives       Objective     Vitals:    05/13/24 1559   BP: 122/90   BP Location: Left arm   Patient Position: Sitting   Cuff Size: Adult   Pulse: 78   Resp: 14   Temp: 98 °F (36.7 °C)   TempSrc: Temporal   SpO2: 98%   Weight: 102 kg (224 lb)   Height: 5' 6\" (1.676 m)       Physical Exam:     GENERAL: NAD, pleasant   HEENT:  NC/AT, PERRL, EOMI, no scleral icterus  CARDIAC:  RRR, +S1/S2, no S3/S4 appreciated, no m/g/r  PULMONARY:  CTA B/L, no wheezing/rales/rhonci, non-labored breathing  ABDOMEN:  Soft, NT/ND, no rebound/guarding/rigidity  Extremities:. No edema, cyanosis, or clubbing  Musculoskeletal:  Full range of motion intact in all extremities   NEUROLOGIC: Grossly intact, no focal " deficits  SKIN:  No rashes or erythema noted on exposed skin  Psych: Normal affect, mood stable    Left hand sensation, muscle strength, vascular intact, swelling thenar region    ==  PLEASE NOTE:  This encounter was completed utilizing the [employer] Direct Speech Voice Recognition Software. Grammatical errors, random word insertions, pronoun errors and incomplete sentences are occasional consequences of the system due to software limitations, ambient noise and hardware issues.These may be missed by proof reading prior to affixing electronic signature. Any questions or concerns about the content, text or information contained within the body of this dictation should be directly addressed to the physician for clarification. Please do not hesitate to call me directly if you have any any questions or concerns.    Dejan Jeffrey,   [facility] Internal Medicine   [facility]

## 2024-05-28 DIAGNOSIS — R22.32 LOCALIZED SWELLING ON LEFT HAND: Primary | ICD-10-CM

## 2024-05-29 ENCOUNTER — TELEPHONE (OUTPATIENT)
Age: 39
End: 2024-05-29

## 2024-05-29 NOTE — TELEPHONE ENCOUNTER
Patient is being referred to a orthopedics. Please schedule accordingly.    [facility]   [phone]

## 2024-06-03 ENCOUNTER — OFFICE VISIT (OUTPATIENT)
Age: 39
End: 2024-06-03
Payer: COMMERCIAL

## 2024-06-03 VITALS
HEIGHT: 66 IN | SYSTOLIC BLOOD PRESSURE: 134 MMHG | DIASTOLIC BLOOD PRESSURE: 74 MMHG | HEART RATE: 70 BPM | WEIGHT: 224 LBS | BODY MASS INDEX: 36 KG/M2

## 2024-06-03 DIAGNOSIS — M99.01 SEGMENTAL DYSFUNCTION OF CERVICAL REGION: ICD-10-CM

## 2024-06-03 DIAGNOSIS — M99.03 SEGMENTAL DYSFUNCTION OF LUMBAR REGION: ICD-10-CM

## 2024-06-03 DIAGNOSIS — M99.02 SEGMENTAL DYSFUNCTION OF THORACIC REGION: ICD-10-CM

## 2024-06-03 DIAGNOSIS — M62.838 MUSCLE SPASM: ICD-10-CM

## 2024-06-03 DIAGNOSIS — M99.04 SEGMENTAL DYSFUNCTION OF SACRAL REGION: Primary | ICD-10-CM

## 2024-06-03 PROCEDURE — 99203 OFFICE O/P NEW LOW 30 MIN: CPT | Performed by: CHIROPRACTOR

## 2024-06-03 PROCEDURE — 98941 CHIROPRACT MANJ 3-4 REGIONS: CPT | Performed by: CHIROPRACTOR

## 2024-06-03 PROCEDURE — 97110 THERAPEUTIC EXERCISES: CPT | Performed by: CHIROPRACTOR

## 2024-06-03 NOTE — PROGRESS NOTES
Initial date of service: 6/3/24    Diagnoses and all orders for this visit:    Segmental dysfunction of sacral region    Muscle spasm    Segmental dysfunction of lumbar region    Segmental dysfunction of thoracic region    Segmental dysfunction of cervical region      No red flags, radiculopathy or neurologic deficit appreciated clinically. Pt's symptoms and exam findings consistent with mechanical neck/back pain secondary to repetitive st/sp injury, exacerbated by postural/ergonomic stressors.   Pt responded well to traction, stretches and manual mobilization of the affected spinal and myofascial jt dysfunction, with increased ROM; trial of conservative tx recommended consisting of stretching, ther-ex, graded mobilization/manipulation of the affected spinal/myofascial tissues, postural/ergonomic education, and take home stretches/exercises.   If symptoms fail to improve with short trial of conservative care, appropriate imaging and referral will be coordinated.  Spent greater than 30 min c pt discussing hx, pe, ddx, tx options and reviewing notes/imaging    TREATMENT: 52521, 76861  Fear avoidance behavior discussion, encouraged and reassured pt that natural course of condition is to improve over time with adherence to tx plan and home care strategies. Home care recommendations: avoid bed rest, walk (but avoid trails and uneven surfaces), gradual return to activity to tolerance (avoid anything that peripheralizes symptoms), ice 20 min on/off, watch for ice burn, call if symptoms peripheralize, worsen, or neurologic deficit progresses. Ther-ex: IASTM - discussed post procedure soreness and/or ecchymosis for up to 36 hrs, applied to affected mm hypertonicities; wall jose c, axial retraction, upper trap stretch, lev scap stretch, SCM stretch, glute stretch, abdominal bracing; greater than 15 min spent performing above mentioned ther-ex to improve ROM/flexibility. Thoracic mobilization/manipulation: prone P-A mob, supine  A-P manip; cervical mobilization/manipulation: traction, diversified supine graded mobilization; lumbar prne flexion-traction, side laying graded mobilization; L SIJ supine LAT    HPI:  Serafin Conklin is a 38 y.o. male   Chief Complaint   Patient presents with    Back Pain     Low back pain, intermittent, in the center but more on the left this past weekend, tightness, going on for years, about a 3 currently but a 6 at its worst     Neck Pain     Intermittent stiffness none currently      Pt presents for eval and tx for exacerbation of chronic intermittent neck/back pain. 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 Pt undergoes injections with Dr Pittman with good benefit, but not lasting as long, may move towards nerve ablation. Pt has undergone PT in past    Back Pain  This is a chronic problem. The current episode started more than 1 year ago. The problem occurs daily. The problem has been waxing and waning since onset. The pain is present in the lumbar spine and thoracic spine. The quality of the pain is described as aching. The pain does not radiate. The symptoms are aggravated by bending, standing and twisting. Pertinent negatives include no numbness or weakness.   Neck Pain   This is a chronic problem. The current episode started more than 1 year ago. The problem occurs daily. The problem has been waxing and waning. The pain is present in the left side, midline and right side. The quality of the pain is described as aching. The symptoms are aggravated by bending, position, stress and twisting. Worse during: worst in am and end of day. Stiffness is present In the morning. Pertinent negatives include no numbness or weakness.     Past Medical History:   Diagnosis Date    Asthma     as child    Diabetes mellitus (HCC)     Fatty liver     Hypertension     Nasal congestion     Frequently    PONV (postoperative nausea and vomiting)     Sleep apnea     5.5 apnea scale// no  cpap      The following portions of the patient's history were reviewed and updated as appropriate: allergies, past family history, past medical history, past social history, past surgical history, and problem list.  Review of Systems   Musculoskeletal:  Positive for back pain and neck pain.   Neurological:  Negative for weakness and numbness.     Physical Exam  Eyes:      Extraocular Movements: Extraocular movements intact.   Neck:        Comments: Pnful and limited in Brot, Ext/Brot  Cardiovascular:      Pulses: Normal pulses.   Musculoskeletal:      Cervical back: Pain with movement and muscular tenderness present. Decreased range of motion.      Thoracic back: Spasms and tenderness present. Decreased range of motion.        Back:       Comments: Pnful and limited in Ext/Brot,    Lymphadenopathy:      Cervical: No cervical adenopathy.   Neurological:      Mental Status: He is alert and oriented to person, place, and time.      Cranial Nerves: Cranial nerves 2-12 are intact.      Sensory: Sensation is intact.      Motor: Motor function is intact.      Coordination: Coordination is intact.      Gait: Gait is intact. Gait and tandem walk normal.      Deep Tendon Reflexes: Reflexes normal. Babinski sign absent on the right side. Babinski sign absent on the left side.      Reflex Scores:       Tricep reflexes are 2+ on the right side and 2+ on the left side.       Bicep reflexes are 2+ on the right side and 2+ on the left side.       Brachioradialis reflexes are 2+ on the right side and 2+ on the left side.  Psychiatric:         Mood and Affect: Mood and affect normal.       SOFT TISSUE ASSESSMENT: Hypertonicity and tenderness palpated B C5-T6 erector spinae, upper traps, lev scap, SCM, rhomboid, B glute med/min JOINT RECTRICTIONS: C5-T6, L4-S1 and L SIJ ORTHO: Cathie unremarkable for centralization/peripheralization; max foraminal comp elicits local np R/L; shoulder depression elicits stiffness in R/L upper trap;  brachial plexus tension test elicits no neural tension in R/L UE; cervical distraction relieves CC; sitting root neg B; slump test neg B; maude, iliac compression and thigh thrust elicit stiffness L SIJ    Return in about 3 weeks (around 6/24/2024) for Next scheduled follow up.

## 2024-06-10 ENCOUNTER — APPOINTMENT (OUTPATIENT)
Dept: RADIOLOGY | Facility: CLINIC | Age: 39
End: 2024-06-10
Payer: COMMERCIAL

## 2024-06-10 ENCOUNTER — OFFICE VISIT (OUTPATIENT)
Dept: OBGYN CLINIC | Facility: CLINIC | Age: 39
End: 2024-06-10
Payer: COMMERCIAL

## 2024-06-10 VITALS
WEIGHT: 224 LBS | SYSTOLIC BLOOD PRESSURE: 118 MMHG | DIASTOLIC BLOOD PRESSURE: 74 MMHG | BODY MASS INDEX: 36 KG/M2 | HEIGHT: 66 IN | HEART RATE: 70 BPM

## 2024-06-10 DIAGNOSIS — M79.642 LEFT HAND PAIN: ICD-10-CM

## 2024-06-10 DIAGNOSIS — R22.32 LOCALIZED SWELLING ON LEFT HAND: ICD-10-CM

## 2024-06-10 DIAGNOSIS — S67.22XA CRUSHING INJURY OF LEFT HAND, INITIAL ENCOUNTER: Primary | ICD-10-CM

## 2024-06-10 PROCEDURE — 73130 X-RAY EXAM OF HAND: CPT

## 2024-06-10 PROCEDURE — 99213 OFFICE O/P EST LOW 20 MIN: CPT | Performed by: STUDENT IN AN ORGANIZED HEALTH CARE EDUCATION/TRAINING PROGRAM

## 2024-06-10 NOTE — PROGRESS NOTES
ASSESSMENT/PLAN:    Assessment:   Crush injury to left hand   Left hand mass     Plan:   Etiology of above diagnosis and treatment options reviewed.   Patients symptoms have been improving since DOI, I believe he will continue to improve with occupational therapy. A referral was provided today.   Discussed MRI if left dorsal hand mass (over second metacarpal shaft) gets bigger or remains painful     Follow Up:  If symptoms worsen or fail to improve    To Do Next Visit:  Repeat evaluation/ further imaging  _____________________________________________________  CHIEF COMPLAINT:  Chief Complaint   Patient presents with    Left Hand - Pain         SUBJECTIVE:  Serafin Conklin is a 38 y.o. male who presents for initial evaluation of left hand pain. Patient injured his hand 5/6/24 when he hit it with a hammer while doing work around his house. Patient had pain, swelling, and bruising at time of injury. PCP ordered imaging no acute osseous abnormality seen. He presents today because he has continued pain and swelling to the dorsal hand between the thumb and index finger. He notes pain with extension. Patient states he can feel a lump between his thumb and index finger where he injured his hand.           PAST MEDICAL HISTORY:  Past Medical History:   Diagnosis Date    Asthma     as child    Diabetes mellitus (HCC)     Fatty liver     Hypertension     Nasal congestion     Frequently    PONV (postoperative nausea and vomiting)     Sleep apnea     5.5 apnea scale// no cpap       PAST SURGICAL HISTORY:  Past Surgical History:   Procedure Laterality Date    EPIDURAL BLOCK INJECTION N/A 11/15/2023    Procedure: L5-S1  LUMBAR epidural steroid injection (48516);  Surgeon: Lewis Pittman DO;  Location: [facility] MAIN OR;  Service: Pain Management     EPIDURAL BLOCK INJECTION N/A 4/3/2024    Procedure: L5-S1 LUMBAR EPIDURAL STEROID INJECTION;  Surgeon: Lewis Pittman DO;  Location: [facility] MAIN OR;  Service: Pain Management      VT LAPS GSTR RSTCV PX W/BYP TIFFANI-EN-Y LIMB <150 CM N/A 1/15/2024    Procedure: LAPAROSCOPIC TIFFANI-EN-Y GASTRIC BYPASS;  Surgeon: Jaime Robert MD;  Location: [facility] MAIN OR;  Service: Bariatrics    VT SEPTOPLASTY/SUBMUCOUS RESECJ W/WO CARTILAGE GRF N/A 2022    Procedure: SEPTOPLASTY;  Surgeon: Abhi Couch MD;  Location: [facility] MAIN OR;  Service: ENT    VT SUBMUCOUS RESCJ INFERIOR TURBINATE PRTL/COMPL Bilateral 2022    Procedure: TURBINECTOMY;  Surgeon: Abhi Couch MD;  Location: [facility] MAIN OR;  Service: ENT       FAMILY HISTORY:  Family History   Problem Relation Age of Onset    Diabetes type II Mother     Diabetes Mother     Diabetes Maternal Grandmother     Diabetes type II Maternal Grandmother     Arthritis Maternal Grandmother     Cancer Maternal Grandfather         Liver cancer       SOCIAL HISTORY:  Social History     Tobacco Use    Smoking status: Some Days     Types: Cigars     Start date: 2019     Last attempt to quit: 2023     Years since quittin.7     Passive exposure: Past    Smokeless tobacco: Never    Tobacco comments:     Wife just had baby not smoking right now     Pt stated stopped smoking cigars over a month ago   Vaping Use    Vaping status: Never Used   Substance Use Topics    Alcohol use: Not Currently     Comment: none    Drug use: Never       MEDICATIONS:    Current Outpatient Medications:     sildenafil (VIAGRA) 25 MG tablet, Take 25 mg by mouth daily as needed for erectile dysfunction, Disp: , Rfl:     ALLERGIES:  Allergies   Allergen Reactions    Other Angioedema    Shellfish-Derived Products - Food Allergy Anaphylaxis    Penicillins Hives    Penicillin G Hives       REVIEW OF SYSTEMS:  Pertinent items are noted in HPI.  A comprehensive review of systems was negative.    LABS:  HgA1c:   Lab Results   Component Value Date    HGBA1C 6.5 (H) 2024     BMP:   Lab Results   Component Value Date    CALCIUM 9.5 2024    K 4.2 2024    CO2 31 2024  "    02/12/2024    BUN 15 02/12/2024    CREATININE 0.76 02/12/2024         _____________________________________________________  PHYSICAL EXAMINATION:  Vital signs: /74   Pulse 70   Ht 5' 6\" (1.676 m)   Wt 102 kg (224 lb)   BMI 36.15 kg/m²   General: well developed and well nourished, alert, oriented times 3, and appears comfortable  Psychiatric: Normal  HEENT: Trachea Midline, No torticollis  Cardiovascular: No discernable arrhythmia  Pulmonary: No wheezing or stridor  Abdomen: No rebound or guarding  Extremities: No peripheral edema  Skin: No masses, erythema, lacerations, fluctation, ulcerations  Neurovascular: Sensation Intact to the Median, Ulnar, Radial Nerve, Motor Intact to the Median, Ulnar, Radial Nerve, and Pulses Intact    MUSCULOSKELETAL EXAMINATION:  left Hand -      TTP 2nd metacarpal shaft with  palpable mass, less than 0.5cm, movile and soft  Full AROM of thumb   A ok sign   Patient presents with no obvious anatomical deformity  Skin is warm and dry to touch with no signs of erythema, ecchymosis, or infection  No soft tissue swelling or effusion noted  Full FDS, FDP, extensor mechanisms are intact  No rotational deformity with composite finger flexion  Demonstrates normal wrist, elbow, and shoulder motion  Forearm compartments are soft and supple  2+ distal radial pulse with brisk capillary refill to the fingers  Radial, median, and ulnar motor and sensory distribution intact  Sensations light to touch intact distally      _____________________________________________________  STUDIES REVIEWED:  Images were reviewed in PACS by Dr. Varela and demonstrate: no acute osseous abnormalities       PROCEDURES PERFORMED:  Procedures  No Procedures performed today    Scribe Attestation      I,:  Vibha Domitila am acting as a scribe while in the presence of the attending physician.:       I,:  Chio Varela MD personally performed the services described in this documentation    as " scribed in my presence.:

## 2024-06-18 ENCOUNTER — EVALUATION (OUTPATIENT)
Dept: OCCUPATIONAL THERAPY | Facility: CLINIC | Age: 39
End: 2024-06-18
Payer: COMMERCIAL

## 2024-06-18 DIAGNOSIS — M79.642 LEFT HAND PAIN: ICD-10-CM

## 2024-06-18 DIAGNOSIS — S67.22XA CRUSHING INJURY OF LEFT HAND, INITIAL ENCOUNTER: Primary | ICD-10-CM

## 2024-06-18 PROCEDURE — 97110 THERAPEUTIC EXERCISES: CPT

## 2024-06-18 PROCEDURE — 97166 OT EVAL MOD COMPLEX 45 MIN: CPT

## 2024-06-18 NOTE — PROGRESS NOTES
OT Evaluation     Today's date: 2024  Patient name: Serafin Conklin  : 1985  MRN: [id number]  Referring provider: Chio Varela MD  Dx:   Encounter Diagnosis     ICD-10-CM    1. Crushing injury of left hand, initial encounter  S67.22XA Ambulatory Referral to PT/OT Hand Therapy      2. Left hand pain  M79.642 Ambulatory Referral to PT/OT Hand Therapy                     Assessment  Impairments: lacks appropriate home exercise program, pain with function and weight-bearing intolerance  Symptom irritability: moderate    Assessment details: Patient is a 38 y.o. male who presents for OT IE and treatment for L hand pain, crushing injury of left hand. Patient reports he hit his hand with a hammer while doing work around his house. Patient had pain, swelling, and bruising at time of injury. PCP ordered imaging no acute osseous abnormality seen. He states he has swelling to the dorsal hand between the thumb and index finger but it has decreased significantly since DOI. He notes pain with extension. Patient states he can feel a lump between his thumb and index finger where he injured his hand. Patient referred by Dr. Chio Varela to initiate treatment including hand therapy.  Understanding of Dx/Px/POC: good     Prognosis: excellent    Goals  Short Term Goals by 2 - 4 weeks:    Establish HEP to increase performance with daily activities.     Patient will increase  strength by at least 10 lbs to assist in completing ADLs.     Patient will increase ROM of UE by at least 10 degrees to complete ADLs.             Long Term Goals by discharge:    Establish final home exercise program to enhance maximal functional level with ADLs.    Achieve functional active range of motion of LUE for full return to household chores.                               Plan  Patient would benefit from: skilled occupational therapy and OT eval  Planned modality interventions: electrical stimulation/Swazi stimulation, manual  electrical stimulation, high voltage pulsed current: pain management, thermotherapy: hydrocollator packs, ultrasound and TENS    Planned therapy interventions: activity modification, ADL retraining, ADL training, IASTM, joint mobilization, kinesiology taping, manual therapy, behavior modification, balance/weight bearing training, patient/caregiver education, home exercise program, IADL retraining, graded motor, graded exercise, fine motor coordination training, flexibility, functional ROM exercises, strengthening, stretching, therapeutic activities, therapeutic exercise and therapeutic training    Duration in weeks: 8  Plan of Care beginning date: 2024  Plan of Care expiration date: 2024  Treatment plan discussed with: patient  Plan details: Focus on AROM, AAROM, PROM, stretching, strengthening and all modalities as seen fit to improve ability to complete daily activites with ease.  POC: 2024 - 2024      Subjective Evaluation    History of Present Illness  Date of surgery: 2024  Mechanism of injury: trauma  Mechanism of injury: Patient is a 38 y.o. male who presents for OT IE and treatment for L hand pain, crushing injury of left hand. Patient reports he hit his hand with a hammer while doing work around his house. Patient had pain, swelling, and bruising at time of injury. PCP ordered imaging no acute osseous abnormality seen. He states he has swelling to the dorsal hand between the thumb and index finger but it has decreased significantly since DOI. He notes pain with extension. Patient states he can feel a lump between his thumb and index finger where he injured his hand. Patient referred by Dr. Chio Varela to initiate treatment including hand therapy.      Quality of life: good    Patient Goals  Patient goals for therapy: independence with ADLs/IADLs, return to sport/leisure activities, increased motion, decreased pain and increased strength    Pain  Current pain ratin  At best pain  ratin  At worst pain ratin  Quality: cramping, discomfort, dull ache and sharp    Social Support  Lives with: spouse and young children    Hand dominance: right      Diagnostic Tests  X-ray: normal      Objective     Active Range of Motion     Left Wrist   Wrist flexion: 58 degrees with pain  Wrist extension: 74 degrees     Right Wrist   Wrist flexion: 70 degrees   Wrist extension: 71 degrees     Left Thumb   Flexion     MP: 54 degrees    DIP: 48 degrees  Palmar Abduction     CMC: 44 degrees  Kapandji score: 10 degrees      Right Thumb   Flexion     MP: 58    DIP: 60  Palmar Abduction    CMC: 50  Kapandji score: 10 degrees    Strength/Myotome Testing     Left Wrist/Hand      (2nd hand position)     Trial 1: 100    Thumb Strength  Key/Lateral Pinch     Trial 1: 28  Palmar/Three-Point Pinch     Trial 1: 22    Right Wrist/Hand      (2nd hand position)     Trial 1: 100    Thumb Strength   Key/Lateral Pinch     Trial 1: 24  Palmar/Three-Point Pinch     Trial 1: 25           Precautions: Universal      Auth Tracker  Auth Status Total   Visits  Expiration date Co-Insurance   pending                                  Visit Tracker  Date IE                                                                                      PMHx:   has a past medical history of Asthma, Diabetes mellitus (HCC), Fatty liver, Hypertension, Nasal congestion, PONV (postoperative nausea and vomiting), and Sleep apnea.      Manuals HEP 2024                       Ther Ex     Education on HEP and dx x x5min   Wrist tenodesis x x10   STM/gentle PROM  X5min    Thumb flex/ext/cmc abduction x X10 each    Wrist flexor stretch  x 5 x 10 sec    Wrist extensor stretch  x 5 x 10 sec   Towel scrunches with wrist extension   x5             Ther Act                     Modalities     MHP  x5 min           Assessment:   Patient tolerated session well. Patient demonstrates decreased AROM and  strength upon assessment today. Patient  tolerated manual therapy consisting of STM and wrist PROM well with minimal reports of stretching and discomfort on the dorsal aspect of wrist. Patient session focused on patient education on anatomy and physiology concerning current dx, techniques for decreasing deficits through HEP, and appropriate use of modalities. Patient educated on HEP to include AROM wrist exercises and stretches with verbal instructions and handouts for patient reference. Patient educated on treatment plan at this time. Patient benefiting from skilled hand therapy OT to reduce deficits to improve independence with daily activities.      Plan:   Focus on AROM, AAROM, PROM, stretching, strengthening and all modalities as seen fit to improve ability to complete daily activites with ease.  POC: 6/18/2024 - 8/27/2024

## 2024-06-25 ENCOUNTER — APPOINTMENT (OUTPATIENT)
Dept: OCCUPATIONAL THERAPY | Facility: CLINIC | Age: 39
End: 2024-06-25
Payer: COMMERCIAL

## 2024-07-01 ENCOUNTER — PROCEDURE VISIT (OUTPATIENT)
Age: 39
End: 2024-07-01
Payer: COMMERCIAL

## 2024-07-01 VITALS
SYSTOLIC BLOOD PRESSURE: 132 MMHG | BODY MASS INDEX: 36 KG/M2 | WEIGHT: 224 LBS | HEART RATE: 74 BPM | DIASTOLIC BLOOD PRESSURE: 73 MMHG | HEIGHT: 66 IN

## 2024-07-01 DIAGNOSIS — M99.01 SEGMENTAL DYSFUNCTION OF CERVICAL REGION: ICD-10-CM

## 2024-07-01 DIAGNOSIS — M99.03 SEGMENTAL DYSFUNCTION OF LUMBAR REGION: ICD-10-CM

## 2024-07-01 DIAGNOSIS — M99.02 SEGMENTAL DYSFUNCTION OF THORACIC REGION: ICD-10-CM

## 2024-07-01 DIAGNOSIS — M99.04 SEGMENTAL DYSFUNCTION OF SACRAL REGION: Primary | ICD-10-CM

## 2024-07-01 DIAGNOSIS — M62.838 MUSCLE SPASM: ICD-10-CM

## 2024-07-01 PROCEDURE — 98941 CHIROPRACT MANJ 3-4 REGIONS: CPT | Performed by: CHIROPRACTOR

## 2024-07-01 PROCEDURE — 97110 THERAPEUTIC EXERCISES: CPT | Performed by: CHIROPRACTOR

## 2024-07-01 NOTE — PROGRESS NOTES
Initial date of service: 6/3/24    Diagnoses and all orders for this visit:    Segmental dysfunction of sacral region    Muscle spasm    Segmental dysfunction of lumbar region    Segmental dysfunction of thoracic region    Segmental dysfunction of cervical region    Pt improved with reduced pain and increased ROM    TREATMENT: 75145, 77718  Ther-ex: IASTM - discussed post procedure soreness and/or ecchymosis for up to 36 hrs, applied to affected mm hypertonicities; wall jose c, axial retraction, upper trap stretch, lev scap stretch, SCM stretch, glute stretch, abdominal bracing; greater than 15 min spent performing above mentioned ther-ex to improve ROM/flexibility. Thoracic mobilization/manipulation: prone P-A mob, supine A-P manip; cervical mobilization/manipulation: traction, diversified supine graded mobilization; lumbar prne flexion-traction, side laying graded mobilization; L SIJ supine LAT    HPI:  Serafin Conklin is a 38 y.o. male   Chief Complaint   Patient presents with    Neck - Pain     Neck is stiff and tight   Pain score 2    Back Pain     Lower lumbar is tight . Pain score 1     Pt presents for tx for exacerbation of chronic intermittent neck/back pain. 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 Pt undergoes injections with Dr Pittman with good benefit, but not lasting as long, may move towards nerve ablation. Pt has undergone PT in past    Back Pain  This is a chronic problem. The current episode started more than 1 year ago. The problem occurs daily. The problem has been waxing and waning since onset. The pain is present in the lumbar spine and thoracic spine. The quality of the pain is described as aching. The pain does not radiate. The symptoms are aggravated by bending, standing and twisting. Pertinent negatives include no numbness or weakness.   Neck Pain   This is a chronic problem. The current episode started more than 1 year ago. The problem occurs  daily. The problem has been waxing and waning. The pain is present in the left side, midline and right side. The quality of the pain is described as aching. The symptoms are aggravated by bending, position, stress and twisting. Worse during: worst in am and end of day. Stiffness is present In the morning. Pertinent negatives include no numbness or weakness.     Past Medical History:   Diagnosis Date    Asthma     as child    Diabetes mellitus (HCC)     Fatty liver     Hypertension     Nasal congestion     Frequently    PONV (postoperative nausea and vomiting)     Sleep apnea     5.5 apnea scale// no cpap      The following portions of the patient's history were reviewed and updated as appropriate: allergies, past family history, past medical history, past social history, past surgical history, and problem list.  Review of Systems   Musculoskeletal:  Positive for back pain and neck pain.   Neurological:  Negative for weakness and numbness.     Physical Exam  Neck:        Comments: Pnful and limited in Brot, Ext/Brot  Musculoskeletal:      Cervical back: Pain with movement and muscular tenderness present. Decreased range of motion.      Thoracic back: Spasms and tenderness present. Decreased range of motion.        Back:       Comments: Pnful and limited in Ext/Brot,    Lymphadenopathy:      Cervical: No cervical adenopathy.   Neurological:      Mental Status: He is alert and oriented to person, place, and time.      Gait: Gait is intact.   Psychiatric:         Mood and Affect: Mood and affect normal.       SOFT TISSUE ASSESSMENT: Hypertonicity and tenderness palpated B C5-T6 erector spinae, upper traps, lev scap, SCM, rhomboid, B glute med/min JOINT RECTRICTIONS: C5-T6, L4-S1 and L SIJ     Return in about 3 weeks (around 7/22/2024).

## 2024-07-08 NOTE — ASSESSMENT & PLAN NOTE
-At risk for malabsorption of vitamins/minerals secondary to malabsorption and restriction of intake from bariatric surgery  -Currently taking adequate postop bariatric surgery vitamin supplementation: [place] Fusion MVI w/ 45mg iron, calcium citrate 500mg TID, Vitamin D 2,000IU    -Last set of bariatric labs completed on 07/15/24 and showed vitamin D is low normal - add 2,000IU D3 daily, B12 is mildly low - add additional 1,000mcg sublingal B12 daily and in office injection 1,000mcg IM today    -Next set of bariatric labs ordered for 6 months  -Patient received education about the importance of adhering to a lifelong supplementation regimen to avoid vitamin/mineral deficiencies

## 2024-07-08 NOTE — ASSESSMENT & PLAN NOTE
-Avoid fried foods and trans fat, limit saturated fats and refined carbohydrates  -Increase fish/omega 3 FA consumption  -Increase physical activity  -low HDL - repeat panel

## 2024-07-08 NOTE — ASSESSMENT & PLAN NOTE
-s/p Armand-En-Y Gastric Bypass with Dr. Robert on 1/15/24. He is doing well - weight loss slowing down. He agrees to keep careful logs/track and f/u with RD.     Initial: 281lbs  Current: 219.6lbs  EWL: 49%  Cyrus: current  Current BMI is Body mass index is 35.44 kg/m².     Tolerating a regular diet-yes  Eating at least 60 grams of protein per day-yes  Following 30/60 minute rule with liquids-yes  Drinking at least 64 ounces of fluid per day-yes  Drinking carbonated beverages-no  Sufficient exercise-yes, no active at work though - encouraged him to start exercising  Using NSAIDs regularly-no  Using nicotine-no  Using alcohol-no. Advised about the risks of alcohol s/p bariatric surgery and recommend avoiding all alcohol

## 2024-07-15 ENCOUNTER — LAB (OUTPATIENT)
Dept: LAB | Facility: CLINIC | Age: 39
End: 2024-07-15
Payer: COMMERCIAL

## 2024-07-15 DIAGNOSIS — E66.9 OBESITY, CLASS II, BMI 35-39.9: ICD-10-CM

## 2024-07-15 DIAGNOSIS — E11.65 TYPE 2 DIABETES MELLITUS WITH HYPERGLYCEMIA, WITHOUT LONG-TERM CURRENT USE OF INSULIN (HCC): ICD-10-CM

## 2024-07-15 DIAGNOSIS — K91.2 POSTSURGICAL MALABSORPTION: ICD-10-CM

## 2024-07-15 LAB
25(OH)D3 SERPL-MCNC: 31.3 NG/ML (ref 30–100)
ALBUMIN SERPL BCG-MCNC: 4.2 G/DL (ref 3.5–5)
ALP SERPL-CCNC: 87 U/L (ref 34–104)
ALT SERPL W P-5'-P-CCNC: 23 U/L (ref 7–52)
ANION GAP SERPL CALCULATED.3IONS-SCNC: 8 MMOL/L (ref 4–13)
AST SERPL W P-5'-P-CCNC: 18 U/L (ref 13–39)
BASOPHILS # BLD AUTO: 0.05 THOUSANDS/ÂΜL (ref 0–0.1)
BASOPHILS NFR BLD AUTO: 1 % (ref 0–1)
BILIRUB SERPL-MCNC: 1 MG/DL (ref 0.2–1)
BUN SERPL-MCNC: 20 MG/DL (ref 5–25)
CALCIUM SERPL-MCNC: 9.5 MG/DL (ref 8.4–10.2)
CHLORIDE SERPL-SCNC: 104 MMOL/L (ref 96–108)
CHOLEST SERPL-MCNC: 120 MG/DL
CO2 SERPL-SCNC: 27 MMOL/L (ref 21–32)
CREAT SERPL-MCNC: 0.63 MG/DL (ref 0.6–1.3)
EOSINOPHIL # BLD AUTO: 0.19 THOUSAND/ÂΜL (ref 0–0.61)
EOSINOPHIL NFR BLD AUTO: 3 % (ref 0–6)
ERYTHROCYTE [DISTWIDTH] IN BLOOD BY AUTOMATED COUNT: 13.2 % (ref 11.6–15.1)
EST. AVERAGE GLUCOSE BLD GHB EST-MCNC: 131 MG/DL
FERRITIN SERPL-MCNC: 175 NG/ML (ref 24–336)
FOLATE SERPL-MCNC: 10.7 NG/ML
GFR SERPL CREATININE-BSD FRML MDRD: 124 ML/MIN/1.73SQ M
GLUCOSE P FAST SERPL-MCNC: 113 MG/DL (ref 65–99)
HBA1C MFR BLD: 6.2 %
HCT VFR BLD AUTO: 39.9 % (ref 36.5–49.3)
HDLC SERPL-MCNC: 36 MG/DL
HGB BLD-MCNC: 13.1 G/DL (ref 12–17)
IMM GRANULOCYTES # BLD AUTO: 0.02 THOUSAND/UL (ref 0–0.2)
IMM GRANULOCYTES NFR BLD AUTO: 0 % (ref 0–2)
IRON SATN MFR SERPL: 22 % (ref 15–50)
IRON SERPL-MCNC: 78 UG/DL (ref 50–212)
LDLC SERPL CALC-MCNC: 66 MG/DL (ref 0–100)
LYMPHOCYTES # BLD AUTO: 2.36 THOUSANDS/ÂΜL (ref 0.6–4.47)
LYMPHOCYTES NFR BLD AUTO: 38 % (ref 14–44)
MCH RBC QN AUTO: 27.3 PG (ref 26.8–34.3)
MCHC RBC AUTO-ENTMCNC: 32.8 G/DL (ref 31.4–37.4)
MCV RBC AUTO: 83 FL (ref 82–98)
MONOCYTES # BLD AUTO: 0.44 THOUSAND/ÂΜL (ref 0.17–1.22)
MONOCYTES NFR BLD AUTO: 7 % (ref 4–12)
NEUTROPHILS # BLD AUTO: 3.13 THOUSANDS/ÂΜL (ref 1.85–7.62)
NEUTS SEG NFR BLD AUTO: 51 % (ref 43–75)
NONHDLC SERPL-MCNC: 84 MG/DL
NRBC BLD AUTO-RTO: 0 /100 WBCS
PLATELET # BLD AUTO: 322 THOUSANDS/UL (ref 149–390)
PMV BLD AUTO: 11.6 FL (ref 8.9–12.7)
POTASSIUM SERPL-SCNC: 4.5 MMOL/L (ref 3.5–5.3)
PROT SERPL-MCNC: 7.1 G/DL (ref 6.4–8.4)
PTH-INTACT SERPL-MCNC: 39 PG/ML (ref 12–88)
RBC # BLD AUTO: 4.79 MILLION/UL (ref 3.88–5.62)
SODIUM SERPL-SCNC: 139 MMOL/L (ref 135–147)
TIBC SERPL-MCNC: 359 UG/DL (ref 250–450)
TRIGL SERPL-MCNC: 88 MG/DL
TSH SERPL DL<=0.05 MIU/L-ACNC: 2.44 UIU/ML (ref 0.45–4.5)
UIBC SERPL-MCNC: 281 UG/DL (ref 155–355)
VIT B12 SERPL-MCNC: 320 PG/ML (ref 180–914)
WBC # BLD AUTO: 6.19 THOUSAND/UL (ref 4.31–10.16)

## 2024-07-15 PROCEDURE — 84590 ASSAY OF VITAMIN A: CPT

## 2024-07-15 PROCEDURE — 83970 ASSAY OF PARATHORMONE: CPT

## 2024-07-15 PROCEDURE — 82746 ASSAY OF FOLIC ACID SERUM: CPT

## 2024-07-15 PROCEDURE — 84443 ASSAY THYROID STIM HORMONE: CPT

## 2024-07-15 PROCEDURE — 80061 LIPID PANEL: CPT

## 2024-07-15 PROCEDURE — 83540 ASSAY OF IRON: CPT

## 2024-07-15 PROCEDURE — 82607 VITAMIN B-12: CPT

## 2024-07-15 PROCEDURE — 84425 ASSAY OF VITAMIN B-1: CPT

## 2024-07-15 PROCEDURE — 84630 ASSAY OF ZINC: CPT

## 2024-07-15 PROCEDURE — 36415 COLL VENOUS BLD VENIPUNCTURE: CPT

## 2024-07-15 PROCEDURE — 83550 IRON BINDING TEST: CPT

## 2024-07-15 PROCEDURE — 83036 HEMOGLOBIN GLYCOSYLATED A1C: CPT

## 2024-07-15 PROCEDURE — 82306 VITAMIN D 25 HYDROXY: CPT

## 2024-07-15 PROCEDURE — 82728 ASSAY OF FERRITIN: CPT

## 2024-07-15 PROCEDURE — 80053 COMPREHEN METABOLIC PANEL: CPT

## 2024-07-15 PROCEDURE — 85025 COMPLETE CBC W/AUTO DIFF WBC: CPT

## 2024-07-16 ENCOUNTER — TELEPHONE (OUTPATIENT)
Dept: BARIATRICS | Facility: CLINIC | Age: 39
End: 2024-07-16

## 2024-07-17 LAB — ZINC SERPL-MCNC: 59 UG/DL (ref 44–115)

## 2024-07-18 ENCOUNTER — OFFICE VISIT (OUTPATIENT)
Dept: BARIATRICS | Facility: CLINIC | Age: 39
End: 2024-07-18

## 2024-07-18 VITALS
HEIGHT: 66 IN | SYSTOLIC BLOOD PRESSURE: 118 MMHG | WEIGHT: 219.6 LBS | HEART RATE: 81 BPM | DIASTOLIC BLOOD PRESSURE: 70 MMHG | BODY MASS INDEX: 35.29 KG/M2

## 2024-07-18 DIAGNOSIS — R79.89 LOW VITAMIN B12 LEVEL: ICD-10-CM

## 2024-07-18 DIAGNOSIS — K91.2 POSTSURGICAL MALABSORPTION: ICD-10-CM

## 2024-07-18 DIAGNOSIS — E66.9 OBESITY, CLASS II, BMI 35-39.9: ICD-10-CM

## 2024-07-18 DIAGNOSIS — E78.2 MIXED HYPERLIPIDEMIA: ICD-10-CM

## 2024-07-18 DIAGNOSIS — Z48.815 ENCOUNTER FOR SURGICAL AFTERCARE FOLLOWING SURGERY OF DIGESTIVE SYSTEM: Primary | ICD-10-CM

## 2024-07-18 LAB — VIT B1 BLD-SCNC: 155.5 NMOL/L (ref 66.5–200)

## 2024-07-18 PROCEDURE — 99214 OFFICE O/P EST MOD 30 MIN: CPT | Performed by: PHYSICIAN ASSISTANT

## 2024-07-18 PROCEDURE — 96372 THER/PROPH/DIAG INJ SC/IM: CPT

## 2024-07-18 RX ORDER — CYANOCOBALAMIN 1000 UG/ML
1000 INJECTION, SOLUTION INTRAMUSCULAR; SUBCUTANEOUS ONCE
Status: COMPLETED | OUTPATIENT
Start: 2024-07-18 | End: 2024-07-18

## 2024-07-18 RX ORDER — CALCIUM CARBONATE 500 MG/1
1 TABLET, CHEWABLE ORAL DAILY
COMMUNITY

## 2024-07-18 RX ADMIN — CYANOCOBALAMIN 1000 MCG: 1000 INJECTION, SOLUTION INTRAMUSCULAR; SUBCUTANEOUS at 14:19

## 2024-07-19 LAB — VIT A SERPL-MCNC: 42.8 UG/DL (ref 18.9–57.3)

## 2024-07-26 ENCOUNTER — HOSPITAL ENCOUNTER (EMERGENCY)
Facility: HOSPITAL | Age: 39
Discharge: HOME | End: 2024-07-26
Attending: EMERGENCY MEDICINE
Payer: COMMERCIAL

## 2024-07-26 ENCOUNTER — APPOINTMENT (EMERGENCY)
Dept: RADIOLOGY | Facility: HOSPITAL | Age: 39
End: 2024-07-26
Payer: COMMERCIAL

## 2024-07-26 VITALS
SYSTOLIC BLOOD PRESSURE: 139 MMHG | BODY MASS INDEX: 35.58 KG/M2 | DIASTOLIC BLOOD PRESSURE: 87 MMHG | OXYGEN SATURATION: 99 % | HEART RATE: 83 BPM | WEIGHT: 220.46 LBS | TEMPERATURE: 98.2 F | RESPIRATION RATE: 22 BRPM

## 2024-07-26 DIAGNOSIS — R07.9 CHEST PAIN, UNSPECIFIED: Primary | ICD-10-CM

## 2024-07-26 LAB
ALBUMIN SERPL BCG-MCNC: 4.6 G/DL (ref 3.5–5)
ALP SERPL-CCNC: 102 U/L (ref 34–104)
ALT SERPL W P-5'-P-CCNC: 20 U/L (ref 7–52)
ANION GAP SERPL CALCULATED.3IONS-SCNC: 7 MMOL/L (ref 4–13)
AST SERPL W P-5'-P-CCNC: 17 U/L (ref 13–39)
BASOPHILS # BLD AUTO: 0.05 THOUSANDS/ÂΜL (ref 0–0.1)
BASOPHILS NFR BLD AUTO: 1 % (ref 0–1)
BILIRUB SERPL-MCNC: 1.09 MG/DL (ref 0.2–1)
BILIRUB UR QL STRIP: NEGATIVE
BUN SERPL-MCNC: 24 MG/DL (ref 5–25)
CALCIUM SERPL-MCNC: 9.3 MG/DL (ref 8.4–10.2)
CARDIAC TROPONIN I PNL SERPL HS: <2 NG/L
CARDIAC TROPONIN I PNL SERPL HS: <2 NG/L
CHLORIDE SERPL-SCNC: 107 MMOL/L (ref 96–108)
CLARITY UR: CLEAR
CO2 SERPL-SCNC: 25 MMOL/L (ref 21–32)
COLOR UR: YELLOW
CREAT SERPL-MCNC: 0.69 MG/DL (ref 0.6–1.3)
D DIMER PPP FEU-MCNC: <0.27 UG/ML FEU
EOSINOPHIL # BLD AUTO: 0.17 THOUSAND/ÂΜL (ref 0–0.61)
EOSINOPHIL NFR BLD AUTO: 2 % (ref 0–6)
ERYTHROCYTE [DISTWIDTH] IN BLOOD BY AUTOMATED COUNT: 13.2 % (ref 11.6–15.1)
GFR SERPL CREATININE-BSD FRML MDRD: 119 ML/MIN/1.73SQ M
GLUCOSE SERPL-MCNC: 105 MG/DL (ref 65–140)
GLUCOSE UR STRIP-MCNC: NEGATIVE MG/DL
HCT VFR BLD AUTO: 38.4 % (ref 36.5–49.3)
HGB BLD-MCNC: 12.6 G/DL (ref 12–17)
HGB UR QL STRIP.AUTO: NEGATIVE
IMM GRANULOCYTES # BLD AUTO: 0.02 THOUSAND/UL (ref 0–0.2)
IMM GRANULOCYTES NFR BLD AUTO: 0 % (ref 0–2)
KETONES UR STRIP-MCNC: NEGATIVE MG/DL
LEUKOCYTE ESTERASE UR QL STRIP: NEGATIVE
LYMPHOCYTES # BLD AUTO: 2.4 THOUSANDS/ÂΜL (ref 0.6–4.47)
LYMPHOCYTES NFR BLD AUTO: 27 % (ref 14–44)
MAGNESIUM SERPL-MCNC: 1.7 MG/DL (ref 1.9–2.7)
MCH RBC QN AUTO: 27.1 PG (ref 26.8–34.3)
MCHC RBC AUTO-ENTMCNC: 32.8 G/DL (ref 31.4–37.4)
MCV RBC AUTO: 83 FL (ref 82–98)
MONOCYTES # BLD AUTO: 0.52 THOUSAND/ÂΜL (ref 0.17–1.22)
MONOCYTES NFR BLD AUTO: 6 % (ref 4–12)
NEUTROPHILS # BLD AUTO: 5.84 THOUSANDS/ÂΜL (ref 1.85–7.62)
NEUTS SEG NFR BLD AUTO: 64 % (ref 43–75)
NITRITE UR QL STRIP: NEGATIVE
NRBC BLD AUTO-RTO: 0 /100 WBCS
PH UR STRIP.AUTO: 5.5 [PH]
PLATELET # BLD AUTO: 308 THOUSANDS/UL (ref 149–390)
PMV BLD AUTO: 10 FL (ref 8.9–12.7)
POTASSIUM SERPL-SCNC: 3.8 MMOL/L (ref 3.5–5.3)
PROT SERPL-MCNC: 7.6 G/DL (ref 6.4–8.4)
PROT UR STRIP-MCNC: NEGATIVE MG/DL
RBC # BLD AUTO: 4.65 MILLION/UL (ref 3.88–5.62)
SODIUM SERPL-SCNC: 139 MMOL/L (ref 135–147)
SP GR UR STRIP.AUTO: >=1.03 (ref 1–1.03)
TSH SERPL DL<=0.05 MIU/L-ACNC: 1.46 UIU/ML (ref 0.45–4.5)
UROBILINOGEN UR STRIP-ACNC: <2 MG/DL
WBC # BLD AUTO: 9 THOUSAND/UL (ref 4.31–10.16)

## 2024-07-26 PROCEDURE — 84443 ASSAY THYROID STIM HORMONE: CPT | Performed by: EMERGENCY MEDICINE

## 2024-07-26 PROCEDURE — 85025 COMPLETE CBC W/AUTO DIFF WBC: CPT | Performed by: EMERGENCY MEDICINE

## 2024-07-26 PROCEDURE — 93005 ELECTROCARDIOGRAM TRACING: CPT

## 2024-07-26 PROCEDURE — 36415 COLL VENOUS BLD VENIPUNCTURE: CPT | Performed by: EMERGENCY MEDICINE

## 2024-07-26 PROCEDURE — 80053 COMPREHEN METABOLIC PANEL: CPT | Performed by: EMERGENCY MEDICINE

## 2024-07-26 PROCEDURE — 83735 ASSAY OF MAGNESIUM: CPT | Performed by: EMERGENCY MEDICINE

## 2024-07-26 PROCEDURE — 99285 EMERGENCY DEPT VISIT HI MDM: CPT

## 2024-07-26 PROCEDURE — 71045 X-RAY EXAM CHEST 1 VIEW: CPT

## 2024-07-26 PROCEDURE — 96361 HYDRATE IV INFUSION ADD-ON: CPT

## 2024-07-26 PROCEDURE — 85379 FIBRIN DEGRADATION QUANT: CPT | Performed by: EMERGENCY MEDICINE

## 2024-07-26 PROCEDURE — 96365 THER/PROPH/DIAG IV INF INIT: CPT

## 2024-07-26 PROCEDURE — 99285 EMERGENCY DEPT VISIT HI MDM: CPT | Performed by: EMERGENCY MEDICINE

## 2024-07-26 PROCEDURE — 84484 ASSAY OF TROPONIN QUANT: CPT | Performed by: EMERGENCY MEDICINE

## 2024-07-26 PROCEDURE — 81003 URINALYSIS AUTO W/O SCOPE: CPT | Performed by: EMERGENCY MEDICINE

## 2024-07-26 RX ORDER — MAGNESIUM SULFATE HEPTAHYDRATE 40 MG/ML
2 INJECTION, SOLUTION INTRAVENOUS ONCE
Status: COMPLETED | OUTPATIENT
Start: 2024-07-26 | End: 2024-07-26

## 2024-07-26 RX ADMIN — MAGNESIUM SULFATE HEPTAHYDRATE 2 G: 40 INJECTION, SOLUTION INTRAVENOUS at 18:17

## 2024-07-26 RX ADMIN — SODIUM CHLORIDE 1000 ML: 0.9 INJECTION, SOLUTION INTRAVENOUS at 17:16

## 2024-07-26 NOTE — ED PROVIDER NOTES
History  Chief Complaint   Patient presents with    Chest Pain     Around noon this pt. Experienced mid sternal chest pain which resolved in a few minutes. 4 pm the pt became sob , clammy and had chest pressure.      38-year-old male with past history of diabetes, hypertension, sleep apnea, fatty liver, asthma, status post Armand-en-Y gastric bypass surgery, presents to the ED for evaluation of intermittent chest pain since noon today.  Patient states that he has been working around the house with moving furniture's and changing carpets.  First episode of chest pain occurred no that lasted for several minutes and subsided.  Patient then started having lightheadedness and chest discomfort again few hours later.  Subsequently EMS was called and patient brought to the ED for further evaluation.  By the time patient arrived to the ED he was symptom-free again.  Patient denies any fevers or chills.  Patient denies any headaches, weakness, dizziness, or any focal neurodeficits.      History provided by:  Patient  Chest Pain  Associated symptoms: no abdominal pain, no back pain, no cough, no fever, no palpitations, no shortness of breath and not vomiting        Prior to Admission Medications   Prescriptions Last Dose Informant Patient Reported? Taking?   Multiple Vitamins-Iron (MULTIVITAMIN/IRON PO)   Yes No   Sig: Take 1 tablet by mouth in the morning   VITAMIN D, CHOLECALCIFEROL, PO   Yes No   Sig: Take 2 tablets by mouth in the morning   calcium carbonate (TUMS) 500 mg chewable tablet   Yes No   Sig: Chew 1 tablet daily   sildenafil (VIAGRA) 25 MG tablet  Self Yes No   Sig: Take 25 mg by mouth daily as needed for erectile dysfunction   Patient not taking: Reported on 7/18/2024      Facility-Administered Medications: None       Past Medical History:   Diagnosis Date    Asthma     as child    Diabetes mellitus (HCC)     Fatty liver     Hypertension     Nasal congestion     Frequently    PONV (postoperative nausea and  vomiting)     Sleep apnea     5.5 apnea scale// no cpap       Past Surgical History:   Procedure Laterality Date    EPIDURAL BLOCK INJECTION N/A 11/15/2023    Procedure: L5-S1  LUMBAR epidural steroid injection (74057);  Surgeon: Lewis Pittman DO;  Location: [facility] MAIN OR;  Service: Pain Management     EPIDURAL BLOCK INJECTION N/A 4/3/2024    Procedure: L5-S1 LUMBAR EPIDURAL STEROID INJECTION;  Surgeon: Lewis Pittman DO;  Location: [facility] MAIN OR;  Service: Pain Management     AZ LAPS GSTR RSTCV PX W/BYP TIFFANI-EN-Y LIMB <150 CM N/A 1/15/2024    Procedure: LAPAROSCOPIC TIFFANI-EN-Y GASTRIC BYPASS;  Surgeon: Jaime Robert MD;  Location: [facility] MAIN OR;  Service: Bariatrics    AZ SEPTOPLASTY/SUBMUCOUS RESECJ W/WO CARTILAGE GRF N/A 2022    Procedure: SEPTOPLASTY;  Surgeon: Abhi Couch MD;  Location: [facility] MAIN OR;  Service: ENT    AZ SUBMUCOUS RESCJ INFERIOR TURBINATE PRTL/COMPL Bilateral 2022    Procedure: TURBINECTOMY;  Surgeon: Abhi Couch MD;  Location: [facility] MAIN OR;  Service: ENT       Family History   Problem Relation Age of Onset    Diabetes type II Mother     Diabetes Mother     Diabetes Maternal Grandmother     Diabetes type II Maternal Grandmother     Arthritis Maternal Grandmother     Cancer Maternal Grandfather         Liver cancer     I have reviewed and agree with the history as documented.    E-Cigarette/Vaping    E-Cigarette Use Never User      E-Cigarette/Vaping Substances    Nicotine No     THC No     CBD No     Flavoring No     Other No     Unknown No      Social History     Tobacco Use    Smoking status: Some Days     Types: Cigars     Start date: 2019     Last attempt to quit: 2023     Years since quittin.8     Passive exposure: Past    Smokeless tobacco: Never    Tobacco comments:     Wife just had baby not smoking right now     Pt stated stopped smoking cigars over a month ago   Vaping Use    Vaping status: Never Used   Substance Use Topics    Alcohol  use: Not Currently     Comment: none    Drug use: Never       Review of Systems   Constitutional:  Negative for chills and fever.   HENT:  Negative for ear pain and sore throat.    Eyes:  Negative for pain and visual disturbance.   Respiratory:  Negative for cough and shortness of breath.    Cardiovascular:  Positive for chest pain. Negative for palpitations.   Gastrointestinal:  Negative for abdominal pain and vomiting.   Genitourinary:  Negative for dysuria and hematuria.   Musculoskeletal:  Negative for arthralgias and back pain.   Skin:  Negative for color change and rash.   Neurological:  Negative for seizures and syncope.   All other systems reviewed and are negative.      Physical Exam  Physical Exam  Vitals and nursing note reviewed.   Constitutional:       General: He is not in acute distress.     Appearance: He is well-developed.   HENT:      Head: Normocephalic and atraumatic.   Eyes:      Conjunctiva/sclera: Conjunctivae normal.   Cardiovascular:      Rate and Rhythm: Normal rate and regular rhythm.      Heart sounds: No murmur heard.  Pulmonary:      Effort: Pulmonary effort is normal. No respiratory distress.      Breath sounds: Normal breath sounds.      Comments: Lungs are clear to auscultation bilateral.  Abdominal:      Palpations: Abdomen is soft.      Tenderness: There is no abdominal tenderness.   Musculoskeletal:         General: No swelling.      Cervical back: Neck supple.   Skin:     General: Skin is warm and dry.      Capillary Refill: Capillary refill takes less than 2 seconds.   Neurological:      Mental Status: He is alert.   Psychiatric:         Mood and Affect: Mood normal.         Vital Signs  ED Triage Vitals   Temperature Pulse Respirations Blood Pressure SpO2   07/26/24 1638 07/26/24 1638 07/26/24 1638 07/26/24 1638 07/26/24 1638   98.2 °F (36.8 °C) 79 18 130/86 97 %      Temp src Heart Rate Source Patient Position - Orthostatic VS BP Location FiO2 (%)   -- -- 07/26/24 1715 -- --      Lying - Orthostatic VS        Pain Score       07/26/24 1638       No Pain           Vitals:    07/26/24 1716 07/26/24 1718 07/26/24 1730 07/26/24 1830   BP: 135/86 136/89 147/81 139/87   Pulse: 86 83 72 83   Patient Position - Orthostatic VS: Sitting - Orthostatic VS Standing - Orthostatic VS           Visual Acuity      ED Medications  Medications   sodium chloride 0.9 % bolus 1,000 mL (0 mL Intravenous Stopped 7/26/24 1816)   magnesium sulfate 2 g/50 mL IVPB (premix) 2 g (2 g Intravenous New Bag 7/26/24 1817)       Diagnostic Studies  Results Reviewed       Procedure Component Value Units Date/Time    HS Troponin I 2hr [[id number]] Collected: 07/26/24 1841    Lab Status: Final result Specimen: Blood from Arm, Left Updated: 07/26/24 1915     hs TnI 2hr <2 ng/L      Delta 2hr hsTnI --    HS Troponin I 4hr [[id number]]     Lab Status: No result Specimen: Blood     D-Dimer [[id number]]  (Normal) Collected: 07/26/24 1654    Lab Status: Final result Specimen: Blood from Arm, Left Updated: 07/26/24 1829     D-Dimer, Quant <0.27 ug/ml FEU     TSH, 3rd generation with Free T4 reflex [[id number]]  (Normal) Collected: 07/26/24 1654    Lab Status: Final result Specimen: Blood from Arm, Left Updated: 07/26/24 1735     TSH 3RD GENERATON 1.456 uIU/mL     HS Troponin 0hr (reflex protocol) [[id number]]  (Normal) Collected: 07/26/24 1654    Lab Status: Final result Specimen: Blood from Arm, Left Updated: 07/26/24 1726     hs TnI 0hr <2 ng/L     UA w Reflex to Microscopic w Reflex to Culture [[id number]] Collected: 07/26/24 1715    Lab Status: Final result Specimen: Urine, Clean Catch Updated: 07/26/24 1724     Color, UA Yellow     Clarity, UA Clear     Specific Gravity, UA >=1.030     pH, UA 5.5     Leukocytes, UA Negative     Nitrite, UA Negative     Protein, UA Negative mg/dl      Glucose, UA Negative mg/dl      Ketones, UA Negative mg/dl      Urobilinogen, UA <2.0 mg/dl      Bilirubin, UA Negative     Occult Blood, UA Negative     Comprehensive metabolic panel [[id number]]  (Abnormal) Collected: 07/26/24 1654    Lab Status: Final result Specimen: Blood from Arm, Left Updated: 07/26/24 1719     Sodium 139 mmol/L      Potassium 3.8 mmol/L      Chloride 107 mmol/L      CO2 25 mmol/L      ANION GAP 7 mmol/L      BUN 24 mg/dL      Creatinine 0.69 mg/dL      Glucose 105 mg/dL      Calcium 9.3 mg/dL      AST 17 U/L      ALT 20 U/L      Alkaline Phosphatase 102 U/L      Total Protein 7.6 g/dL      Albumin 4.6 g/dL      Total Bilirubin 1.09 mg/dL      eGFR 119 ml/min/1.73sq m     Narrative:      National Kidney Disease Foundation guidelines for Chronic Kidney Disease (CKD):     Stage 1 with normal or high GFR (GFR > 90 mL/min/1.73 square meters)    Stage 2 Mild CKD (GFR = 60-89 mL/min/1.73 square meters)    Stage 3A Moderate CKD (GFR = 45-59 mL/min/1.73 square meters)    Stage 3B Moderate CKD (GFR = 30-44 mL/min/1.73 square meters)    Stage 4 Severe CKD (GFR = 15-29 mL/min/1.73 square meters)    Stage 5 End Stage CKD (GFR <15 mL/min/1.73 square meters)  Note: GFR calculation is accurate only with a steady state creatinine    Magnesium [[id number]]  (Abnormal) Collected: 07/26/24 1654    Lab Status: Final result Specimen: Blood from Arm, Left Updated: 07/26/24 1719     Magnesium 1.7 mg/dL     CBC and differential [[id number]] Collected: 07/26/24 1654    Lab Status: Final result Specimen: Blood from Arm, Left Updated: 07/26/24 1701     WBC 9.00 Thousand/uL      RBC 4.65 Million/uL      Hemoglobin 12.6 g/dL      Hematocrit 38.4 %      MCV 83 fL      MCH 27.1 pg      MCHC 32.8 g/dL      RDW 13.2 %      MPV 10.0 fL      Platelets 308 Thousands/uL      nRBC 0 /100 WBCs      Segmented % 64 %      Immature Grans % 0 %      Lymphocytes % 27 %      Monocytes % 6 %      Eosinophils Relative 2 %      Basophils Relative 1 %      Absolute Neutrophils 5.84 Thousands/µL      Absolute Immature Grans 0.02 Thousand/uL      Absolute Lymphocytes 2.40 Thousands/µL       Absolute Monocytes 0.52 Thousand/µL      Eosinophils Absolute 0.17 Thousand/µL      Basophils Absolute 0.05 Thousands/µL                    XR chest 1 view portable   Final Result by Shy Mason MD (07/26 1823)      No acute cardiopulmonary disease.            Workstation performed: [id number]                    Procedures  ECG 12 Lead Documentation Only    Date/Time: 7/26/2024 4:40 PM    Performed by: Lisa Ma DO  Authorized by: Lisa Ma DO    Indications / Diagnosis:  Pain  ECG reviewed by me, the ED Provider: yes    Patient location:  ED  Previous ECG:     Previous ECG:  Compared to current    Similarity:  No change    Comparison to cardiac monitor: Yes    Interpretation:     Interpretation: non-specific    Comments:      Sinus rhythm, rate 76, normal axis, normal intervals, no acute ST elevations noted, T wave inversions noted in lead III suggesting nonspecific T wave abnormalitt that is unchanged from previous study.           ED Course  ED Course as of 07/26/24 1923 Fri Jul 26, 2024 1838 Patient reexamined at bedside.  Patient remains symptom-free.  Patient will be discharged after second troponin.                                 SBIRT 20yo+      Flowsheet Row Most Recent Value   Initial Alcohol Screen: US AUDIT-C     1. How often do you have a drink containing alcohol? 0 Filed at: 07/26/2024 1641   2. How many drinks containing alcohol do you have on a typical day you are drinking?  0 Filed at: 07/26/2024 1641   3a. Male UNDER 65: How often do you have five or more drinks on one occasion? 0 Filed at: 07/26/2024 1641   3b. FEMALE Any Age, or MALE 65+: How often do you have 4 or more drinks on one occassion? 0 Filed at: 07/26/2024 1641   Audit-C Score 0 Filed at: 07/26/2024 1641                      Medical Decision Making  Return blood work, EKG, chest x-ray, troponin x 2  Give IV fluids and continue to monitor patient for any worsening symptoms  Patient had 2 consecutive troponin  that was unremarkable with an unremarkable EKG.  Patient remained symptom-free during his entire ED visit.  At this time the etiology of patient's chest pain is unclear.  It may be exertional in nature as patient was doing some heavy work today.  At this time patient is discharged home with follow-up to PCP as well as cardiology.  Close return instructions given to return to the ER for any worsening symptoms.  Patient agrees with discharge plan.  Patient well appearing at time of discharge.    Please Note: Fluency Direct voice recognition software may have been used in the creation of this document. Wrong words or sound a like substitutions may have occurred due to the inherent limitations of the voice software.         Amount and/or Complexity of Data Reviewed  External Data Reviewed: ECG.  Labs: ordered. Decision-making details documented in ED Course.  Radiology: ordered. Decision-making details documented in ED Course.  ECG/medicine tests: ordered and independent interpretation performed. Decision-making details documented in ED Course.    Risk  Prescription drug management.                 Disposition  Final diagnoses:   Chest pain, unspecified     Time reflects when diagnosis was documented in both MDM as applicable and the Disposition within this note       Time User Action Codes Description Comment    7/26/2024  7:18 PM Lisa Ma Add [R07.9] Chest pain, unspecified           ED Disposition       ED Disposition   Discharge    Condition   Stable    Date/Time   Fri Jul 26, 2024 1810    Comment   Serafin Conklin discharge to home/self care.                   Follow-up Information       Follow up With Specialties Details Why Contact Info Additional Information    Dejan Jeffrey,  Family Medicine, Internal Medicine   [address] [facility] [address]  [phone]       [facility] Schedule an appointment as soon as possible for a visit  If symptoms worsen  [address], [facility] [address]  [facility] [id number]  [phone] [facility] [place], [address], [address] [facility] [address], [place], [place], [id number] [phone]            Patient's Medications   Discharge Prescriptions    No medications on file       No discharge procedures on file.    PDMP Review         Value Time User    PDMP Reviewed  Yes 1/16/2024  8:09 AM Mary Fraser PA-C            ED Provider  Electronically Signed by             Lisa Ma DO  07/26/24 1923

## 2024-07-26 NOTE — DISCHARGE INSTRUCTIONS
Please take a list of all of your medications and discharge paperwork with you to all of your follow-up medical visits. Please take all of your medications as directed. Please call your family doctor or return to the ER if you have increased shortness of breath, chest pain, fevers, chills, nausea, vomiting, diarrhea, or any other worsening symptoms.

## 2024-07-29 LAB
ATRIAL RATE: 76 BPM
P AXIS: 52 DEGREES
PR INTERVAL: 172 MS
QRS AXIS: 86 DEGREES
QRSD INTERVAL: 96 MS
QT INTERVAL: 368 MS
QTC INTERVAL: 414 MS
T WAVE AXIS: 39 DEGREES
VENTRICULAR RATE: 76 BPM

## 2024-07-29 PROCEDURE — 93010 ELECTROCARDIOGRAM REPORT: CPT | Performed by: INTERNAL MEDICINE

## 2024-09-09 ENCOUNTER — RA CDI HCC (OUTPATIENT)
Dept: OTHER | Facility: HOSPITAL | Age: 39
End: 2024-09-09

## 2024-09-09 NOTE — PROGRESS NOTES
HCC coding opportunities       Chart reviewed, no opportunity found: CHART REVIEWED, NO OPPORTUNITY FOUND        Patients Insurance        Commercial Insurance: [employer]

## 2024-09-16 ENCOUNTER — OFFICE VISIT (OUTPATIENT)
Dept: FAMILY MEDICINE CLINIC | Facility: CLINIC | Age: 39
End: 2024-09-16
Payer: COMMERCIAL

## 2024-09-16 VITALS
TEMPERATURE: 98 F | HEIGHT: 66 IN | SYSTOLIC BLOOD PRESSURE: 110 MMHG | OXYGEN SATURATION: 97 % | RESPIRATION RATE: 14 BRPM | WEIGHT: 222 LBS | HEART RATE: 80 BPM | DIASTOLIC BLOOD PRESSURE: 78 MMHG | BODY MASS INDEX: 35.68 KG/M2

## 2024-09-16 DIAGNOSIS — I10 PRIMARY HYPERTENSION: ICD-10-CM

## 2024-09-16 DIAGNOSIS — E78.2 MIXED HYPERLIPIDEMIA: ICD-10-CM

## 2024-09-16 DIAGNOSIS — Z98.84 S/P GASTRIC BYPASS: ICD-10-CM

## 2024-09-16 DIAGNOSIS — K91.2 POSTSURGICAL MALABSORPTION: ICD-10-CM

## 2024-09-16 DIAGNOSIS — E11.65 TYPE 2 DIABETES MELLITUS WITH HYPERGLYCEMIA, WITHOUT LONG-TERM CURRENT USE OF INSULIN (HCC): ICD-10-CM

## 2024-09-16 DIAGNOSIS — Z00.00 ANNUAL PHYSICAL EXAM: Primary | ICD-10-CM

## 2024-09-16 PROBLEM — Z48.815 ENCOUNTER FOR SURGICAL AFTERCARE FOLLOWING SURGERY OF DIGESTIVE SYSTEM: Status: RESOLVED | Noted: 2024-04-15 | Resolved: 2024-09-16

## 2024-09-16 PROBLEM — E66.01 MORBID OBESITY (HCC): Status: RESOLVED | Noted: 2023-11-14 | Resolved: 2024-09-16

## 2024-09-16 PROCEDURE — 99395 PREV VISIT EST AGE 18-39: CPT | Performed by: STUDENT IN AN ORGANIZED HEALTH CARE EDUCATION/TRAINING PROGRAM

## 2024-09-16 NOTE — PATIENT INSTRUCTIONS
"Patient Education     Routine physical for adults   The Basics   Written by the doctors and editors at [facility]   What is a physical? -- A physical is a routine visit, or \"check-up,\" with your doctor. You might also hear it called a \"wellness visit\" or \"preventive visit.\"  During each visit, the doctor will:   Ask about your physical and mental health   Ask about your habits, behaviors, and lifestyle   Do an exam   Give you vaccines if needed   Talk to you about any medicines you take   Give advice about your health   Answer your questions  Getting regular check-ups is an important part of taking care of your health. It can help your doctor find and treat any problems you have. But it's also important for preventing health problems.  A routine physical is different from a \"sick visit.\" A sick visit is when you see a doctor because of a health concern or problem. Since physicals are scheduled ahead of time, you can think about what you want to ask the doctor.  How often should I get a physical? -- It depends on your age and health. In general, for people age 21 years and older:   If you are younger than 50 years, you might be able to get a physical every 3 years.   If you are 50 years or older, your doctor might recommend a physical every year.  If you have an ongoing health condition, like diabetes or high blood pressure, your doctor will probably want to see you more often.  What happens during a physical? -- In general, each visit will include:   Physical exam - The doctor or nurse will check your height, weight, heart rate, and blood pressure. They will also look at your eyes and ears. They will ask about how you are feeling and whether you have any symptoms that bother you.   Medicines - It's a good idea to bring a list of all the medicines you take to each doctor visit. Your doctor will talk to you about your medicines and answer any questions. Tell them if you are having any side effects that bother you. You " "should also tell them if you are having trouble paying for any of your medicines.   Habits and behaviors - This includes:   Your diet   Your exercise habits   Whether you smoke, drink alcohol, or use drugs   Whether you are sexually active   Whether you feel safe at home  Your doctor will talk to you about things you can do to improve your health and lower your risk of health problems. They will also offer help and support. For example, if you want to quit smoking, they can give you advice and might prescribe medicines. If you want to improve your diet or get more physical activity, they can help you with this, too.   Lab tests, if needed - The tests you get will depend on your age and situation. For example, your doctor might want to check your:   Cholesterol   Blood sugar   Iron level   Vaccines - The recommended vaccines will depend on your age, health, and what vaccines you already had. Vaccines are very important because they can prevent certain serious or deadly infections.   Discussion of screening - \"Screening\" means checking for diseases or other health problems before they cause symptoms. Your doctor can recommend screening based on your age, risk, and preferences. This might include tests to check for:   Cancer, such as breast, prostate, cervical, ovarian, colorectal, prostate, lung, or skin cancer   Sexually transmitted infections, such as chlamydia and gonorrhea   Mental health conditions like depression and anxiety  Your doctor will talk to you about the different types of screening tests. They can help you decide which screenings to have. They can also explain what the results might mean.   Answering questions - The physical is a good time to ask the doctor or nurse questions about your health. If needed, they can refer you to other doctors or specialists, too.  Adults older than 65 years often need other care, too. As you get older, your doctor will talk to you about:   How to prevent falling at " home   Hearing or vision tests   Memory testing   How to take your medicines safely   Making sure that you have the help and support you need at home  All topics are updated as new evidence becomes available and our peer review process is complete.  This topic retrieved from [employer] on: May 02, 2024.  Topic 894830 Version 1.0  Release: 32.4.3 - C32.122  © 2024 UpToDate, Inc. and/or its affiliates. All rights reserved.  Consumer Information Use and Disclaimer   Disclaimer: This generalized information is a limited summary of diagnosis, treatment, and/or medication information. It is not meant to be comprehensive and should be used as a tool to help the user understand and/or assess potential diagnostic and treatment options. It does NOT include all information about conditions, treatments, medications, side effects, or risks that may apply to a specific patient. It is not intended to be medical advice or a substitute for the medical advice, diagnosis, or treatment of a health care provider based on the health care provider's examination and assessment of a patient's specific and unique circumstances. Patients must speak with a health care provider for complete information about their health, medical questions, and treatment options, including any risks or benefits regarding use of medications. This information does not endorse any treatments or medications as safe, effective, or approved for treating a specific patient. UpToDate, Inc. and its affiliates disclaim any warranty or liability relating to this information or the use thereof.The use of this information is governed by the Terms of Use, available at https://www.wolters51aiya.comuwer.com/en/know/clinical-effectiveness-terms. 2024© UpToDate, Inc. and its affiliates and/or licensors. All rights reserved.  Copyright   © 2024 UpToDate, Inc. and/or its affiliates. All rights reserved.

## 2024-09-16 NOTE — PROGRESS NOTES
Adult Annual Physical  Name: Serafin Conklin      : 1985      MRN: [id number]  Encounter Provider: Dejan Jeffrey DO  Encounter Date: 2024   Encounter department: [facility]    Assessment & Plan  Primary hypertension         Postsurgical malabsorption         Type 2 diabetes mellitus with hyperglycemia, without long-term current use of insulin (HCC)    Lab Results   Component Value Date    HGBA1C 6.2 (H) 07/15/2024          S/P gastric bypass         Mixed hyperlipidemia         Annual physical exam           Patient is a pleasant 38-year-old male presenting for annual physical, no acute concerns, patient has blood work follow-up with bariatric surgery in December, follow-up afterwards.    Immunizations and preventive care screenings were discussed with patient today. Appropriate education was printed on patient's after visit summary.    Counseling:  Alcohol/drug use: discussed moderation in alcohol intake, the recommendations for healthy alcohol use, and avoidance of illicit drug use.  Dental Health: discussed importance of regular tooth brushing, flossing, and dental visits.  Injury prevention: discussed safety/seat belts, safety helmets, smoke detectors, carbon dioxide detectors, and smoking near bedding or upholstery.  Sexual health: discussed sexually transmitted diseases, partner selection, use of condoms, avoidance of unintended pregnancy, and contraceptive alternatives.  Exercise: the importance of regular exercise/physical activity was discussed. Recommend exercise 3-5 times per week for at least 30 minutes.       Depression Screening and Follow-up Plan: Patient was screened for depression during today's encounter. They screened negative with a PHQ-2 score of 0.        History of Present Illness     Adult Annual Physical:  Patient presents for annual physical.     Diet and Physical Activity:  - Diet/Nutrition: well balanced diet.  - Exercise: 3-4 times a week on  "average.    Depression Screening:  - PHQ-2 Score: 0    General Health:  - Sleep: sleeps well.  - Hearing: normal hearing bilateral ears.  - Vision: no vision problems.  - Dental: regular dental visits.     Health:  - History of STDs: no.   - Urinary symptoms: none.     Review of Systems   Constitutional:  Negative for chills and fever.   HENT:  Negative for ear pain and sore throat.    Eyes:  Negative for pain and visual disturbance.   Respiratory:  Negative for cough and shortness of breath.    Cardiovascular:  Negative for chest pain and palpitations.   Gastrointestinal:  Negative for abdominal pain and vomiting.   Genitourinary:  Negative for dysuria and hematuria.   Musculoskeletal:  Negative for arthralgias and back pain.   Skin:  Negative for color change and rash.   Neurological:  Negative for seizures and syncope.   All other systems reviewed and are negative.        Objective     /78 (BP Location: Left arm, Patient Position: Sitting, Cuff Size: Adult)   Pulse 80   Temp 98 °F (36.7 °C) (Temporal)   Resp 14   Ht 5' 6\" (1.676 m)   Wt 101 kg (222 lb)   SpO2 97%   BMI 35.83 kg/m²     Physical Exam  Vitals and nursing note reviewed.   Constitutional:       General: He is not in acute distress.     Appearance: He is well-developed.   HENT:      Head: Normocephalic and atraumatic.   Eyes:      General: No scleral icterus.     Conjunctiva/sclera: Conjunctivae normal.   Cardiovascular:      Rate and Rhythm: Normal rate and regular rhythm.      Pulses: Normal pulses.      Heart sounds: No murmur heard.  Pulmonary:      Effort: Pulmonary effort is normal. No respiratory distress.      Breath sounds: Normal breath sounds.   Abdominal:      General: Bowel sounds are normal. There is no distension.      Palpations: Abdomen is soft.      Tenderness: There is no abdominal tenderness.   Musculoskeletal:         General: No swelling. Normal range of motion.      Cervical back: Neck supple.   Skin:     General: " Skin is warm and dry.      Capillary Refill: Capillary refill takes less than 2 seconds.   Neurological:      General: No focal deficit present.      Mental Status: He is alert and oriented to person, place, and time. Mental status is at baseline.   Psychiatric:         Mood and Affect: Mood normal.         Behavior: Behavior normal.

## 2024-11-07 ENCOUNTER — OFFICE VISIT (OUTPATIENT)
Dept: FAMILY MEDICINE CLINIC | Facility: CLINIC | Age: 39
End: 2024-11-07
Payer: COMMERCIAL

## 2024-11-07 VITALS
OXYGEN SATURATION: 97 % | DIASTOLIC BLOOD PRESSURE: 86 MMHG | HEART RATE: 88 BPM | RESPIRATION RATE: 14 BRPM | WEIGHT: 217 LBS | BODY MASS INDEX: 34.87 KG/M2 | TEMPERATURE: 97.9 F | SYSTOLIC BLOOD PRESSURE: 136 MMHG | HEIGHT: 66 IN

## 2024-11-07 DIAGNOSIS — Z98.84 S/P GASTRIC BYPASS: ICD-10-CM

## 2024-11-07 DIAGNOSIS — I10 PRIMARY HYPERTENSION: ICD-10-CM

## 2024-11-07 DIAGNOSIS — E11.65 TYPE 2 DIABETES MELLITUS WITH HYPERGLYCEMIA, WITHOUT LONG-TERM CURRENT USE OF INSULIN (HCC): ICD-10-CM

## 2024-11-07 DIAGNOSIS — J22 LOWER RESPIRATORY TRACT INFECTION: Primary | ICD-10-CM

## 2024-11-07 DIAGNOSIS — K91.2 POSTSURGICAL MALABSORPTION: ICD-10-CM

## 2024-11-07 DIAGNOSIS — E78.2 MIXED HYPERLIPIDEMIA: ICD-10-CM

## 2024-11-07 PROCEDURE — 99214 OFFICE O/P EST MOD 30 MIN: CPT | Performed by: STUDENT IN AN ORGANIZED HEALTH CARE EDUCATION/TRAINING PROGRAM

## 2024-11-07 RX ORDER — PREDNISONE 20 MG/1
TABLET ORAL
Qty: 9 TABLET | Refills: 0 | Status: SHIPPED | OUTPATIENT
Start: 2024-11-07 | End: 2024-11-13

## 2024-11-07 RX ORDER — AZITHROMYCIN 250 MG/1
TABLET, FILM COATED ORAL
Qty: 6 TABLET | Refills: 0 | Status: SHIPPED | OUTPATIENT
Start: 2024-11-07 | End: 2024-11-12

## 2024-11-07 NOTE — PROGRESS NOTES
Clinic Visit Note  Serafin Conklin 39 y.o. male   MRN: [id number]    Assessment and Plan      Diagnoses and all orders for this visit:    Lower respiratory tract infection  -     predniSONE 20 mg tablet; Take 2 tablets (40 mg total) by mouth daily for 3 days, THEN 1 tablet (20 mg total) daily for 3 days.  -     azithromycin (Zithromax) 250 mg tablet; Take 2 tablets (500 mg total) by mouth daily for 1 day, THEN 1 tablet (250 mg total) daily for 4 days.    Type 2 diabetes mellitus with hyperglycemia, without long-term current use of insulin (HCC)    Postsurgical malabsorption    Primary hypertension    S/P gastric bypass    Mixed hyperlipidemia      Patient is a pleasant 39-year-old male coming in secondary to lower respiratory tract infection with acute cough, recommend steroid taper with atypical antibiotic.  Will hold off on using doxycycline atypical coverage and use azithromycin in the setting of gastric bypass.  Appropriate hydration/nutrition with medication.  If symptoms worsen or not improving reevaluation recommended.    My impressions and treatment recommendations were discussed in detail with the patient who verbalized understanding and had no further questions.  Discharge instructions were provided.    Subjective     Chief Complaint: ACV    History of Present Illness:    Follow-up lower respiratory tract infection.    The following portions of the patient's history were reviewed and updated as appropriate: allergies, current medications, past family history, past medical history, past social history, past surgical history and problem list.    REVIEW OF SYSTEMS:  A complete 12-point review of systems is negative other than that noted in the HPI.    Review of Systems   Constitutional:  Negative for chills and fever.   HENT:  Positive for postnasal drip, rhinorrhea and sore throat. Negative for ear pain.    Respiratory:  Positive for cough. Negative for shortness of breath and wheezing.    Cardiovascular:   Negative for chest pain.   Musculoskeletal:  Negative for myalgias.   Skin:  Negative for rash.   Neurological:  Positive for headaches.         Current Outpatient Medications:     calcium carbonate (TUMS) 500 mg chewable tablet, Chew 1 tablet daily, Disp: , Rfl:     Multiple Vitamins-Iron (MULTIVITAMIN/IRON PO), Take 1 tablet by mouth in the morning, Disp: , Rfl:     VITAMIN D, CHOLECALCIFEROL, PO, Take 2 tablets by mouth in the morning, Disp: , Rfl:     sildenafil (VIAGRA) 25 MG tablet, Take 25 mg by mouth daily as needed for erectile dysfunction (Patient not taking: Reported on 7/18/2024), Disp: , Rfl:   Past Medical History:   Diagnosis Date    Asthma     as child    Diabetes mellitus (HCC)     Fatty liver     Hypertension     Nasal congestion     Frequently    PONV (postoperative nausea and vomiting)     Sleep apnea     5.5 apnea scale// no cpap     Past Surgical History:   Procedure Laterality Date    EPIDURAL BLOCK INJECTION N/A 11/15/2023    Procedure: L5-S1  LUMBAR epidural steroid injection (60941);  Surgeon: Lewis Pittman DO;  Location: [facility] MAIN OR;  Service: Pain Management     EPIDURAL BLOCK INJECTION N/A 4/3/2024    Procedure: L5-S1 LUMBAR EPIDURAL STEROID INJECTION;  Surgeon: Lewis Pittman DO;  Location: [facility] MAIN OR;  Service: Pain Management     IA LAPS GSTR RSTCV PX W/BYP TIFFANI-EN-Y LIMB <150 CM N/A 1/15/2024    Procedure: LAPAROSCOPIC TIFFANI-EN-Y GASTRIC BYPASS;  Surgeon: Jaime Robert MD;  Location: [facility] MAIN OR;  Service: Bariatrics    IA SEPTOPLASTY/SUBMUCOUS RESECJ W/WO CARTILAGE GRF N/A 11/28/2022    Procedure: SEPTOPLASTY;  Surgeon: Abhi Couch MD;  Location: [facility] MAIN OR;  Service: ENT    IA SUBMUCOUS RESCJ INFERIOR TURBINATE PRTL/COMPL Bilateral 11/28/2022    Procedure: TURBINECTOMY;  Surgeon: Abhi Couch MD;  Location: [facility] MAIN OR;  Service: ENT     Social History     Socioeconomic History    Marital status: /Civil Union     Spouse name: Not on file     Number of children: Not on file    Years of education: Not on file    Highest education level: Not on file   Occupational History    Not on file   Tobacco Use    Smoking status: Some Days     Types: Cigars     Start date: 2019     Last attempt to quit: 2023     Years since quittin.1     Passive exposure: Past    Smokeless tobacco: Never    Tobacco comments:     Wife just had baby not smoking right now     Pt stated stopped smoking cigars over a month ago   Vaping Use    Vaping status: Never Used   Substance and Sexual Activity    Alcohol use: Not Currently     Comment: none    Drug use: Never    Sexual activity: Yes     Partners: Female     Birth control/protection: None   Other Topics Concern    Not on file   Social History Narrative    Not on file     Social Determinants of Health     Financial Resource Strain: Not on file   Food Insecurity: No Food Insecurity (2021)    Received from [employer] [facility]    Hunger Vital Sign     Worried About Running Out of Food in the Last Year: Never true     Ran Out of Food in the Last Year: Never true   Transportation Needs: Not on file   Physical Activity: Not on file   Stress: Not on file   Social Connections: Unknown (2024)    Received from [employer]    Social Connections     How often do you feel lonely or isolated from those around you? (Adult - for ages 18 years and over): Not on file   Intimate Partner Violence: Not on file   Housing Stability: Not on file     Family History   Problem Relation Age of Onset    Diabetes type II Mother     Diabetes Mother     Diabetes Maternal Grandmother     Diabetes type II Maternal Grandmother     Arthritis Maternal Grandmother     Cancer Maternal Grandfather         Liver cancer     Allergies   Allergen Reactions    Other Angioedema    Shellfish-Derived Products - Food Allergy Anaphylaxis    Penicillins Hives    Penicillin G Hives       Objective     Vitals:    24 1540   BP: 136/86   BP Location: Left arm  "  Patient Position: Sitting   Cuff Size: Standard   Pulse: 88   Resp: 14   Temp: 97.9 °F (36.6 °C)   TempSrc: Temporal   SpO2: 97%   Weight: 98.4 kg (217 lb)   Height: 5' 6\" (1.676 m)       Physical Exam:     GENERAL: NAD, pleasant   HEENT:  NC/AT, PERRL, EOMI, no scleral icterus  CARDIAC:  RRR, +S1/S2, no S3/S4 appreciated, no m/g/r  PULMONARY:  CTA B/L, no wheezing/rales/rhonci, non-labored breathing  ABDOMEN:  Soft, NT/ND, no rebound/guarding/rigidity  Extremities:. No edema, cyanosis, or clubbing  Musculoskeletal:  Full range of motion intact in all extremities   NEUROLOGIC: Grossly intact, no focal deficits  SKIN:  No rashes or erythema noted on exposed skin  Psych: Normal affect, mood stable    ==  PLEASE NOTE:  This encounter was completed utilizing the [employer] Direct Speech Voice Recognition Software. Grammatical errors, random word insertions, pronoun errors and incomplete sentences are occasional consequences of the system due to software limitations, ambient noise and hardware issues.These may be missed by proof reading prior to affixing electronic signature. Any questions or concerns about the content, text or information contained within the body of this dictation should be directly addressed to the physician for clarification. Please do not hesitate to call me directly if you have any any questions or concerns.    Dejan Jeffrey DO  [facility] Internal Medicine   [facility]     Answers submitted by the patient for this visit:  Cough Questionnaire (Submitted on 11/4/2024)  Chief Complaint: Cough  Chronicity: new  Onset: in the past 7 days  Progression since onset: gradually worsening  Frequency: constantly  Cough characteristics: non-productive, productive of brown sputum, productive of blood-tinged sputum  ear congestion: No  heartburn: No  hemoptysis: Yes  nasal congestion: Yes  sweats: No  weight loss: No    "

## 2024-12-23 ENCOUNTER — OFFICE VISIT (OUTPATIENT)
Dept: FAMILY MEDICINE CLINIC | Facility: CLINIC | Age: 39
End: 2024-12-23
Payer: COMMERCIAL

## 2024-12-23 VITALS
TEMPERATURE: 98.6 F | SYSTOLIC BLOOD PRESSURE: 136 MMHG | RESPIRATION RATE: 16 BRPM | WEIGHT: 221.6 LBS | DIASTOLIC BLOOD PRESSURE: 86 MMHG | OXYGEN SATURATION: 99 % | BODY MASS INDEX: 35.62 KG/M2 | HEIGHT: 66 IN | HEART RATE: 105 BPM

## 2024-12-23 DIAGNOSIS — E78.2 MIXED HYPERLIPIDEMIA: ICD-10-CM

## 2024-12-23 DIAGNOSIS — I10 PRIMARY HYPERTENSION: ICD-10-CM

## 2024-12-23 DIAGNOSIS — Z98.84 S/P GASTRIC BYPASS: ICD-10-CM

## 2024-12-23 DIAGNOSIS — E11.65 TYPE 2 DIABETES MELLITUS WITH HYPERGLYCEMIA, WITHOUT LONG-TERM CURRENT USE OF INSULIN (HCC): ICD-10-CM

## 2024-12-23 DIAGNOSIS — J01.00 ACUTE NON-RECURRENT MAXILLARY SINUSITIS: Primary | ICD-10-CM

## 2024-12-23 PROCEDURE — 99214 OFFICE O/P EST MOD 30 MIN: CPT | Performed by: STUDENT IN AN ORGANIZED HEALTH CARE EDUCATION/TRAINING PROGRAM

## 2024-12-23 RX ORDER — AZITHROMYCIN 250 MG/1
TABLET, FILM COATED ORAL
Qty: 6 TABLET | Refills: 0 | Status: SHIPPED | OUTPATIENT
Start: 2024-12-23 | End: 2024-12-28

## 2024-12-23 RX ORDER — METHYLPREDNISOLONE 4 MG/1
TABLET ORAL
Qty: 21 EACH | Refills: 0 | Status: SHIPPED | OUTPATIENT
Start: 2024-12-23

## 2024-12-23 NOTE — PROGRESS NOTES
Clinic Visit Note  Serafin Conklin 39 y.o. male   MRN: [id number]    Assessment and Plan      Diagnoses and all orders for this visit:    Acute non-recurrent maxillary sinusitis  -     azithromycin (Zithromax) 250 mg tablet; Take 2 tablets (500 mg total) by mouth daily for 1 day, THEN 1 tablet (250 mg total) daily for 4 days.  -     methylPREDNISolone 4 MG tablet therapy pack; Use as directed on package    Type 2 diabetes mellitus with hyperglycemia, without long-term current use of insulin ([facility])    Primary hypertension    S/P gastric bypass    Mixed hyperlipidemia      Patient is a pleasant 39-year-old male coming in secondary to maxillary sinus symptoms, recommend antibiotic/steroid, patient has done well on azithromycin in the past, monitor for symptomatic resolution, hold off on doxycycline in the setting of gastric bypass.  If symptoms worsen or not improving reevaluation recommended.    My impressions and treatment recommendations were discussed in detail with the patient who verbalized understanding and had no further questions.  Discharge instructions were provided.    Subjective     Chief Complaint: ACV    History of Present Illness:    Acute care visit secondary to acute sinusitis symptoms.    The following portions of the patient's history were reviewed and updated as appropriate: allergies, current medications, past family history, past medical history, past social history, past surgical history and problem list.    REVIEW OF SYSTEMS:  A complete 12-point review of systems is negative other than that noted in the HPI.    Review of Systems   Constitutional:  Negative for chills, fatigue and fever.   HENT:  Positive for congestion and postnasal drip. Negative for sore throat.    Eyes:  Negative for pain and visual disturbance.   Respiratory:  Negative for shortness of breath and wheezing.    Cardiovascular:  Negative for chest pain and palpitations.   Gastrointestinal:  Negative for abdominal pain,  constipation, diarrhea, nausea and vomiting.   Genitourinary:  Negative for dysuria and frequency.   Musculoskeletal:  Negative for back pain and neck pain.   Skin:  Negative for color change and rash.   Neurological:  Negative for dizziness and headaches.   Psychiatric/Behavioral:  Negative for agitation and confusion.    All other systems reviewed and are negative.        Current Outpatient Medications:   •  azithromycin (Zithromax) 250 mg tablet, Take 2 tablets (500 mg total) by mouth daily for 1 day, THEN 1 tablet (250 mg total) daily for 4 days., Disp: 6 tablet, Rfl: 0  •  calcium carbonate (TUMS) 500 mg chewable tablet, Chew 1 tablet daily, Disp: , Rfl:   •  methylPREDNISolone 4 MG tablet therapy pack, Use as directed on package, Disp: 21 each, Rfl: 0  •  Multiple Vitamins-Iron (MULTIVITAMIN/IRON PO), Take 1 tablet by mouth in the morning, Disp: , Rfl:   •  sildenafil (VIAGRA) 25 MG tablet, Take 25 mg by mouth daily as needed for erectile dysfunction, Disp: , Rfl:   •  VITAMIN D, CHOLECALCIFEROL, PO, Take 2 tablets by mouth in the morning, Disp: , Rfl:   Past Medical History:   Diagnosis Date   • Asthma     as child   • Diabetes mellitus (HCC)    • Fatty liver    • Hypertension    • Nasal congestion     Frequently   • PONV (postoperative nausea and vomiting)    • Sleep apnea     5.5 apnea scale// no cpap     Past Surgical History:   Procedure Laterality Date   • EPIDURAL BLOCK INJECTION N/A 11/15/2023    Procedure: L5-S1  LUMBAR epidural steroid injection (16257);  Surgeon: Lewis Pittman DO;  Location: [facility] MAIN OR;  Service: Pain Management    • EPIDURAL BLOCK INJECTION N/A 4/3/2024    Procedure: L5-S1 LUMBAR EPIDURAL STEROID INJECTION;  Surgeon: Lewis Pittman DO;  Location: [facility] MAIN OR;  Service: Pain Management    • MA LAPS GSTR RSTCV PX W/BYP TIFFANI-EN-Y LIMB <150 CM N/A 1/15/2024    Procedure: LAPAROSCOPIC TIFFANI-EN-Y GASTRIC BYPASS;  Surgeon: Jaime Robert MD;  Location: [facility] MAIN OR;   Service: Bariatrics   • MN SEPTOPLASTY/SUBMUCOUS RESECJ W/WO CARTILAGE GRF N/A 2022    Procedure: SEPTOPLASTY;  Surgeon: Abhi Couch MD;  Location: [facility] MAIN OR;  Service: ENT   • MN SUBMUCOUS RESCJ INFERIOR TURBINATE PRTL/COMPL Bilateral 2022    Procedure: TURBINECTOMY;  Surgeon: Abhi Couch MD;  Location: [facility] MAIN OR;  Service: ENT     Social History     Socioeconomic History   • Marital status: /Civil Union     Spouse name: Not on file   • Number of children: Not on file   • Years of education: Not on file   • Highest education level: Not on file   Occupational History   • Not on file   Tobacco Use   • Smoking status: Some Days     Types: Cigars     Start date: 2019     Last attempt to quit: 2023     Years since quittin.2     Passive exposure: Past   • Smokeless tobacco: Never   • Tobacco comments:     Wife just had baby not smoking right now     Pt stated stopped smoking cigars over a month ago   Vaping Use   • Vaping status: Never Used   Substance and Sexual Activity   • Alcohol use: Not Currently     Comment: none   • Drug use: Never   • Sexual activity: Yes     Partners: Female     Birth control/protection: None   Other Topics Concern   • Not on file   Social History Narrative   • Not on file     Social Drivers of Health     Financial Resource Strain: Not on file   Food Insecurity: No Food Insecurity (2021)    Received from [facility]    Hunger Vital Sign    • Worried About Running Out of Food in the Last Year: Never true    • Ran Out of Food in the Last Year: Never true   Transportation Needs: Not on file   Physical Activity: Not on file   Stress: Not on file   Social Connections: Unknown (2024)    Received from [employer]    Social Connections    • How often do you feel lonely or isolated from those around you? (Adult - for ages 18 years and over): Not on file   Intimate Partner Violence: Not on file   Housing Stability: Not on file     Family  "History   Problem Relation Age of Onset   • Diabetes type II Mother    • Diabetes Mother    • Diabetes Maternal Grandmother    • Diabetes type II Maternal Grandmother    • Arthritis Maternal Grandmother    • Cancer Maternal Grandfather         Liver cancer     Allergies   Allergen Reactions   • Other Angioedema   • Shellfish-Derived Products - Food Allergy Anaphylaxis   • Penicillins Hives   • Penicillin G Hives       Objective     Vitals:    12/23/24 1407   BP: 136/86   BP Location: Left arm   Patient Position: Sitting   Cuff Size: Standard   Pulse: 105   Resp: 16   Temp: 98.6 °F (37 °C)   TempSrc: Temporal   SpO2: 99%   Weight: 101 kg (221 lb 9.6 oz)   Height: 5' 6\" (1.676 m)       Physical Exam:     GENERAL: NAD, pleasant   HEENT:  NC/AT, PERRL, EOMI, no scleral icterus  CARDIAC:  RRR, +S1/S2, no S3/S4 appreciated, no m/g/r  PULMONARY:  CTA B/L, no wheezing/rales/rhonci, non-labored breathing  ABDOMEN:  Soft, NT/ND, no rebound/guarding/rigidity  Extremities:. No edema, cyanosis, or clubbing  Musculoskeletal:  Full range of motion intact in all extremities   NEUROLOGIC: Grossly intact, no focal deficits  SKIN:  No rashes or erythema noted on exposed skin  Psych: Normal affect, mood stable    ==  PLEASE NOTE:  This encounter was completed utilizing the [employer] Direct Speech Voice Recognition Software. Grammatical errors, random word insertions, pronoun errors and incomplete sentences are occasional consequences of the system due to software limitations, ambient noise and hardware issues.These may be missed by proof reading prior to affixing electronic signature. Any questions or concerns about the content, text or information contained within the body of this dictation should be directly addressed to the physician for clarification. Please do not hesitate to call me directly if you have any any questions or concerns.    Dejan Jeffrey, DO  [facility] Internal Medicine   [facility]     "

## 2024-12-27 DIAGNOSIS — J01.00 ACUTE NON-RECURRENT MAXILLARY SINUSITIS: Primary | ICD-10-CM

## 2024-12-27 RX ORDER — CLINDAMYCIN HYDROCHLORIDE 300 MG/1
300 CAPSULE ORAL 3 TIMES DAILY
Qty: 15 CAPSULE | Refills: 0 | Status: SHIPPED | OUTPATIENT
Start: 2024-12-27 | End: 2025-01-01

## 2024-12-30 DIAGNOSIS — R05.2 SUBACUTE COUGH: Primary | ICD-10-CM

## 2024-12-30 RX ORDER — PREDNISONE 10 MG/1
10 TABLET ORAL DAILY
Qty: 5 TABLET | Refills: 0 | Status: SHIPPED | OUTPATIENT
Start: 2024-12-30

## 2024-12-30 RX ORDER — DEXTROMETHORPHAN HYDROBROMIDE AND PROMETHAZINE HYDROCHLORIDE 15; 6.25 MG/5ML; MG/5ML
5 SYRUP ORAL 4 TIMES DAILY PRN
Qty: 180 ML | Refills: 0 | Status: SHIPPED | OUTPATIENT
Start: 2024-12-30

## 2025-01-08 LAB
LEFT EYE DIABETIC RETINOPATHY: NORMAL
RIGHT EYE DIABETIC RETINOPATHY: NORMAL

## 2025-02-11 ENCOUNTER — OFFICE VISIT (OUTPATIENT)
Dept: FAMILY MEDICINE CLINIC | Facility: CLINIC | Age: 40
End: 2025-02-11
Payer: COMMERCIAL

## 2025-02-11 VITALS
RESPIRATION RATE: 16 BRPM | HEART RATE: 101 BPM | BODY MASS INDEX: 33.52 KG/M2 | WEIGHT: 208.6 LBS | DIASTOLIC BLOOD PRESSURE: 72 MMHG | OXYGEN SATURATION: 98 % | TEMPERATURE: 97.8 F | HEIGHT: 66 IN | SYSTOLIC BLOOD PRESSURE: 130 MMHG

## 2025-02-11 DIAGNOSIS — K91.2 POSTSURGICAL MALABSORPTION: ICD-10-CM

## 2025-02-11 DIAGNOSIS — E11.65 TYPE 2 DIABETES MELLITUS WITH HYPERGLYCEMIA, WITHOUT LONG-TERM CURRENT USE OF INSULIN (HCC): Primary | ICD-10-CM

## 2025-02-11 DIAGNOSIS — I10 PRIMARY HYPERTENSION: ICD-10-CM

## 2025-02-11 DIAGNOSIS — Z98.84 S/P GASTRIC BYPASS: ICD-10-CM

## 2025-02-11 DIAGNOSIS — N52.9 ERECTILE DYSFUNCTION, UNSPECIFIED ERECTILE DYSFUNCTION TYPE: ICD-10-CM

## 2025-02-11 DIAGNOSIS — E78.2 MIXED HYPERLIPIDEMIA: ICD-10-CM

## 2025-02-11 DIAGNOSIS — J06.9 UPPER RESPIRATORY TRACT INFECTION, UNSPECIFIED TYPE: ICD-10-CM

## 2025-02-11 DIAGNOSIS — H60.502 ACUTE OTITIS EXTERNA OF LEFT EAR, UNSPECIFIED TYPE: ICD-10-CM

## 2025-02-11 PROBLEM — R36.1 BLOOD IN SEMEN: Status: RESOLVED | Noted: 2024-01-23 | Resolved: 2025-02-11

## 2025-02-11 LAB — SL AMB POCT HEMOGLOBIN AIC: 5.7 (ref ?–6.5)

## 2025-02-11 PROCEDURE — 99214 OFFICE O/P EST MOD 30 MIN: CPT | Performed by: STUDENT IN AN ORGANIZED HEALTH CARE EDUCATION/TRAINING PROGRAM

## 2025-02-11 PROCEDURE — 83036 HEMOGLOBIN GLYCOSYLATED A1C: CPT | Performed by: STUDENT IN AN ORGANIZED HEALTH CARE EDUCATION/TRAINING PROGRAM

## 2025-02-11 RX ORDER — LOTILANER OPHTHALMIC SOLUTION 2.5 MG/ML
SOLUTION/ DROPS OPHTHALMIC
COMMUNITY
Start: 2025-01-09

## 2025-02-11 RX ORDER — AZITHROMYCIN 250 MG/1
TABLET, FILM COATED ORAL
Qty: 6 TABLET | Refills: 0 | Status: SHIPPED | OUTPATIENT
Start: 2025-02-11 | End: 2025-02-16

## 2025-02-11 RX ORDER — CIPROFLOXACIN AND DEXAMETHASONE 3; 1 MG/ML; MG/ML
4 SUSPENSION/ DROPS AURICULAR (OTIC) 2 TIMES DAILY
Qty: 7.5 ML | Refills: 0 | Status: SHIPPED | OUTPATIENT
Start: 2025-02-11

## 2025-02-11 RX ORDER — METHYLPREDNISOLONE 4 MG/1
TABLET ORAL
Qty: 21 EACH | Refills: 0 | Status: SHIPPED | OUTPATIENT
Start: 2025-02-11

## 2025-02-11 NOTE — PROGRESS NOTES
Name: Serafin Conklin      : 1985      MRN: [id number]  Encounter Provider: Dejan Jeffrey DO  Encounter Date: 2025   Encounter department: [facility]  :  Assessment & Plan  Type 2 diabetes mellitus with hyperglycemia, without long-term current use of insulin (HCC)  Most recent hemoglobin A1c 5.7%, well-controlled, continue off medication at this time  Lab Results   Component Value Date    HGBA1C 5.7 2025     Orders:  •  POCT hemoglobin A1c  •  Albumin / creatinine urine ratio    S/P gastric bypass  Patient is doing well, asymptomatic       Postsurgical malabsorption  And vitamin supplementation       Primary hypertension  Blood pressure well-controlled, continue off antihypertensive medications       Erectile dysfunction, unspecified erectile dysfunction type         Mixed hyperlipidemia  Recheck cholesterol at annual physical       Upper respiratory tract infection, unspecified type  Symptoms consistent with upper respiratory tract infection, trial steroid taper, if symptoms do not respond in the next 48 hours start azithromycin.  If symptoms worsen or not improving reevaluation recommended.  Orders:  •  methylPREDNISolone 4 MG tablet therapy pack; Use as directed on package  •  azithromycin (Zithromax) 250 mg tablet; Take 2 tablets (500 mg total) by mouth daily for 1 day, THEN 1 tablet (250 mg total) daily for 4 days.    Acute otitis externa of left ear, unspecified type    Orders:  •  ciprofloxacin-dexamethasone (CIPRODEX) otic suspension; Administer 4 drops into the left ear 2 (two) times a day          Depression Screening and Follow-up Plan: Patient was screened for depression during today's encounter. They screened negative with a PHQ-2 score of 0.        History of Present Illness   Follow up chronic disease management.       Review of Systems   Constitutional:  Negative for chills and fever.   HENT:  Negative for ear pain and sore throat.    Eyes:  Negative  "for pain and visual disturbance.   Respiratory:  Positive for cough. Negative for shortness of breath and wheezing.    Cardiovascular:  Negative for chest pain and palpitations.   Gastrointestinal:  Negative for abdominal pain and vomiting.   Genitourinary:  Negative for dysuria and hematuria.   Musculoskeletal:  Negative for arthralgias and back pain.   Skin:  Negative for color change and rash.   Neurological:  Negative for seizures and syncope.   All other systems reviewed and are negative.      Objective   /72 (BP Location: Left arm, Patient Position: Sitting, Cuff Size: Large)   Pulse 101   Temp 97.8 °F (36.6 °C) (Temporal)   Resp 16   Ht 5' 6\" (1.676 m)   Wt 94.6 kg (208 lb 9.6 oz)   SpO2 98%   BMI 33.67 kg/m²      Physical Exam  Vitals and nursing note reviewed.   Constitutional:       General: He is not in acute distress.     Appearance: He is well-developed.   HENT:      Head: Normocephalic and atraumatic.   Eyes:      General: No scleral icterus.     Conjunctiva/sclera: Conjunctivae normal.   Cardiovascular:      Rate and Rhythm: Normal rate and regular rhythm.      Pulses: Normal pulses.      Heart sounds: No murmur heard.  Pulmonary:      Effort: Pulmonary effort is normal. No respiratory distress.      Breath sounds: Normal breath sounds.   Abdominal:      General: Bowel sounds are normal. There is no distension.      Palpations: Abdomen is soft.      Tenderness: There is no abdominal tenderness.   Musculoskeletal:         General: No swelling. Normal range of motion.      Cervical back: Neck supple.   Skin:     General: Skin is warm and dry.      Capillary Refill: Capillary refill takes less than 2 seconds.      Coloration: Skin is not jaundiced.   Neurological:      General: No focal deficit present.      Mental Status: He is alert and oriented to person, place, and time. Mental status is at baseline.   Psychiatric:         Mood and Affect: Mood normal.         Behavior: Behavior normal. "

## 2025-02-11 NOTE — ASSESSMENT & PLAN NOTE
Most recent hemoglobin A1c 5.7%, well-controlled, continue off medication at this time  Lab Results   Component Value Date    HGBA1C 5.7 02/11/2025     Orders:  •  POCT hemoglobin A1c  •  Albumin / creatinine urine ratio

## 2025-02-11 NOTE — PROGRESS NOTES
Diabetic Foot Exam    Patient's shoes and socks removed.    Right Foot/Ankle   Right Foot Inspection  Skin Exam: skin normal and skin intact. No dry skin, no warmth, no callus, no erythema, no maceration, no abnormal color, no pre-ulcer, no ulcer and no callus.     Toe Exam: ROM and strength within normal limits.     Sensory   Proprioception: intact  Monofilament testing: intact    Vascular  Capillary refills: < 3 seconds  The right DP pulse is 2+. The right PT pulse is 2+.     Left Foot/Ankle  Left Foot Inspection  Skin Exam: skin normal and skin intact. No dry skin, no warmth, no erythema, no maceration, normal color, no pre-ulcer, no ulcer and no callus.     Toe Exam: ROM and strength within normal limits.     Sensory   Vibration: intact  Proprioception: intact  Monofilament testing: intact    Vascular  Capillary refills: < 3 seconds  The left DP pulse is 2+. The left PT pulse is 2+.     Assign Risk Category  No deformity present  No loss of protective sensation  No weak pulses  Risk: 0

## 2025-02-12 ENCOUNTER — TELEPHONE (OUTPATIENT)
Dept: ADMINISTRATIVE | Facility: OTHER | Age: 40
End: 2025-02-12

## 2025-02-12 NOTE — LETTER
Diabetic Eye Exam Form    Date Requested: 25  Patient: Serafin Conklin      Please complete form  Patient : 1985   Referring Provider: Dejan Jeffrey,       DIABETIC Eye Exam Date _______________________________      Type of Exam MUST be documented for Diabetic Eye Exams. Please CHECK ONE.     Retinal Exam       Dilated Retinal Exam       OCT       Optomap-Iris Exam      Fundus Photography       Left Eye - Please check Retinopathy or No Retinopathy        Exam did show retinopathy    Exam did not show retinopathy       Right Eye - Please check Retinopathy or No Retinopathy       Exam did show retinopathy    Exam did not show retinopathy       Comments __________________________________________________________    Practice Providing Exam ______________________________________________    Exam Performed By (print name) _______________________________________      Provider Signature ___________________________________________________      These reports are needed for  compliance.  Please fax this completed form and a copy of the Diabetic Eye Exam report to our office located at [address] [place] [address] as soon as possible via Fax [phone] attention Héctor: Phone [phone]  We thank you for your assistance in treating our mutual patient.

## 2025-02-12 NOTE — TELEPHONE ENCOUNTER
Upon review of the In Basket request and the patient's chart, initial outreach has been made via fax to facility. Please see Contacts section for details.     Thank you  Héctor Groves MA

## 2025-02-12 NOTE — TELEPHONE ENCOUNTER
----- Message from Shira VOGEL sent at 2/11/2025  2:57 PM EST -----  Regarding: care gap request  02/11/25 2:57 PM    Hello, our patient attached above has had Diabetic Eye Exam completed/performed. Please assist in updating the patient chart by making an External outreach to [facility] located in [place]. The date of service is 2024.    Thank you,  Shira ABBOTT

## 2025-02-13 NOTE — TELEPHONE ENCOUNTER
Upon review of the In Basket request we were able to locate, review, and update the patient chart as requested for Diabetic Eye Exam.    Any additional questions or concerns should be emailed to the Practice Liaisons via the appropriate education email address, please do not reply via In Basket.    Thank you  Héctor Groves MA   [facility]

## 2025-02-17 PROBLEM — Z48.815 ENCOUNTER FOR SURGICAL AFTERCARE FOLLOWING SURGERY OF DIGESTIVE SYSTEM: Status: ACTIVE | Noted: 2024-01-23

## 2025-02-17 NOTE — ASSESSMENT & PLAN NOTE
-At risk for malabsorption of vitamins/minerals secondary to malabsorption and restriction of intake from bariatric surgery  -Currently taking adequate postop bariatric surgery vitamin supplementation: [place] Fusion MVI w/ 45mg iron, calcium citrate 500mg TID, Vitamin D 2,000IU    Labs from 02/18/25:  -HDL low - increase exercise and discuss with PCP  -Iron deficiency - (ferritin 13) and iron low - start Blood Builder vs VITRON C daily - repeat labs in 3 months - might need iron infusions  -Rest of vitamins look great    -Next set of bariatric labs ordered for 1 year  -Patient received education about the importance of adhering to a lifelong supplementation regimen to avoid vitamin/mineral deficiencies

## 2025-02-17 NOTE — ASSESSMENT & PLAN NOTE
-Avoid fried foods and trans fat, limit saturated fats and refined carbohydrates  -Increase fish/omega 3 FA consumption  -Increase physical activity  -low HDL - repeat panel ordered

## 2025-02-17 NOTE — ASSESSMENT & PLAN NOTE
-no longer on lisinopril ; normotensive today  -Continue monitoring and management with prescribing provider

## 2025-02-17 NOTE — ASSESSMENT & PLAN NOTE
Lab Results   Component Value Date    HGBA1C 5.7 02/11/2025     -no longer on DM meds; well controlled  -continue healthy diet and exercise; f/u with RD

## 2025-02-17 NOTE — ASSESSMENT & PLAN NOTE
-s/p Armand-En-Y Gastric Bypass with Dr. Robert on 1/15/24. He is doing very well - about 80lbs weight loss in the last year. Feeling great and will continue with healthy diet - seeing RD today. He agrees to start strength training. F/u with me in 1 year.    Initial: 281lbs  Current: 203.8lbs  EWL: 62%  Cyrus: current  Current BMI is Body mass index is 32.79 kg/m².     Tolerating a regular diet-yes  Eating at least 60 grams of protein per day-yes  Following 30/60 minute rule with liquids-yes  Drinking at least 64 ounces of fluid per day-yes  Drinking carbonated beverages-no  Sufficient exercise-yes, no active at work though - encouraged him to start exercising  Using NSAIDs regularly-no  Using nicotine-no  Using alcohol-no. Advised about the risks of alcohol s/p bariatric surgery and recommend avoiding all alcohol

## 2025-02-18 ENCOUNTER — RESULTS FOLLOW-UP (OUTPATIENT)
Dept: BARIATRICS | Facility: CLINIC | Age: 40
End: 2025-02-18

## 2025-02-18 ENCOUNTER — APPOINTMENT (OUTPATIENT)
Dept: LAB | Facility: CLINIC | Age: 40
End: 2025-02-18
Payer: COMMERCIAL

## 2025-02-18 DIAGNOSIS — K91.2 POSTSURGICAL MALABSORPTION: ICD-10-CM

## 2025-02-18 LAB
25(OH)D3 SERPL-MCNC: 52.6 NG/ML (ref 30–100)
ALBUMIN SERPL BCG-MCNC: 4.3 G/DL (ref 3.5–5)
ALP SERPL-CCNC: 104 U/L (ref 34–104)
ALT SERPL W P-5'-P-CCNC: 21 U/L (ref 7–52)
ANION GAP SERPL CALCULATED.3IONS-SCNC: 12 MMOL/L (ref 4–13)
AST SERPL W P-5'-P-CCNC: 20 U/L (ref 13–39)
BASOPHILS # BLD AUTO: 0.05 THOUSANDS/ΜL (ref 0–0.1)
BASOPHILS NFR BLD AUTO: 1 % (ref 0–1)
BILIRUB SERPL-MCNC: 0.84 MG/DL (ref 0.2–1)
BUN SERPL-MCNC: 21 MG/DL (ref 5–25)
CALCIUM SERPL-MCNC: 9.7 MG/DL (ref 8.4–10.2)
CHLORIDE SERPL-SCNC: 101 MMOL/L (ref 96–108)
CHOLEST SERPL-MCNC: 118 MG/DL (ref ?–200)
CO2 SERPL-SCNC: 26 MMOL/L (ref 21–32)
CREAT SERPL-MCNC: 0.75 MG/DL (ref 0.6–1.3)
CREAT UR-MCNC: 232.4 MG/DL
EOSINOPHIL # BLD AUTO: 0.19 THOUSAND/ΜL (ref 0–0.61)
EOSINOPHIL NFR BLD AUTO: 3 % (ref 0–6)
ERYTHROCYTE [DISTWIDTH] IN BLOOD BY AUTOMATED COUNT: 13.2 % (ref 11.6–15.1)
EST. AVERAGE GLUCOSE BLD GHB EST-MCNC: 131 MG/DL
FERRITIN SERPL-MCNC: 13 NG/ML (ref 24–336)
FOLATE SERPL-MCNC: 21.4 NG/ML
GFR SERPL CREATININE-BSD FRML MDRD: 114 ML/MIN/1.73SQ M
GLUCOSE P FAST SERPL-MCNC: 118 MG/DL (ref 65–99)
HBA1C MFR BLD: 6.2 %
HCT VFR BLD AUTO: 40.3 % (ref 36.5–49.3)
HDLC SERPL-MCNC: 33 MG/DL
HGB BLD-MCNC: 12.7 G/DL (ref 12–17)
IMM GRANULOCYTES # BLD AUTO: 0.02 THOUSAND/UL (ref 0–0.2)
IMM GRANULOCYTES NFR BLD AUTO: 0 % (ref 0–2)
IRON SATN MFR SERPL: 9 % (ref 15–50)
IRON SERPL-MCNC: 44 UG/DL (ref 50–212)
LDLC SERPL CALC-MCNC: 74 MG/DL (ref 0–100)
LYMPHOCYTES # BLD AUTO: 2.41 THOUSANDS/ΜL (ref 0.6–4.47)
LYMPHOCYTES NFR BLD AUTO: 37 % (ref 14–44)
MCH RBC QN AUTO: 25.4 PG (ref 26.8–34.3)
MCHC RBC AUTO-ENTMCNC: 31.5 G/DL (ref 31.4–37.4)
MCV RBC AUTO: 81 FL (ref 82–98)
MICROALBUMIN UR-MCNC: 14.6 MG/L
MICROALBUMIN/CREAT 24H UR: 6 MG/G CREATININE (ref 0–30)
MONOCYTES # BLD AUTO: 0.54 THOUSAND/ΜL (ref 0.17–1.22)
MONOCYTES NFR BLD AUTO: 8 % (ref 4–12)
NEUTROPHILS # BLD AUTO: 3.39 THOUSANDS/ΜL (ref 1.85–7.62)
NEUTS SEG NFR BLD AUTO: 51 % (ref 43–75)
NONHDLC SERPL-MCNC: 85 MG/DL
NRBC BLD AUTO-RTO: 0 /100 WBCS
PLATELET # BLD AUTO: 383 THOUSANDS/UL (ref 149–390)
PMV BLD AUTO: 10.9 FL (ref 8.9–12.7)
POTASSIUM SERPL-SCNC: 4.2 MMOL/L (ref 3.5–5.3)
PROT SERPL-MCNC: 7.6 G/DL (ref 6.4–8.4)
PTH-INTACT SERPL-MCNC: 34.5 PG/ML (ref 12–88)
RBC # BLD AUTO: 5 MILLION/UL (ref 3.88–5.62)
SODIUM SERPL-SCNC: 139 MMOL/L (ref 135–147)
TIBC SERPL-MCNC: 474.6 UG/DL (ref 250–450)
TRANSFERRIN SERPL-MCNC: 339 MG/DL (ref 203–362)
TRIGL SERPL-MCNC: 54 MG/DL (ref ?–150)
TSH SERPL DL<=0.05 MIU/L-ACNC: 1.43 UIU/ML (ref 0.45–4.5)
UIBC SERPL-MCNC: 431 UG/DL (ref 155–355)
VIT B12 SERPL-MCNC: 772 PG/ML (ref 180–914)
WBC # BLD AUTO: 6.6 THOUSAND/UL (ref 4.31–10.16)

## 2025-02-18 PROCEDURE — 85025 COMPLETE CBC W/AUTO DIFF WBC: CPT

## 2025-02-18 PROCEDURE — 82570 ASSAY OF URINE CREATININE: CPT | Performed by: STUDENT IN AN ORGANIZED HEALTH CARE EDUCATION/TRAINING PROGRAM

## 2025-02-18 PROCEDURE — 84425 ASSAY OF VITAMIN B-1: CPT

## 2025-02-18 PROCEDURE — 80053 COMPREHEN METABOLIC PANEL: CPT

## 2025-02-18 PROCEDURE — 83970 ASSAY OF PARATHORMONE: CPT

## 2025-02-18 PROCEDURE — 83550 IRON BINDING TEST: CPT

## 2025-02-18 PROCEDURE — 82306 VITAMIN D 25 HYDROXY: CPT

## 2025-02-18 PROCEDURE — 83540 ASSAY OF IRON: CPT

## 2025-02-18 PROCEDURE — 36415 COLL VENOUS BLD VENIPUNCTURE: CPT

## 2025-02-18 PROCEDURE — 80061 LIPID PANEL: CPT

## 2025-02-18 PROCEDURE — 82607 VITAMIN B-12: CPT

## 2025-02-18 PROCEDURE — 84590 ASSAY OF VITAMIN A: CPT

## 2025-02-18 PROCEDURE — 82746 ASSAY OF FOLIC ACID SERUM: CPT

## 2025-02-18 PROCEDURE — 82043 UR ALBUMIN QUANTITATIVE: CPT | Performed by: STUDENT IN AN ORGANIZED HEALTH CARE EDUCATION/TRAINING PROGRAM

## 2025-02-18 PROCEDURE — 83036 HEMOGLOBIN GLYCOSYLATED A1C: CPT

## 2025-02-18 PROCEDURE — 84630 ASSAY OF ZINC: CPT

## 2025-02-18 PROCEDURE — 84443 ASSAY THYROID STIM HORMONE: CPT

## 2025-02-18 PROCEDURE — 82728 ASSAY OF FERRITIN: CPT

## 2025-02-18 NOTE — RESULT ENCOUNTER NOTE
Will discuss further in the office when I see him this week, but please give him a heads up that he is very iron deficient and we can try increasing oral iron first but he might need iron infusions if we can't get his levels up!    Labs from 02/18/25:  -HDL low - increase exercise and discuss with PCP  -Iron deficiency - (ferritin 13) and iron low - start Blood Builder vs VITRON C daily - repeat labs in 3 months - might need iron infusions  -Rest of vitamins look great

## 2025-02-20 ENCOUNTER — CLINICAL SUPPORT (OUTPATIENT)
Dept: BARIATRICS | Facility: CLINIC | Age: 40
End: 2025-02-20

## 2025-02-20 ENCOUNTER — OFFICE VISIT (OUTPATIENT)
Dept: BARIATRICS | Facility: CLINIC | Age: 40
End: 2025-02-20
Payer: COMMERCIAL

## 2025-02-20 VITALS — BODY MASS INDEX: 32.75 KG/M2 | WEIGHT: 203.8 LBS | HEIGHT: 66 IN

## 2025-02-20 VITALS
BODY MASS INDEX: 32.75 KG/M2 | DIASTOLIC BLOOD PRESSURE: 70 MMHG | HEIGHT: 66 IN | SYSTOLIC BLOOD PRESSURE: 110 MMHG | HEART RATE: 73 BPM | WEIGHT: 203.8 LBS

## 2025-02-20 DIAGNOSIS — E78.2 MIXED HYPERLIPIDEMIA: ICD-10-CM

## 2025-02-20 DIAGNOSIS — I10 PRIMARY HYPERTENSION: ICD-10-CM

## 2025-02-20 DIAGNOSIS — Z48.815 ENCOUNTER FOR SURGICAL AFTERCARE FOLLOWING SURGERY OF DIGESTIVE SYSTEM: Primary | ICD-10-CM

## 2025-02-20 DIAGNOSIS — E11.00 TYPE 2 DIABETES MELLITUS WITH HYPEROSMOLARITY WITHOUT NONKETOTIC HYPERGLYCEMIC-HYPEROSMOLAR COMA (NKHHC) (HCC): ICD-10-CM

## 2025-02-20 DIAGNOSIS — E61.1 IRON DEFICIENCY: ICD-10-CM

## 2025-02-20 DIAGNOSIS — E11.65 TYPE 2 DIABETES MELLITUS WITH HYPERGLYCEMIA, WITHOUT LONG-TERM CURRENT USE OF INSULIN (HCC): ICD-10-CM

## 2025-02-20 DIAGNOSIS — K91.2 POSTSURGICAL MALABSORPTION: Primary | ICD-10-CM

## 2025-02-20 DIAGNOSIS — K91.2 POSTSURGICAL MALABSORPTION: ICD-10-CM

## 2025-02-20 PROCEDURE — 99214 OFFICE O/P EST MOD 30 MIN: CPT | Performed by: PHYSICIAN ASSISTANT

## 2025-02-20 PROCEDURE — RECHECK

## 2025-02-20 NOTE — PROGRESS NOTES
"Bariatric Follow Up Nutrition Note    Type of surgery  Gastric bypass: laparoscopic  Surgery Date: 1/15/24  1 years  post-op  Surgeon: Dr. Jaime Robert     Nutrition Assessment   Serafin Conklin  39 y.o.  male  Height 5' 6.1\" (1.679 m), weight 92.4 kg (203 lb 12.8 oz).Body mass index is 32.79 kg/m².    Initial:  281#  Weight on Day of Weight Loss Surgery: 260.1  Weight in (lb) to have BMI = 25: 155.6  Pre-Op Excess Wt: 125.4#  Post-Op Wt Loss: 77.2#/ 62% EBWL in 1 year(s)    Was 199# on home scale this morning w/o his work uniform on    Review of History and Medications   Past Medical History:   Diagnosis Date    Asthma     as child    Diabetes mellitus (HCC)     Fatty liver     Hypertension     Nasal congestion     Frequently    PONV (postoperative nausea and vomiting)     Sleep apnea     5.5 apnea scale// no cpap     Past Surgical History:   Procedure Laterality Date    EPIDURAL BLOCK INJECTION N/A 11/15/2023    Procedure: L5-S1  LUMBAR epidural steroid injection (04646);  Surgeon: Lewis Pittman DO;  Location: [facility] MAIN OR;  Service: Pain Management     EPIDURAL BLOCK INJECTION N/A 4/3/2024    Procedure: L5-S1 LUMBAR EPIDURAL STEROID INJECTION;  Surgeon: Lewis Pittman DO;  Location: [facility] MAIN OR;  Service: Pain Management     IL LAPS GSTR RSTCV PX W/BYP TIFFANI-EN-Y LIMB <150 CM N/A 1/15/2024    Procedure: LAPAROSCOPIC TIFFANI-EN-Y GASTRIC BYPASS;  Surgeon: Jaime Robert MD;  Location: [facility] MAIN OR;  Service: Bariatrics    IL SEPTOPLASTY/SUBMUCOUS RESECJ W/WO CARTILAGE GRF N/A 11/28/2022    Procedure: SEPTOPLASTY;  Surgeon: Abhi Couch MD;  Location: [facility] MAIN OR;  Service: ENT    IL SUBMUCOUS RESCJ INFERIOR TURBINATE PRTL/COMPL Bilateral 11/28/2022    Procedure: TURBINECTOMY;  Surgeon: Abhi Couch MD;  Location: [facility] MAIN OR;  Service: ENT     Social History     Socioeconomic History    Marital status: /Civil Union     Spouse name: Not on file    Number of children: Not on file    " Years of education: Not on file    Highest education level: Not on file   Occupational History    Not on file   Tobacco Use    Smoking status: Some Days     Types: Cigars     Start date: 2019     Last attempt to quit: 2023     Years since quittin.4     Passive exposure: Past    Smokeless tobacco: Never    Tobacco comments:     Wife just had baby not smoking right now     Pt stated stopped smoking cigars over a month ago   Vaping Use    Vaping status: Never Used   Substance and Sexual Activity    Alcohol use: Not Currently     Comment: none    Drug use: Never    Sexual activity: Yes     Partners: Female     Birth control/protection: None   Other Topics Concern    Not on file   Social History Narrative    Not on file     Social Drivers of Health     Financial Resource Strain: Not on file   Food Insecurity: No Food Insecurity (2021)    Received from [facility]    Hunger Vital Sign     Worried About Running Out of Food in the Last Year: Never true     Ran Out of Food in the Last Year: Never true   Transportation Needs: Not on file   Physical Activity: Not on file   Stress: Not on file   Social Connections: Unknown (2024)    Received from [facility]    Social Connections     How often do you feel lonely or isolated from those around you? (Adult - for ages 18 years and over): Not on file   Intimate Partner Violence: Not on file   Housing Stability: Not on file       Current Outpatient Medications:     ciprofloxacin-dexamethasone (CIPRODEX) otic suspension, Administer 4 drops into the left ear 2 (two) times a day, Disp: 7.5 mL, Rfl: 0    methylPREDNISolone 4 MG tablet therapy pack, Use as directed on package, Disp: 21 each, Rfl: 0    Multiple Vitamins-Iron (MULTIVITAMIN/IRON PO), Take 1 tablet by mouth in the morning, Disp: , Rfl:     sildenafil (VIAGRA) 25 MG tablet, Take 25 mg by mouth daily as needed for erectile dysfunction (Patient not taking: Reported on 2025), Disp: , Rfl:      VITAMIN D, CHOLECALCIFEROL, PO, Take 2 tablets by mouth in the morning, Disp: , Rfl:     Xdemvy 0.25 % SOLN, , Disp: , Rfl:     Food Intake and Lifestyle Assessment   Food Intake Assessment completed via usual diet recall  Starts day between 2-3am   Drinks coffee on way into work w/equal and sf creamer  Breakfast: 4am-every weekday--turkey sausage breakfast burrito (18gm)  Snack: usually fruit if has a snack or beef jerky   Lunch: 9am-12pm:  sandwich with zero carb wrap (2) + ham + swiss + lyles (10-14gm)  Snack: small spoon of PB or 1 slice of swiss (6gm)  Dinner: 5pm-turkey chili or salmon or debra chicken tenders (1.5) or hot dog (20-40gm protein)  Snack: -    Beverage intake: water, coffee  Diet texture/stage: regular  Protein supplement: has been having core power or nutrition plan 1 per day or occ Seeq clear  Estimated protein intake per day: 65-75  Estimated fluid intake per day: 64oz water per day  Meals eaten away from home: not often  Typical meal pattern: 3 meals per day and 1-2 snacks per day  Eating Behaviors: Appropriate diet advancement, Appropriate portion sizes, and continues to follow the 30/60 rule.    Vitamins:  Bariatric fusion with iron w/45mg iron  Calcium chewy richard fusion 2 per day, sometimes remembers the 3rd  Vitamin D chewable from fusion  Will be adding in Vitron C due to low ferritin    Food allergies or intolerances: starchy foods and steak  Cultural or Adventist considerations: -    Physical Assessment  Nutrition Related Findings  none    Physical Activity  Types of exercise: no set regimen  Current physical limitations: -    Psychosocial Assessment   Support systems: spouse friend(s) relative(s)  Socioeconomic factors: -    Nutrition Diagnosis  Diagnosis: Unintentional Weight Loss (NC-3.4)  Related to: Altered GI function  As Evidenced by: Unintentional weight loss     Interventions and Teaching   Patient educated on post-op nutrition guidelines.       Patient educated and handouts  provided.  Surgical changes to stomach / GI  Capacity of post-surgery stomach  Adequate hydration  Expected weight loss  Weight loss plateaus/ possibility of weight regain  Exercise  Nutrition considerations after surgery  Protein supplements  Meal planning and preparation  Appropriate carbohydrate, protein, and fat intake, and food/fluid choices to maximize safe weight loss, nutrient intake, and tolerance   Dietary and lifestyle changes  Possible problems with poor eating habits  Intuitive eating  Techniques for self monitoring and keeping daily food journal  Potential for food intolerance after surgery, and ways to deal with them including: lactose intolerance, nausea, reflux, vomiting, diarrhea, food intolerance, appetite changes, gas  Vitamin / Mineral supplementation of Multivitamin with minerals, Calcium, Vitamin B12, Iron, and Vitamin D    Education provided to: patient    Barriers to learning: No barriers identified    Readiness to change: action    Comprehension: verbalizes understanding     Expected Compliance: good    Pt presents today for 1 year f/u s/p RYGB with excellent weight loss. Reviewed diet recall and he is eating three meals per day with protein at each meal. Suggested a higher protein snack mid afternoon to be consistent with meeting at least 75gm protein per day. Pt at times still drinks protein shakes and clear protein.  Completed body comp today and reviewed results. Encouraged a consistent exercise regimen to focus on strength to maintain his already good muscle mass and work on building.     Goals  Exercise 30 minutes 5 times per week--focus on strength training  Eat 3 meals per day  Eliminate mindless snacking  Use protein shake as needed to best meet protein needs  Add vitron C for low iron  Repeat CBC, iron panel and A1c in 3 months  F/U body comp and PA-c visit in 6 months.      Time Spent:   30 Minutes

## 2025-02-20 NOTE — PATIENT INSTRUCTIONS
GOALS:   Continued/Maintain healthy weight loss with good nutrition intakes.  Adequate hydration with at least 64oz. fluid intake.  Normal vitamin and mineral levels.  Exercise as tolerated.  VITRON C iron supplement daily if tolerated - repeat labs in 3 months    Follow-up in 1 year. We kindly ask that your arrive 15 minutes before your scheduled appointment time with your provider to allow our staff to room you, get your vital signs and update your chart.    Follow diet as discussed.      Get lab work done in 3 months.  You have been given a lab slip today.  Please call the office if you need a replacement.  It is recommended to check with your insurance BEFORE getting labs done to make sure they are covered by your policy.  Also, please check with your PCP and other providers before getting labs to avoid duplicate labs. Make sure to HOLD any multivitamins that may contain biotin and any biotin supplements FOR 5 DAYS before any labs since it can affect the results.    Follow vitamin and mineral recommendations as reviewed with you.    Call our office if you have any problems with abdominal pain especially associated with fever, chills, nausea, vomiting or any other concerns.    All  Post-bariatric surgery patients should be aware that very small quantities of any alcohol can cause impairment and it is very possible not to feel the effect. The effect can be in the system for several hours.  It is also a stomach irritant.     It is advised to AVOID alcohol, Nonsteroidal antiinflammatory drugs (NSAIDS) and nicotine of all forms . Any of these can cause stomach irritation/pain.

## 2025-02-20 NOTE — PROGRESS NOTES
Assessment/Plan:    Encounter for surgical aftercare following surgery of digestive system  -s/p Armand-En-Y Gastric Bypass with Dr. Robert on 1/15/24. He is doing very well - about 80lbs weight loss in the last year. Feeling great and will continue with healthy diet - seeing RD today. He agrees to start strength training. F/u with me in 1 year.    Initial: 281lbs  Current: 203.8lbs  EWL: 62%  Cyrus: current  Current BMI is Body mass index is 32.79 kg/m².     Tolerating a regular diet-yes  Eating at least 60 grams of protein per day-yes  Following 30/60 minute rule with liquids-yes  Drinking at least 64 ounces of fluid per day-yes  Drinking carbonated beverages-no  Sufficient exercise-yes, no active at work though - encouraged him to start exercising  Using NSAIDs regularly-no  Using nicotine-no  Using alcohol-no. Advised about the risks of alcohol s/p bariatric surgery and recommend avoiding all alcohol    Postsurgical malabsorption  -At risk for malabsorption of vitamins/minerals secondary to malabsorption and restriction of intake from bariatric surgery  -Currently taking adequate postop bariatric surgery vitamin supplementation: [place] Fusion MVI w/ 45mg iron, calcium citrate 500mg TID, Vitamin D 2,000IU    Labs from 02/18/25:  -HDL low - increase exercise and discuss with PCP  -Iron deficiency - (ferritin 13) and iron low - start Blood Builder vs VITRON C daily - repeat labs in 3 months - might need iron infusions  -Rest of vitamins look great    -Next set of bariatric labs ordered for 1 year  -Patient received education about the importance of adhering to a lifelong supplementation regimen to avoid vitamin/mineral deficiencies       Hypertension  -no longer on lisinopril ; normotensive today  -Continue monitoring and management with prescribing provider      Type 2 diabetes mellitus with hyperglycemia, without long-term current use of insulin (HCC)    Lab Results   Component Value Date    HGBA1C 5.7 02/11/2025      -no longer on DM meds; well controlled  -continue healthy diet and exercise; f/u with RD    Hyperlipidemia  -Avoid fried foods and trans fat, limit saturated fats and refined carbohydrates  -Increase fish/omega 3 FA consumption  -Increase physical activity  -low HDL - repeat panel ordered     Diagnoses and all orders for this visit:    Encounter for surgical aftercare following surgery of digestive system    Postsurgical malabsorption    Primary hypertension    Type 2 diabetes mellitus with hyperglycemia, without long-term current use of insulin (HCC)  -     Hemoglobin A1C; Future    Mixed hyperlipidemia    Iron deficiency  -     CBC and differential; Future  -     Iron Panel (Includes Ferritin, Iron Sat%, Iron, and TIBC); Future    Type 2 diabetes mellitus with hyperosmolarity without nonketotic hyperglycemic-hyperosmolar coma ([facility]) (HCC)          Subjective:      Patient ID: Serafin Conklin is a 39 y.o. male.    -s/p Armand-En-Y Gastric Bypass with Dr. Robert on 1/15/24. Presents to the office today for routine follow up. Tolerating diet without issues; denies N/V, dysphagia, reflux. Overall doing very well - very happy with weight loss so far.    They live in [facility]. Wife Aurora recently had surgery.  Son Chandu doing very well.  Heavy duty  and .  Wife is . Son Chandu is 1yo.     The following portions of the patient's history were reviewed and updated as appropriate: allergies, current medications, past family history, past medical history, past social history, past surgical history and problem list.    Review of Systems   Constitutional:  Negative for chills and fever. Unexpected weight change: planned weight loss.  HENT:  Negative for trouble swallowing.    Respiratory:  Negative for cough and shortness of breath.    Cardiovascular:  Negative for chest pain and palpitations.   Gastrointestinal:  Negative for abdominal pain, constipation, diarrhea,  "nausea and vomiting.   Neurological:  Negative for dizziness.   Psychiatric/Behavioral:          Denies anxiety and depression         Objective:      /70 (Patient Position: Sitting, Cuff Size: Large)   Pulse 73   Ht 5' 6.1\" (1.679 m)   Wt 92.4 kg (203 lb 12.8 oz)   BMI 32.79 kg/m²     Colonoscopy-Not applicable       Physical Exam  Vitals reviewed.   Constitutional:       General: He is not in acute distress.     Appearance: He is well-developed.   HENT:      Head: Normocephalic and atraumatic.   Eyes:      General: No scleral icterus.  Cardiovascular:      Rate and Rhythm: Normal rate and regular rhythm.   Pulmonary:      Effort: Pulmonary effort is normal. No respiratory distress.   Abdominal:      General: There is no distension.   Skin:     General: Skin is warm and dry.   Neurological:      Mental Status: He is alert.   Psychiatric:         Mood and Affect: Mood normal.         Behavior: Behavior normal.           BARRIERS: none identified    GOALS:   Continued/Maintain healthy weight loss with good nutrition intakes.  Adequate hydration with at least 64oz. fluid intake.  Normal vitamin and mineral levels.  Exercise as tolerated.  SHAY VOGEL     Follow-up in 1 year. We kindly ask that your arrive 15 minutes before your scheduled appointment time with your provider to allow our staff to room you, get your vital signs and update your chart.    Follow diet as discussed.      Get lab work done in the next 2 weeks.  You have been given a lab slip today.  Please call the office if you need a replacement.  It is recommended to check with your insurance BEFORE getting labs done to make sure they are covered by your policy.  Also, please check with your PCP and other providers before getting labs to avoid duplicate labs. Make sure to HOLD any multivitamins that may contain biotin and any biotin supplements FOR 5 DAYS before any labs since it can affect the results.    Follow vitamin and mineral recommendations " as reviewed with you.    Call our office if you have any problems with abdominal pain especially associated with fever, chills, nausea, vomiting or any other concerns.    All  Post-bariatric surgery patients should be aware that very small quantities of any alcohol can cause impairment and it is very possible not to feel the effect. The effect can be in the system for several hours.  It is also a stomach irritant.     It is advised to AVOID alcohol, Nonsteroidal antiinflammatory drugs (NSAIDS) and nicotine of all forms . Any of these can cause stomach irritation/pain.

## 2025-02-21 LAB — ZINC SERPL-MCNC: 79 UG/DL (ref 44–115)

## 2025-02-22 LAB — VIT A SERPL-MCNC: 40.2 UG/DL (ref 20.1–62)

## 2025-02-23 LAB — VIT B1 BLD-SCNC: 156.1 NMOL/L (ref 66.5–200)

## 2025-05-07 ENCOUNTER — APPOINTMENT (OUTPATIENT)
Dept: RADIOLOGY | Facility: CLINIC | Age: 40
End: 2025-05-07
Attending: ORTHOPAEDIC SURGERY
Payer: COMMERCIAL

## 2025-05-07 ENCOUNTER — CONSULT (OUTPATIENT)
Dept: OBGYN CLINIC | Facility: CLINIC | Age: 40
End: 2025-05-07
Payer: COMMERCIAL

## 2025-05-07 VITALS — HEIGHT: 66 IN | WEIGHT: 204 LBS | BODY MASS INDEX: 32.78 KG/M2

## 2025-05-07 DIAGNOSIS — M25.521 RIGHT ELBOW PAIN: ICD-10-CM

## 2025-05-07 DIAGNOSIS — M77.11 LATERAL EPICONDYLITIS OF RIGHT ELBOW: Primary | ICD-10-CM

## 2025-05-07 PROCEDURE — 20605 DRAIN/INJ JOINT/BURSA W/O US: CPT | Performed by: ORTHOPAEDIC SURGERY

## 2025-05-07 PROCEDURE — 73080 X-RAY EXAM OF ELBOW: CPT

## 2025-05-07 PROCEDURE — 99214 OFFICE O/P EST MOD 30 MIN: CPT | Performed by: ORTHOPAEDIC SURGERY

## 2025-05-07 RX ADMIN — LIDOCAINE HYDROCHLORIDE 1 ML: 10 INJECTION, SOLUTION INFILTRATION; PERINEURAL at 14:00

## 2025-05-07 RX ADMIN — DEXAMETHASONE SODIUM PHOSPHATE 40 MG: 10 INJECTION, SOLUTION INTRAMUSCULAR; INTRAVENOUS at 14:00

## 2025-05-07 NOTE — ASSESSMENT & PLAN NOTE
Orders:  •  XR elbow 3+ vw right; Future  •  Ambulatory Referral to Physical Therapy; Future  •  Brace  •  Medium joint arthrocentesis: R elbow  •  lidocaine (XYLOCAINE) 1 % injection 1 mL  •  dexamethasone (DECADRON) injection 40 mg   negative...

## 2025-05-07 NOTE — PROGRESS NOTES
Patient Name: Serafin Conklin      : 1985       MRN: [id number]   Encounter Provider: Pb Diaz MD   Encounter Date: 25  Encounter department: [facility]         Assessment & Plan  Lateral epicondylitis of right elbow  Cortisone injection administered in clinic, procedure outlined below  Referral to PT along with HEP provided  Wrist brace for night time splinting only   Voltaren topically to elbow as needed   Activities as tolerated  Follow up if symptoms worsen or fail to improve  Orders:  •  Ambulatory Referral to Physical Therapy; Future  •  Brace  •  Medium joint arthrocentesis: R elbow  •  lidocaine (XYLOCAINE) 1 % injection 1 mL  •  dexamethasone (DECADRON) injection 40 mg    Right elbow pain    Orders:  •  XR elbow 3+ vw right; Future  •  Ambulatory Referral to Physical Therapy; Future  •  Brace  •  Medium joint arthrocentesis: R elbow  •  lidocaine (XYLOCAINE) 1 % injection 1 mL  •  dexamethasone (DECADRON) injection 40 mg       Plan:  Serafin is a pleasant 39 y.o. who presents for initial evaluation of right elbow pain. Upon review of the x-ray, a thorough history and my examination, Serafin is presenting with signs and symptoms consistent with lateral epicondylitis. Etiology and treatment options discussed, all his questions were addressed. Due to nature of patients work as a  and severity of symptoms a right elbow corticosteroid injection was offered, accepted, and administered in clinic. Procedure tolerated well, outlined below, post injection protocol advised. Patient was provided a referral to physical therapy, home exercise program, and a wrist brace to be worn at night. He may continue activities as tolerated. NSAID's as needed. Follow up if symptoms worsen or fail to improve.       Follow-up:  Return if symptoms worsen or fail to improve.     _____________________________________________________  CHIEF COMPLAINT:  Chief Complaint    Patient presents with   • Right Elbow - Pain     Pain started two weeks ago. Pain when lifting something up. No trauma or injury.         SUBJECTIVE:  Serafin Conklin is a 39 y.o. male who presents for initial evaluation of right elbow pain. RHD. Pain started 2 weeks ago with out injury or trauma. Patients pain is localized to the lateral epicondyle and extensor muscles. He is a  performs repetitive supination/pronation thru out the day. Pain will impact his  strength. He denies any numbness or paresthesias.       PAST MEDICAL HISTORY:  Past Medical History:   Diagnosis Date   • Asthma     as child   • Diabetes mellitus (HCC)    • Fatty liver    • Hypertension    • Nasal congestion     Frequently   • PONV (postoperative nausea and vomiting)    • Sleep apnea     5.5 apnea scale// no cpap       PAST SURGICAL HISTORY:  Past Surgical History:   Procedure Laterality Date   • EPIDURAL BLOCK INJECTION N/A 11/15/2023    Procedure: L5-S1  LUMBAR epidural steroid injection (00750);  Surgeon: Lewis Pittman DO;  Location: [facility] MAIN OR;  Service: Pain Management    • EPIDURAL BLOCK INJECTION N/A 4/3/2024    Procedure: L5-S1 LUMBAR EPIDURAL STEROID INJECTION;  Surgeon: Lewis Pittman DO;  Location: [facility] MAIN OR;  Service: Pain Management    • NH LAPS GSTR RSTCV PX W/BYP TIFFANI-EN-Y LIMB <150 CM N/A 1/15/2024    Procedure: LAPAROSCOPIC TIFFANI-EN-Y GASTRIC BYPASS;  Surgeon: Jaime Robert MD;  Location: [facility] MAIN OR;  Service: Bariatrics   • NH SEPTOPLASTY/SUBMUCOUS RESECJ W/WO CARTILAGE GRF N/A 11/28/2022    Procedure: SEPTOPLASTY;  Surgeon: Abhi Couch MD;  Location: [facility] MAIN OR;  Service: ENT   • NH SUBMUCOUS RESCJ INFERIOR TURBINATE PRTL/COMPL Bilateral 11/28/2022    Procedure: TURBINECTOMY;  Surgeon: Abhi Couch MD;  Location: [facility] MAIN OR;  Service: ENT       FAMILY HISTORY:  Family History   Problem Relation Age of Onset   • Diabetes type II Mother    • Diabetes Mother    • Diabetes Maternal  Grandmother    • Diabetes type II Maternal Grandmother    • Arthritis Maternal Grandmother    • Cancer Maternal Grandfather         Liver cancer       SOCIAL HISTORY:  Social History     Tobacco Use   • Smoking status: Some Days     Types: Cigars     Start date: 2019     Last attempt to quit: 2023     Years since quittin.6     Passive exposure: Past   • Smokeless tobacco: Never   • Tobacco comments:     Wife just had baby not smoking right now     Pt stated stopped smoking cigars over a month ago   Vaping Use   • Vaping status: Never Used   Substance Use Topics   • Alcohol use: Not Currently     Comment: none   • Drug use: Never       MEDICATIONS:    Current Outpatient Medications:   •  Multiple Vitamins-Iron (MULTIVITAMIN/IRON PO), Take 1 tablet by mouth in the morning, Disp: , Rfl:   •  VITAMIN D, CHOLECALCIFEROL, PO, Take 2 tablets by mouth in the morning, Disp: , Rfl:   •  ciprofloxacin-dexamethasone (CIPRODEX) otic suspension, Administer 4 drops into the left ear 2 (two) times a day (Patient not taking: Reported on 2025), Disp: 7.5 mL, Rfl: 0  •  methylPREDNISolone 4 MG tablet therapy pack, Use as directed on package (Patient not taking: Reported on 2025), Disp: 21 each, Rfl: 0  •  sildenafil (VIAGRA) 25 MG tablet, Take 25 mg by mouth daily as needed for erectile dysfunction (Patient not taking: Reported on 2025), Disp: , Rfl:   •  Xdemvy 0.25 % SOLN, , Disp: , Rfl:     ALLERGIES:  Allergies   Allergen Reactions   • Other Angioedema   • Shellfish-Derived Products - Food Allergy Anaphylaxis   • Penicillins Hives   • Penicillin G Hives       LABS:  HgA1c:   Lab Results   Component Value Date    HGBA1C 6.2 (H) 2025     BMP:   Lab Results   Component Value Date    CALCIUM 9.7 2025    K 4.2 2025    CO2 26 2025     2025    BUN 21 2025    CREATININE 0.75 2025     CBC: No components found for:  "\"CBC\"    _____________________________________________________  Review of Systems   Constitutional:  Negative for chills and fever.   HENT:  Negative for ear pain and sore throat.    Eyes:  Negative for pain and visual disturbance.   Respiratory:  Negative for cough and shortness of breath.    Cardiovascular:  Negative for chest pain and palpitations.   Gastrointestinal:  Negative for abdominal pain and vomiting.   Genitourinary:  Negative for dysuria and hematuria.   Musculoskeletal:  Negative for arthralgias and back pain.   Skin:  Negative for color change and rash.   Neurological:  Negative for seizures and syncope.   All other systems reviewed and are negative.       Right Elbow Exam     Tenderness   The patient is experiencing tenderness in the lateral epicondyle.     Range of Motion   The patient has normal right elbow ROM.    Tests   Varus: negative  Valgus: negative  Tinel's sign (cubital tunnel): negative    Other   Erythema: absent  Sensation: normal  Pulse: present    Comments:  Pain with wrist extension  Pain with resisted wrist extension   Skin is warm and dry to touch with no signs of erythema, ecchymosis, or infection   MMT: 5/5 throughout  Demonstrates normal shoulder, wrist, and finger motion  (-) radial head instability  (-) ulnar nerve subluxation  2+ distal radial pulse with brisk capillary refill to the fingers  Radial, median, and ulnar motor and sensory distrubution intact  Sensation to light touch intact distally                Physical Exam  Vitals and nursing note reviewed.   Constitutional:       Appearance: Normal appearance.   HENT:      Head: Normocephalic and atraumatic.      Right Ear: External ear normal.      Left Ear: External ear normal.   Eyes:      Extraocular Movements: Extraocular movements intact.      Conjunctiva/sclera: Conjunctivae normal.   Cardiovascular:      Rate and Rhythm: Normal rate.      Pulses: Normal pulses.   Pulmonary:      Effort: Pulmonary effort is normal. "   Musculoskeletal:         General: Normal range of motion.      Cervical back: Normal range of motion and neck supple.      Comments: See ortho exam   Skin:     General: Skin is warm and dry.   Neurological:      General: No focal deficit present.      Mental Status: He is alert.   Psychiatric:         Behavior: Behavior normal.        _____________________________________________________  STUDIES REVIEWED:  I personally reviewed the pertinent images and my independent interpretation is as follows:  X-ray of the right elbow obtained today demonstrates no acute osseous abnormalities.     PROCEDURES PERFORMED:  Medium joint arthrocentesis: R elbow    Performed by: Pb Diaz MD  Authorized by: Pb Diaz MD    Universal Protocol:  procedure performed by consultantConsent: Verbal consent obtained.  Risks and benefits: risks, benefits and alternatives were discussed  Consent given by: patient  Timeout called at: 5/7/2025 2:27 PM.  Patient understanding: patient states understanding of the procedure being performed  Patient identity confirmed: verbally with patient  Supporting Documentation  Indications: pain and diagnostic evaluation   Procedure Details  Location: elbow - R elbow  Preparation: Patient was prepped and draped in the usual sterile fashion  Needle size: 25 G  Ultrasound guidance: no  Medications administered: 1 mL lidocaine 1 %; 40 mg dexamethasone 100 mg/10 mL    Dressing:  Sterile dressing applied            Scribe Attestation    I,:  Vibha Ramesh am acting as a scribe while in the presence of the attending physician.:       I,:  Pb Diaz MD personally performed the services described in this documentation    as scribed in my presence.:

## 2025-05-07 NOTE — PATIENT INSTRUCTIONS
Patient Education     Lateral Epicondylitis Exercises   About this topic   The elbow is where your upper arm bone meets the two lower bones in your arm. There is a bump on the outside of your elbow at the bottom of your upper arm bone. It is the lateral epicondyle. A few tendons attach here. Tendons attach muscles to bone. These muscles are used to pull your wrist and fingers up.   When these tendons get sore and swollen from overuse, you have lateral epicondylitis. Is also called tennis elbow. This is a common problem in tennis players. It may also happen with other activities or sports. You are more likely to have this problem in the arm you use most, but it can happen in either arm. Exercises can help to make this problem better.  General   Before starting with a program, ask your doctor if you are healthy enough to do these exercises. Your doctor may have you work with a  or physical therapist to make a safe exercise program to meet your needs.  Stretching Exercises   Stretching exercises keep your muscles flexible. They also stop them from getting tight. Start by doing each of these stretches 2 to 3 times. In order for your body to make changes, you will need to hold these stretches for 20 to 30 seconds. Try to do the stretches 2 to 3 times each day. Do all exercises slowly.  Wrist stretches bending down ? Straighten your elbow and have your palm facing down. Keeping your sore elbow straight, bend your hand and fingers down so that your fingers are now pointing to the floor. Grab your hand with your other hand and push back the wrist further until you feel a stretch.  Strengthening Exercises   Strengthening exercises keep your muscles firm and strong. Start by repeating each exercise 2 to 3 times. Work up to doing each exercise 10 times. Try to do the exercises 2 to 3 times each day. Do all exercises slowly.  Some exercises can be done holding a small weight. You can use a can of soup or a hand weight.  If the exercise gets too easy, use heavier weights or do the exercise more times.  Wrist exercises seated with your lower arm supported on a table or your leg. Your hand should be off the edge:  Bending up ? Have your palm facing UP with a small weight in your hand. Bend your wrist up as far as you can. Now, lower the weight back down. Be sure to control the weight as you lower it. Repeat using other hand.  Bending down ? Have your palm facing DOWN with a small weight in your hand. Bend your wrist back as far as you can. Now, lower the weight back down. Be sure to control the weight as you lower it. Repeat using other hand.  Side-to-side ? Position your hand SIDEWAYS with your thumb up. With a weight in your hand, lift your hand straight up. Now, lower your hand back down. Repeat using other hand.  Lower arm twists with weight ? Start by having your elbow bent with your arm at your side. Hold a small weight in your hand. Keeping your arm at your side and not moving your elbow, turn the lower arm until your palm is facing down. Now, turn the lower arm until your palm is facing up. Repeat using other hand.  Finger spreads with rubber band ? Find a thick rubber band. Straighten your fingers and bring them together so they are touching each other. Place the rubber band around your fingers and thumb as close to the nails as possible. Spread your fingers out as far as you can. Then, slowly bring them back to the starting position.  Ball squeezes ? Gently squeeze a tennis ball 10 times. If you have no pain, squeeze with more pressure.  Tennis strokes with exercise band ? Once your pain has lessened and you are almost ready to return to the tennis court, try these 3 exercises. You will need to exercise near a door or closet that can be opened and closed easily. Start with a thinner band and work your way up to a thicker band. These band exercises can help you with three tennis strokes:  Mark ? Secure an exercise band in  a door at waist level. Stand sideways with the hand you use the closest to the door. Grab the band with that hand. Pull the band across your body like you do in a mayito motion.  Backhand ? Secure an exercise band in a door at waist level. Stand sideways with the hand you use the most away from the door. Reach toward the door and grab the band with this hand. Now, pull the band across your body like you do in a backhand motion.  Serve ? Secure an exercise band in a door at shoulder level. Stand facing away from the door. Grab the band with the hand you use the most. Start with your hand up and behind your head like you would for the start of a serve. Pull the band up and over like you are doing a serving motion.  Your doctor or therapist may also have you use a rubber bar or Flex bar for exercises to help your lateral epicondylitis.               What will the results be?   Less pain and swelling  Increased strength  Better range of motion  Greater ease doing arm activities  Helpful tips   Stay active and work out to keep your muscles strong and flexible.  Keep a healthy weight to avoid putting too much stress on your joints. Eat a healthy diet to keep your muscles healthy.  Be sure you do not hold your breath when exercising. This can raise your blood pressure. If you tend to hold your breath, try counting out loud when exercising. If any exercise bothers you, stop right away.  Always warm up before stretching. Heated muscles stretch much easier than cool muscles. Stretching cool muscles can lead to injury.  Try walking and swinging your arms at an easy pace for a few minutes to warm up your muscles. Do this again after exercising.  Never bounce when doing stretches.  Doing exercises before a meal may be a good way to get into a routine.  If you are using weights, choose a weight that will allow you to repeat the exercise 10 times before resting. If you easily do 10 repeats, you may not be using enough weight. If  you are not able to do 10 repeats, you are using too heavy of a weight.  Exercise may be slightly uncomfortable, but you should not have sharp pains. If you do get sharp pains, stop what you are doing. If the sharp pains continue, call your doctor.  Last Reviewed Date   2021-08-03  Consumer Information Use and Disclaimer   This generalized information is a limited summary of diagnosis, treatment, and/or medication information. It is not meant to be comprehensive and should be used as a tool to help the user understand and/or assess potential diagnostic and treatment options. It does NOT include all information about conditions, treatments, medications, side effects, or risks that may apply to a specific patient. It is not intended to be medical advice or a substitute for the medical advice, diagnosis, or treatment of a health care provider based on the health care provider's examination and assessment of a patient’s specific and unique circumstances. Patients must speak with a health care provider for complete information about their health, medical questions, and treatment options, including any risks or benefits regarding use of medications. This information does not endorse any treatments or medications as safe, effective, or approved for treating a specific patient. [employer] and its affiliates disclaim any warranty or liability relating to this information or the use thereof. The use of this information is governed by the Terms of Use, available at https://www.[employer].com/en/know/clinical-effectiveness-terms   Copyright   Copyright © 2024 [employer] and its affiliates and/or licensors. All rights reserved.

## 2025-05-07 NOTE — ASSESSMENT & PLAN NOTE
Cortisone injection administered in clinic, procedure outlined below  Referral to PT along with HEP provided  Wrist brace for night time splinting only   Voltaren topically to elbow as needed   Activities as tolerated  Follow up if symptoms worsen or fail to improve  Orders:  •  Ambulatory Referral to Physical Therapy; Future  •  Brace  •  Medium joint arthrocentesis: R elbow  •  lidocaine (XYLOCAINE) 1 % injection 1 mL  •  dexamethasone (DECADRON) injection 40 mg

## 2025-05-12 RX ORDER — LIDOCAINE HYDROCHLORIDE 10 MG/ML
1 INJECTION, SOLUTION INFILTRATION; PERINEURAL
Status: COMPLETED | OUTPATIENT
Start: 2025-05-07 | End: 2025-05-07

## 2025-05-12 RX ORDER — DEXAMETHASONE SODIUM PHOSPHATE 10 MG/ML
40 INJECTION, SOLUTION INTRAMUSCULAR; INTRAVENOUS
Status: COMPLETED | OUTPATIENT
Start: 2025-05-07 | End: 2025-05-07

## 2025-05-13 ENCOUNTER — EVALUATION (OUTPATIENT)
Dept: OCCUPATIONAL THERAPY | Facility: CLINIC | Age: 40
End: 2025-05-13
Attending: ORTHOPAEDIC SURGERY
Payer: COMMERCIAL

## 2025-05-13 DIAGNOSIS — M77.11 LATERAL EPICONDYLITIS OF RIGHT ELBOW: Primary | ICD-10-CM

## 2025-05-13 DIAGNOSIS — M25.521 RIGHT ELBOW PAIN: ICD-10-CM

## 2025-05-13 PROCEDURE — 97166 OT EVAL MOD COMPLEX 45 MIN: CPT

## 2025-05-13 PROCEDURE — 97110 THERAPEUTIC EXERCISES: CPT

## 2025-05-13 NOTE — PROGRESS NOTES
OT Evaluation     Today's date: 2025  Patient name: Serafin Conklin  : 1985  MRN: [id number]  Referring provider: Pb Diaz MD  Dx:   Encounter Diagnosis     ICD-10-CM    1. Lateral epicondylitis of right elbow  M77.11 Ambulatory Referral to Physical Therapy      2. Right elbow pain  M25.521 Ambulatory Referral to Physical Therapy                     Assessment  Impairments: activity intolerance, impaired physical strength, lacks appropriate home exercise program, pain with function and weight-bearing intolerance  Symptom irritability: high  Understanding of Dx/Px/POC: excellent     Prognosis: excellent    Goals  Short Term Goals by 2 - 4 weeks:    Establish HEP to increase performance with daily activities.     Patient will increase R  strength by at least 10 lbs to assist in completing ADLs.     Patient will decrease pain rate of UE by at least 2 points per the VAS scale.         Long Term Goals by discharge:    Establish final home exercise program to enhance maximal functional level with ADLs.                            Achieve functional strength of RUE for full return to high level ADLs.               Plan  Patient would benefit from: OT eval, orthotics, custom splinting and skilled occupational therapy  Planned modality interventions: electrical stimulation/Ugandan stimulation, manual electrical stimulation, TENS, thermotherapy: hydrocollator packs and high voltage pulsed current: pain management    Planned therapy interventions: IASTM, joint mobilization, kinesiology taping, manual therapy, ADL training, ADL retraining, activity modification, balance/weight bearing training, orthotic fitting/training, neuromuscular re-education, nerve gliding, body mechanics training, flexibility, functional ROM exercises, graded activity, graded exercise, graded motor, home exercise program, IADL retraining, therapeutic training, therapeutic exercise, therapeutic activities, stretching,  "strengthening and patient/caregiver education    Frequency: 1-2x week  Duration in weeks: 6  Plan of Care beginning date: 2025  Plan of Care expiration date: 2025  Treatment plan discussed with: patient    Subjective Evaluation    History of Present Illness  Mechanism of injury: Patient is a 39 y.o. male who presents for OT IE and treatment for Lateral epicondylitis of right elbow and Right elbow pain. Patient had pain in his elbow in which he saw orthopedic physician and received a prefab wrist cock up wrist brace to be worn at night and given a CSI in the office for his right elbow on 2025. Patient reports good relief with CSI for his elbow, but states he has pain with his rotator cuff making it impossible to abduct his arm and touch his head or reach above 90 degrees and unable to  a lot of things. Also states his lateral epicondyle is tender to touch and has shooting pain with dynamic movement. Patient referred by Dr. Diaz to initiate treatment including hand therapy.      Quality of life: good    Patient Goals  Patient goals for therapy: increased strength, return to sport/leisure activities, increased motion, decreased pain and independence with ADLs/IADLs    Pain  Current pain ratin  At worst pain ratin  Quality: dull ache, sharp and knife-like  Relieving factors: heat  Exacerbated by: \"Working, reaching to get my hard hat, ipad etc anything that requires to reach grab and lift\"    Social Support  Lives with: spouse and young children    Employment status: working ()  Hand dominance: right    Treatments  Current treatment: injection treatment      Objective     Observations     Additional Observation Details  Patient unable to lift his arm above 90 degrees with subjective verbal pain report scale of 8/10 pain \"shooting stabbing pain, it's super uncomfortable in my rotator cuff muscle area, I can barely lift it\", therefore therapist unable to take measurement of shoulder " AROM secondary to increased pain and patient unable to sustain positioning.    Tenderness     Right Elbow   Tenderness in the lateral epicondyle.     Right Wrist/Hand   Tenderness in the lateral epicondyle.     Active Range of Motion     Left Elbow   Normal active range of motion    Right Elbow   Normal active range of motion    Left Wrist   Normal active range of motion    Right Wrist   Normal active range of motion    Left Thumb   Kapandji score: 10 degrees      Right Thumb   Kapandji score: 10 degrees    Strength/Myotome Testing     Left Wrist/Hand      (2nd hand position)     Trial 1: 110    Comments: Pronated  Strength:   115 lbs    Right Wrist/Hand      (2nd hand position)     Trial 1: 40    Comments: Pronated  Strength:   65 lbs    Additional Strength Details  For pronated  patient required assistance from therapist to hold dynamometer due to increased pain in the R shoulder joint and unable to extend his elbow and grasp dynamometer    Tests     Left Elbow   Negative Cozen's, Maudsley's and Mill's.     Right Elbow   Positive Cozen's, Maudsley's and Mill's.     Right Wrist/Hand   Positive resisted middle finger.              Precautions: Universal       Visit Tracker - NO AUTH REQ BOMN  Date IE  5/13                                                                                    PMHx:   has a past medical history of Asthma, Diabetes mellitus (HCC), Fatty liver, Hypertension, Nasal congestion, PONV (postoperative nausea and vomiting), and Sleep apnea.        Manuals HEP 5/13/2025                       Ther Ex     Education on HEP and dx x x5min   Lateral epi stretches 4 ways x x5 x 5-10 sec holds   Deep friction STM at lateral epicondyle x X4 min   Wrist flexor stretch  x X5 x 5-10 sec   Prayer stretch  x X3 x 10 sec                       Ther Act                     Modalities     MHP     Kinesiotape at R lateral epicondyle  X4 min       Assessment:   Patient tolerated session fair,  unable to perform shoulder AROM secondary to increased pain in his R shoulder joint but will be following up with orthopedic physician once schedule allows. Patient demonstrates WFL of elbow, wrist and digital AROM upon assessment today. Patients  strength tested in neutral and pronated  compared to contralateral side in which patient has deficits present in RUE  strength compared to LUE. Patient session focused on patient education on anatomy and physiology concerning current dx, techniques for decreasing deficits through HEP, and appropriate use of modalities. Patient educated on HEP to include lateral epicondylitis strategies and activity modifications as well as wrist extensor stretches with verbal instructions and handouts for patient reference. Kinesio tape was placed at the lateral epicondyle of the RUE for support, positioning and protection to facilitate improvement in symptoms. Reviewed with patient about potential adverse effects of the Kinesiotape. Patient educated on treatment plan at this time. Patient benefiting from skilled hand therapy OT to reduce deficits to improve independence with daily activities.       Plan:   Focus on HEP, A/AA/PROM, TE, TA, ortho fit and train, kinesiotape, stretching, strengthening, activity modification, modalities PRN, manual therapy to improve ability to complete daily activites with ease.  POC 5/13/2025 - 6/24/2025

## 2025-05-15 ENCOUNTER — OFFICE VISIT (OUTPATIENT)
Dept: OCCUPATIONAL THERAPY | Facility: CLINIC | Age: 40
End: 2025-05-15
Attending: ORTHOPAEDIC SURGERY
Payer: COMMERCIAL

## 2025-05-15 DIAGNOSIS — M25.521 RIGHT ELBOW PAIN: ICD-10-CM

## 2025-05-15 DIAGNOSIS — M77.11 LATERAL EPICONDYLITIS OF RIGHT ELBOW: Primary | ICD-10-CM

## 2025-05-15 PROCEDURE — 97110 THERAPEUTIC EXERCISES: CPT

## 2025-05-15 NOTE — PROGRESS NOTES
"Daily Note     Today's date: 5/15/2025  Patient name: Serafin Conklin  : 1985  MRN: [id number]  Referring provider: Pb Diaz MD  Dx:   Encounter Diagnosis     ICD-10-CM    1. Lateral epicondylitis of right elbow  M77.11       2. Right elbow pain  M25.521           Start Time: 1305  Stop Time: 1345  Total time in clinic (min): 40 minutes    Subjective: Patient reports increased irritation to the medial side of the forearm after removing kinesio tape from previous session. Patient notes increased support to the lateral epicondyle with kinesio tape placement of \"X\".      Objective: See treatment diary below      Assessment:   Patient tolerated session well. Initiated additional shoulder ROM and stretching exercises to assist with shoulder pain. Discussed with patient about following up with orthopedic surgeon or scheduling for a direct access evaluation with physical therapy for further evaluation of anterior shoulder pain if shoulder HEP doesn't not help alleviate symptoms. Reviewed appropriate body mechanics and joint protection strategies to decrease compensatory strategies of utilizing the R shoulder to complete daily ADLs secondary to R elbow pain. Performed deep transverse fricition massage (DTFM) that focuses on treating pain and inflammation from musculosketal conditions and increase range of motion; improves blood flow which helps deliver oxygen to the injury and promote healing; prevent adhesion from forming, and breaks down adhesions. Session focused on HEP education, manual, stretching, kinesio tape, range of motion, patient education and strengthening to improve functional task performance with daily activities. Patient tolerated all TE with good tolerance and minimal complaints of pain. Educated patient on potential adverse effects of use of kinesio tape and instructed patient to remove kinesio tape within 3 to 5 days of placement. Patient progressing well towards goals. Patient " "benefiting from skilled hand therapy OT to reduce deficits to improve independence with daily activities.              Plan:   Focus on HEP, A/AA/PROM, TE, TA, ortho fit and train, kinesiotape, stretching, strengthening, activity modification, modalities PRN, manual therapy to improve ability to complete daily activites with ease.  POC 5/13/2025 - 6/24/2025     Precautions: Universal       Visit Tracker - NO AUTH REQ BOMN  Date IE  5/13 5/15                                                                                   PMHx:   has a past medical history of Asthma, Diabetes mellitus (HCC), Fatty liver, Hypertension, Nasal congestion, PONV (postoperative nausea and vomiting), and Sleep apnea.        Manuals HEP 5/15/2025                       Ther Ex     Education on HEP and dx x x5min   Lateral epi stretches 4 ways x x5 x 5-10 sec holds   Deep friction STM at lateral epicondyle (DTFM) x X 8 min   Wrist flexor stretch  x X5 x 5-10 sec   Prayer stretch  x X3 x 10 sec   Seated Scapular Retraction x x10   Circular Shoulder Pendulum with Table Support  x x10   Seated Shoulder Flexion Towel Slide at Table Top x x10   Seated Shoulder Abduction Towel Slide at Table Top   x x10   Shoulder Posterior Capsule Stretch x X5 reps 10 sec hold         Ther Act                     Modalities     MHP     Kinesiotape at R lateral epicondyle \"X\" position only   X4 min     Access Code: [email or web address]  URL: [email or web address]  Date: 05/15/2025  Prepared by: Marci Membreno             "

## 2025-05-20 ENCOUNTER — OFFICE VISIT (OUTPATIENT)
Dept: OCCUPATIONAL THERAPY | Facility: CLINIC | Age: 40
End: 2025-05-20
Attending: ORTHOPAEDIC SURGERY
Payer: COMMERCIAL

## 2025-05-20 DIAGNOSIS — M77.11 LATERAL EPICONDYLITIS OF RIGHT ELBOW: Primary | ICD-10-CM

## 2025-05-20 DIAGNOSIS — M25.521 RIGHT ELBOW PAIN: ICD-10-CM

## 2025-05-20 PROCEDURE — 97032 APPL MODALITY 1+ESTIM EA 15: CPT

## 2025-05-20 PROCEDURE — 97110 THERAPEUTIC EXERCISES: CPT

## 2025-05-20 NOTE — PROGRESS NOTES
"Daily Note     Today's date: 2025  Patient name: Serafin Conklin  : 1985  MRN: [id number]  Referring provider: Pb Diaz MD  Dx:   Encounter Diagnosis     ICD-10-CM    1. Lateral epicondylitis of right elbow  M77.11       2. Right elbow pain  M25.521                      Subjective: Patient reports going to chiropractor and getting a lot of relief with an adjustment to neck and back.       Objective: See treatment diary below      Assessment: Tolerated treatment well. Patient educated on possible radicular symptoms of lateral elbow pain. Patient noting \"sore tired\" feeling in the arm after stimulation. Patient exhibited good technique with therapeutic exercises and would benefit from continued OT.            Plan:   Focus on HEP, A/AA/PROM, TE, TA, ortho fit and train, kinesiotape, stretching, strengthening, activity modification, modalities PRN, manual therapy to improve ability to complete daily activites with ease.  POC 2025 - 2025     Precautions: Universal       Visit Tracker - NO AUTH REQ BOMN  Date IE  5/13 5/15 5/20                                                                                  PMHx:   has a past medical history of Asthma, Diabetes mellitus (HCC), Fatty liver, Hypertension, Nasal congestion, PONV (postoperative nausea and vomiting), and Sleep apnea.        Manuals HEP 2025                       Ther Ex     Education on HEP and dx x x5min   Lateral epi stretches 4 ways x x5 x 5-10 sec holds   Deep friction STM at lateral epicondyle (DTFM) x X 8 min   Door jam extension x X5 10 sec each                                      Ther Act                     Modalities     MHP  X5min    stim  X10 min    Kinesiotape at R lateral epicondyle \"X\" position only   X4 min   Patient received pre modulated Electric Stimulation - continuous to Right lateral epicondyle at level (80-150HZ) to decrease pain to allow for increased ability to perform ADLs.  2in x 2in " electrode pads were used at an average intensity of 13 Cv volts. Skin was assessed pre and post tx with no adverse effects noted. Therapist attended to patient throughout the entire duration of ESTM treatment to adjust intensity as needed.          Access Code: [email or web address]  URL: [email or web address]  Date: 05/15/2025  Prepared by: Marci Membreno

## 2025-05-22 ENCOUNTER — OFFICE VISIT (OUTPATIENT)
Dept: OCCUPATIONAL THERAPY | Facility: CLINIC | Age: 40
End: 2025-05-22
Attending: ORTHOPAEDIC SURGERY
Payer: COMMERCIAL

## 2025-05-22 DIAGNOSIS — M77.11 LATERAL EPICONDYLITIS OF RIGHT ELBOW: ICD-10-CM

## 2025-05-22 DIAGNOSIS — M25.521 RIGHT ELBOW PAIN: Primary | ICD-10-CM

## 2025-05-22 PROCEDURE — 97032 APPL MODALITY 1+ESTIM EA 15: CPT

## 2025-05-22 PROCEDURE — 97110 THERAPEUTIC EXERCISES: CPT

## 2025-05-22 NOTE — PROGRESS NOTES
"Daily Note     Today's date: 2025  Patient name: Serafin Conklin  : 1985  MRN: [id number]  Referring provider: Pb Diaz MD  Dx:   Encounter Diagnosis     ICD-10-CM    1. Right elbow pain  M25.521       2. Lateral epicondylitis of right elbow  M77.11                  Subjective: Patient reports no pain at start of session, \"kinesiotape has been helping a lot, I can feel that it helps provide support to my elbow\".      Objective: See treatment diary below      Assessment: Patient tolerated treatment well able to perform all lateral epicondyle stretches well with no reports of pain or soreness. Patient noting no \"sore tired\" feeling in the arm after stimulation compared to previous session. Patient exhibited good technique with therapeutic exercises and would benefit from continued skilled OT services to aid in performance of ADLs/iADLs, works tasks with improved ability and decreased pain.            Plan:   Focus on HEP, A/AA/PROM, TE, TA, ortho fit and train, kinesiotape, stretching, strengthening, activity modification, modalities PRN, manual therapy to improve ability to complete daily activites with ease.  POC 2025 - 2025       Precautions: Universal       Visit Tracker - NO AUTH REQ BOMN  Date IE  5/13 5/15 5/20 5/22                                                                                 PMHx:   has a past medical history of Asthma, Diabetes mellitus (HCC), Fatty liver, Hypertension, Nasal congestion, PONV (postoperative nausea and vomiting), and Sleep apnea.        Manuals HEP 2025                       Ther Ex     Education on HEP and dx x x5min   Lateral epi stretches 4 ways x x5  5-10 sec holds   Deep friction STM at lateral epicondyle (DTFM) x X 8 min   Door jam extension x X5 10 sec each   Red flex bar sean twist   X10 red    Wrist flexor stretch  X3 5-10 sec each                            Ther Act                     Modalities     MHP  X5min    stim  X10 " "min    Kinesiotape at R lateral epicondyle \"X\" position only   X4 min       Patient received pre modulated Electric Stimulation - continuous to Right lateral epicondyle at level (80-150HZ) to decrease pain to allow for increased ability to perform ADLs.  2in x 2in electrode pads were used at an average intensity of 11 Cv volts. Skin was assessed pre and post tx with no adverse effects noted. Therapist attended to patient throughout the entire duration of ESTM treatment to adjust intensity as needed.        Access Code: [email or web address]  URL: [email or web address]  Date: 05/15/2025  Prepared by: Marci Membreno         "

## 2025-05-27 ENCOUNTER — OFFICE VISIT (OUTPATIENT)
Dept: OCCUPATIONAL THERAPY | Facility: CLINIC | Age: 40
End: 2025-05-27
Attending: ORTHOPAEDIC SURGERY
Payer: COMMERCIAL

## 2025-05-27 DIAGNOSIS — M77.11 LATERAL EPICONDYLITIS OF RIGHT ELBOW: Primary | ICD-10-CM

## 2025-05-27 DIAGNOSIS — M25.521 RIGHT ELBOW PAIN: ICD-10-CM

## 2025-05-27 PROCEDURE — 97110 THERAPEUTIC EXERCISES: CPT

## 2025-05-27 PROCEDURE — 97032 APPL MODALITY 1+ESTIM EA 15: CPT

## 2025-05-27 NOTE — PROGRESS NOTES
"Daily Note     Today's date: 2025  Patient name: Serafin Conklin  : 1985  MRN: [id number]  Referring provider: Pb Diaz MD  Dx:   Encounter Diagnosis     ICD-10-CM    1. Lateral epicondylitis of right elbow  M77.11       2. Right elbow pain  M25.521                    Subjective: Patient reports increased pain in his elbow when performing forearm gripping motion \"all weekend it's been really hurting, it made work this morning seem extremely tough I've been using the kinesiotape that does a great job of supporting the elbow\".      Objective: See treatment diary below      Assessment: Patient tolerated treatment fair able to perform all lateral epicondyle stretches with downgrades with elbow flexed compared to outstretched with improvement in \"burning/pain\" symptoms. Patient exhibited good technique with therapeutic exercises and would benefit from continued skilled OT services to aid in performance of ADLs/iADLs, works tasks with improved ability and decreased pain.          Plan:   Focus on HEP, A/AA/PROM, TE, TA, ortho fit and train, kinesiotape, stretching, strengthening, activity modification, modalities PRN, manual therapy to improve ability to complete daily activites with ease.  POC 2025 - 2025       Precautions: Universal       Visit Tracker - NO AUTH REQ BOMN  Date IE  5/13 5/15 5/20 5/22 5/27                                                                                PMHx:   has a past medical history of Asthma, Diabetes mellitus (HCC), Fatty liver, Hypertension, Nasal congestion, PONV (postoperative nausea and vomiting), and Sleep apnea.        Manuals HEP 2025                       Ther Ex     Education on HEP and dx x x5min   Lateral epi stretches 4 ways x x5  5-10 sec holds   Deep friction STM at lateral epicondyle (DTFM) x X 8 min   Door jam extension x X5 10 sec each   Red flex bar sean twist   X10 red    Wrist flexor stretch  X3 5-10 sec each                " "            Ther Act                     Modalities     MHP  X5min    stim  X10 min    Kinesiotape at R lateral epicondyle \"X\" position only   X4 min       Patient received pre modulated Electric Stimulation - continuous to Right lateral epicondyle at level (80-150HZ) to decrease pain to allow for increased ability to perform ADLs.  2in x 2in electrode pads were used at an average intensity of 11.5 Cv volts. Skin was assessed pre and post tx with no adverse effects noted. Therapist attended to patient throughout the entire duration of ESTM treatment to adjust intensity as needed.        Access Code: [email or web address]  URL: [email or web address]  Date: 05/15/2025  Prepared by: Marci Membreno         "

## 2025-05-29 ENCOUNTER — OFFICE VISIT (OUTPATIENT)
Dept: OCCUPATIONAL THERAPY | Facility: CLINIC | Age: 40
End: 2025-05-29
Attending: ORTHOPAEDIC SURGERY
Payer: COMMERCIAL

## 2025-05-29 ENCOUNTER — TELEPHONE (OUTPATIENT)
Age: 40
End: 2025-05-29

## 2025-05-29 DIAGNOSIS — M25.521 RIGHT ELBOW PAIN: ICD-10-CM

## 2025-05-29 DIAGNOSIS — M77.11 LATERAL EPICONDYLITIS OF RIGHT ELBOW: Primary | ICD-10-CM

## 2025-05-29 PROCEDURE — 97032 APPL MODALITY 1+ESTIM EA 15: CPT

## 2025-05-29 PROCEDURE — 97110 THERAPEUTIC EXERCISES: CPT

## 2025-05-29 NOTE — PROGRESS NOTES
"Daily Note     Today's date: 2025  Patient name: Serafin Conklin  : 1985  MRN: [id number]  Referring provider: Pb Diaz MD  Dx:   Encounter Diagnosis     ICD-10-CM    1. Lateral epicondylitis of right elbow  M77.11       2. Right elbow pain  M25.521           Start Time: 1530  Stop Time: 1615  Total time in clinic (min): 45 minutes  Subjective: Patient reports a 10/10 sharp pain with  strength, \"making me a grumpy person because I am constantly in pain\".       Objective: See treatment diary below      Assessment: Patient tolerated treatment fair. Performed deep transverse fricition massage (DTFM) that focuses on treating pain and inflammation from musculosketal conditions and increase range of motion; improves blood flow which helps deliver oxygen to the injury and promote healing; prevent adhesion from forming, and breaks down adhesions. Performed (MWM) Mulligan mobilization with movement to assist with reducing pain, increase functional activity and providing a subtle manual joint position correction which in turn biomechanically deceases pain due to dx of lateral epicondylitis. Patient exhibited good technique with therapeutic exercises and would benefit from continued skilled OT services to aid in performance of ADLs/iADLs, works tasks with improved ability and decreased pain.            Plan:   Focus on HEP, A/AA/PROM, TE, TA, ortho fit and train, kinesiotape, stretching, strengthening, activity modification, modalities PRN, manual therapy to improve ability to complete daily activites with ease.  POC 2025 - 2025       Precautions: Universal       Visit Tracker - NO AUTH REQ BOMN  Date IE  5/13 5/15 5/20 5/22 5/27 5/29                                                                               PMHx:   has a past medical history of Asthma, Diabetes mellitus (HCC), Fatty liver, Hypertension, Nasal congestion, PONV (postoperative nausea and vomiting), and Sleep " apnea.        Manuals HEP 5/29/2025                       Ther Ex     Education on HEP and dx x x5min   Lateral epi stretches 4 ways x x5  5-10 sec holds   Deep friction STM at lateral epicondyle (DTFM) x X 8 min   Door jam extension x NP   Red flex bar sean twist   NP   Wrist flexor stretch  X3 5-10 sec each   MWM  2 sets x5reps                       Ther Act                     Modalities     MHP  X5min    stim  X15 min    Kinesiotape at R lateral epicondyle counterforce position only   X4 min       Patient received pre modulated Electric Stimulation - continuous to Right lateral epicondyle at level (80-150HZ) to decrease pain to allow for increased ability to perform ADLs.  2in x 2in electrode pads were used at an average intensity of 16.5 Cv volts. Skin was assessed pre and post tx with no adverse effects noted. Therapist attended to patient throughout the entire duration of ESTM treatment to adjust intensity as needed.        Access Code: [email or web address]  URL: [email or web address]  Date: 05/15/2025  Prepared by: Marci Membreno

## 2025-05-29 NOTE — TELEPHONE ENCOUNTER
Caller: Patient    Doctor: Dr. DOMINGO appt    Reason for call:     Patient calling to cancel his physical therapy appointment, transferred to PT.    Call back#: n/a

## 2025-06-03 ENCOUNTER — OFFICE VISIT (OUTPATIENT)
Dept: OCCUPATIONAL THERAPY | Facility: CLINIC | Age: 40
End: 2025-06-03
Attending: ORTHOPAEDIC SURGERY
Payer: COMMERCIAL

## 2025-06-03 DIAGNOSIS — M77.11 LATERAL EPICONDYLITIS OF RIGHT ELBOW: Primary | ICD-10-CM

## 2025-06-03 DIAGNOSIS — M25.521 RIGHT ELBOW PAIN: ICD-10-CM

## 2025-06-03 PROCEDURE — 97110 THERAPEUTIC EXERCISES: CPT

## 2025-06-03 PROCEDURE — 97032 APPL MODALITY 1+ESTIM EA 15: CPT

## 2025-06-03 NOTE — PROGRESS NOTES
"Daily Note     Today's date: 6/3/2025  Patient name: Serafin Conklin  : 1985  MRN: [id number]  Referring provider: Pb Diaz MD  Dx:   Encounter Diagnosis     ICD-10-CM    1. Lateral epicondylitis of right elbow  M77.11       2. Right elbow pain  M25.521                Subjective: Patient reports a 4/10 sharp pain, \"I did try to use it less and rest it at work but after this I have to fix a broken dry shaft which is very heavy\".        Objective: See treatment diary below        Assessment: Patient tolerated treatment well compared to previous session. Performed deep transverse fricition massage (DTFM) that focuses on treating pain and inflammation from musculosketal conditions and increase range of motion; improves blood flow which helps deliver oxygen to the injury and promote healing; prevent adhesion from forming, and breaks down adhesions. Performed (MWM) Mulligan mobilization with movement to assist with reducing pain, increase functional activity and providing a subtle manual joint position correction which in turn biomechanically deceases pain due to dx of lateral epicondylitis. Patient exhibited good technique with therapeutic exercises and would benefit from continued skilled OT services to aid in performance of ADLs/iADLs, works tasks with improved ability and decreased pain.          Plan:   Focus on HEP, A/AA/PROM, TE, TA, ortho fit and train, kinesiotape, stretching, strengthening, activity modification, modalities PRN, manual therapy to improve ability to complete daily activites with ease.  POC 2025 - 2025           Precautions: Universal       Visit Tracker - NO AUTH REQ BOMN  Date IE  5/13 5/15 5/20 5/22 5/27 5/29    6/3                                                                           PMHx:   has a past medical history of Asthma, Diabetes mellitus (HCC), Fatty liver, Hypertension, Nasal congestion, PONV (postoperative nausea and vomiting), and Sleep " apnea.        Manuals HEP 6/3/2025                       Ther Ex     Education on HEP and dx x x5min   Lateral epi stretches 4 ways x x5  5-10 sec holds   Deep friction STM at lateral epicondyle (DTFM) x X 8 min   Door jam extension x NP   Red flex bar sean twist   X10    Wrist flexor stretch  X3 5-10 sec each   MWM  2 sets x5reps                       Ther Act                     Modalities     MHP  X5min    stim  X10 min    Kinesiotape at R lateral epicondyle counterforce position only   X4 min       Patient received pre modulated Electric Stimulation - continuous to Right lateral epicondyle at level (80-150HZ) to decrease pain to allow for increased ability to perform ADLs.  2in x 2in electrode pads were used at an average intensity of 14.5 Cv volts. Skin was assessed pre and post tx with no adverse effects noted. Therapist attended to patient throughout the entire duration of ESTM treatment to adjust intensity as needed.        Access Code: [email or web address]  URL: [email or web address]  Date: 05/15/2025  Prepared by: Marci Memrbeno

## 2025-06-04 ENCOUNTER — OFFICE VISIT (OUTPATIENT)
Dept: OBGYN CLINIC | Facility: CLINIC | Age: 40
End: 2025-06-04
Payer: COMMERCIAL

## 2025-06-04 ENCOUNTER — APPOINTMENT (OUTPATIENT)
Dept: RADIOLOGY | Facility: CLINIC | Age: 40
End: 2025-06-04
Attending: ORTHOPAEDIC SURGERY
Payer: COMMERCIAL

## 2025-06-04 VITALS — HEIGHT: 66 IN | WEIGHT: 204 LBS | BODY MASS INDEX: 32.78 KG/M2

## 2025-06-04 DIAGNOSIS — M24.129 INTERNAL DERANGEMENT OF ELBOW: ICD-10-CM

## 2025-06-04 DIAGNOSIS — M25.522 PAIN IN LEFT ELBOW: Primary | ICD-10-CM

## 2025-06-04 DIAGNOSIS — M77.12 LATERAL EPICONDYLITIS OF LEFT ELBOW: ICD-10-CM

## 2025-06-04 DIAGNOSIS — M25.522 PAIN IN LEFT ELBOW: ICD-10-CM

## 2025-06-04 DIAGNOSIS — M25.521 RIGHT ELBOW PAIN: ICD-10-CM

## 2025-06-04 DIAGNOSIS — M77.11 LATERAL EPICONDYLITIS OF RIGHT ELBOW: ICD-10-CM

## 2025-06-04 PROCEDURE — 99214 OFFICE O/P EST MOD 30 MIN: CPT | Performed by: ORTHOPAEDIC SURGERY

## 2025-06-04 PROCEDURE — 73080 X-RAY EXAM OF ELBOW: CPT

## 2025-06-04 NOTE — ASSESSMENT & PLAN NOTE
Due to on going pain and increasing weakness an MRI is warranted for further evaluation. Follow up after MRI.   Orders:    MRI elbow right wo contrast; Future

## 2025-06-04 NOTE — PROGRESS NOTES
Patient Name: Serafin Conklin      : 1985       MRN: [id number]   Encounter Provider: Pb Diaz MD   Encounter Date: 25  Encounter department: [facility]         Assessment & Plan  Lateral epicondylitis of right elbow  Due to on going pain and increasing weakness an MRI is warranted for further evaluation. Follow up after MRI.   Orders:    MRI elbow right wo contrast; Future    Internal derangement of elbow    Orders:    MRI elbow right wo contrast; Future    Pain in left elbow    Orders:    XR elbow 3+ vw left; Future    Lateral epicondylitis of left elbow  Voltaren, bracing, HEP, and OTC analgesics as needed       Right elbow pain    Orders:    MRI elbow right wo contrast; Future       Plan:  Serafin is a 39 y.o. male with right elbow lateral epicondylitis. Patient has tried and failed tennis elbow strap, night time bracing, a corticosteroid injection, and physical therapy. At this time due to failure of non operative treatments, continued pain, and increasing weakness with  and MRI is warranted for further evaluation. This was ordered today, he will follow up after MRI.     Patient has been relying on his LUE more at work due to right elbow pain and weakness. Upon review of the left elbow x-ray, a thorough history and my examination, Serafin is presenting with signs and symptoms consistent with lateral epicondylitis. He can utilize Voltaren, OTC analgesics, bracing as needed. Will defer CSI at this time.       Follow-up:  No follow-ups on file.     _____________________________________________________  CHIEF COMPLAINT:  Chief Complaint   Patient presents with    Right Elbow - Follow-up         SUBJECTIVE:  Serafin Conklin is a 39 y.o. male who presents for follow up evaluation of right elbow and initial evaluation of left elbow. Patient has lateral epicondylitis which he has tried to treat with CSI administered 25, formal physical therapy, bracing,  "activity modification and OTC analgesics. He continues with increasing pain to the lateral elbow and weakness with his  strength. He is a big  and the pain and weakness is impacting his job. He has been relying on his left upper extremity more which has caused similar symptoms. Pain to lateral elbow, no weakness to the left.     PAST MEDICAL HISTORY:  Past Medical History[1]    PAST SURGICAL HISTORY:  Past Surgical History[2]    FAMILY HISTORY:  Family History[3]    SOCIAL HISTORY:  Social History[4]    MEDICATIONS:  Current Medications[5]    ALLERGIES:  Allergies[6]    LABS:  HgA1c:   Lab Results   Component Value Date    HGBA1C 6.2 (H) 02/18/2025     BMP:   Lab Results   Component Value Date    CALCIUM 9.7 02/18/2025    K 4.2 02/18/2025    CO2 26 02/18/2025     02/18/2025    BUN 21 02/18/2025    CREATININE 0.75 02/18/2025     CBC: No components found for: \"CBC\"    _____________________________________________________  Review of Systems   Constitutional:  Negative for chills and fever.   HENT:  Negative for ear pain and sore throat.    Eyes:  Negative for pain and visual disturbance.   Respiratory:  Negative for cough and shortness of breath.    Cardiovascular:  Negative for chest pain and palpitations.   Gastrointestinal:  Negative for abdominal pain and vomiting.   Genitourinary:  Negative for dysuria and hematuria.   Musculoskeletal:  Negative for arthralgias and back pain.   Skin:  Negative for color change and rash.   Neurological:  Negative for seizures and syncope.   All other systems reviewed and are negative.       Right Elbow Exam     Tenderness   The patient is experiencing tenderness in the lateral epicondyle.     Range of Motion   The patient has normal right elbow ROM.    Tests   Varus: negative  Valgus: negative    Other   Erythema: absent  Sensation: normal  Pulse: present    Comments:  (+) pain with resisted wrist extension   Weakness with  compared to contralateral " side  (-) radial head instability  (-) ulnar nerve subluxation  Skin is warm and dry to touch with no signs of erythema, ecchymosis, or infection   Demonstrates normal shoulder, wrist, and finger motion  2+ distal radial pulse with brisk capillary refill to the fingers  Radial, median, and ulnar motor and sensory distrubution intact  Sensation to light touch intact distally         Left Elbow Exam     Tenderness   The patient is experiencing tenderness in the lateral epicondyle.     Range of Motion   The patient has normal left elbow ROM.    Tests   Varus: negative  Valgus: negative    Other   Erythema: absent  Sensation: normal  Pulse: present    Comments:  Pain with resisted wrist extension  (-) radial head instability  (-) ulnar nerve subluxation  Skin is warm and dry to touch with no signs of erythema, ecchymosis, or infection   Demonstrates normal shoulder, wrist, and finger motion  2+ distal radial pulse with brisk capillary refill to the fingers  Radial, median, and ulnar motor and sensory distrubution intact  Sensation to light touch intact distally                Physical Exam  Vitals and nursing note reviewed.   Constitutional:       Appearance: Normal appearance.   HENT:      Head: Normocephalic and atraumatic.      Right Ear: External ear normal.      Left Ear: External ear normal.     Eyes:      Extraocular Movements: Extraocular movements intact.      Conjunctiva/sclera: Conjunctivae normal.       Cardiovascular:      Rate and Rhythm: Normal rate.      Pulses: Normal pulses.   Pulmonary:      Effort: Pulmonary effort is normal.     Musculoskeletal:         General: Normal range of motion.      Cervical back: Normal range of motion and neck supple.      Comments: See ortho exam     Skin:     General: Skin is warm and dry.     Neurological:      General: No focal deficit present.      Mental Status: He is alert.     Psychiatric:         Behavior: Behavior normal.         _____________________________________________________  STUDIES REVIEWED:  I personally reviewed the pertinent images and my independent interpretation is as follows:  X-ray of the left elbow obtained today demonstrates no acute osseous abnormalities    PROCEDURES PERFORMED:  No procedures performed today.    Scribe Attestation      I,:  Vibha Ramesh am acting as a scribe while in the presence of the attending physician.:       I,:  Pb Diaz MD personally performed the services described in this documentation    as scribed in my presence.:                    [1]   Past Medical History:  Diagnosis Date    Asthma     as child    Diabetes mellitus (HCC)     Fatty liver     Hypertension     Nasal congestion     Frequently    PONV (postoperative nausea and vomiting)     Sleep apnea     5.5 apnea scale// no cpap   [2]   Past Surgical History:  Procedure Laterality Date    EPIDURAL BLOCK INJECTION N/A 11/15/2023    Procedure: L5-S1  LUMBAR epidural steroid injection (68927);  Surgeon: Lewis Pittman DO;  Location: [facility] MAIN OR;  Service: Pain Management     EPIDURAL BLOCK INJECTION N/A 4/3/2024    Procedure: L5-S1 LUMBAR EPIDURAL STEROID INJECTION;  Surgeon: Lewis Pittman DO;  Location: [facility] MAIN OR;  Service: Pain Management     WI LAPS GSTR RSTCV PX W/BYP TIFFANI-EN-Y LIMB <150 CM N/A 1/15/2024    Procedure: LAPAROSCOPIC TIFFANI-EN-Y GASTRIC BYPASS;  Surgeon: Jaime Robert MD;  Location: [facility] MAIN OR;  Service: Bariatrics    WI SEPTOPLASTY/SUBMUCOUS RESECJ W/WO CARTILAGE GRF N/A 11/28/2022    Procedure: SEPTOPLASTY;  Surgeon: Abhi Couch MD;  Location: [facility] MAIN OR;  Service: ENT    WI SUBMUCOUS RESCJ INFERIOR TURBINATE PRTL/COMPL Bilateral 11/28/2022    Procedure: TURBINECTOMY;  Surgeon: Abhi Couch MD;  Location: [facility] MAIN OR;  Service: ENT   [3]   Family History  Problem Relation Name Age of Onset    Diabetes type II Mother Oumou molina     Diabetes Mother Oumou molina      Diabetes Maternal Grandmother Alexa molina     Diabetes type II Maternal Grandmother Alexa molina     Arthritis Maternal Grandmother Alexa molina     Cancer Maternal Grandfather Regis molina         Liver cancer   [4]   Social History  Tobacco Use    Smoking status: Some Days     Types: Cigars     Start date: 2019     Last attempt to quit: 2023     Years since quittin.6     Passive exposure: Past    Smokeless tobacco: Never    Tobacco comments:     Wife just had baby not smoking right now     Pt stated stopped smoking cigars over a month ago   Vaping Use    Vaping status: Never Used   Substance Use Topics    Alcohol use: Not Currently     Comment: none    Drug use: Never   [5]   Current Outpatient Medications:     Multiple Vitamins-Iron (MULTIVITAMIN/IRON PO), Take 1 tablet by mouth in the morning, Disp: , Rfl:     VITAMIN D, CHOLECALCIFEROL, PO, Take 2 tablets by mouth in the morning, Disp: , Rfl:     ciprofloxacin-dexamethasone (CIPRODEX) otic suspension, Administer 4 drops into the left ear 2 (two) times a day (Patient not taking: Reported on 2025), Disp: 7.5 mL, Rfl: 0    methylPREDNISolone 4 MG tablet therapy pack, Use as directed on package (Patient not taking: Reported on 2025), Disp: 21 each, Rfl: 0    sildenafil (VIAGRA) 25 MG tablet, Take 25 mg by mouth daily as needed for erectile dysfunction (Patient not taking: Reported on 2025), Disp: , Rfl:     Xdemvy 0.25 % SOLN, , Disp: , Rfl:   [6]   Allergies  Allergen Reactions    Other Angioedema    Shellfish-Derived Products - Food Allergy Anaphylaxis    Penicillins Hives    Penicillin G Hives

## 2025-06-05 ENCOUNTER — OFFICE VISIT (OUTPATIENT)
Dept: OCCUPATIONAL THERAPY | Facility: CLINIC | Age: 40
End: 2025-06-05
Attending: ORTHOPAEDIC SURGERY
Payer: COMMERCIAL

## 2025-06-05 DIAGNOSIS — M77.11 LATERAL EPICONDYLITIS OF RIGHT ELBOW: ICD-10-CM

## 2025-06-05 DIAGNOSIS — M25.521 RIGHT ELBOW PAIN: Primary | ICD-10-CM

## 2025-06-05 PROCEDURE — 97110 THERAPEUTIC EXERCISES: CPT

## 2025-06-05 PROCEDURE — 97032 APPL MODALITY 1+ESTIM EA 15: CPT

## 2025-06-05 NOTE — PROGRESS NOTES
"Daily Note     Today's date: 2025  Patient name: Serafin Conklin  : 1985  MRN: [id number]  Referring provider: Pb Diaz MD  Dx:   Encounter Diagnosis     ICD-10-CM    1. Right elbow pain  M25.521       2. 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        Objective: See treatment diary below        Assessment: Patient tolerated treatment fair with moderate complaints of discomfort. Performed deep transverse friction massage (DTFM) that focuses on treating pain and inflammation from musculosketal conditions and increase range of motion; improves blood flow which helps deliver oxygen to the injury and promote healing; prevent adhesion from forming, and breaks down adhesions. Performed (MWM) Mulligan mobilization with movement to assist with reducing pain, increase functional activity and providing a subtle manual joint position correction which in turn biomechanically deceases pain due to dx of lateral epicondylitis. Kinesio tape was placed at the lateral epicondyle of R hand for support, positioning and protection to facilitate improvement in symptoms. Reviewed with patient about potential adverse effects of the Kinesiotape. Patient exhibited good technique with therapeutic exercises and would benefit from continued skilled OT services to aid in performance of ADLs/iADLs, works tasks with improved ability and decreased pain.          Plan:   Focus on HEP, A/AA/PROM, TE, TA, ortho fit and train, kinesiotape, stretching, strengthening, activity modification, modalities PRN, manual therapy to improve ability to complete daily activites with ease.  POC 2025 " - 6/24/2025           Precautions: Universal       Visit Tracker - NO AUTH REQ BOMN  Date IE  5/13 5/15 5/20 5/22 5/27 5/29    6/3 6/5                                                                          PMHx:   has a past medical history of Asthma, Diabetes mellitus (HCC), Fatty liver, Hypertension, Nasal congestion, PONV (postoperative nausea and vomiting), and Sleep apnea.        Manuals HEP 6/5/2025                       Ther Ex     Education on HEP and dx x x5min   Lateral epi stretches 4 ways x x5  5-10 sec holds   Deep friction STM at lateral epicondyle (DTFM) x X 8 min   Door jam extension x NP   Red flex bar sean twist   X10    Wrist flexor stretch  X3 5-10 sec each   MWM  2 sets x5reps                       Ther Act                     Modalities     MHP  X5min    stim  X10 min    Kinesiotape at R lateral epicondyle counterforce position only   X4 min       Patient received pre modulated Electric Stimulation - continuous to Right lateral epicondyle at level (80-150HZ) to decrease pain to allow for increased ability to perform ADLs.  2in x 2in electrode pads were used at an average intensity of 13.5 Cv volts. Skin was assessed pre and post tx with no adverse effects noted. Therapist attended to patient throughout the entire duration of ESTM treatment to adjust intensity as needed.        Access Code: [email or web address]  URL: [email or web address]  Date: 05/15/2025  Prepared by: Marci Membreno

## 2025-06-10 ENCOUNTER — OFFICE VISIT (OUTPATIENT)
Dept: OCCUPATIONAL THERAPY | Facility: CLINIC | Age: 40
End: 2025-06-10
Attending: ORTHOPAEDIC SURGERY
Payer: COMMERCIAL

## 2025-06-10 DIAGNOSIS — M77.11 LATERAL EPICONDYLITIS OF RIGHT ELBOW: ICD-10-CM

## 2025-06-10 DIAGNOSIS — M25.521 RIGHT ELBOW PAIN: Primary | ICD-10-CM

## 2025-06-10 PROCEDURE — 97110 THERAPEUTIC EXERCISES: CPT

## 2025-06-10 PROCEDURE — 97032 APPL MODALITY 1+ESTIM EA 15: CPT

## 2025-06-10 NOTE — PROGRESS NOTES
"Daily Note     Today's date: 6/10/2025  Patient name: Serafin Conklin  : 1985  MRN: [id number]  Referring provider: Pb Diaz MD  Dx:   Encounter Diagnosis     ICD-10-CM    1. Right elbow pain  M25.521       2. Lateral epicondylitis of right elbow  M77.11                    Subjective: Patient reports he will have an MRI done this weekend on , \"work was a bit better than usual but  I actually hurt myself I tried to move a grocery cart with my son it with palm down hurt so badly so I've been trying to take it easy yesterday and today\".        Objective: See treatment diary below        Assessment: Patient tolerated treatment fair with complaints of discomfort with resistive exercises such as use of flex bar sean twist exercise in which many therapeutic exercises were not performed. Performed deep transverse friction massage (DTFM) that focuses on treating pain and inflammation from musculosketal conditions and increase range of motion; improves blood flow which helps deliver oxygen to the injury and promote healing; prevent adhesion from forming, and breaks down adhesions. Kinesio tape was placed at the lateral epicondyle of R hand for support, positioning and protection to facilitate improvement in symptoms. Reviewed with patient about potential adverse effects of the Kinesiotape. Patient exhibited fair technique with therapeutic exercises and would benefit from continued skilled OT services to aid in performance of ADLs/iADLs, works tasks with improved ability and decreased pain.        Plan:   Focus on HEP, A/AA/PROM, TE, TA, ortho fit and train, kinesiotape, stretching, strengthening, activity modification, modalities PRN, manual therapy to improve ability to complete daily activites with ease.  POC 2025 - 2025         Precautions: Universal       Visit Tracker - NO AUTH REQ BOMN  Date IE  5/13 5/15 5/20 5/22 5/27 5/29    6/3 6/5 6/10 RE                                    "                                     PMHx:   has a past medical history of Asthma, Diabetes mellitus (HCC), Fatty liver, Hypertension, Nasal congestion, PONV (postoperative nausea and vomiting), and Sleep apnea.        Manuals HEP 6/10/2025                       Ther Ex     Education on HEP and dx x x5min   Lateral epi stretches 4 ways x x5  5-10 sec holds   Deep friction STM at lateral epicondyle (DTFM) x X 8 min   Door jam extension x 3 x 10 sec   Red flex bar sean twist   X10 (NP)   Wrist flexor stretch  X3 5-10 sec each   MWM  2 sets x5 reps (NP)                       Ther Act                     Modalities     MHP  X5min    stim  X10 min    Kinesiotape at R lateral epicondyle counterforce position only   X4 min       Patient received pre modulated Electric Stimulation - continuous to Right lateral epicondyle at level (80-150HZ) to decrease pain to allow for increased ability to perform ADLs.  2in x 2in electrode pads were used at an average intensity of 12.5 Cv volts. Skin was assessed pre and post tx with no adverse effects noted. Therapist attended to patient throughout the entire duration of ESTM treatment to adjust intensity as needed.        Access Code: [email or web address]  URL: [email or web address]  Date: 05/15/2025  Prepared by: Marci Membreno

## 2025-06-12 ENCOUNTER — APPOINTMENT (OUTPATIENT)
Dept: OCCUPATIONAL THERAPY | Facility: CLINIC | Age: 40
End: 2025-06-12
Attending: ORTHOPAEDIC SURGERY
Payer: COMMERCIAL

## 2025-06-15 ENCOUNTER — HOSPITAL ENCOUNTER (OUTPATIENT)
Dept: RADIOLOGY | Facility: HOSPITAL | Age: 40
Discharge: HOME | End: 2025-06-15
Attending: ORTHOPAEDIC SURGERY
Payer: COMMERCIAL

## 2025-06-15 DIAGNOSIS — M77.11 LATERAL EPICONDYLITIS OF RIGHT ELBOW: ICD-10-CM

## 2025-06-15 DIAGNOSIS — M25.521 RIGHT ELBOW PAIN: ICD-10-CM

## 2025-06-15 DIAGNOSIS — M24.129 INTERNAL DERANGEMENT OF ELBOW: ICD-10-CM

## 2025-06-15 PROCEDURE — 73221 MRI JOINT UPR EXTREM W/O DYE: CPT

## 2025-06-20 ENCOUNTER — OFFICE VISIT (OUTPATIENT)
Age: 40
End: 2025-06-20
Payer: COMMERCIAL

## 2025-06-20 ENCOUNTER — OFFICE VISIT (OUTPATIENT)
Dept: FAMILY MEDICINE CLINIC | Facility: CLINIC | Age: 40
End: 2025-06-20
Payer: COMMERCIAL

## 2025-06-20 VITALS — BODY MASS INDEX: 32.62 KG/M2 | WEIGHT: 203 LBS | HEIGHT: 66 IN

## 2025-06-20 VITALS
WEIGHT: 203 LBS | BODY MASS INDEX: 32.62 KG/M2 | OXYGEN SATURATION: 96 % | RESPIRATION RATE: 12 BRPM | DIASTOLIC BLOOD PRESSURE: 72 MMHG | TEMPERATURE: 100.3 F | SYSTOLIC BLOOD PRESSURE: 116 MMHG | HEIGHT: 66 IN | HEART RATE: 95 BPM

## 2025-06-20 DIAGNOSIS — I10 PRIMARY HYPERTENSION: ICD-10-CM

## 2025-06-20 DIAGNOSIS — M77.11 LATERAL EPICONDYLITIS OF RIGHT ELBOW: Primary | ICD-10-CM

## 2025-06-20 DIAGNOSIS — J03.90 ACUTE TONSILLITIS, UNSPECIFIED ETIOLOGY: Primary | ICD-10-CM

## 2025-06-20 DIAGNOSIS — E11.65 TYPE 2 DIABETES MELLITUS WITH HYPERGLYCEMIA, WITHOUT LONG-TERM CURRENT USE OF INSULIN (HCC): ICD-10-CM

## 2025-06-20 PROBLEM — E11.00 TYPE 2 DIABETES MELLITUS WITH HYPEROSMOLARITY WITHOUT NONKETOTIC HYPERGLYCEMIC-HYPEROSMOLAR COMA (NKHHC) (HCC): Status: RESOLVED | Noted: 2025-02-20 | Resolved: 2025-06-20

## 2025-06-20 PROCEDURE — 99214 OFFICE O/P EST MOD 30 MIN: CPT | Performed by: ORTHOPAEDIC SURGERY

## 2025-06-20 PROCEDURE — 99214 OFFICE O/P EST MOD 30 MIN: CPT | Performed by: STUDENT IN AN ORGANIZED HEALTH CARE EDUCATION/TRAINING PROGRAM

## 2025-06-20 RX ORDER — CEFDINIR 300 MG/1
300 CAPSULE ORAL EVERY 12 HOURS SCHEDULED
Qty: 14 CAPSULE | Refills: 0 | Status: SHIPPED | OUTPATIENT
Start: 2025-06-20 | End: 2025-06-27

## 2025-06-20 RX ORDER — PREDNISONE 20 MG/1
10 TABLET ORAL DAILY
Qty: 7 TABLET | Refills: 0 | Status: SHIPPED | OUTPATIENT
Start: 2025-06-20

## 2025-06-20 RX ORDER — LIDOCAINE HYDROCHLORIDE 20 MG/ML
15 SOLUTION OROPHARYNGEAL 4 TIMES DAILY PRN
Qty: 100 ML | Refills: 0 | Status: SHIPPED | OUTPATIENT
Start: 2025-06-20

## 2025-06-20 NOTE — PROGRESS NOTES
Patient Name: Serafin Conklin      : 1985       MRN: [id number]   Encounter Provider: Pb Diaz MD   Encounter Date: 25  Encounter department: [facility] [place]         Assessment & Plan  Lateral epicondylitis of right elbow  MRI right elbow reviewed with the patient.  We discussed surgical vs non-surgical treatment options.   We discussed PRP under ultrasound guidance which could be performed by Dr. Sarabia. Explained this is not covered by insurance. We discussed toradol injection in clinic today. Too soon for repeat CSI.  We discussed monitoring with current management.   We discussed surgical intervention including common extensor tendon repair and debridement of the tendonitis tissue. Explained there are risks to surgery including infection, bleeding, continued pain and disability.  Research shows lateral epicondylitis improves with time.   Recent OT notes reviewed.  Patient wishes to discuss cost with his wife. A consultation was placed with Dr. Sarabia.              Follow-up:  No follow-ups on file.     _____________________________________________________  CHIEF COMPLAINT:  Chief Complaint   Patient presents with   • Right Elbow - Follow-up         SUBJECTIVE:  Serafin Conklin is a 39 y.o. male who presents for follow-up and MRI review of right elbow lateral epicondylitis. This has been going on for 3 months. Today, patient reports no change in his symptoms. Pain  He reports pain at the lateral epicondyle and dorsal aspect of the wrist.  This pain is made worse with extension of the wrist or if he bumps the elbow.  He works as a heavy machine .  He has tried 5 weeks of occupational therapy twice per week, counterforce strap with activity, cock up wrist brace at rest, topical lidocaine patch, KT tape, heat without significant benefit. Had right elbow CSI 2025 without significant benefit.      PAST MEDICAL HISTORY:  Past Medical History[1]    PAST  "SURGICAL HISTORY:  Past Surgical History[2]    FAMILY HISTORY:  Family History[3]    SOCIAL HISTORY:  Social History[4]    MEDICATIONS:  Current Medications[5]    ALLERGIES:  Allergies[6]    LABS:  HgA1c:   Lab Results   Component Value Date    HGBA1C 6.2 (H) 02/18/2025     BMP:   Lab Results   Component Value Date    CALCIUM 9.7 02/18/2025    K 4.2 02/18/2025    CO2 26 02/18/2025     02/18/2025    BUN 21 02/18/2025    CREATININE 0.75 02/18/2025     CBC: No components found for: \"CBC\"    _____________________________________________________  Review of Systems   Constitutional:  Negative for chills and fever.   HENT:  Negative for ear pain and sore throat.    Eyes:  Negative for pain and visual disturbance.   Respiratory:  Negative for cough and shortness of breath.    Cardiovascular:  Negative for chest pain and palpitations.   Gastrointestinal:  Negative for abdominal pain and vomiting.   Genitourinary:  Negative for dysuria and hematuria.   Musculoskeletal:  Positive for arthralgias. Negative for back pain.   Skin:  Negative for color change and rash.   Neurological:  Negative for seizures and syncope.   All other systems reviewed and are negative.       Right Elbow Exam     Tenderness   The patient is experiencing tenderness in the lateral epicondyle.     Range of Motion   The patient has normal right elbow ROM.    Muscle Strength   Pronation:  5/5   Supination:  5/5     Tests   Varus: negative  Valgus: negative  Tinel's sign (cubital tunnel): negative    Other   Erythema: absent  Scars: absent  Sensation: normal  Pulse: present    Comments:  Skin intact.  No swelling.    Full wrist range of motion with pain with wrist extension.  Full composite fist.             Physical Exam  Vitals reviewed.   Constitutional:       Appearance: Normal appearance.   HENT:      Head: Normocephalic.      Nose: Nose normal.     Eyes:      Conjunctiva/sclera: Conjunctivae normal.       Skin:     General: Skin is warm and dry. "     Neurological:      Mental Status: He is alert and oriented to person, place, and time.     Psychiatric:         Mood and Affect: Mood normal.        _____________________________________________________  STUDIES REVIEWED:  I personally reviewed the pertinent images and my independent interpretation is as follows:    MRI of the right elbow from 5/16/2025 was reviewed which demonstrates mild common extensor tendinosis with a partial interstitial tear.      PROCEDURES PERFORMED:  No procedures performed today.    Scribe Attestation    I,:  Nhung Alexander am acting as a scribe while in the presence of the attending physician.:       I,:  Pb Diaz MD personally performed the services described in this documentation    as scribed in my presence.:                   [1]  Past Medical History:  Diagnosis Date   • Asthma     as child   • Diabetes mellitus (HCC)    • Fatty liver    • Hypertension    • Nasal congestion     Frequently   • PONV (postoperative nausea and vomiting)    • Sleep apnea     5.5 apnea scale// no cpap   [2]  Past Surgical History:  Procedure Laterality Date   • EPIDURAL BLOCK INJECTION N/A 11/15/2023    Procedure: L5-S1  LUMBAR epidural steroid injection (78731);  Surgeon: Lewis Pittman DO;  Location: [facility] MAIN OR;  Service: Pain Management    • EPIDURAL BLOCK INJECTION N/A 4/3/2024    Procedure: L5-S1 LUMBAR EPIDURAL STEROID INJECTION;  Surgeon: Lewis Pittman DO;  Location: [facility] MAIN OR;  Service: Pain Management    • HI LAPS GSTR RSTCV PX W/BYP TIFFANI-EN-Y LIMB <150 CM N/A 1/15/2024    Procedure: LAPAROSCOPIC TIFFANI-EN-Y GASTRIC BYPASS;  Surgeon: Jaime Robert MD;  Location: [facility] MAIN OR;  Service: Bariatrics   • HI SEPTOPLASTY/SUBMUCOUS RESECJ W/WO CARTILAGE GRF N/A 11/28/2022    Procedure: SEPTOPLASTY;  Surgeon: Abhi Couch MD;  Location: [facility] MAIN OR;  Service: ENT   • HI SUBMUCOUS RESCJ INFERIOR TURBINATE PRTL/COMPL Bilateral 11/28/2022    Procedure: TURBINECTOMY;   Surgeon: Abhi Couch MD;  Location: [facility]IN OR;  Service: ENT   [3]  Family History  Problem Relation Name Age of Onset   • Diabetes type II Mother Oumou molina    • Diabetes Mother Oumou molina    • Diabetes Maternal Grandmother Alexa molina    • Diabetes type II Maternal Grandmother Alexa molina    • Arthritis Maternal Grandmother Alexa molina    • Hypertension Maternal Grandmother Alexa molina    • Cancer Maternal Grandfather Regis molina         Liver cancer   [4]  Social History  Tobacco Use   • Smoking status: Some Days     Types: Cigars     Start date: 2019     Last attempt to quit: 2023     Years since quittin.7     Passive exposure: Past   • Smokeless tobacco: Never   • Tobacco comments:     Wife just had baby not smoking right now     Pt stated stopped smoking cigars over a month ago   Vaping Use   • Vaping status: Never Used   Substance Use Topics   • Alcohol use: Not Currently     Comment: none   • Drug use: Never   [5]    Current Outpatient Medications:   •  cefdinir (OMNICEF) 300 mg capsule, Take 1 capsule (300 mg total) by mouth every 12 (twelve) hours for 7 days, Disp: 14 capsule, Rfl: 0  •  Lidocaine Viscous HCl (XYLOCAINE) 2 % mucosal solution, Swish and spit 15 mL 4 (four) times a day as needed for mouth pain or discomfort, Disp: 100 mL, Rfl: 0  •  Multiple Vitamins-Iron (MULTIVITAMIN/IRON PO), Take 1 tablet by mouth in the morning, Disp: , Rfl:   •  predniSONE 20 mg tablet, Take 0.5 tablets (10 mg total) by mouth daily, Disp: 7 tablet, Rfl: 0  •  sildenafil (VIAGRA) 25 MG tablet, Take 25 mg by mouth daily as needed for erectile dysfunction (Patient not taking: Reported on 2025), Disp: , Rfl:   •  VITAMIN D, CHOLECALCIFEROL, PO, Take 2 tablets by mouth in the morning, Disp: , Rfl:   •  Xdemvy 0.25 % SOLN, , Disp: , Rfl: [6]  Allergies  Allergen Reactions   • Other Angioedema   • Shellfish-Derived Products - Food Allergy Anaphylaxis   • Penicillins  Hives   • Penicillin G Hives

## 2025-06-20 NOTE — ASSESSMENT & PLAN NOTE
Well-controlled off medical therapy at this time  Lab Results   Component Value Date    HGBA1C 6.2 (H) 02/18/2025

## 2025-06-20 NOTE — ASSESSMENT & PLAN NOTE
MRI right elbow reviewed with the patient.  We discussed surgical vs non-surgical treatment options.   We discussed PRP under ultrasound guidance which could be performed by Dr. Sarabia. Explained this is not covered by insurance. We discussed toradol injection in clinic today. Too soon for repeat CSI.  We discussed monitoring with current management.   We discussed surgical intervention including common extensor tendon repair and debridement of the tendonitis tissue. Explained there are risks to surgery including infection, bleeding, continued pain and disability.  Research shows lateral epicondylitis improves with time.   Recent OT notes reviewed.  Patient wishes to discuss cost with his wife. A consultation was placed with Dr. Sarabia.

## 2025-06-20 NOTE — PROGRESS NOTES
Name: Serafin Conklin      : 1985      MRN: [id number]  Encounter Provider: Dejan Jeffrey DO  Encounter Date: 2025   Encounter department: [facility]  :  Assessment & Plan  Acute tonsillitis, unspecified etiology  Acute tonsillitis, recommend cefdinir, patient does have a penicillin allergy, low severity with hives, we will be very careful with this medication, will also add lidocaine viscous and low-dose steroid to help relieve inflammatory state, symptoms worsen or not improving reevaluation recommended.  Orders:  •  cefdinir (OMNICEF) 300 mg capsule; Take 1 capsule (300 mg total) by mouth every 12 (twelve) hours for 7 days  •  Lidocaine Viscous HCl (XYLOCAINE) 2 % mucosal solution; Swish and spit 15 mL 4 (four) times a day as needed for mouth pain or discomfort  •  predniSONE 20 mg tablet; Take 0.5 tablets (10 mg total) by mouth daily    Type 2 diabetes mellitus with hyperglycemia, without long-term current use of insulin (HCC)  Well-controlled off medical therapy at this time  Lab Results   Component Value Date    HGBA1C 6.2 (H) 2025            Primary hypertension  Blood pressure is well-controlled             Tobacco Cessation Counseling: Tobacco cessation counseling was provided. The patient is sincerely urged to quit consumption of tobacco. He is ready to quit tobacco.       History of Present Illness   Acute care visit    Fever  Associated symptoms include congestion and coughing. Pertinent negatives include no abdominal pain, arthralgias, chest pain, chills, fever, rash, sore throat or vomiting.     Review of Systems   Constitutional:  Negative for chills and fever.   HENT:  Positive for congestion and postnasal drip. Negative for ear pain and sore throat.    Eyes:  Negative for pain and visual disturbance.   Respiratory:  Positive for cough. Negative for shortness of breath and wheezing.    Cardiovascular:  Negative for chest pain and palpitations.  "  Gastrointestinal:  Negative for abdominal pain and vomiting.   Genitourinary:  Negative for dysuria and hematuria.   Musculoskeletal:  Negative for arthralgias and back pain.   Skin:  Negative for color change and rash.   Neurological:  Negative for seizures and syncope.   Psychiatric/Behavioral:  Negative for agitation, behavioral problems and confusion.    All other systems reviewed and are negative.      Objective   /72 (BP Location: Left arm, Patient Position: Sitting, Cuff Size: Large)   Pulse 95   Temp 100.3 °F (37.9 °C) (Temporal)   Resp 12   Ht 5' 6\" (1.676 m)   Wt 92.1 kg (203 lb)   SpO2 96%   BMI 32.77 kg/m²      Physical Exam  Vitals and nursing note reviewed.   Constitutional:       General: He is not in acute distress.     Appearance: He is well-developed.   HENT:      Head: Normocephalic and atraumatic.     Eyes:      General: No scleral icterus.     Conjunctiva/sclera: Conjunctivae normal.       Cardiovascular:      Rate and Rhythm: Normal rate and regular rhythm.      Pulses: Normal pulses.      Heart sounds: No murmur heard.  Pulmonary:      Effort: Pulmonary effort is normal. No respiratory distress.      Breath sounds: Normal breath sounds.   Abdominal:      Palpations: Abdomen is soft.      Tenderness: There is no abdominal tenderness.     Musculoskeletal:         General: No swelling. Normal range of motion.      Cervical back: Neck supple.     Skin:     General: Skin is warm and dry.      Capillary Refill: Capillary refill takes less than 2 seconds.      Coloration: Skin is not jaundiced.     Neurological:      General: No focal deficit present.      Mental Status: He is alert and oriented to person, place, and time. Mental status is at baseline.     Psychiatric:         Mood and Affect: Mood normal.         Behavior: Behavior normal.         "

## 2025-07-17 ENCOUNTER — OFFICE VISIT (OUTPATIENT)
Dept: OBGYN CLINIC | Facility: CLINIC | Age: 40
End: 2025-07-17
Payer: COMMERCIAL

## 2025-07-17 VITALS — HEIGHT: 66 IN | WEIGHT: 203 LBS | BODY MASS INDEX: 32.62 KG/M2

## 2025-07-17 DIAGNOSIS — M77.11 LATERAL EPICONDYLITIS OF RIGHT ELBOW: Primary | ICD-10-CM

## 2025-07-17 PROCEDURE — 99213 OFFICE O/P EST LOW 20 MIN: CPT | Performed by: ORTHOPAEDIC SURGERY

## 2025-07-17 NOTE — PROGRESS NOTES
"Patient Name: Serafin Conklin      : 1985       MRN: [id number]   Encounter Provider: Sebastián Sarabia DO   Encounter Date: 25  Encounter department: [facility] [place]         Assessment & Plan  Lateral epicondylitis of right elbow  Serafin has right-sided elbow pain consistent with lateral epicondylitis which has not responded conservative measures thus far.  We discussed at length risks and benefits of PRP injection.  I informed him that this will clear rate and inflammatory reaction at the location and he should probably consider taking a few days off of work if not 1 week in time to allow for full benefit.  He was agreeable to this plan.  We will schedule him for localized PRP injection in the coming few weeks.              Follow-up:  No follow-ups on file.     _____________________________________________________  CHIEF COMPLAINT:  Chief Complaint   Patient presents with    Right Elbow - Pain       Height 5' 6\" (1.676 m), weight 92.1 kg (203 lb).  Pain Score:   5 (can reach to 5 or higher depending on activity)      SUBJECTIVE:  Serafin Conklin is a 39 y.o. male who presents to the office for consultation for potential PRP injection.  He has a history of lateral epicondylitis on the right elbow.  He was referred to see me by Dr. Diaz after ongoing lateral epicondylar pain and no improvement with conservative measures including occupational therapy and localized cortisone injection.  Recently an MRI was obtained showing a interstitial tear at the common extensor tendon without large tear present.  He was interested in PRP and was referred to talk about options today.  He has not wanted proceed with surgery.  Today he has aching throbbing at the lateral aspect of the elbow that worsens with lifting or gripping objects.  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    PAST MEDICAL HISTORY:  Past Medical History[1]    PAST SURGICAL " HISTORY:  Past Surgical History[2]    FAMILY HISTORY:  Family History[3]    SOCIAL HISTORY:  Social History[4]    MEDICATIONS:  Current Medications[5]    ALLERGIES:  Allergies[6]      _____________________________________________________  Review of Systems     Right Elbow Exam     Tenderness   The patient is experiencing tenderness in the lateral epicondyle.     Range of Motion   Extension:  normal   Flexion:  normal   Pronation:  normal   Supination:  normal     Other   Erythema: absent  Sensation: normal  Pulse: present             Physical Exam  Vitals reviewed.   Constitutional:       Appearance: He is well-developed.   HENT:      Head: Normocephalic and atraumatic.     Eyes:      Conjunctiva/sclera: Conjunctivae normal.      Pupils: Pupils are equal, round, and reactive to light.       Cardiovascular:      Rate and Rhythm: Normal rate.      Pulses: Normal pulses.   Pulmonary:      Effort: Pulmonary effort is normal. No respiratory distress.     Musculoskeletal:      Cervical back: Normal range of motion and neck supple.      Comments: As noted in HPI     Skin:     General: Skin is warm and dry.     Neurological:      General: No focal deficit present.      Mental Status: He is alert and oriented to person, place, and time.     Psychiatric:         Mood and Affect: Mood normal.         Behavior: Behavior normal.        _____________________________________________________  STUDIES REVIEWED:  I personally reviewed the pertinent images and my independent interpretation is as follows:  MRI of the right elbow from 6/15/2025 demonstrates interstitial tearing of common extensor tendon.    PROCEDURES PERFORMED:  Procedures        This document was created using speech voice recognition software.   Grammatical errors, random word insertions, pronoun errors, and incomplete sentences are an occasional consequence of this system due to software limitations, ambient noise, and hardware issues.   Any formal questions or  concerns about content, text, or information contained within the body of this dictation should be directly addressed to the provider for clarification.         [1]   Past Medical History:  Diagnosis Date    Asthma     as child    Diabetes mellitus (HCC)     Fatty liver     Hypertension     Nasal congestion     Frequently    PONV (postoperative nausea and vomiting)     Sleep apnea     5.5 apnea scale// no cpap   [2]   Past Surgical History:  Procedure Laterality Date    EPIDURAL BLOCK INJECTION N/A 11/15/2023    Procedure: L5-S1  LUMBAR epidural steroid injection (83839);  Surgeon: Lewis Pittman DO;  Location: [facility] MAIN OR;  Service: Pain Management     EPIDURAL BLOCK INJECTION N/A 4/3/2024    Procedure: L5-S1 LUMBAR EPIDURAL STEROID INJECTION;  Surgeon: Lewis Pittman DO;  Location: [facility] MAIN OR;  Service: Pain Management     MA LAPS GSTR RSTCV PX W/BYP TIFFANI-EN-Y LIMB <150 CM N/A 1/15/2024    Procedure: LAPAROSCOPIC TIFFANI-EN-Y GASTRIC BYPASS;  Surgeon: Jaime Robert MD;  Location: [facility] MA[facility];  Service: Bariatrics    MA SEPTOPLASTY/SUBMUCOUS RESECJ W/WO CARTILAGE GRF N/A 11/28/2022    Procedure: SEPTOPLASTY;  Surgeon: Abhi Couch MD;  Location: [facility]IN OR;  Service: ENT    MA SUBMUCOUS RESCJ INFERIOR TURBINATE PRTL/COMPL Bilateral 11/28/2022    Procedure: TURBINECTOMY;  Surgeon: Abhi Couch MD;  Location: [facility] MAIN OR;  Service: ENT   [3]   Family History  Problem Relation Name Age of Onset    Diabetes type II Mother Oumou diaso     Diabetes Mother Oumou diaso     Diabetes Maternal Grandmother Alexa diaso     Diabetes type II Maternal Grandmother Alexa meadmolina     Arthritis Maternal Grandmother Alexa molina     Hypertension Maternal Grandmother Alexa molina     Cancer Maternal Grandfather Regis molina         Liver cancer   [4]   Social History  Tobacco Use    Smoking status: Some Days     Types: Cigars     Start date: 6/11/2019     Last attempt to quit:  2023     Years since quittin.8     Passive exposure: Past    Smokeless tobacco: Never    Tobacco comments:     Wife just had baby not smoking right now     Pt stated stopped smoking cigars over a month ago   Vaping Use    Vaping status: Never Used   Substance Use Topics    Alcohol use: Not Currently     Comment: none    Drug use: Never   [5]   Current Outpatient Medications:     Multiple Vitamins-Iron (MULTIVITAMIN/IRON PO), Take 1 tablet by mouth in the morning, Disp: , Rfl:     VITAMIN D, CHOLECALCIFEROL, PO, Take 2 tablets by mouth in the morning, Disp: , Rfl:     Lidocaine Viscous HCl (XYLOCAINE) 2 % mucosal solution, Swish and spit 15 mL 4 (four) times a day as needed for mouth pain or discomfort (Patient not taking: Reported on 2025), Disp: 100 mL, Rfl: 0    predniSONE 20 mg tablet, Take 0.5 tablets (10 mg total) by mouth daily (Patient not taking: Reported on 2025), Disp: 7 tablet, Rfl: 0    sildenafil (VIAGRA) 25 MG tablet, Take 25 mg by mouth daily as needed for erectile dysfunction (Patient not taking: Reported on 2025), Disp: , Rfl:     Xdemvy 0.25 % SOLN, , Disp: , Rfl:   [6]   Allergies  Allergen Reactions    Other Angioedema    Shellfish-Derived Products - Food Allergy Anaphylaxis    Penicillins Hives    Penicillin G Hives

## 2025-07-17 NOTE — ASSESSMENT & PLAN NOTE
Serafin has right-sided elbow pain consistent with lateral epicondylitis which has not responded conservative measures thus far.  We discussed at length risks and benefits of PRP injection.  I informed him that this will clear rate and inflammatory reaction at the location and he should probably consider taking a few days off of work if not 1 week in time to allow for full benefit.  He was agreeable to this plan.  We will schedule him for localized PRP injection in the coming few weeks.

## 2025-07-21 ENCOUNTER — PATIENT MESSAGE (OUTPATIENT)
Dept: OBGYN CLINIC | Facility: CLINIC | Age: 40
End: 2025-07-21

## 2025-07-23 ENCOUNTER — TELEPHONE (OUTPATIENT)
Dept: OBGYN CLINIC | Facility: CLINIC | Age: 40
End: 2025-07-23

## 2025-07-23 NOTE — PATIENT COMMUNICATION
Caller: Patients wife Aurora    Doctor: Dr. Sarabia    Reason for call: would like to reschedule his PRP injection appointment on 7/31  Please advise     Call back#: [phone]

## 2025-07-23 NOTE — TELEPHONE ENCOUNTER
Pt sent in message to reschedule , but there was no availability for the day he was looking for. He will keep 7/31

## 2025-07-30 ENCOUNTER — TELEPHONE (OUTPATIENT)
Age: 40
End: 2025-07-30

## 2025-07-30 DIAGNOSIS — J01.00 ACUTE NON-RECURRENT MAXILLARY SINUSITIS: Primary | ICD-10-CM

## 2025-07-30 RX ORDER — CEFDINIR 300 MG/1
300 CAPSULE ORAL EVERY 12 HOURS SCHEDULED
Qty: 14 CAPSULE | Refills: 0 | Status: SHIPPED | OUTPATIENT
Start: 2025-07-30 | End: 2025-08-07 | Stop reason: SDUPTHER

## 2025-07-30 RX ORDER — METHYLPREDNISOLONE 4 MG/1
TABLET ORAL
Qty: 21 EACH | Refills: 0 | Status: SHIPPED | OUTPATIENT
Start: 2025-07-30 | End: 2025-08-07

## 2025-08-07 ENCOUNTER — OFFICE VISIT (OUTPATIENT)
Dept: FAMILY MEDICINE CLINIC | Facility: CLINIC | Age: 40
End: 2025-08-07
Payer: COMMERCIAL

## 2025-08-07 ENCOUNTER — PROCEDURE VISIT (OUTPATIENT)
Dept: OBGYN CLINIC | Facility: CLINIC | Age: 40
End: 2025-08-07
Payer: COMMERCIAL

## 2025-08-07 VITALS
TEMPERATURE: 98 F | DIASTOLIC BLOOD PRESSURE: 80 MMHG | OXYGEN SATURATION: 98 % | HEIGHT: 66 IN | BODY MASS INDEX: 33.27 KG/M2 | SYSTOLIC BLOOD PRESSURE: 122 MMHG | WEIGHT: 207 LBS | RESPIRATION RATE: 14 BRPM | HEART RATE: 84 BPM

## 2025-08-07 DIAGNOSIS — I10 PRIMARY HYPERTENSION: ICD-10-CM

## 2025-08-07 DIAGNOSIS — J01.00 ACUTE NON-RECURRENT MAXILLARY SINUSITIS: Primary | ICD-10-CM

## 2025-08-07 DIAGNOSIS — M77.11 LATERAL EPICONDYLITIS OF RIGHT ELBOW: Primary | ICD-10-CM

## 2025-08-07 DIAGNOSIS — E11.65 TYPE 2 DIABETES MELLITUS WITH HYPERGLYCEMIA, WITHOUT LONG-TERM CURRENT USE OF INSULIN (HCC): ICD-10-CM

## 2025-08-07 PROCEDURE — 76942 ECHO GUIDE FOR BIOPSY: CPT | Performed by: ORTHOPAEDIC SURGERY

## 2025-08-07 PROCEDURE — 99213 OFFICE O/P EST LOW 20 MIN: CPT | Performed by: ORTHOPAEDIC SURGERY

## 2025-08-07 PROCEDURE — 99214 OFFICE O/P EST MOD 30 MIN: CPT | Performed by: STUDENT IN AN ORGANIZED HEALTH CARE EDUCATION/TRAINING PROGRAM

## 2025-08-07 PROCEDURE — 20551 NJX 1 TENDON ORIGIN/INSJ: CPT | Performed by: ORTHOPAEDIC SURGERY

## 2025-08-07 RX ORDER — CEFDINIR 300 MG/1
300 CAPSULE ORAL EVERY 12 HOURS SCHEDULED
Qty: 14 CAPSULE | Refills: 0 | Status: SHIPPED | OUTPATIENT
Start: 2025-08-07 | End: 2025-08-14

## 2025-08-07 RX ORDER — LIDOCAINE HYDROCHLORIDE 10 MG/ML
2 INJECTION, SOLUTION INFILTRATION; PERINEURAL
Status: COMPLETED | OUTPATIENT
Start: 2025-08-07 | End: 2025-08-07

## 2025-08-07 RX ORDER — PREDNISONE 20 MG/1
20 TABLET ORAL DAILY
Qty: 7 TABLET | Refills: 0 | Status: SHIPPED | OUTPATIENT
Start: 2025-08-07

## 2025-08-07 RX ORDER — FLUTICASONE PROPIONATE 50 MCG
2 SPRAY, SUSPENSION (ML) NASAL DAILY
Qty: 16 G | Refills: 0 | Status: SHIPPED | OUTPATIENT
Start: 2025-08-07

## 2025-08-07 RX ADMIN — LIDOCAINE HYDROCHLORIDE 2 ML: 10 INJECTION, SOLUTION INFILTRATION; PERINEURAL at 13:00

## (undated) DEVICE — GLOVE SRG BIOGEL 7

## (undated) DEVICE — SUTURING DEVICE: Brand: ENDO STITCH

## (undated) DEVICE — TRAY EPIDURAL PERIFIX 20GA X 3.5IN TUOHY 8ML

## (undated) DEVICE — SPLINT 1527005 10PK PAIR NASAL STD THICK

## (undated) DEVICE — GLOVE SRG BIOGEL ECLIPSE 7.5

## (undated) DEVICE — ASTOUND IMPERVIOUS SURGICAL GOWN: Brand: CONVERTORS

## (undated) DEVICE — NEEDLE TUOHY 20G X 6 IN

## (undated) DEVICE — PLASTIC ADHESIVE BANDAGE: Brand: CURITY

## (undated) DEVICE — SUT CHROMIC 4-0 RB-1 27 IN U203H

## (undated) DEVICE — TROCAR: Brand: KII FIOS FIRST ENTRY

## (undated) DEVICE — TUBING SMOKE EVAC W/FILTRATION DEVICE PLUMEPORT ACTIV

## (undated) DEVICE — RADIOLOGY STERILE LABELS: Brand: CENTURION

## (undated) DEVICE — IV SET EXT SM BORE CARESITE 8IN

## (undated) DEVICE — INTENDED FOR TISSUE SEPARATION, AND OTHER PROCEDURES THAT REQUIRE A SHARP SURGICAL BLADE TO PUNCTURE OR CUT.: Brand: BARD-PARKER SAFETY BLADES SIZE 11, STERILE

## (undated) DEVICE — ENDOPATH XCEL BLADELESS TROCARS WITH STABILITY SLEEVES: Brand: ENDOPATH XCEL

## (undated) DEVICE — LIGACLIP 10-M/L, 10MM ENDOSCOPIC ROTATING MULTIPLE CLIP APPLIERS: Brand: LIGACLIP

## (undated) DEVICE — 10 MM BABCOCKS WITH RATCHET HANDLES: Brand: ENDOPATH

## (undated) DEVICE — INSUFFLATION NEEDLE TO ESTABLISH PNEUMOPERITONEUM.: Brand: INSUFFLATION NEEDLE

## (undated) DEVICE — TRUE CONTENT TO BE POPULATED AS PART OF REBRANDING: Brand: ARGYLE

## (undated) DEVICE — ENDOPATH ECHELON ENDOSCOPIC LINEAR CUTTER RELOADS, BLUE, 60MM: Brand: ECHELON ENDOPATH

## (undated) DEVICE — INTENDED FOR TISSUE SEPARATION, AND OTHER PROCEDURES THAT REQUIRE A SHARP SURGICAL BLADE TO PUNCTURE OR CUT.: Brand: BARD-PARKER ® CARBON RIB-BACK BLADES

## (undated) DEVICE — NEURO PATTIES 1/2 X 3

## (undated) DEVICE — WEBRIL 6 IN UNSTERILE

## (undated) DEVICE — DENTAL PACK: Brand: CARDINAL HEALTH

## (undated) DEVICE — ENDO STITCH 2-0 SURGIDAC 48 IN

## (undated) DEVICE — ENDO STITCH 2-0 VICRYL

## (undated) DEVICE — SHEARS: Brand: ENDO MINI-SHEARS

## (undated) DEVICE — SYRINGE EPI 8ML LUER SLIP LOSS OF RESISTANCE PLASTIC PERFIX

## (undated) DEVICE — SYRINGE 5ML LL

## (undated) DEVICE — TRAY PAIN SUPPORT

## (undated) DEVICE — GLOVE SRG LF STRL BGL SKNSNS 7.5 PF

## (undated) DEVICE — [HIGH FLOW INSUFFLATOR,  DO NOT USE IF PACKAGE IS DAMAGED,  KEEP DRY,  KEEP AWAY FROM SUNLIGHT,  PROTECT FROM HEAT AND RADIOACTIVE SOURCES.]: Brand: PNEUMOSURE

## (undated) DEVICE — KIT, GASTRIC BYPASS: Brand: CARDINAL HEALTH

## (undated) DEVICE — ECHELON 3000 60MM STANDARD: Brand: ECHELON

## (undated) DEVICE — SUT PLAIN 4-0 SC-1 18 IN 1828H

## (undated) DEVICE — CHLORAPREP APPLICATOR TINTED 10.5ML ONE-STEP

## (undated) DEVICE — SUT MONOCRYL 4-0 PS-2 27 IN Y426H

## (undated) DEVICE — ENDOPATH ECHELON ENDOSCOPIC LINEAR CUTTER RELOADS, WHITE, 60MM: Brand: ECHELON ENDOPATH

## (undated) DEVICE — HARMONIC 1100 SHEARS, 36CM SHAFT LENGTH: Brand: HARMONIC

## (undated) DEVICE — GLOVE INDICATOR PI UNDERGLOVE SZ 7 BLUE

## (undated) DEVICE — TOWEL SET X-RAY

## (undated) DEVICE — 34" SINGLE PATIENT USE HOVERMATT: Brand: SINGLE PATIENT USE HOVERMATT

## (undated) DEVICE — ADHESIVE SKIN HIGH VISCOSITY EXOFIN 1ML

## (undated) DEVICE — X-RAY DETECTABLE SPONGES,16 PLY: Brand: VISTEC

## (undated) DEVICE — SUT PROLENE 4-0 PS-2 18 IN 8682G

## (undated) DEVICE — GLOVE SRG BIOGEL 7.5

## (undated) DEVICE — GLOVE SRG BIOGEL 8

## (undated) DEVICE — WAND COBLATION REFLEX ULTRA 45

## (undated) DEVICE — LIGHT GLOVE GREEN

## (undated) DEVICE — SUT VICRYL 0 18 IN J906G